# Patient Record
Sex: MALE | Race: WHITE | Employment: OTHER | URBAN - METROPOLITAN AREA
[De-identification: names, ages, dates, MRNs, and addresses within clinical notes are randomized per-mention and may not be internally consistent; named-entity substitution may affect disease eponyms.]

---

## 2017-07-26 ENCOUNTER — TRANSFERRED RECORDS (OUTPATIENT)
Dept: HEALTH INFORMATION MANAGEMENT | Facility: CLINIC | Age: 42
End: 2017-07-26

## 2017-08-01 ENCOUNTER — TRANSFERRED RECORDS (OUTPATIENT)
Dept: HEALTH INFORMATION MANAGEMENT | Facility: CLINIC | Age: 42
End: 2017-08-01

## 2017-08-11 ENCOUNTER — PRE VISIT (OUTPATIENT)
Dept: UROLOGY | Facility: CLINIC | Age: 42
End: 2017-08-11

## 2017-09-18 ENCOUNTER — PRE VISIT (OUTPATIENT)
Dept: UROLOGY | Facility: CLINIC | Age: 42
End: 2017-09-18

## 2017-09-22 ENCOUNTER — OFFICE VISIT (OUTPATIENT)
Dept: UROLOGY | Facility: CLINIC | Age: 42
End: 2017-09-22

## 2017-09-22 VITALS
WEIGHT: 195 LBS | HEART RATE: 92 BPM | SYSTOLIC BLOOD PRESSURE: 120 MMHG | BODY MASS INDEX: 28.78 KG/M2 | DIASTOLIC BLOOD PRESSURE: 78 MMHG

## 2017-09-22 DIAGNOSIS — N20.0 KIDNEY STONES: Primary | ICD-10-CM

## 2017-09-22 ASSESSMENT — PAIN SCALES - GENERAL: PAINLEVEL: NO PAIN (0)

## 2017-09-22 NOTE — PATIENT INSTRUCTIONS
Follow up with Dr. Horn in 4-6 weeks with imaging prior to appointment.    It was a pleasure meeting with you today.  Thank you for allowing me and my team the privilege of caring for you today.  YOU are the reason we are here, and I truly hope we provided you with the excellent service you deserve.  Please let us know if there is anything else we can do for you so that we can be sure you are leaving completely satisfied with your care experience.      MAIRA Leon

## 2017-09-22 NOTE — LETTER
9/22/2017       RE: Maicol Carrera  [address]  [facility] [address]     Dear Colleague,    Thank you for referring your patient, Maicol Carrera, to the [facility] AND [facility] FOR PROSTATE AND UROLOGIC CANCERS at [facility]. Please see a copy of my visit note below.    ASSESSMENT and PLAN     Renal stone:  Patient had renal US in 8/2017. Plan to get a CT scan today and follow-up in 4-6 weeks to discuss results.     ____________________________________________________________________    CHIEF COMPLAINT  It was my pleasure to see Maicol Carrera who is a 42 year old male here for evaluation of kidney stones.    HPI  He has a history of percutaneous nephrolithotomy in 2011. In the past he has been seen by Dr. Carrizales.     RADIOLOGIC IMAGING      I reviewed the recent radiologic imaging and reports described above.      Patient Active Problem List    Diagnosis Date Noted     Sepsis with multiple organ dysfunction (MOD) (H) 10/24/2011     Priority: Medium     Anemia due to blood loss, acute 10/24/2011     Priority: Medium     Postsurgical nonabsorption 10/24/2011     Priority: Medium     Problem list name updated by automated process. Provider to review       Quadriplegia, post-traumatic (H) 10/24/2011     Priority: Medium     Renal hemorrhage, left 10/24/2011     Priority: Medium     Kidney stones 08/09/2011     Priority: Medium     Past Medical History:   Diagnosis Date     Acid reflux      Constipation, chronic      Head injury      Neurogenic bladder     SP catheter     Quadriplegia (H)      Renal disease      Seizure (H)      Spastic quadriplegia (H)      Urinary tract infection      Past Surgical History:   Procedure Laterality Date     APPENDECTOMY OPEN       BACK SURGERY       INSERT PUMP BACLOFEN       LASER HOLMIUM NEPHROLITHOTOMY VIA PERCUTANEOUS NEPHROSTOMY  10/19/2011    Procedure:LASER HOLMIUM NEPHROLITHOTOMY VIA PERCUTANEOUS NEPHROSTOMY; Left Ureteral Stent Placement,  Left Percutaneous Access, Left Percutaneous Nephrostomy  *Latex Allergy*  ; Surgeon:YAN ADDISON; Location:[facility]     ORTHOPEDIC SURGERY       supra pubic catheter       Current Outpatient Prescriptions   Medication Sig Dispense Refill     acetaminophen (TYLENOL) 160 MG/5ML oral liquid 20.3 mLs by Per Feeding Tube route every 6 hours as needed. 120 mL      albuterol (2.5 MG/3ML) 0.083% nebulizer solution Take 3 mLs by nebulization every 2 hours as needed. 1 Box      bisacodyl (DULCOLAX) 10 MG suppository Place 1 suppository rectally every other day. 12 suppository      Cranberry 300 MG TABS Take 300 mg by mouth 2 times daily.       cyanocolbalamin (VITAMIN B-12) 1000 MCG tablet Take 1 tablet by mouth daily.       ferrous sulfate 325 (65 FE) MG tablet Take 1 tablet by mouth daily (with breakfast). 100 tablet      fluconazole (DIFLUCAN) 100 MG tablet Take  by mouth daily. two tablets on first day, then one tablet daily for 2 weeks 15 tablet      levetiracetam (KEPPRA XR) 500 MG 24 hr tablet Take 2 tablets by mouth 2 times daily.       magnesium gluconate (MAGONATE) 500 MG tablet Take 1 tablet by mouth daily.       miconazole (MICATIN; MICRO GUARD) 2 % powder Apply  topically 2 times daily. 70 g      ondansetron (ZOFRAN-ODT) 4 MG disintegrating tablet Take 1 tablet by mouth every 6 hours as needed for nausea. 20 tablet      oxybutynin (DITROPAN) 5 MG tablet Take 1 tablet by mouth 3 times daily. 270 tablet      ranitidine (ZANTAC) 150 MG tablet Take 1 tablet by mouth At Bedtime. 180 tablet      senna-docusate (SENOKOT-S;PERICOLACE) 8.6-50 MG per tablet Take 1-2 tablets by mouth 2 times daily. 60 tablet      vitamin C 250 MG TABS Take 1 tablet by mouth 2 times daily.       zinc oxide (DESITIN) 40 % ointment Apply  topically every hour as needed. 56.7 g      zolpidem (AMBIEN) 5 MG tablet Take 1 tablet by mouth nightly as needed for sleep. 30 tablet      baclofen (LIORESAL) 10 MG tablet Take 10 mg by mouth 4 times  daily.       MAGNESIUM CITRATE PO Take  by mouth every 3 days. PRN       sodium PHOSphate 66 mL, mineral oil 60 mL, docusate 100 mL, Magnesium Citrate 60 mL PINK LADY enema Place 268 mLs rectally once.  mL      docusate sodium (COLACE) 100 MG capsule Take 100 mg by mouth 2 times daily.       Bisacodyl (DULCOLAX PO) Take 10 mg by mouth At Bedtime.       BACLOFEN  mg by Intrathecal route daily. Via pump         Nutritional Supplements (OSTEO-MULTIVITAMINS/MINERALS OR) Take  by mouth daily. 12 pm         Sorbitol 70 % SOLN Take 30 mLs by mouth At Bedtime.        Allergies   Allergen Reactions     Latex Rash     Latex Rash     No family history on file.   Social History     Occupational History     Not on file.     Social History Main Topics     Smoking status: Former Smoker     Packs/day: 0.30     Years: 5.00     Types: Cigarettes     Quit date: 9/23/1994     Smokeless tobacco: Never Used     Alcohol use No     Drug use: No     Sexual activity: Not on file       REVIEW OF SYSTEMS  The following systems were evaluated:   Constitutional, Eyes, Ears/Nose/Throat, Respiratory, Cardiovascular, Gastrointestinal, Genitourinary, Musculoskeletal, Skin/Integument, Neurologic, Psychiatric, Hematologic/Lymphatic, Allergic/Immunologic, Endocrine.  The only pertinent positives were as follows:  There are no additional symptoms other than noted in HPI     PHYSICAL EXAM  Vitals:    09/22/17 1429   BP: 120/78   Pulse: 92   Weight: 88.5 kg (195 lb)     Wt Readings from Last 3 Encounters:   09/22/17 88.5 kg (195 lb)   11/05/11 87.7 kg (193 lb 5.5 oz)     Constitutional: Alert, no acute distress  Psychiatric: Normal mood and affect  Head: Normocephalic.  Neck: Neck supple. No adenopathy. Thyroid symmetric, normal size  ENT: Oropharynx clear.  Back: No spinal tenderness.  No costovertebral angle tenderness  Cardiovascular: RRR. No murmurs, clicks gallops or rub  Respiratory: Good diaphragmatic excursion. Lungs  clear  Gastrointestinal: Abdomen soft, non-tender. No masses, organomegaly. No hernia  Skin: no suspicious lesions or rashes on abdomen  Extremities: No lower extremity edema.  No clubbing or cyanosis.  Neurologic: Cranial nerves grossly intact.  Equal strength and sensation on bilateral extremities.   : Deferred     Recent Labs   Lab Test  12/29/11   1500  11/29/11   1230  11/07/11   0633  11/06/11   1530   WBC  8.1  6.0  10.6  9.3   HGB  13.8  11.6*  9.2*  8.7*   HCT  44.1  38.2*  29.6*  28.6*   PLT  237  279  437  458*     Recent Labs   Lab Test  12/29/11   1500  11/29/11   1230  11/07/11   0633  11/06/11   1530   NA  142  144  140  140   POTASSIUM  4.0  4.1  4.7  4.9   CHLORIDE  103  104  104  104   CO2  29  29  29  30   ANIONGAP  10.2  10.8  7.1  6.4   GLC  125*  71  91  137*   BUN  13  11  21  21   CR  0.60*  0.70  1.00  1.10   GFRESTIMATED  >90  >90  86  77   GFRESTBLACK  >90  >90  >90  >90   BALAJI  9.2  9.2  9.1  9.0     Recent Labs   Lab Test  11/06/11   1530   COLOR  Yellow   APPEARANCE  Cloudy   URINEGLC  Negative   URINEBILI  Negative   URINEKETONE  Negative   SG  1.011   UBLD  Large*   URINEPH  6.5   PROTEIN  10*   NITRITE  Negative   LEUKEST  Large*   RBCU  124*   WBCU  >182*       I, Lio Horn, reviewed these laboratory values.        Scribe Disclosure:   IViky, am serving as a scribe; to document services personally performed by Lio Horn MD -based on data collection and the provider's statements to me.     Provider Disclosure:  I agree with above History, Review of Systems, Physical exam and Plan.  I have reviewed the content of the documentation and have edited it as needed. I have personally performed the services documented here and the ocumentation accurately represents those services and the decisions I have made.      Electronically signed by:  Lio Horn MD      Patient Care Team:  Nancy Ag MD as PCP - General (Family Practice)  Dejon Lam MD  as Referring Physician  Mike Horn MD as MD (Urology)  Priscilla Wong, RN as Registered Nurse  MIKE GHOTRA    Copy to patient  GABRIELA CARRANZA  [address] [facility] [address]

## 2017-09-22 NOTE — NURSING NOTE
Chief Complaint   Patient presents with     Consult     New patient consult for kidney stones.       Nadira Hare

## 2017-09-22 NOTE — PROGRESS NOTES
ASSESSMENT and PLAN     Renal stone:  Patient had renal US in 8/2017. Plan to get a CT scan today and follow-up in 4-6 weeks to discuss results.     ____________________________________________________________________    CHIEF COMPLAINT  It was my pleasure to see Maicol Carrera who is a 42 year old male here for evaluation of kidney stones.    HPI  He has a history of percutaneous nephrolithotomy in 2011. In the past he has been seen by Dr. Addison.     RADIOLOGIC IMAGING      I reviewed the recent radiologic imaging and reports described above.      Patient Active Problem List    Diagnosis Date Noted     Sepsis with multiple organ dysfunction (MOD) (H) 10/24/2011     Priority: Medium     Anemia due to blood loss, acute 10/24/2011     Priority: Medium     Postsurgical nonabsorption 10/24/2011     Priority: Medium     Problem list name updated by automated process. Provider to review       Quadriplegia, post-traumatic (H) 10/24/2011     Priority: Medium     Renal hemorrhage, left 10/24/2011     Priority: Medium     Kidney stones 08/09/2011     Priority: Medium     Past Medical History:   Diagnosis Date     Acid reflux      Constipation, chronic      Head injury      Neurogenic bladder     SP catheter     Quadriplegia (H)      Renal disease      Seizure (H)      Spastic quadriplegia (H)      Urinary tract infection      Past Surgical History:   Procedure Laterality Date     APPENDECTOMY OPEN       BACK SURGERY       INSERT PUMP BACLOFEN       LASER HOLMIUM NEPHROLITHOTOMY VIA PERCUTANEOUS NEPHROSTOMY  10/19/2011    Procedure:LASER HOLMIUM NEPHROLITHOTOMY VIA PERCUTANEOUS NEPHROSTOMY; Left Ureteral Stent Placement, Left Percutaneous Access, Left Percutaneous Nephrostomy  *Latex Allergy*  ; Surgeon:YAN ADDISON; Location[facility]     ORTHOPEDIC SURGERY       supra pubic catheter       Current Outpatient Prescriptions   Medication Sig Dispense Refill     acetaminophen (TYLENOL) 160 MG/5ML oral liquid 20.3 mLs by Per  Feeding Tube route every 6 hours as needed. 120 mL      albuterol (2.5 MG/3ML) 0.083% nebulizer solution Take 3 mLs by nebulization every 2 hours as needed. 1 Box      bisacodyl (DULCOLAX) 10 MG suppository Place 1 suppository rectally every other day. 12 suppository      Cranberry 300 MG TABS Take 300 mg by mouth 2 times daily.       cyanocolbalamin (VITAMIN B-12) 1000 MCG tablet Take 1 tablet by mouth daily.       ferrous sulfate 325 (65 FE) MG tablet Take 1 tablet by mouth daily (with breakfast). 100 tablet      fluconazole (DIFLUCAN) 100 MG tablet Take  by mouth daily. two tablets on first day, then one tablet daily for 2 weeks 15 tablet      levetiracetam (KEPPRA XR) 500 MG 24 hr tablet Take 2 tablets by mouth 2 times daily.       magnesium gluconate (MAGONATE) 500 MG tablet Take 1 tablet by mouth daily.       miconazole (MICATIN; MICRO GUARD) 2 % powder Apply  topically 2 times daily. 70 g      ondansetron (ZOFRAN-ODT) 4 MG disintegrating tablet Take 1 tablet by mouth every 6 hours as needed for nausea. 20 tablet      oxybutynin (DITROPAN) 5 MG tablet Take 1 tablet by mouth 3 times daily. 270 tablet      ranitidine (ZANTAC) 150 MG tablet Take 1 tablet by mouth At Bedtime. 180 tablet      senna-docusate (SENOKOT-S;PERICOLACE) 8.6-50 MG per tablet Take 1-2 tablets by mouth 2 times daily. 60 tablet      vitamin C 250 MG TABS Take 1 tablet by mouth 2 times daily.       zinc oxide (DESITIN) 40 % ointment Apply  topically every hour as needed. 56.7 g      zolpidem (AMBIEN) 5 MG tablet Take 1 tablet by mouth nightly as needed for sleep. 30 tablet      baclofen (LIORESAL) 10 MG tablet Take 10 mg by mouth 4 times daily.       MAGNESIUM CITRATE PO Take  by mouth every 3 days. PRN       sodium PHOSphate 66 mL, mineral oil 60 mL, docusate 100 mL, Magnesium Citrate 60 mL PINK LADY enema Place 268 mLs rectally once.  mL      docusate sodium (COLACE) 100 MG capsule Take 100 mg by mouth 2 times daily.       Bisacodyl  (DULCOLAX PO) Take 10 mg by mouth At Bedtime.       BACLOFEN  mg by Intrathecal route daily. Via pump         Nutritional Supplements (OSTEO-MULTIVITAMINS/MINERALS OR) Take  by mouth daily. 12 pm         Sorbitol 70 % SOLN Take 30 mLs by mouth At Bedtime.        Allergies   Allergen Reactions     Latex Rash     Latex Rash     No family history on file.   Social History     Occupational History     Not on file.     Social History Main Topics     Smoking status: Former Smoker     Packs/day: 0.30     Years: 5.00     Types: Cigarettes     Quit date: 9/23/1994     Smokeless tobacco: Never Used     Alcohol use No     Drug use: No     Sexual activity: Not on file       REVIEW OF SYSTEMS  The following systems were evaluated:   Constitutional, Eyes, Ears/Nose/Throat, Respiratory, Cardiovascular, Gastrointestinal, Genitourinary, Musculoskeletal, Skin/Integument, Neurologic, Psychiatric, Hematologic/Lymphatic, Allergic/Immunologic, Endocrine.  The only pertinent positives were as follows:  There are no additional symptoms other than noted in HPI     PHYSICAL EXAM  Vitals:    09/22/17 1429   BP: 120/78   Pulse: 92   Weight: 88.5 kg (195 lb)     Wt Readings from Last 3 Encounters:   09/22/17 88.5 kg (195 lb)   11/05/11 87.7 kg (193 lb 5.5 oz)     Constitutional: Alert, no acute distress  Psychiatric: Normal mood and affect  Head: Normocephalic.  Neck: Neck supple. No adenopathy. Thyroid symmetric, normal size  ENT: Oropharynx clear.  Back: No spinal tenderness.  No costovertebral angle tenderness  Cardiovascular: RRR. No murmurs, clicks gallops or rub  Respiratory: Good diaphragmatic excursion. Lungs clear  Gastrointestinal: Abdomen soft, non-tender. No masses, organomegaly. No hernia  Skin: no suspicious lesions or rashes on abdomen  Extremities: No lower extremity edema.  No clubbing or cyanosis.  Neurologic: Cranial nerves grossly intact.  Equal strength and sensation on bilateral extremities.   : Deferred     Recent  Labs   Lab Test  12/29/11   1500  11/29/11   1230  11/07/11   0633  11/06/11   1530   WBC  8.1  6.0  10.6  9.3   HGB  13.8  11.6*  9.2*  8.7*   HCT  44.1  38.2*  29.6*  28.6*   PLT  237  279  437  458*     Recent Labs   Lab Test  12/29/11   1500  11/29/11   1230  11/07/11   0633  11/06/11   1530   NA  142  144  140  140   POTASSIUM  4.0  4.1  4.7  4.9   CHLORIDE  103  104  104  104   CO2  29  29  29  30   ANIONGAP  10.2  10.8  7.1  6.4   GLC  125*  71  91  137*   BUN  13  11  21  21   CR  0.60*  0.70  1.00  1.10   GFRESTIMATED  >90  >90  86  77   GFRESTBLACK  >90  >90  >90  >90   BALAJI  9.2  9.2  9.1  9.0     Recent Labs   Lab Test  11/06/11   1530   COLOR  Yellow   APPEARANCE  Cloudy   URINEGLC  Negative   URINEBILI  Negative   URINEKETONE  Negative   SG  1.011   UBLD  Large*   URINEPH  6.5   PROTEIN  10*   NITRITE  Negative   LEUKEST  Large*   RBCU  124*   WBCU  >182*       I, Lio Horn, reviewed these laboratory values.        Scribe Disclosure:   I, Viky Parker, am serving as a scribe; to document services personally performed by Lio Horn MD -based on data collection and the provider's statements to me.     Provider Disclosure:  I agree with above History, Review of Systems, Physical exam and Plan.  I have reviewed the content of the documentation and have edited it as needed. I have personally performed the services documented here and the ocumentation accurately represents those services and the decisions I have made.      Electronically signed by:  Lio Horn MD    CC  Patient Care Team:  Nancy Ag MD as PCP - General (Family Practice)  Mike Ghotra MD as Referring Physician  Mike Horn MD as MD (Urology)  Priscilla Wong, RN as Registered Nurse  MIKE GHOTRA    Copy to patient  GABRIELA CARRANZA  [facility] [facility] [address]

## 2017-09-22 NOTE — MR AVS SNAPSHOT
After Visit Summary   9/22/2017    Maicol Carrera    MRN: [id number]           Patient Information     Date Of Birth          1975        Visit Information        Provider Department      9/22/2017 2:40 PM Dejon Horn MD [facility] and [facility]        Today's Diagnoses     Kidney stones    -  1      Care Instructions    Follow up with Dr. Horn in 4-6 weeks with imaging prior to appointment.    It was a pleasure meeting with you today.  Thank you for allowing me and my team the privilege of caring for you today.  YOU are the reason we are here, and I truly hope we provided you with the excellent service you deserve.  Please let us know if there is anything else we can do for you so that we can be sure you are leaving completely satisfied with your care experience.      MAIRA Leon          Follow-ups after your visit        Your next 10 appointments already scheduled     Oct 20, 2017 10:00 AM CDT   CT ABDOMEN PELVIS W/O CONTRAST with UCCT2   [facility] ([facility])    [address] [address] [phone]           Please bring any scans or X-rays taken at other hospitals, if similar tests were done. Also bring a list of your medicines, including vitamins, minerals and over-the-counter drugs. It is safest to leave personal items at home.  Be sure to tell your doctor:   If you have any allergies.   If there s any chance you are pregnant.   If you are breastfeeding.   If you have any special needs.  You may have contrast for this exam. To prepare:   Do not eat or drink for 2 hours before your exam. If you need to take medicine, you may take it with small sips of water. (We may ask you to take liquid medicine as well.)   The day before your exam, drink extra fluids at least six 8-ounce glasses (unless your doctor tells you to restrict your fluids).  Patients over 70 or  patients with diabetes or kidney problems:   If you haven t had a blood test (creatinine test) within the last 30 days, go to your clinic or Diagnostic Imaging Department for this test.  If you have diabetes:   If your kidney function is normal, continue taking your metformin (Avandamet, Glucophage, Glucovance, Metaglip) on the day of your exam.   If your kidney function is abnormal, wait 48 hours before restarting this medicine.  You will have oral contrast for this exam:   You will drink the contrast at home. Get this from your clinic or Diagnostic Imaging Department. Please follow the directions given.  Please wear loose clothing, such as a sweat suit or jogging clothes. Avoid snaps, zippers and other metal. We may ask you to undress and put on a hospital gown.  If you have any questions, please call the Imaging Department where you will have your exam.            Oct 20, 2017 11:00 AM CDT   (Arrive by 10:45 AM)   Return Renal Calculi with Dejon Horn MD   [facility] and [facility] [[email or web address]] for Prostate and Urologic Cancers ([facility])    [address] [place] [address] [phone]              Future tests that were ordered for you today     Open Future Orders        Priority Expected Expires Ordered    CT Abdomen Pelvis w/o Contrast Routine  9/21/2018 9/22/2017            Who to contact     Please call your clinic at [phone] to:    Ask questions about your health    Make or cancel appointments    Discuss your medicines    Learn about your test results    Speak to your doctor   If you have compliments or concerns about an experience at your clinic, or if you wish to file a complaint, please contact [facility] Patient Relations at [phone] or email us at [email or web address]         Additional Information About Your Visit        [employer]Noise Information     [employer] is an electronic gateway that provides easy, online  access to your medical records. With [employer], you can request a clinic appointment, read your test results, renew a prescription or communicate with your care team.     To sign up for [employer] visit the website at [email or web address]   You will be asked to enter the access code listed below, as well as some personal information. Please follow the directions to create your username and password.     Your access code is: [email or web address]  Expires: 2017  6:30 AM     Your access code will  in 90 days. If you need help or a new code, please contact your [facility] or call [phone] for assistance.        Care EveryWhere ID     This is your Care EveryWhere ID. This could be used by other organizations to access your [place] medical records  [id number]        Your Vitals Were     Pulse BMI (Body Mass Index)                92 28.78 kg/m2           Blood Pressure from Last 3 Encounters:   17 120/78   12 123/71   12 110/68    Weight from Last 3 Encounters:   17 88.5 kg (195 lb)   11 87.7 kg (193 lb 5.5 oz)               Primary Care Provider Office Phone # Fax #    Nancy Ag [phone]       [address] [place] [id number]        Equal Access to Services     AUSTIN VIEIRA AH: Hadii trupti ku hadasho Sonavneet, waaxda luqadaha, qaybta kaalmada adenadeemyada, kathy phillips . [facility] [phone].    ATENCIÓN: Si habla español, tiene a da silva disposición servicios gratuitos de asistencia lingüística. Llame al [phone].    We comply with applicable federal civil rights laws and [place] laws. We do not discriminate on the basis of race, color, national origin, age, disability sex, sexual orientation or gender identity.            Thank you!     Thank you for choosing [facility] UROLOGY AND [facility] PROSTATE [facility] UROLOGIC CANCERS  for your care. Our goal is always to provide you with excellent care.  Hearing back from our patients is one way we can continue to improve our services. Please take a few minutes to complete the written survey that you may receive in the mail after your visit with us. Thank you!             Your Updated Medication List - Protect others around you: Learn how to safely use, store and throw away your medicines at www.disposemymeds.org.          This list is accurate as of: 9/22/17  3:41 PM.  Always use your most recent med list.                   Brand Name Dispense Instructions for use Diagnosis    acetaminophen 32 mg/mL solution    TYLENOL    120 mL    20.3 mLs by Per Feeding Tube route every 6 hours as needed.    Abdominal pain       albuterol (2.5 MG/3ML) 0.083% neb solution     1 Box    Take 3 mLs by nebulization every 2 hours as needed.    SOB (shortness of breath)       ascorbic acid 250 MG Tabs tablet      Take 1 tablet by mouth 2 times daily.    Recurrent UTI       * baclofen 10 MG tablet    LIORESAL     Take 10 mg by mouth 4 times daily.        * BACLOFEN IT      240 mg by Intrathecal route daily. Via pump        COLACE 100 MG capsule   Generic drug:  docusate sodium      Take 100 mg by mouth 2 times daily.        Cranberry 300 MG Tabs      Take 300 mg by mouth 2 times daily.    UTI (urinary tract infection)       cyanocobalamin 1000 MCG tablet    vitamin  B-12     Take 1 tablet by mouth daily.    Vitamin B 12 deficiency       * bisacodyl 10 MG Suppository    DULCOLAX    12 suppository    Place 1 suppository rectally every other day.    Chronic constipation       * DULCOLAX PO      Take 10 mg by mouth At Bedtime.        ferrous sulfate 325 (65 FE) MG tablet    IRON    100 tablet    Take 1 tablet by mouth daily (with breakfast).    Anemia due to blood loss, acute       fluconazole 100 MG tablet    DIFLUCAN    15 tablet    Take  by mouth daily. two tablets on first day, then one tablet daily for 2 weeks    Candida UTI       levETIRAcetam 500 MG 24 hr tablet    KEPPRA XR     Take  2 tablets by mouth 2 times daily.    Seizure disorder (H)       MAGNESIUM CITRATE PO      Take  by mouth every 3 days. PRN        magnesium gluconate 500 MG tablet    MAGONATE     Take 1 tablet by mouth daily.    Magnesium deficiency       miconazole 2 % powder    MICATIN; MICRO GUARD    70 g    Apply  topically 2 times daily.    Candida infection of flexural skin       ondansetron 4 MG ODT tab    ZOFRAN-ODT    20 tablet    Take 1 tablet by mouth every 6 hours as needed for nausea.    Nausea and vomiting       OSTEO-MULTIVITAMINS/MINERALS OR      Take  by mouth daily. 12 pm        oxybutynin 5 MG tablet    DITROPAN    270 tablet    Take 1 tablet by mouth 3 times daily.    Neurogenic bladder       ranitidine 150 MG tablet    ZANTAC    180 tablet    Take 1 tablet by mouth At Bedtime.    GERD (gastroesophageal reflux disease)       senna-docusate 8.6-50 MG per tablet    SENOKOT-S;PERICOLACE    60 tablet    Take 1-2 tablets by mouth 2 times daily.    Chronic constipation       sodium PHOSphate 66 mL, mineral oil 60 mL, docusate 100 mL, Magnesium Citrate 60 mL PINK LADY enema     286 mL    Place 268 mLs rectally once. PRN        sorbitol 70 % Soln      Take 30 mLs by mouth At Bedtime.        zinc oxide 40 % ointment    DESITIN    56.7 g    Apply  topically every hour as needed.    Breakdown of skin tissue       zolpidem 5 MG tablet    AMBIEN    30 tablet    Take 1 tablet by mouth nightly as needed for sleep.    Insomnia       * Notice:  This list has 4 medication(s) that are the same as other medications prescribed for you. Read the directions carefully, and ask your doctor or other care provider to review them with you.

## 2017-10-16 ENCOUNTER — PRE VISIT (OUTPATIENT)
Dept: UROLOGY | Facility: CLINIC | Age: 42
End: 2017-10-16

## 2017-10-16 NOTE — TELEPHONE ENCOUNTER
Stone follow up with imaging prior.  Records available in [facility].

## 2017-10-20 ENCOUNTER — ALLIED HEALTH/NURSE VISIT (OUTPATIENT)
Dept: UROLOGY | Facility: CLINIC | Age: 42
End: 2017-10-20

## 2017-10-20 ENCOUNTER — OFFICE VISIT (OUTPATIENT)
Dept: UROLOGY | Facility: CLINIC | Age: 42
End: 2017-10-20

## 2017-10-20 VITALS
DIASTOLIC BLOOD PRESSURE: 73 MMHG | WEIGHT: 196 LBS | HEIGHT: 69 IN | SYSTOLIC BLOOD PRESSURE: 111 MMHG | BODY MASS INDEX: 29.03 KG/M2 | HEART RATE: 93 BPM

## 2017-10-20 DIAGNOSIS — N20.0 CALCULUS OF KIDNEY: Primary | ICD-10-CM

## 2017-10-20 DIAGNOSIS — N20.0 CALCULUS OF KIDNEY: ICD-10-CM

## 2017-10-20 LAB
ALBUMIN UR-MCNC: 100 MG/DL
APPEARANCE UR: ABNORMAL
BACTERIA #/AREA URNS HPF: ABNORMAL /HPF
BILIRUB UR QL STRIP: NEGATIVE
COLOR UR AUTO: ABNORMAL
GLUCOSE UR STRIP-MCNC: NEGATIVE MG/DL
HGB UR QL STRIP: ABNORMAL
KETONES UR STRIP-MCNC: NEGATIVE MG/DL
LEUKOCYTE ESTERASE UR QL STRIP: ABNORMAL
NITRATE UR QL: POSITIVE
PH UR STRIP: 8 PH (ref 5–7)
RBC #/AREA URNS AUTO: 58 /HPF (ref 0–2)
SOURCE: ABNORMAL
SP GR UR STRIP: 1 (ref 1–1.03)
UROBILINOGEN UR STRIP-MCNC: 0 MG/DL (ref 0–2)
WBC #/AREA URNS AUTO: >182 /HPF (ref 0–2)
WBC CLUMPS #/AREA URNS HPF: PRESENT /HPF

## 2017-10-20 RX ORDER — CEFAZOLIN SODIUM 1 G/3ML
1 INJECTION, POWDER, FOR SOLUTION INTRAMUSCULAR; INTRAVENOUS SEE ADMIN INSTRUCTIONS
Status: CANCELLED | OUTPATIENT
Start: 2017-10-20

## 2017-10-20 RX ORDER — ACETAMINOPHEN 325 MG/1
975 TABLET ORAL ONCE
Status: CANCELLED | OUTPATIENT
Start: 2017-10-20 | End: 2017-10-20

## 2017-10-20 ASSESSMENT — PAIN SCALES - GENERAL: PAINLEVEL: NO PAIN (0)

## 2017-10-20 NOTE — PROGRESS NOTES
ASSESSMENT AND PLAN    Bilateral staghorn calculi    During counseling for this visit, we covered the natural history of kidney stones, the risk of progression to symptomatic pain/infection, and the possibility of renal failure/kidney damage.  We covered treatment options including observation, ureteroscopy, extracorporeal shock wave lithotripsy (ESWL), and percutaneous nephrolithotomy (PCNL).  We covered surgical risks which include but are not limited to heart attack, stroke, blood clot in the legs or lungs, death, injury to surrounding organs (intestine, liver, spleen, pancreas, lung, muscles, nerves), loss of sensation around incisions, decreased renal function, infection, injury to ureter, injury to bladder, and urethral strictures.  Additional procedures may be necessary in the perioperative period.  We discussed the risks of blood transfusion including HIV and hepatitis.   There are other additional unexpected complications which may occur. Patient would like to proceed with left PCNL. I do not see a need to repeat the NM Renogram at this time.    -Urinalysis with micro reflex culture  -IR to place nephrostomy tube and drain 1 day prior to surgery  -Antibiotics 7 days before surgery  -Will plan left PCNL  _________________________________________________________________________    CHIEF COMPLAINT  It was my pleasure to see Maicol Carrera who is a 42 year old quadriplegic (SCI) male who is here for follow-up for kidney stones.    HPI  Maicol Carrera is a 42 year old male with a previous history of bilateral renal stones. 5 years ago had a similar large stone burden. NM renal scan at the time revealed 9% function of his right kidney. He underwent PCNL of the left kidney and was complicated by septic shock.    Now presents with bilateral complete staghorn calculi according to CT on 10/20/17. Denies any flank pain, fevers, nausea, vomiting.      RADIOLOGIC IMAGING  10/20/17 CT Abdomen Pelvis w/o contrast  1.  Bilateral staghorn calculi with inflammatory changes surrounding  the renal pelvises and proximal ureters bilaterally, left greater than  right, concerning for infection. 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  2. New soft tissue density at the mid left ureter, may be infectious  or inflammatory, consider direct visualization.  3. Diffuse bladder wall thickening with inflammatory stranding  surrounding the bladder, prostate and rectum, not significantly  changed since 1/25/2012 study. May be sequela of chronic cystitis and  prostatitis, although superimposed acute infection is not excluded.  Correlate clinically.  4. Cholelithiasis.     I reviewed the recent radiologic imaging and reports described above.      Patient Active Problem List    Diagnosis Date Noted     Sepsis with multiple organ dysfunction (MOD) (H) 10/24/2011     Priority: Medium     Anemia due to blood loss, acute 10/24/2011     Priority: Medium     Postsurgical nonabsorption 10/24/2011     Priority: Medium     Problem list name updated by automated process. Provider to review       Quadriplegia, post-traumatic (H) 10/24/2011     Priority: Medium     Renal hemorrhage, left 10/24/2011     Priority: Medium     Kidney stones 08/09/2011     Priority: Medium     Past Medical History:   Diagnosis Date     Acid reflux      Constipation, chronic      Head injury      Neurogenic bladder     SP catheter     Quadriplegia (H)      Renal disease      Seizure (H)      Spastic quadriplegia (H)      Urinary tract infection      Past Surgical History:   Procedure Laterality Date     APPENDECTOMY OPEN       BACK SURGERY       INSERT PUMP BACLOFEN       LASER HOLMIUM NEPHROLITHOTOMY VIA PERCUTANEOUS NEPHROSTOMY  10/19/2011    Procedure:LASER HOLMIUM NEPHROLITHOTOMY VIA PERCUTANEOUS NEPHROSTOMY; Left Ureteral Stent  Placement, Left Percutaneous Access, Left Percutaneous Nephrostomy  *Latex Allergy*  ; Surgeon:YAN ADDISON; Location:[facility] OR     ORTHOPEDIC SURGERY       supra pubic catheter       Current Outpatient Prescriptions   Medication Sig Dispense Refill     acetaminophen (TYLENOL) 160 MG/5ML oral liquid 20.3 mLs by Per Feeding Tube route every 6 hours as needed. 120 mL      albuterol (2.5 MG/3ML) 0.083% nebulizer solution Take 3 mLs by nebulization every 2 hours as needed. 1 Box      bisacodyl (DULCOLAX) 10 MG suppository Place 1 suppository rectally every other day. 12 suppository      Cranberry 300 MG TABS Take 300 mg by mouth 2 times daily.       cyanocolbalamin (VITAMIN B-12) 1000 MCG tablet Take 1 tablet by mouth daily.       ferrous sulfate 325 (65 FE) MG tablet Take 1 tablet by mouth daily (with breakfast). 100 tablet      fluconazole (DIFLUCAN) 100 MG tablet Take  by mouth daily. two tablets on first day, then one tablet daily for 2 weeks 15 tablet      levetiracetam (KEPPRA XR) 500 MG 24 hr tablet Take 2 tablets by mouth 2 times daily.       magnesium gluconate (MAGONATE) 500 MG tablet Take 1 tablet by mouth daily.       miconazole (MICATIN; MICRO GUARD) 2 % powder Apply  topically 2 times daily. 70 g      ondansetron (ZOFRAN-ODT) 4 MG disintegrating tablet Take 1 tablet by mouth every 6 hours as needed for nausea. 20 tablet      oxybutynin (DITROPAN) 5 MG tablet Take 1 tablet by mouth 3 times daily. 270 tablet      ranitidine (ZANTAC) 150 MG tablet Take 1 tablet by mouth At Bedtime. 180 tablet      senna-docusate (SENOKOT-S;PERICOLACE) 8.6-50 MG per tablet Take 1-2 tablets by mouth 2 times daily. 60 tablet      vitamin C 250 MG TABS Take 1 tablet by mouth 2 times daily.       zinc oxide (DESITIN) 40 % ointment Apply  topically every hour as needed. 56.7 g      zolpidem (AMBIEN) 5 MG tablet Take 1 tablet by mouth nightly as needed for sleep. 30 tablet      baclofen (LIORESAL) 10 MG tablet Take 10 mg by  "mouth 4 times daily.       MAGNESIUM CITRATE PO Take  by mouth every 3 days. PRN       sodium PHOSphate 66 mL, mineral oil 60 mL, docusate 100 mL, Magnesium Citrate 60 mL PINK LADY enema Place 268 mLs rectally once.  mL      docusate sodium (COLACE) 100 MG capsule Take 100 mg by mouth 2 times daily.       Bisacodyl (DULCOLAX PO) Take 10 mg by mouth At Bedtime.       BACLOFEN  mg by Intrathecal route daily. Via pump         Nutritional Supplements (OSTEO-MULTIVITAMINS/MINERALS OR) Take  by mouth daily. 12 pm         Sorbitol 70 % SOLN Take 30 mLs by mouth At Bedtime.        SOCIAL HISTORY: He  reports that he quit smoking about 23 years ago. His smoking use included Cigarettes. He has a 1.50 pack-year smoking history. He has never used smokeless tobacco. He reports that he does not drink alcohol or use illicit drugs.    PHYSICAL EXAM  /73  Pulse 93  Ht 1.753 m (5' 9\")  Wt 88.9 kg (196 lb)  BMI 28.94 kg/m2  Constitutional: Alert, no acute distress. In wheelchair.  Psychiatric: Normal mood and affect  Gastrointestinal: Abdomen soft, non-tender. No masses, organomegaly.       Lab Results   Component Value Date    BALAJI 9.2 12/29/2011    BALAJI 9.2 11/29/2011    BALAJI 9.1 11/07/2011       Recent Labs   Lab Test  12/29/11   1500  11/29/11   1230  11/07/11   0633  11/06/11   1530   NA  142  144  140  140   POTASSIUM  4.0  4.1  4.7  4.9   CHLORIDE  103  104  104  104   CO2  29 29 29 30   ANIONGAP  10.2  10.8  7.1  6.4   GLC  125*  71  91  137*   BUN  13  11  21  21   CR  0.60*  0.70  1.00  1.10   GFRESTIMATED  >90  >90  86  77   GFRESTBLACK  >90  >90  >90  >90   BALAJI  9.2  9.2  9.1  9.0        UA RESULTS:   Recent Labs   Lab Test  11/06/11   1530  10/22/11   1200  10/21/11   0630   SG  1.011  1.024  1.008   URINEPH  6.5  6.5  6.5   NITRITE  Negative  Negative  Negative   RBCU  124*  >182*  >182*   WBCU  >182*  >182*  >182*       As the medical student, I acted as a scribe for this note. All portions of the " documented history and physical were personally performed by Dr. JEREMIE Horn as the attending physician.     Rishabh Michele served as the scribe for this patient's visit and documented my history and physical exam.  I performed the history and physical exam.  I have edited and agree with the note.  JEREMIE Horn MD          Patient Care Team:  Nancy Ag MD as PCP - General (Family Practice)  Dejon Lam MD as Referring Physician  Dejon Horn MD as MD (Urology)  Priscilla Wong, RN as Registered Nurse  SELF, REFERRED    Copy to patient  GABRIELA CARRANZA  [address] [place] [id number]

## 2017-10-20 NOTE — PROGRESS NOTES
Pre Op Teaching Flowsheet       Pre and Post op Patient Education  Relevant Diagnosis:  Left kidney stone  Teaching Topic:  Pre and post op teaching for Left percutaneous nephrolithotomy, access to kidney, ureteroscopy, cystoscopy, stent placement  Person Involved in teaching:  Maicol Carrera      Motivation Level:  Asks Questions: Yes  Eager to Learn:  Yes  Cooperative: Yes  Receptive (willing/able to accept information):  Yes  Patient demonstrates understanding of the following:  Date and time of surgery:  12.20.17 at 0745  Location of surgery: [address] [place] [address]  History and Physical and any other testing necessary prior to surgery: Yes  Required time line for completion of History and Physical and any pre-op testing: Yes    PAC 12.6.17 at 0900    NPO Guidelines: NPO after midnight    Patient demonstrates understanding of the following:  Pre-op bowel prep: no, not needed  Pre-op showering/scrub information with Hibiclens Soap: Yes  Medications to take the day of surgery:  Per PCP  Blood thinner medications discussed and when to stop (if applicable):  Yes  Diabetes medication management (if applicable):  N/A  Discussed pain control after surgery: pain scale, pain medications and pain management techniques  Infection Prevention: Patient demonstrates understanding of the following:  Patient instructed on hand hygiene:  Yes  Surgical procedure site care taught: Yes  Signs and symptoms of infection taught:  Yes  Wound care will be taught at the time of discharge.  Central venous catheter care will be taught at the time of discharge (if applicable).    Post-op follow-up:  Discussed how to contact the hospital, nurse, and clinic scheduling staff if necessary.    Instructional materials used/given/mailed:  [place] Surgery Booklet, post op teaching sheet, Map, Soap, and arrival/location information.    Surgical instructions given to patient in clinic: Yes.    Instructional Materials given:  Before your surgery packet ,  Medications to avoid before surgery , Showering or Bathing instructions before surgery  and What to expect after surgery    Post-op appointment/testing scheduled per MD orders: Yes, 12.29.17 at 1020    Total time with patient: 10 minutes    Arlette Alvarez RN-BC, BSN  Urology Care Coordinator

## 2017-10-20 NOTE — MR AVS SNAPSHOT
After Visit Summary   10/20/2017    Maicol Carrera    MRN: [id number]           Patient Information     Date Of Birth          1975        Visit Information        Provider Department      10/20/2017 11:00 AM Dejon Horn MD [facility] Urology and [facility] for Prostate and Urologic Cancers        Today's Diagnoses     Calculus of kidney    -  1      Care Instructions    Urine testing today.    Schedule surgery with Dr. Horn.    It was a pleasure meeting with you today.  Thank you for allowing me and my team the privilege of caring for you today.  YOU are the reason we are here, and I truly hope we provided you with the excellent service you deserve.  Please let us know if there is anything else we can do for you so that we can be sure you are leaving completely satisfied with your care experience.      Lyndsay Sherwood SH[facility]          Follow-ups after your visit        Additional Services     PAC Visit Referral (For [facility] Only)       Does this visit require an Anesthesia consul  Quad                  Your next 10 appointments already scheduled     Dec 06, 2017  9:00 AM CST   (Arrive by 8:45 AM)   PAC EVALUATION with  Pac Domingo 1   [facility] Preoperative Assessment [place] ([facility])    [address] [place] [address]   [phone]            Dec 06, 2017 10:00 AM CST   (Arrive by 9:45 AM)   PAC RN ASSESSMENT with  Pac Rn   [facility] Assessment [place] ([facility])    [address] [place] [address]   [phone]            Dec 06, 2017 10:30 AM CST   (Arrive by 10:15 AM)   PAC Anesthesia Consult with  Pac Anesthesiologist   [facility] Preoperative Assessment [place] ([facility])    [address] [place] [address]   [phone]            Dec 20, 2017   Procedure with Dejon Horn MD   [facility], [place], Same Day Surgery  "(--)    [address] [address]   [phone]            Dec 29, 2017 10:20 AM CST   (Arrive by 10:05 AM)   Cystoscopy with Dejon Horn MD   [facility] Urology and [facility] for Prostate and Urologic Cancers ([facility])    [address] [facility]  [address] [facility] [address] [phone]              Who to contact     Please call your clinic at [phone] to:    Ask questions about your health    Make or cancel appointments    Discuss your medicines    Learn about your test results    Speak to your doctor   If you have compliments or concerns about an experience at your clinic, or if you wish to file a complaint, please contact [facility] Patient Relations at [phone] or email us at [email or web address] [place]         Additional Information About Your Visit        [employer] Information     benchee is an electronic gateway that provides easy, online access to your medical records. With benchee, you can request a clinic appointment, read your test results, renew a prescription or communicate with your care team.     To sign up for benchee visit the website at [email or web address]   You will be asked to enter the access code listed below, as well as some personal information. Please follow the directions to create your username and password.     Your access code is: [email or web address]  Expires: 2017  5:30 AM     Your access code will  in 90 days. If you need help or a new code, please contact your [facility] or call [phone] for assistance.        Care EveryWhere ID     This is your Care EveryWhere ID. This could be used by other organizations to access your [facility] medical records  [id number]        Your Vitals Were     Pulse Height BMI (Body Mass Index)             93 1.753 m (5' 9\") 28.94 kg/m2          Blood Pressure from Last 3 Encounters:   17 123/77   10/20/17 111/73 "   09/22/17 120/78    Weight from Last 3 Encounters:   11/13/17 89.4 kg (197 lb)   10/20/17 88.9 kg (196 lb)   09/22/17 88.5 kg (195 lb)              We Performed the Following     PAC Visit Referral (For [facility] Only)     Geetha-Operative Worksheet  (Urology General)     Urine Culture Aerobic Bacterial        Primary Care Provider    Nancy Ag MD       No address on file        Equal Access to Services     JUS VIEIRA : Hadii trupti vigil hadpatriciao Sonavneet, waaxda luqadaha, qaybta kaalmada adeegyada, kathy huynhjihansilvestre phillips . So [facility] [phone].    ATENCIÓN: Si habla español, tiene a da silva disposición servicios gratuitos de asistencia lingüística. Llame al [phone].    We comply with applicable federal civil rights laws and [place] laws. We do not discriminate on the basis of race, color, national origin, age, disability, sex, sexual orientation, or gender identity.            Thank you!     Thank you for choosing [facility] UROLOGY AND [place] FOR PROSTATE AND UROLOGIC CANCERS  for your care. Our goal is always to provide you with excellent care. Hearing back from our patients is one way we can continue to improve our services. Please take a few minutes to complete the written survey that you may receive in the mail after your visit with us. Thank you!             Your Updated Medication List - Protect others around you: Learn how to safely use, store and throw away your medicines at www.disposemymeds.org.          This list is accurate as of: 10/20/17 11:59 PM.  Always use your most recent med list.                   Brand Name Dispense Instructions for use Diagnosis    acetaminophen 32 mg/mL solution    TYLENOL    120 mL    20.3 mLs by Per Feeding Tube route every 6 hours as needed.    Abdominal pain       albuterol (2.5 MG/3ML) 0.083% neb solution     1 Box    Take 3 mLs by nebulization every 2 hours as needed.    SOB (shortness of breath)       ascorbic acid 250 MG Tabs tablet      Take 1  tablet by mouth 2 times daily.    Recurrent UTI       * baclofen 10 MG tablet    LIORESAL     Take 10 mg by mouth 4 times daily.        * BACLOFEN IT      240 mg by Intrathecal route daily. Via pump        COLACE 100 MG capsule   Generic drug:  docusate sodium      Take 100 mg by mouth 2 times daily.        Cranberry 300 MG Tabs      Take 300 mg by mouth 2 times daily.    UTI (urinary tract infection)       cyanocobalamin 1000 MCG tablet    vitamin  B-12     Take 1 tablet by mouth daily.    Vitamin B 12 deficiency       * bisacodyl 10 MG Suppository    DULCOLAX    12 suppository    Place 1 suppository rectally every other day.    Chronic constipation       * DULCOLAX PO      Take 10 mg by mouth At Bedtime.        ferrous sulfate 325 (65 FE) MG tablet    IRON    100 tablet    Take 1 tablet by mouth daily (with breakfast).    Anemia due to blood loss, acute       fluconazole 100 MG tablet    DIFLUCAN    15 tablet    Take  by mouth daily. two tablets on first day, then one tablet daily for 2 weeks    Candida UTI       levETIRAcetam 500 MG 24 hr tablet    KEPPRA XR     Take 2 tablets by mouth 2 times daily.    Seizure disorder (H)       MAGNESIUM CITRATE PO      Take  by mouth every 3 days. PRN        magnesium gluconate 500 MG tablet    MAGONATE     Take 1 tablet by mouth daily.    Magnesium deficiency       miconazole 2 % powder    MICATIN; MICRO GUARD    70 g    Apply  topically 2 times daily.    Candida infection of flexural skin       ondansetron 4 MG ODT tab    ZOFRAN-ODT    20 tablet    Take 1 tablet by mouth every 6 hours as needed for nausea.    Nausea and vomiting       OSTEO-MULTIVITAMINS/MINERALS OR      Take  by mouth daily. 12 pm        oxybutynin 5 MG tablet    DITROPAN    270 tablet    Take 1 tablet by mouth 3 times daily.    Neurogenic bladder       ranitidine 150 MG tablet    ZANTAC    180 tablet    Take 1 tablet by mouth At Bedtime.    GERD (gastroesophageal reflux disease)       senna-docusate 8.6-50  MG per tablet    SENOKOT-S;PERICOLACE    60 tablet    Take 1-2 tablets by mouth 2 times daily.    Chronic constipation       sodium PHOSphate 66 mL, mineral oil 60 mL, docusate 100 mL, Magnesium Citrate 60 mL PINK LADY enema     286 mL    Place 268 mLs rectally once. PRN        sorbitol 70 % Soln      Take 30 mLs by mouth At Bedtime.        zinc oxide 40 % ointment    DESITIN    56.7 g    Apply  topically every hour as needed.    Breakdown of skin tissue       zolpidem 5 MG tablet    AMBIEN    30 tablet    Take 1 tablet by mouth nightly as needed for sleep.    Insomnia       * Notice:  This list has 4 medication(s) that are the same as other medications prescribed for you. Read the directions carefully, and ask your doctor or other care provider to review them with you.

## 2017-10-20 NOTE — NURSING NOTE
"Chief Complaint   Patient presents with     RECHECK     [place] follow up with imaging       Initial Wt 88.9 kg (196 lb)  BMI 28.93 kg/m2 Estimated body mass index is 28.93 kg/(m^2) as calculated from the following:    Height as of 10/28/11: 1.753 m (5' 9.02\").    Weight as of this encounter: 88.9 kg (196 lb).  Medication Reconciliation: complete     MAIRA Leon    "

## 2017-10-20 NOTE — PATIENT INSTRUCTIONS
Urine testing today.    Schedule surgery with Dr. Horn.    It was a pleasure meeting with you today.  Thank you for allowing me and my team the privilege of caring for you today.  YOU are the reason we are here, and I truly hope we provided you with the excellent service you deserve.  Please let us know if there is anything else we can do for you so that we can be sure you are leaving completely satisfied with your care experience.      MAIRA Leon

## 2017-10-20 NOTE — MR AVS SNAPSHOT
After Visit Summary   10/20/2017    Maicol Carrera    MRN: [id number]           Patient Information     Date Of Birth          1975        Visit Information        Provider Department      10/20/2017 12:00 PM Nurse, [facility] Urology and Inst for Prostate and Urologic Cancers        Today's Diagnoses     Calculus of kidney           Follow-ups after your visit        Your next 10 appointments already scheduled     Dec 06, 2017  9:00 AM CST   (Arrive by 8:45 AM)   PAC EVALUATION with  Pac Domingo 1   [facility] Preoperative Assessment [place] ([facility])    [address] [place] [address]   [phone]            Dec 06, 2017 10:00 AM CST   (Arrive by 9:45 AM)   PAC RN ASSESSMENT with Libby Pac Rn   [facility] Preoperative Assessment [place] ([facility])    [address] [place] [address]   [phone]            Dec 06, 2017 10:30 AM CST   (Arrive by 10:15 AM)   PAC Anesthesia Consult with Libby Pac Anesthesiologist   [facility] Preoperative Assessment [place] ([facility])    [address] [place] [address]   [phone]            Dec 20, 2017   Procedure with Dejon Horn MD   [facility], [place], Same Day Surgery (--)    [address] [facility] [address] [phone]            Dec 29, 2017 10:20 AM CST   (Arrive by 10:05 AM)   Cystoscopy with Dejon Horn MD   [facility] Urology and [facility] for Prostate and Urologic Cancers ([facility])    [address] [place] [address] [phone]              Future tests that were ordered for you today     Open Future Orders        Priority Expected Expires Ordered    IR Nephrostomy Tube Placement Left Routine 12/1/2017 2/1/2018 10/20/2017            Who to contact     Please call your clinic at [phone] to:    Ask questions  about your health    Make or cancel appointments    Discuss your medicines    Learn about your test results    Speak to your doctor   If you have compliments or concerns about an experience at your clinic, or if you wish to file a complaint, please contact [facility] Patient Relations at [phone] or email us at [email or web address]         Additional Information About Your Visit        BackOpshart Information     GiveCorpst is an electronic gateway that provides easy, online access to your medical records. With Snupps, you can request a clinic appointment, read your test results, renew a prescription or communicate with your care team.     To sign up for Snupps visit the website at [email or web address]   You will be asked to enter the access code listed below, as well as some personal information. Please follow the directions to create your username and password.     Your access code is: [email or web address]  Expires: 2017  6:30 AM     Your access code will  in 90 days. If you need help or a new code, please contact your [facility] or call [phone] for assistance.        Care EveryWhere ID     This is your Care EveryWhere ID. This could be used by other organizations to access your [facility] medical records  [id number]         Blood Pressure from Last 3 Encounters:   10/20/17 111/73   17 120/78   12 123/71    Weight from Last 3 Encounters:   10/20/17 88.9 kg (196 lb)   17 88.5 kg (195 lb)   11 87.7 kg (193 lb 5.5 oz)              We Performed the Following     Routine UA with micro reflex to culture        Primary Care Provider Office Phone # Fax #    Nancy Ag [phone]       [address] [facility] [address]  [facility] [address]        Equal Access to Services     AUSTIN VIEIAR AH: Lory Forman, waaxda luqadaha, qaybta kaalmabrynn kunz, kathy frazier.  So [facility] [phone].    ATENCIÓN: Si joseph shaw, tiene a da silva disposición servicios gratuitos de asistencia lingüística. Richar sargent [phone].    We comply with applicable federal civil rights laws and [place] laws. We do not discriminate on the basis of race, color, national origin, age, disability, sex, sexual orientation, or gender identity.            Thank you!     Thank you for choosing [facility] UROLOGY AND [facility] FOR PROSTATE AND UROLOGIC CANCERS  for your care. Our goal is always to provide you with excellent care. Hearing back from our patients is one way we can continue to improve our services. Please take a few minutes to complete the written survey that you may receive in the mail after your visit with us. Thank you!             Your Updated Medication List - Protect others around you: Learn how to safely use, store and throw away your medicines at www.disposemymeds.org.          This list is accurate as of: 10/20/17 12:32 PM.  Always use your most recent med list.                   Brand Name Dispense Instructions for use Diagnosis    acetaminophen 32 mg/mL solution    TYLENOL    120 mL    20.3 mLs by Per Feeding Tube route every 6 hours as needed.    Abdominal pain       albuterol (2.5 MG/3ML) 0.083% neb solution     1 Box    Take 3 mLs by nebulization every 2 hours as needed.    SOB (shortness of breath)       ascorbic acid 250 MG Tabs tablet      Take 1 tablet by mouth 2 times daily.    Recurrent UTI       * baclofen 10 MG tablet    LIORESAL     Take 10 mg by mouth 4 times daily.        * BACLOFEN IT      240 mg by Intrathecal route daily. Via pump        COLACE 100 MG capsule   Generic drug:  docusate sodium      Take 100 mg by mouth 2 times daily.        Cranberry 300 MG Tabs      Take 300 mg by mouth 2 times daily.    UTI (urinary tract infection)       cyanocobalamin 1000 MCG tablet    vitamin  B-12     Take 1 tablet by mouth daily.    Vitamin B 12 deficiency       * bisacodyl 10 MG Suppository     DULCOLAX    12 suppository    Place 1 suppository rectally every other day.    Chronic constipation       * DULCOLAX PO      Take 10 mg by mouth At Bedtime.        ferrous sulfate 325 (65 FE) MG tablet    IRON    100 tablet    Take 1 tablet by mouth daily (with breakfast).    Anemia due to blood loss, acute       fluconazole 100 MG tablet    DIFLUCAN    15 tablet    Take  by mouth daily. two tablets on first day, then one tablet daily for 2 weeks    Candida UTI       levETIRAcetam 500 MG 24 hr tablet    KEPPRA XR     Take 2 tablets by mouth 2 times daily.    Seizure disorder (H)       MAGNESIUM CITRATE PO      Take  by mouth every 3 days. PRN        magnesium gluconate 500 MG tablet    MAGONATE     Take 1 tablet by mouth daily.    Magnesium deficiency       miconazole 2 % powder    MICATIN; MICRO GUARD    70 g    Apply  topically 2 times daily.    Candida infection of flexural skin       ondansetron 4 MG ODT tab    ZOFRAN-ODT    20 tablet    Take 1 tablet by mouth every 6 hours as needed for nausea.    Nausea and vomiting       OSTEO-MULTIVITAMINS/MINERALS OR      Take  by mouth daily. 12 pm        oxybutynin 5 MG tablet    DITROPAN    270 tablet    Take 1 tablet by mouth 3 times daily.    Neurogenic bladder       ranitidine 150 MG tablet    ZANTAC    180 tablet    Take 1 tablet by mouth At Bedtime.    GERD (gastroesophageal reflux disease)       senna-docusate 8.6-50 MG per tablet    SENOKOT-S;PERICOLACE    60 tablet    Take 1-2 tablets by mouth 2 times daily.    Chronic constipation       sodium PHOSphate 66 mL, mineral oil 60 mL, docusate 100 mL, Magnesium Citrate 60 mL PINK LADY enema     286 mL    Place 268 mLs rectally once. PRN        sorbitol 70 % Soln      Take 30 mLs by mouth At Bedtime.        zinc oxide 40 % ointment    DESITIN    56.7 g    Apply  topically every hour as needed.    Breakdown of skin tissue       zolpidem 5 MG tablet    AMBIEN    30 tablet    Take 1 tablet by mouth nightly as needed  for sleep.    Insomnia       * Notice:  This list has 4 medication(s) that are the same as other medications prescribed for you. Read the directions carefully, and ask your doctor or other care provider to review them with you.

## 2017-10-20 NOTE — LETTER
10/20/2017     RE: Maicol Carrera  [address] [place] [address]     Dear Colleague,    Thank you for referring your patient, Maicol Carrera, to the [facility] PR[facility] UROLOGIC CANCERS at [facility]. Please see a copy of my visit note below.    ASSESSMENT AND PLAN    Bilateral staghorn calculi    During counseling for this visit, we covered the natural history of kidney stones, the risk of progression to symptomatic pain/infection, and the possibility of renal failure/kidney damage.  We covered treatment options including observation, ureteroscopy, extracorporeal shock wave lithotripsy (ESWL), and percutaneous nephrolithotomy (PCNL).  We covered surgical risks which include but are not limited to heart attack, stroke, blood clot in the legs or lungs, death, injury to surrounding organs (intestine, liver, spleen, pancreas, lung, muscles, nerves), loss of sensation around incisions, decreased renal function, infection, injury to ureter, injury to bladder, and urethral strictures.  Additional procedures may be necessary in the perioperative period.  We discussed the risks of blood transfusion including HIV and hepatitis.   There are other additional unexpected complications which may occur. Patient would like to proceed with left PCNL. I do not see a need to repeat the NM Renogram at this time.    -Urinalysis with micro reflex culture  -IR to place nephrostomy tube and drain 1 day prior to surgery  -Antibiotics 7 days before surgery  -Will plan left PCNL  _________________________________________________________________________    CHIEF COMPLAINT  It was my pleasure to see Maicol Carrera who is a 42 year old quadriplegic (SCI) male who is here for follow-up for kidney stones.    HPI  Maicol Carrera is a 42 year old male with a previous history of bilateral renal stones. 5 years ago had a similar large stone burden. NM renal scan at the time revealed 9%  function of his right kidney. He underwent PCNL of the left kidney and was complicated by septic shock.    Now presents with bilateral complete staghorn calculi according to CT on 10/20/17. Denies any flank pain, fevers, nausea, vomiting.      RADIOLOGIC IMAGING  10/20/17 CT Abdomen Pelvis w/o contrast  1. Bilateral staghorn calculi with inflammatory changes surrounding  the renal pelvises and proximal ureters bilaterally, left greater than  right, concerning for infection. 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  2. New soft tissue density at the mid left ureter, may be infectious  or inflammatory, consider direct visualization.  3. Diffuse bladder wall thickening with inflammatory stranding  surrounding the bladder, prostate and rectum, not significantly  changed since 1/25/2012 study. May be sequela of chronic cystitis and  prostatitis, although superimposed acute infection is not excluded.  Correlate clinically.  4. Cholelithiasis.     I reviewed the recent radiologic imaging and reports described above.      Patient Active Problem List    Diagnosis Date Noted     Sepsis with multiple organ dysfunction (MOD) (H) 10/24/2011     Priority: Medium     Anemia due to blood loss, acute 10/24/2011     Priority: Medium     Postsurgical nonabsorption 10/24/2011     Priority: Medium     Problem list name updated by automated process. Provider to review       Quadriplegia, post-traumatic (H) 10/24/2011     Priority: Medium     Renal hemorrhage, left 10/24/2011     Priority: Medium     Kidney stones 08/09/2011     Priority: Medium     Past Medical History:   Diagnosis Date     Acid reflux      Constipation, chronic      Head injury      Neurogenic bladder     SP catheter     Quadriplegia (H)      Renal disease      Seizure (H)      Spastic quadriplegia (H)      Urinary tract  infection      Past Surgical History:   Procedure Laterality Date     APPENDECTOMY OPEN       BACK SURGERY       INSERT PUMP BACLOFEN       LASER HOLMIUM NEPHROLITHOTOMY VIA PERCUTANEOUS NEPHROSTOMY  10/19/2011    Procedure:LASER HOLMIUM NEPHROLITHOTOMY VIA PERCUTANEOUS NEPHROSTOMY; Left Ureteral Stent Placement, Left Percutaneous Access, Left Percutaneous Nephrostomy  *Latex Allergy*  ; Surgeon:YAN ADDISON; Location:[facility] OR     ORTHOPEDIC SURGERY       supra pubic catheter       Current Outpatient Prescriptions   Medication Sig Dispense Refill     acetaminophen (TYLENOL) 160 MG/5ML oral liquid 20.3 mLs by Per Feeding Tube route every 6 hours as needed. 120 mL      albuterol (2.5 MG/3ML) 0.083% nebulizer solution Take 3 mLs by nebulization every 2 hours as needed. 1 Box      bisacodyl (DULCOLAX) 10 MG suppository Place 1 suppository rectally every other day. 12 suppository      Cranberry 300 MG TABS Take 300 mg by mouth 2 times daily.       cyanocolbalamin (VITAMIN B-12) 1000 MCG tablet Take 1 tablet by mouth daily.       ferrous sulfate 325 (65 FE) MG tablet Take 1 tablet by mouth daily (with breakfast). 100 tablet      fluconazole (DIFLUCAN) 100 MG tablet Take  by mouth daily. two tablets on first day, then one tablet daily for 2 weeks 15 tablet      levetiracetam (KEPPRA XR) 500 MG 24 hr tablet Take 2 tablets by mouth 2 times daily.       magnesium gluconate (MAGONATE) 500 MG tablet Take 1 tablet by mouth daily.       miconazole (MICATIN; MICRO GUARD) 2 % powder Apply  topically 2 times daily. 70 g      ondansetron (ZOFRAN-ODT) 4 MG disintegrating tablet Take 1 tablet by mouth every 6 hours as needed for nausea. 20 tablet      oxybutynin (DITROPAN) 5 MG tablet Take 1 tablet by mouth 3 times daily. 270 tablet      ranitidine (ZANTAC) 150 MG tablet Take 1 tablet by mouth At Bedtime. 180 tablet      senna-docusate (SENOKOT-S;PERICOLACE) 8.6-50 MG per tablet Take 1-2 tablets by mouth 2 times daily. 60 tablet   "    vitamin C 250 MG TABS Take 1 tablet by mouth 2 times daily.       zinc oxide (DESITIN) 40 % ointment Apply  topically every hour as needed. 56.7 g      zolpidem (AMBIEN) 5 MG tablet Take 1 tablet by mouth nightly as needed for sleep. 30 tablet      baclofen (LIORESAL) 10 MG tablet Take 10 mg by mouth 4 times daily.       MAGNESIUM CITRATE PO Take  by mouth every 3 days. PRN       sodium PHOSphate 66 mL, mineral oil 60 mL, docusate 100 mL, Magnesium Citrate 60 mL PINK LADY enema Place 268 mLs rectally once.  mL      docusate sodium (COLACE) 100 MG capsule Take 100 mg by mouth 2 times daily.       Bisacodyl (DULCOLAX PO) Take 10 mg by mouth At Bedtime.       BACLOFEN  mg by Intrathecal route daily. Via pump         Nutritional Supplements (OSTEO-MULTIVITAMINS/MINERALS OR) Take  by mouth daily. 12 pm         Sorbitol 70 % SOLN Take 30 mLs by mouth At Bedtime.        SOCIAL HISTORY: He  reports that he quit smoking about 23 years ago. His smoking use included Cigarettes. He has a 1.50 pack-year smoking history. He has never used smokeless tobacco. He reports that he does not drink alcohol or use illicit drugs.    PHYSICAL EXAM  /73  Pulse 93  Ht 1.753 m (5' 9\")  Wt 88.9 kg (196 lb)  BMI 28.94 kg/m2  Constitutional: Alert, no acute distress. In wheelchair.  Psychiatric: Normal mood and affect  Gastrointestinal: Abdomen soft, non-tender. No masses, organomegaly.       Lab Results   Component Value Date    BALAJI 9.2 12/29/2011    BALAJI 9.2 11/29/2011    BALAJI 9.1 11/07/2011       Recent Labs   Lab Test  12/29/11   1500  11/29/11   1230  11/07/11   0633  11/06/11   1530   NA  142  144  140  140   POTASSIUM  4.0  4.1  4.7  4.9   CHLORIDE  103  104  104  104   CO2  29 29 29 30   ANIONGAP  10.2  10.8  7.1  6.4   GLC  125*  71  91  137*   BUN  13  11  21  21   CR  0.60*  0.70  1.00  1.10   GFRESTIMATED  >90  >90  86  77   GFRESTBLACK  >90  >90  >90  >90   BALAJI  9.2  9.2  9.1  9.0        UA RESULTS: "   Recent Labs   Lab Test  11/06/11   1530  10/22/11   1200  10/21/11   0630   SG  1.011  1.024  1.008   URINEPH  6.5  6.5  6.5   NITRITE  Negative  Negative  Negative   RBCU  124*  >182*  >182*   WBCU  >182*  >182*  >182*       As the medical student, I acted as a scribe for this note. All portions of the documented history and physical were personally performed by Dr. JEREMIE Horn as the attending physician.     Rishabh Michele served as the scribe for this patient's visit and documented my history and physical exam.  I performed the history and physical exam.  I have edited and agree with the note.      JEREMIE Horn MD    CC  Patient Care Team:  Nancy Ag MD as PCP - General (Family Practice)  Dejon Lam MD as Referring Physician  Dejon Horn MD as MD (Urology)  Priscilla Wong, RN as Registered Nurse  SELF, REFERRED    Copy to patient  GABRIELA CARRANZA  [address] [place] [id number]

## 2017-10-21 LAB
BACTERIA SPEC CULT: ABNORMAL
Lab: ABNORMAL
SPECIMEN SOURCE: ABNORMAL

## 2017-11-08 ENCOUNTER — TELEPHONE (OUTPATIENT)
Dept: INTERVENTIONAL RADIOLOGY/VASCULAR | Facility: CLINIC | Age: 42
End: 2017-11-08

## 2017-11-08 DIAGNOSIS — R30.0 DYSURIA: Primary | ICD-10-CM

## 2017-11-13 ENCOUNTER — APPOINTMENT (OUTPATIENT)
Dept: MEDSURG UNIT | Facility: CLINIC | Age: 42
End: 2017-11-13
Attending: UROLOGY
Payer: COMMERCIAL

## 2017-11-13 ENCOUNTER — HOSPITAL ENCOUNTER (OUTPATIENT)
Facility: CLINIC | Age: 42
Discharge: HOME | End: 2017-11-13
Attending: UROLOGY | Admitting: UROLOGY
Payer: COMMERCIAL

## 2017-11-13 ENCOUNTER — APPOINTMENT (OUTPATIENT)
Dept: INTERVENTIONAL RADIOLOGY/VASCULAR | Facility: CLINIC | Age: 42
End: 2017-11-13
Attending: UROLOGY
Payer: COMMERCIAL

## 2017-11-13 VITALS
HEART RATE: 83 BPM | RESPIRATION RATE: 18 BRPM | SYSTOLIC BLOOD PRESSURE: 123 MMHG | OXYGEN SATURATION: 97 % | DIASTOLIC BLOOD PRESSURE: 77 MMHG | TEMPERATURE: 98.4 F | BODY MASS INDEX: 29.18 KG/M2 | WEIGHT: 197 LBS | HEIGHT: 69 IN

## 2017-11-13 DIAGNOSIS — N20.0 CALCULUS OF KIDNEY: ICD-10-CM

## 2017-11-13 LAB
ALBUMIN UR-MCNC: ABNORMAL MG/DL
APPEARANCE UR: ABNORMAL
BILIRUB UR QL STRIP: ABNORMAL
COLOR UR AUTO: ABNORMAL
CREAT BLD-MCNC: 1.2 MG/DL (ref 0.66–1.25)
GFR SERPL CREATININE-BSD FRML MDRD: 67 ML/MIN/1.7M2
GLUCOSE UR STRIP-MCNC: ABNORMAL MG/DL
HCT VFR BLD AUTO: 41 % (ref 40–53)
HGB BLD-MCNC: 12.8 G/DL (ref 13.3–17.7)
HGB UR QL STRIP: ABNORMAL
INR PPP: 1.03 (ref 0.86–1.14)
KETONES UR STRIP-MCNC: ABNORMAL MG/DL
LEUKOCYTE ESTERASE UR QL STRIP: ABNORMAL
NITRATE UR QL: ABNORMAL
PH UR STRIP: ABNORMAL PH (ref 5–7)
PLATELET # BLD AUTO: 285 10E9/L (ref 150–450)
RBC #/AREA URNS AUTO: ABNORMAL /HPF (ref 0–2)
SOURCE: ABNORMAL
SP GR UR STRIP: ABNORMAL (ref 1–1.03)
UROBILINOGEN UR STRIP-MCNC: ABNORMAL MG/DL (ref 0–2)
WBC #/AREA URNS AUTO: ABNORMAL /HPF

## 2017-11-13 PROCEDURE — 50432 PLMT NEPHROSTOMY CATHETER: CPT | Mod: LT

## 2017-11-13 PROCEDURE — C1729 CATH, DRAINAGE: HCPCS

## 2017-11-13 PROCEDURE — 40000167 ZZH STATISTIC PP CARE STAGE 2

## 2017-11-13 PROCEDURE — 27210912 ZZH NEEDLE CR8

## 2017-11-13 PROCEDURE — 99153 MOD SED SAME PHYS/QHP EA: CPT

## 2017-11-13 PROCEDURE — 85027 COMPLETE CBC AUTOMATED: CPT | Performed by: RADIOLOGY

## 2017-11-13 PROCEDURE — 27210905 ZZH KIT CR7

## 2017-11-13 PROCEDURE — 82565 ASSAY OF CREATININE: CPT

## 2017-11-13 PROCEDURE — 25000128 H RX IP 250 OP 636: Performed by: PHYSICIAN ASSISTANT

## 2017-11-13 PROCEDURE — 99152 MOD SED SAME PHYS/QHP 5/>YRS: CPT

## 2017-11-13 PROCEDURE — C1769 GUIDE WIRE: HCPCS

## 2017-11-13 PROCEDURE — 27210889 ZZH ACCESSORY CR8

## 2017-11-13 PROCEDURE — 25000128 H RX IP 250 OP 636

## 2017-11-13 PROCEDURE — 27210732 ZZH ACCESSORY CR1

## 2017-11-13 PROCEDURE — 85610 PROTHROMBIN TIME: CPT | Performed by: RADIOLOGY

## 2017-11-13 RX ORDER — NALOXONE HYDROCHLORIDE 0.4 MG/ML
.1-.4 INJECTION, SOLUTION INTRAMUSCULAR; INTRAVENOUS; SUBCUTANEOUS
Status: DISCONTINUED | OUTPATIENT
Start: 2017-11-13 | End: 2017-11-13 | Stop reason: HOSPADM

## 2017-11-13 RX ORDER — AMPICILLIN 1 G/1
1 INJECTION, POWDER, FOR SOLUTION INTRAMUSCULAR; INTRAVENOUS
Status: COMPLETED | OUTPATIENT
Start: 2017-11-13 | End: 2017-11-13

## 2017-11-13 RX ORDER — FENTANYL CITRATE 50 UG/ML
25-50 INJECTION, SOLUTION INTRAMUSCULAR; INTRAVENOUS EVERY 5 MIN PRN
Status: DISCONTINUED | OUTPATIENT
Start: 2017-11-13 | End: 2017-11-13 | Stop reason: HOSPADM

## 2017-11-13 RX ORDER — LIDOCAINE 40 MG/G
CREAM TOPICAL
Status: DISCONTINUED | OUTPATIENT
Start: 2017-11-13 | End: 2017-11-13 | Stop reason: HOSPADM

## 2017-11-13 RX ORDER — FLUMAZENIL 0.1 MG/ML
0.2 INJECTION, SOLUTION INTRAVENOUS
Status: DISCONTINUED | OUTPATIENT
Start: 2017-11-13 | End: 2017-11-13 | Stop reason: HOSPADM

## 2017-11-13 RX ADMIN — AMPICILLIN SODIUM 1 G: 1 INJECTION, POWDER, FOR SOLUTION INTRAMUSCULAR; INTRAVENOUS at 12:19

## 2017-11-13 RX ADMIN — MIDAZOLAM 1 MG: 1 INJECTION INTRAMUSCULAR; INTRAVENOUS at 14:45

## 2017-11-13 RX ADMIN — MIDAZOLAM 0.5 MG: 1 INJECTION INTRAMUSCULAR; INTRAVENOUS at 14:55

## 2017-11-13 RX ADMIN — GENTAMICIN SULFATE 160 MG: 40 INJECTION, SOLUTION INTRAMUSCULAR; INTRAVENOUS at 13:03

## 2017-11-13 RX ADMIN — MIDAZOLAM 0.5 MG: 1 INJECTION INTRAMUSCULAR; INTRAVENOUS at 14:54

## 2017-11-13 RX ADMIN — FENTANYL CITRATE 25 MCG: 50 INJECTION INTRAMUSCULAR; INTRAVENOUS at 15:10

## 2017-11-13 RX ADMIN — FENTANYL CITRATE 25 MCG: 50 INJECTION INTRAMUSCULAR; INTRAVENOUS at 14:55

## 2017-11-13 RX ADMIN — MIDAZOLAM 0.5 MG: 1 INJECTION INTRAMUSCULAR; INTRAVENOUS at 15:10

## 2017-11-13 RX ADMIN — FENTANYL CITRATE 25 MCG: 50 INJECTION INTRAMUSCULAR; INTRAVENOUS at 14:54

## 2017-11-13 RX ADMIN — FENTANYL CITRATE 50 MCG: 50 INJECTION INTRAMUSCULAR; INTRAVENOUS at 14:45

## 2017-11-13 NOTE — PROGRESS NOTES
Returned from IR s/p left PNT placement.  VSS.  Denies pain.  PNT and Belle draining dark cherry urine.  Clots in Belle,  PNT no clots.

## 2017-11-13 NOTE — CONSULTS
Interventional Radiology Brief Post Procedure Note    Procedure: Left  Nephrostomy Tube Placement     Proceduralist: Arsenio Srivastava MD and Marlena Bates RPA    Assistant: ROSANNA Fellow PhysicianTacho    Time Out: Prior to the start of the procedure and with procedural staff participation, I verbally confirmed the patient s identity using two indicators, relevant allergies, that the procedure was appropriate and matched the consent or emergent situation, and that the correct equipment/implants were available. Immediately prior to starting the procedure I conducted the Time Out with the procedural staff and re-confirmed the patient s name, procedure, and site/side. (The Joint Commission universal protocol was followed.)  Yes        Sedation: IR Nurse Monitored Care   Post Procedure Summary:  Prior to the start of the procedure and with procedural staff participation, I verbally confirmed the patient s identity using two indicators, relevant allergies, that the procedure was appropriate and matched the consent or emergent situation, and that the correct equipment/implants were available. Immediately prior to starting the procedure I conducted the Time Out with the procedural staff and re-confirmed the patient s name, procedure, and site/side. (The Joint Commission universal protocol was followed.)  Yes       Sedatives: Fentanyl and Midazolam (Versed)    Vital signs, airway and pulse oximetry were monitored and remained stable throughout the procedure and sedation was maintained until the procedure was complete.  The patient was monitored by staff until sedation discharge criteria were met.    Patient tolerance: Patient tolerated the procedure well with no immediate complications.    Time of sedation in minutes: 40 minutes from beginning to end of physician one to one monitoring.          Findings: Left Nephrostomy placed into renal pelvis, labs sent     Estimated Blood Loss: Minimal    Fluoroscopy Time:   minute(s)    SPECIMENS: Fluid and/or tissue for laboratory analysis and culture    Complications: 1. None     Condition: Stable    Plan: P.R.N    Comments: See dictated procedure note for full details.    Marlena Bates, RPA

## 2017-11-13 NOTE — PROGRESS NOTES
Prepped for IR procedure.  Wheelchair bound.  A & O x 4.  Denies pain.  Care giver with him.  Has ly catheter in place, baclofen continuous pump.   Consent signed, H & P needs update, labs pending.

## 2017-11-13 NOTE — DISCHARGE INSTRUCTIONS
"[facility]  Discharge Instructions for   Percutaneous Nephrostomy   Tube Placement    After you go home:    Have an adult stay with you for 24 hours.    Drink plenty of fluids.    Resume a regular diet unless otherwise ordered by your physician.      For 24 Hours:    Relax and take it easy.    Do not drive or operate machines at home or at work.    No alcohol for 24 hours.    Do not make any important or legal decisions.    Do not do any strenuous exercise or lifting greater that 10 lbs for at least  2 days following your procedure.    CALL THE PHYSICIAN IF:    You stat bleeding from the procedure site. If you do start to bleed from the site lie down and hold some pressure on the site. Your physician will tell you if you need to return to the hospital.    You develop nausea or vomiting.    You develop hives or a rash or any unexplained itching.    ADDITIONAL INSTRUCTIONS:  Please call for the following problems:  1. No urine draining from the nephrostomy tube. Check that tube is not kinked.  2. Urine leaking around tube.  3. Urine becomes very foul smelling or new or fresh blood in urine  4. The skin around the tube is red, painful, or has drainage.  5. You have pain in your back, over your kidney.  6. You have a fever of 100.5 F and chills  7. You feel nauseated and \"just not right.\"    Change the dressing initially the next day to check the insertion site.  After that change every other day.  Clean around tube site with washcloth and antibacterial soap.      [facility] INTERVENTIONAL RADIOLOGY DEPARTMENT  Procedure Physician:  Dr. Titus   Date:November 13, 2017  Telephone Numbers:  [phone]     Monday-Friday 8:00AM-4:30PM                       [phone]     After 4:30 PM Monday-Friday, Weekends and Holidays. Ask for Interventional Radiologist on Call. Someone is available 24 hours a day.  [facility] toll free number: [phone] Monday- Friday 8:00AM -4:30PM.    I  "

## 2017-11-13 NOTE — PROGRESS NOTES
Tolerated bedrest without problems.  Tolerated food and fluids.  Both ly and PNT draining dark cherry urine.  Reviewed discharge instructions with patient and his PCA.  Paperwork filled out by MD's for nursing home.  PCA has paperwork.  Discharged to home with PCA.

## 2017-11-13 NOTE — PROGRESS NOTES
Interventional Radiology Pre-Procedure Sedation Assessment   Time of Assessment: 12:11 PM    Expected Level: Moderate Sedation    Indication: Sedation is required for the following type of Procedure:     Sedation and procedural consent: Risks, benefits and alternatives were discussed with Patient    PO Intake: Appropriately NPO for procedure    ASA Class: Class 3 - SEVERE SYSTEMIC DISEASE, DEFINITE FUNCTIONAL LIMITATIONS.    Mallampati: Grade 3:  Soft palate visible, posterior pharyngeal wall not visible    Lungs: Lungs Clear with good breath sounds bilaterally    Heart: Normal heart sounds and rate    History and physical reviewed and updates include No updates. I have reviewed the lab findings, diagnostic data, medications, and the plan for sedation. I have determined this patient to be an appropriate candidate for the planned sedation and procedure and have reassessed the patient IMMEDIATELY PRIOR to sedation and procedure.    Jose Coles MD

## 2017-11-13 NOTE — IP AVS SNAPSHOT
[address] [address]                                       After Visit Summary   11/13/2017    Maicol Carrera    MRN: [id number]           After Visit Summary Signature Page     I have received my discharge instructions, and my questions have been answered. I have discussed any challenges I see with this plan with the nurse or doctor.    ..........................................................................................................................................  Patient/Patient Representative Signature      ..........................................................................................................................................  Patient Representative Print Name and Relationship to Patient    ..................................................               ................................................  Date                                            Time    ..........................................................................................................................................  Reviewed by Signature/Title    ...................................................              ..............................................  Date                                                            Time

## 2017-11-13 NOTE — IP AVS SNAPSHOT
MRN:[id number]                      After Visit Summary   11/13/2017    Maicol Carrera    MRN: [id number]           Visit Information        Department      11/13/2017 10:15 AM [address] [facility]          Review of your medicines      UNREVIEWED medicines. Ask your doctor about these medicines        Dose / Directions    acetaminophen 32 mg/mL solution   Commonly known as:  TYLENOL   Used for:  Abdominal pain        Dose:  650 mg   20.3 mLs by Per Feeding Tube route every 6 hours as needed.   Quantity:  120 mL   Refills:  0       albuterol (2.5 MG/3ML) 0.083% neb solution   Used for:  SOB (shortness of breath)        Dose:  2.5 mg   Take 3 mLs by nebulization every 2 hours as needed.   Quantity:  1 Box   Refills:  0       ascorbic acid 250 MG Tabs tablet   Used for:  Recurrent UTI        Dose:  250 mg   Take 1 tablet by mouth 2 times daily.   Refills:  0       * baclofen 10 MG tablet   Commonly known as:  LIORESAL        Dose:  10 mg   Take 10 mg by mouth 4 times daily.   Refills:  0       * BACLOFEN IT        Dose:  240 mg   240 mg by Intrathecal route daily. Via pump   Refills:  0       COLACE 100 MG capsule   Generic drug:  docusate sodium        Dose:  100 mg   Take 100 mg by mouth 2 times daily.   Refills:  0       Cranberry 300 MG Tabs   Used for:  UTI (urinary tract infection)        Dose:  300 mg   Take 300 mg by mouth 2 times daily.   Refills:  0       cyanocobalamin 1000 MCG tablet   Commonly known as:  vitamin  B-12   Used for:  Vitamin B 12 deficiency        Dose:  1000 mcg   Take 1 tablet by mouth daily.   Refills:  0       * bisacodyl 10 MG Suppository   Commonly known as:  DULCOLAX   Used for:  Chronic constipation        Dose:  10 mg   Place 1 suppository rectally every other day.   Quantity:  12 suppository   Refills:  0       * DULCOLAX PO        Dose:  10 mg   Take 10 mg by mouth At Bedtime.   Refills:  0       ferrous sulfate 325 (65 FE) MG tablet   Commonly known as:  IRON    Used for:  Anemia due to blood loss, acute        Dose:  325 mg   Take 1 tablet by mouth daily (with breakfast).   Quantity:  100 tablet   Refills:  0       fluconazole 100 MG tablet   Commonly known as:  DIFLUCAN   Used for:  Candida UTI        Take  by mouth daily. two tablets on first day, then one tablet daily for 2 weeks   Quantity:  15 tablet   Refills:  0       levETIRAcetam 500 MG 24 hr tablet   Commonly known as:  KEPPRA XR   Used for:  Seizure disorder (H)        Dose:  1000 mg   Take 2 tablets by mouth 2 times daily.   Refills:  0       MAGNESIUM CITRATE PO        Take  by mouth every 3 days. PRN   Refills:  0       magnesium gluconate 500 MG tablet   Commonly known as:  MAGONATE   Used for:  Magnesium deficiency        Dose:  500 mg   Take 1 tablet by mouth daily.   Refills:  0       miconazole 2 % powder   Commonly known as:  MICATIN; MICRO GUARD   Used for:  Candida infection of flexural skin        Apply  topically 2 times daily.   Quantity:  70 g   Refills:  0       ondansetron 4 MG ODT tab   Commonly known as:  ZOFRAN-ODT   Used for:  Nausea and vomiting        Dose:  4 mg   Take 1 tablet by mouth every 6 hours as needed for nausea.   Quantity:  20 tablet   Refills:  0       OSTEO-MULTIVITAMINS/MINERALS OR        Take  by mouth daily. 12 pm   Refills:  0       oxybutynin 5 MG tablet   Commonly known as:  DITROPAN   Used for:  Neurogenic bladder        Dose:  5 mg   Take 1 tablet by mouth 3 times daily.   Quantity:  270 tablet   Refills:  0       ranitidine 150 MG tablet   Commonly known as:  ZANTAC   Used for:  GERD (gastroesophageal reflux disease)        Dose:  150 mg   Take 1 tablet by mouth At Bedtime.   Quantity:  180 tablet   Refills:  0       senna-docusate 8.6-50 MG per tablet   Commonly known as:  SENOKOT-S;PERICOLACE   Used for:  Chronic constipation        Dose:  1-2 tablet   Take 1-2 tablets by mouth 2 times daily.   Quantity:  60 tablet   Refills:  0       sodium PHOSphate 66 mL,  mineral oil 60 mL, docusate 100 mL, Magnesium Citrate 60 mL PINK LADY enema        Dose:  268 mL   Place 268 mLs rectally once. PRN   Quantity:  286 mL   Refills:  0       sorbitol 70 % Soln        Dose:  30 mL   Take 30 mLs by mouth At Bedtime.   Refills:  0       zinc oxide 40 % ointment   Commonly known as:  DESITIN   Used for:  Breakdown of skin tissue        Apply  topically every hour as needed.   Quantity:  56.7 g   Refills:  0       zolpidem 5 MG tablet   Commonly known as:  AMBIEN   Used for:  Insomnia        Dose:  5 mg   Take 1 tablet by mouth nightly as needed for sleep.   Quantity:  30 tablet   Refills:  0       * Notice:  This list has 4 medication(s) that are the same as other medications prescribed for you. Read the directions carefully, and ask your doctor or other care provider to review them with you.             Protect others around you: Learn how to safely use, store and throw away your medicines at www.disposemymeds.org.         Follow-ups after your visit        Your next 10 appointments already scheduled     Dec 06, 2017  9:00 AM CST   (Arrive by 8:45 AM)   PAC EVALUATION with  Pac Domingo 1   [facility] Preoperative Assessment [place] ([facility])    [address] [place] [id number]   [phone]            Dec 06, 2017 10:00 AM CST   (Arrive by 9:45 AM)   PAC RN ASSESSMENT with  Pac Rn   [facility] Preoperative Assessment [place] ([facility])    [address] [place] [id number]   [phone]            Dec 06, 2017 10:30 AM CST   (Arrive by 10:15 AM)   PAC Anesthesia Consult with  Pac Anesthesiologist   [facility] Preoperative Assessment [place] ([facility])    [address] [place] [id number]   [phone]            Dec 20, 2017   Procedure with Dejon Horn MD   [facility], [place], Same Day Surgery (--)    [address] [facility]  "[id number]   [phone]            Dec 29, 2017 10:20 AM CST   (Arrive by 10:05 AM)   Cystoscopy with Dejon Horn MD   [facility] ([facility])    [address] [place] [phone]   [phone]               Care Instructions        Further instructions from your care team       [facility]  Discharge Instructions for   Percutaneous Nephrostomy   Tube Placement    After you go home:    Have an adult stay with you for 24 hours.    Drink plenty of fluids.    Resume a regular diet unless otherwise ordered by your physician.      For 24 Hours:    Relax and take it easy.    Do not drive or operate machines at home or at work.    No alcohol for 24 hours.    Do not make any important or legal decisions.    Do not do any strenuous exercise or lifting greater that 10 lbs for at least  2 days following your procedure.    CALL THE PHYSICIAN IF:    You stat bleeding from the procedure site. If you do start to bleed from the site lie down and hold some pressure on the site. Your physician will tell you if you need to return to the hospital.    You develop nausea or vomiting.    You develop hives or a rash or any unexplained itching.    ADDITIONAL INSTRUCTIONS:  Please call for the following problems:  1. No urine draining from the nephrostomy tube. Check that tube is not kinked.  2. Urine leaking around tube.  3. Urine becomes very foul smelling or new or fresh blood in urine  4. The skin around the tube is red, painful, or has drainage.  5. You have pain in your back, over your kidney.  6. You have a fever of 100.5 F and chills  7. You feel nauseated and \"just not right.\"    Change the dressing initially the next day to check the insertion site.  After that change every other day.  Clean around tube site with washcloth and antibacterial soap.      [facility] INTERVENTIONAL RADIOLOGY DEPARTMENT  Procedure " "Physician:  Dr. Titus   Date:2017  Telephone Numbers:  [phone]     Monday-Friday 8:00AM-4:30PM                       [phone]     After 4:30 PM Monday-Friday, Weekends and Holidays. Ask for Interventional Radiologist on Call. Someone is available 24 hours a day.  [facility] toll free number: [phone] Monday- Friday 8:00AM -4:30PM.    I     Additional Information About Your Visit        [employer] Information     [facility] lets you send messages to your doctor, view your test results, renew your prescriptions, schedule appointments and more. To sign up, go to [email or web address] . Click on \"Log in\" on the left side of the screen, which will take you to the Welcome page. Then click on \"Sign up Now\" on the right side of the page.     You will be asked to enter the access code listed below, as well as some personal information. Please follow the directions to create your username and password.     Your access code is: [email or web address]  Expires: 2017  5:30 AM     Your access code will  in 90 days. If you need help or a new code, please call your [facility] clinic or [phone].        Care EveryWhere ID     This is your Care EveryWhere ID. This could be used by other organizations to access your [facility] medical records  [id number]        Your Vitals Were     Blood Pressure Pulse Temperature Respirations Height Weight    128/82 (BP Location: Left arm) 86 98.4  F (36.9  C) (Oral) 20 1.753 m (5' 9\") 89.4 kg (197 lb)    Pulse Oximetry BMI (Body Mass Index)                98% 29.09 kg/m2           Primary Care Provider    Nancy Ag MD      Equal Access to Services     [facility]: Lory Forman, naomie blanco, jada valenzuelaalluis f kunz, kathy frazier. Formerly [facility] [phone].    ATENCIÓN: Si habla español, tiene a da silva disposición servicios gratuitos de asistencia lingüística. Llashli al [phone].    We comply with applicable federal civil " rights laws and [place] laws. We do not discriminate on the basis of race, color, national origin, age, disability, sex, sexual orientation, or gender identity.            Thank you!     Thank you for choosing [facility] for your care. Our goal is always to provide you with excellent care. Hearing back from our patients is one way we can continue to improve our services. Please take a few minutes to complete the written survey that you may receive in the mail after you visit with us. Thank you!             Medication List: This is a list of all your medications and when to take them. Check marks below indicate your daily home schedule. Keep this list as a reference.      Medications           Morning Afternoon Evening Bedtime As Needed    acetaminophen 32 mg/mL solution   Commonly known as:  TYLENOL   20.3 mLs by Per Feeding Tube route every 6 hours as needed.                                albuterol (2.5 MG/3ML) 0.083% neb solution   Take 3 mLs by nebulization every 2 hours as needed.                                ascorbic acid 250 MG Tabs tablet   Take 1 tablet by mouth 2 times daily.                                * baclofen 10 MG tablet   Commonly known as:  LIORESAL   Take 10 mg by mouth 4 times daily.                                * BACLOFEN IT   240 mg by Intrathecal route daily. Via pump                                COLACE 100 MG capsule   Take 100 mg by mouth 2 times daily.   Generic drug:  docusate sodium                                Cranberry 300 MG Tabs   Take 300 mg by mouth 2 times daily.                                cyanocobalamin 1000 MCG tablet   Commonly known as:  vitamin  B-12   Take 1 tablet by mouth daily.                                * bisacodyl 10 MG Suppository   Commonly known as:  DULCOLAX   Place 1 suppository rectally every other day.                                * DULCOLAX PO   Take 10 mg by mouth At Bedtime.                                ferrous sulfate 325 (65 FE) MG  tablet   Commonly known as:  IRON   Take 1 tablet by mouth daily (with breakfast).                                fluconazole 100 MG tablet   Commonly known as:  DIFLUCAN   Take  by mouth daily. two tablets on first day, then one tablet daily for 2 weeks                                levETIRAcetam 500 MG 24 hr tablet   Commonly known as:  KEPPRA XR   Take 2 tablets by mouth 2 times daily.                                MAGNESIUM CITRATE PO   Take  by mouth every 3 days. PRN                                magnesium gluconate 500 MG tablet   Commonly known as:  MAGONATE   Take 1 tablet by mouth daily.                                miconazole 2 % powder   Commonly known as:  MICATIN; MICRO GUARD   Apply  topically 2 times daily.                                ondansetron 4 MG ODT tab   Commonly known as:  ZOFRAN-ODT   Take 1 tablet by mouth every 6 hours as needed for nausea.                                OSTEO-MULTIVITAMINS/MINERALS OR   Take  by mouth daily. 12 pm                                oxybutynin 5 MG tablet   Commonly known as:  DITROPAN   Take 1 tablet by mouth 3 times daily.                                ranitidine 150 MG tablet   Commonly known as:  ZANTAC   Take 1 tablet by mouth At Bedtime.                                senna-docusate 8.6-50 MG per tablet   Commonly known as:  SENOKOT-S;PERICOLACE   Take 1-2 tablets by mouth 2 times daily.                                sodium PHOSphate 66 mL, mineral oil 60 mL, docusate 100 mL, Magnesium Citrate 60 mL PINK LADY enema   Place 268 mLs rectally once. PRN                                sorbitol 70 % Soln   Take 30 mLs by mouth At Bedtime.                                zinc oxide 40 % ointment   Commonly known as:  DESITIN   Apply  topically every hour as needed.                                zolpidem 5 MG tablet   Commonly known as:  AMBIEN   Take 1 tablet by mouth nightly as needed for sleep.                                * Notice:  This list has  4 medication(s) that are the same as other medications prescribed for you. Read the directions carefully, and ask your doctor or other care provider to review them with you.

## 2017-11-13 NOTE — PROGRESS NOTES
SPIRITUAL HEALTH SERVICES  SPIRITUAL ASSESSMENT Progress Note  [facility] ([facility]    Visited pt at the request of Aura Rubio, the  from [facility], the facility at which he lives.  Maicol was accompanied by Autumn, an attendant with [facility] and is known to this  from that facility.  He said he is about to receive a stent and expressed a degree of concern that, though it is considered same day surgery, it may result in extended hospitalization, as similar procedures have in the past.  He expressed appreciation for the visit but no further needs at this time.    Will follow this pt while he remains in the hospital.  Visits are available on request.    Monster Fierro M.Div.  Staff   Pager [phone]

## 2017-11-13 NOTE — PROGRESS NOTES
Interventional Radiology Intra-procedural Nursing Note    Patient Name: Maicol Carrera  Medical Record Number: [id number]  Today's Date: November 13, 2017         Start Time: 1445  End of procedure time: 1525  Procedure: Left percutaneous nephrostomy tube placement  Fire Safety Score: 1    Consent Review/Timeout Performed by: Dr. Arsenio Srivastava / Dr. Amrit Titus  Procedure Performed By:  Dr. Arsenio Srivastava / Dr. Amrit Titus     Fentanyl administered: 100 mcg  Versed administered: 2 mg    Start of Sedation Time: 1435  End of Sedation Time: 1520  Total Sedation Time: 40 minutes    Procedure start time: 1445  Puncture time: 1445  Procedure end time: 1525    Report given to: 2A RN  Time pt departs:  1530    Other Notes:  Alert male transported via cart from  to IR Procedure Room 4 for planned intervention.  ID band confirmed and patient acknowledges understanding of planned procedure. Patient repositioned to procedure table via hover-mat and positioned on right side lying.  Patient prepped and draped per policy see VS flowsheet, MAR for further information.       10.2 F Skater catheter (LOT # [id number] / Expiration date 2020-06-21) inserted to left posterior flank region using image guidance.  Placement is confirmed and specimen obtained for ordered lab work.  Output appears purulent, thick and creamy.   Catheter is attached to collection bag for drainage.    Patient condition post procedure is stable.   Patient returned to  for post-procedure monitoring.    Aggie Hickey RN

## 2017-11-16 ENCOUNTER — TELEPHONE (OUTPATIENT)
Dept: UROLOGY | Facility: CLINIC | Age: 42
End: 2017-11-16

## 2017-11-16 NOTE — TELEPHONE ENCOUNTER
Called with a time changed from 7:45 am to 11 am on 12/20/17 for surgery and spoke with Ivett from [facility].  Patient is aware of change.

## 2017-11-20 ENCOUNTER — TELEPHONE (OUTPATIENT)
Dept: UROLOGY | Facility: CLINIC | Age: 42
End: 2017-11-20

## 2017-11-20 DIAGNOSIS — R30.0 DYSURIA: Primary | ICD-10-CM

## 2017-11-20 NOTE — TELEPHONE ENCOUNTER
Called [facility], informed them of plan. They will obtain UA UC and then irrigate PNT with 5 mL sterile saline with low pressure.

## 2017-11-20 NOTE — TELEPHONE ENCOUNTER
"----- Message from Priscilla Wong RN sent at 11/20/2017  3:05 PM CST -----  Regarding: RE: Left PNT  Contact: [phone]  Entered.  ----- Message -----     From: Raquel Bruner LPN     Sent: 11/20/2017   2:50 PM       To: Priscilla Wong RN  Subject: RE: Left PNT                                     I need orders for them.   ----- Message -----     From: Priscilla Wong RN     Sent: 11/20/2017   2:44 PM       To: Raquel Bruner LPN  Subject: RE: Left PNT                                     Yes, please have them irrigate and also if they can run a UA and UC that would be great. Thank you!!  ----- Message -----     From: Raquel Bruner LPN     Sent: 11/20/2017   1:32 PM       To: Dejon Horn MD, Priscilla Wong RN  Subject: Left PNT                                         [facility] called regarding this patient. Patient apparently has \"thick grainy\" urine from left PNT. They did try milk the tubing, which did not help. They believe the tube might be partially clogged.  Should they irrigate?    Please advise.    -Raquel Bruner LPN        "

## 2017-11-28 ENCOUNTER — TELEPHONE (OUTPATIENT)
Dept: UROLOGY | Facility: CLINIC | Age: 42
End: 2017-11-28

## 2017-11-28 NOTE — TELEPHONE ENCOUNTER
"----- Message from Dejon Horn MD sent at 11/28/2017  9:30 AM CST -----  Regarding: RE: Left PNT  Contact: [phone]  They can irrigate with 20cc of sterile normal saline twice per day.  ThanksLio.  ----- Message -----     From: Raquel Bruner LPN     Sent: 11/20/2017   1:32 PM       To: Dejon Horn MD, Priscilla Wong RN  Subject: Left PNT                                         [facility] called regarding this patient. Patient apparently has \"thick grainy\" urine from left PNT. They did try milk the tubing, which did not help. They believe the tube might be partially clogged.  Should they irrigate?    Please advise.    -Raquel Bruner LPN    "

## 2017-11-29 ENCOUNTER — CARE COORDINATION (OUTPATIENT)
Dept: UROLOGY | Facility: CLINIC | Age: 42
End: 2017-11-29

## 2017-11-29 NOTE — PROGRESS NOTES
Spoke with Ivett at care facility. She will obtain a UA and UC with sensitivities tomorrow and fax results. Priscilla Wong RN

## 2017-11-30 ENCOUNTER — TRANSFERRED RECORDS (OUTPATIENT)
Dept: HEALTH INFORMATION MANAGEMENT | Facility: CLINIC | Age: 42
End: 2017-11-30

## 2017-12-05 ENCOUNTER — TELEPHONE (OUTPATIENT)
Dept: UROLOGY | Facility: CLINIC | Age: 42
End: 2017-12-05

## 2017-12-05 ENCOUNTER — TELEPHONE (OUTPATIENT)
Dept: INTERVENTIONAL RADIOLOGY/VASCULAR | Facility: CLINIC | Age: 42
End: 2017-12-05

## 2017-12-05 DIAGNOSIS — N20.0 KIDNEY STONE: Primary | ICD-10-CM

## 2017-12-05 NOTE — TELEPHONE ENCOUNTER
Patients care giver called with positive results below   Patient was prescribed IV antibiotics by his PMD Dr Horn notified. Maisha Jenkins LPN Staff Nurse

## 2017-12-05 NOTE — TELEPHONE ENCOUNTER
Patients care giver called in regarding positive UC  This was faxed to us last week  Patient needs antibiotics to sent and also nephrostomy tube is still running poorly  They are irrigating but it is plugged often.  Needs treatment ASAP. Maisha Jenkins LPN Staff Nurse

## 2017-12-06 ENCOUNTER — HOSPITAL ENCOUNTER (OUTPATIENT)
Facility: CLINIC | Age: 42
Discharge: NURSING, REHAB OR LONG-TERM CARE | End: 2017-12-06
Attending: UROLOGY | Admitting: UROLOGY
Payer: COMMERCIAL

## 2017-12-06 ENCOUNTER — HOSPITAL ENCOUNTER (OUTPATIENT)
Dept: VASCULAR ULTRASOUND | Facility: CLINIC | Age: 42
End: 2017-12-06
Attending: UROLOGY | Admitting: UROLOGY
Payer: COMMERCIAL

## 2017-12-06 ENCOUNTER — APPOINTMENT (OUTPATIENT)
Dept: MEDSURG UNIT | Facility: CLINIC | Age: 42
End: 2017-12-06
Attending: UROLOGY
Payer: COMMERCIAL

## 2017-12-06 ENCOUNTER — APPOINTMENT (OUTPATIENT)
Dept: INTERVENTIONAL RADIOLOGY/VASCULAR | Facility: CLINIC | Age: 42
End: 2017-12-06
Attending: UROLOGY
Payer: COMMERCIAL

## 2017-12-06 VITALS
RESPIRATION RATE: 16 BRPM | HEART RATE: 90 BPM | TEMPERATURE: 98.2 F | HEIGHT: 69 IN | DIASTOLIC BLOOD PRESSURE: 77 MMHG | OXYGEN SATURATION: 94 % | BODY MASS INDEX: 27.4 KG/M2 | WEIGHT: 185 LBS | SYSTOLIC BLOOD PRESSURE: 141 MMHG

## 2017-12-06 DIAGNOSIS — N20.0 KIDNEY STONE: ICD-10-CM

## 2017-12-06 PROCEDURE — 25000125 ZZHC RX 250: Performed by: FAMILY MEDICINE

## 2017-12-06 PROCEDURE — 40000556 ZZH STATISTIC PERIPHERAL IV START W US GUIDANCE

## 2017-12-06 PROCEDURE — C1769 GUIDE WIRE: HCPCS

## 2017-12-06 PROCEDURE — 25000128 H RX IP 250 OP 636: Performed by: UROLOGY

## 2017-12-06 PROCEDURE — 25000128 H RX IP 250 OP 636: Performed by: STUDENT IN AN ORGANIZED HEALTH CARE EDUCATION/TRAINING PROGRAM

## 2017-12-06 PROCEDURE — 99153 MOD SED SAME PHYS/QHP EA: CPT

## 2017-12-06 PROCEDURE — 25000125 ZZHC RX 250: Performed by: STUDENT IN AN ORGANIZED HEALTH CARE EDUCATION/TRAINING PROGRAM

## 2017-12-06 PROCEDURE — 25000128 H RX IP 250 OP 636: Performed by: PHYSICIAN ASSISTANT

## 2017-12-06 PROCEDURE — 27210903 ZZH KIT CR5

## 2017-12-06 PROCEDURE — C1729 CATH, DRAINAGE: HCPCS

## 2017-12-06 PROCEDURE — 99152 MOD SED SAME PHYS/QHP 5/>YRS: CPT

## 2017-12-06 PROCEDURE — 40000168 ZZH STATISTIC PP CARE STAGE 3

## 2017-12-06 PROCEDURE — 50435 EXCHANGE NEPHROSTOMY CATH: CPT | Mod: LT

## 2017-12-06 RX ORDER — AMPICILLIN 1 G/1
1 INJECTION, POWDER, FOR SOLUTION INTRAMUSCULAR; INTRAVENOUS
Status: COMPLETED | OUTPATIENT
Start: 2017-12-06 | End: 2017-12-06

## 2017-12-06 RX ORDER — IODIXANOL 320 MG/ML
50 INJECTION, SOLUTION INTRAVASCULAR ONCE
Status: COMPLETED | OUTPATIENT
Start: 2017-12-06 | End: 2017-12-06

## 2017-12-06 RX ORDER — FENTANYL CITRATE 50 UG/ML
25-50 INJECTION, SOLUTION INTRAMUSCULAR; INTRAVENOUS EVERY 5 MIN PRN
Status: DISCONTINUED | OUTPATIENT
Start: 2017-12-06 | End: 2017-12-06 | Stop reason: HOSPADM

## 2017-12-06 RX ORDER — NALOXONE HYDROCHLORIDE 0.4 MG/ML
.1-.4 INJECTION, SOLUTION INTRAMUSCULAR; INTRAVENOUS; SUBCUTANEOUS
Status: DISCONTINUED | OUTPATIENT
Start: 2017-12-06 | End: 2017-12-06 | Stop reason: HOSPADM

## 2017-12-06 RX ORDER — FLUMAZENIL 0.1 MG/ML
0.2 INJECTION, SOLUTION INTRAVENOUS
Status: DISCONTINUED | OUTPATIENT
Start: 2017-12-06 | End: 2017-12-06 | Stop reason: HOSPADM

## 2017-12-06 RX ORDER — LIDOCAINE 40 MG/G
CREAM TOPICAL
Status: DISCONTINUED | OUTPATIENT
Start: 2017-12-06 | End: 2017-12-06 | Stop reason: HOSPADM

## 2017-12-06 RX ORDER — SODIUM CHLORIDE 9 MG/ML
INJECTION, SOLUTION INTRAVENOUS CONTINUOUS
Status: DISCONTINUED | OUTPATIENT
Start: 2017-12-06 | End: 2017-12-06 | Stop reason: HOSPADM

## 2017-12-06 RX ADMIN — IODIXANOL 30 ML: 320 INJECTION, SOLUTION INTRAVASCULAR at 16:30

## 2017-12-06 RX ADMIN — MIDAZOLAM 2 MG: 1 INJECTION INTRAMUSCULAR; INTRAVENOUS at 16:18

## 2017-12-06 RX ADMIN — LIDOCAINE HYDROCHLORIDE 5 ML: 10 INJECTION, SOLUTION EPIDURAL; INFILTRATION; INTRACAUDAL; PERINEURAL at 18:27

## 2017-12-06 RX ADMIN — AMPICILLIN SODIUM 1 G: 1 INJECTION, POWDER, FOR SOLUTION INTRAMUSCULAR; INTRAVENOUS at 13:56

## 2017-12-06 RX ADMIN — FENTANYL CITRATE 100 MCG: 50 INJECTION, SOLUTION INTRAMUSCULAR; INTRAVENOUS at 16:18

## 2017-12-06 RX ADMIN — GENTAMICIN SULFATE 160 MG: 40 INJECTION, SOLUTION INTRAMUSCULAR; INTRAVENOUS at 14:13

## 2017-12-06 RX ADMIN — SODIUM CHLORIDE: 9 INJECTION, SOLUTION INTRAVENOUS at 13:56

## 2017-12-06 RX ADMIN — LIDOCAINE HYDROCHLORIDE 5 ML: 10 INJECTION, SOLUTION EPIDURAL; INFILTRATION; INTRACAUDAL; PERINEURAL at 16:21

## 2017-12-06 NOTE — PROGRESS NOTES
Prepped for nephrostomy tube check.  Additionally, PICC line placement ordered.   Denies pain.  Needs update on H & P.  No labs ordered.  Consent needed.

## 2017-12-06 NOTE — PROGRESS NOTES
Interventional Radiology Pre-Procedure Sedation Assessment   Time of Assessment: 2:47 PM    Expected Level: Moderate Sedation    Indication: Sedation is required for the following type of Procedure:     Sedation and procedural consent: Risks, benefits and alternatives were discussed with Patient    PO Intake: Appropriately NPO for procedure    ASA Class: Class 3 - SEVERE SYSTEMIC DISEASE, DEFINITE FUNCTIONAL LIMITATIONS.    Mallampati: Grade 3:  Soft palate visible, posterior pharyngeal wall not visible    Lungs: Lungs Clear with good breath sounds bilaterally    Heart: Normal heart sounds and rate    Focused history and physical completed prior to procedure. I have reviewed the lab findings, diagnostic data, medications, and the plan for sedation. I have determined this patient to be an appropriate candidate for the planned sedation and procedure and have reassessed the patient IMMEDIATELY PRIOR to sedation and procedure.    Karina Singer MD

## 2017-12-06 NOTE — PROCEDURES
Interventional Radiology Brief Post Procedure Note    Procedure: IR NEPHROSTOMY TUBE CHANGE LEFT    Proceduralist: Lio Melo PA-C    Assistant: None    Time Out: Prior to the start of the procedure and with procedural staff participation, I verbally confirmed the patient s identity using two indicators, relevant allergies, that the procedure was appropriate and matched the consent or emergent situation, and that the correct equipment/implants were available. Immediately prior to starting the procedure I conducted the Time Out with the procedural staff and re-confirmed the patient s name, procedure, and site/side. (The [facility] universal protocol was followed.)  Yes    Medications   Medication Event Details Admin User Admin Time   midazolam (VERSED) injection 0.5-1 mg Medication Given Dose: 2 mg; Route: Intravenous Nohemi Fields RN 12/6/2017  4:18 PM   fentaNYL (PF) (SUBLIMAZE) injection 25-50 mcg Medication Given Dose: 100 mcg; Route: Intravenous Nohemi Fields RN 12/6/2017  4:18 PM   lidocaine 1 % 1-30 mL Medication Given Dose: 5 mL; Route: Intradermal Nohemi Fields RN 12/6/2017  4:21 PM       Sedation: IR Nurse Monitored Care   Post Procedure Summary:  Prior to the start of the procedure and with procedural staff participation, I verbally confirmed the patient s identity using two indicators, relevant allergies, that the procedure was appropriate and matched the consent or emergent situation, and that the correct equipment/implants were available. Immediately prior to starting the procedure I conducted the Time Out with the procedural staff and re-confirmed the patient s name, procedure, and site/side. (The [facility] universal protocol was followed.)  Yes       Sedatives: Fentanyl and Midazolam (Versed)    Vital signs, airway and pulse oximetry were monitored and remained stable throughout the procedure and sedation was maintained until the procedure was complete.  The  patient was monitored by staff until sedation discharge criteria were met.    Patient tolerance: Patient tolerated the procedure well with no immediate complications.    Time of sedation in minutes: 30 minutes from beginning to end of physician one to one monitoring.          Findings: Completed fluoroscopy-guided over wire exchange of 10.2 [place] villaseñor callahan macklock left percutaneous nephrostomy tube (PNT).      Estimated Blood Loss: Minimal    Fluoroscopy Time: 6.3 minute(s)    SPECIMENS: None    Complications: 1. None     Condition: Stable    Plan: Follow-up per primary team.     Comments: See dictated procedure note for full details.    Lio Melo PA-C

## 2017-12-06 NOTE — PROGRESS NOTES
Patient Name: Maicol Carrera  Medical Record Number: [id number]  Today's Date: 12/6/2017    Procedure: Left Nephrostomy Tube Check, change  Proceduralist: ORALIA Melo PA-C    Sedation start time: 1550  Sedation end time: 1625  Sedation medications administered: Fentanyl 100 mcg Versed 2 mg    Procedure start time: 1550  Puncture time: wire inserted into existing catheter @ 1555  Procedure end time: 1630    Report given to:  RN  : none needed    Other Notes: Pt arrived to IR room 1 from . Pt denies any questions or concerns regarding procedure. Pt positioned supine and monitored per protocol.  Nephrostomy exchanged for 10.2F x 25cm Elmer Ta. LOT [id number]. Exp 2019-11-09.   Pt tolerated procedure without any noted complications. Pt transferred back to .    Nohemi Govea RN

## 2017-12-06 NOTE — IP AVS SNAPSHOT
[address] [address]                                       After Visit Summary   12/6/2017    Maicol Carrera    MRN: [id number]           After Visit Summary Signature Page     I have received my discharge instructions, and my questions have been answered. I have discussed any challenges I see with this plan with the nurse or doctor.    ..........................................................................................................................................  Patient/Patient Representative Signature      ..........................................................................................................................................  Patient Representative Print Name and Relationship to Patient    ..................................................               ................................................  Date                                            Time    ..........................................................................................................................................  Reviewed by Signature/Title    ...................................................              ..............................................  Date                                                            Time

## 2017-12-06 NOTE — IP AVS SNAPSHOT
MRN:[id number]                      After Visit Summary   12/6/2017    Maicol Carrera    MRN: [id number]           Visit Information        Department      12/6/2017 12:39 PM [address] [facility]          Review of your medicines      UNREVIEWED medicines. Ask your doctor about these medicines        Dose / Directions    acetaminophen 32 mg/mL solution   Commonly known as:  TYLENOL   Used for:  Abdominal pain        Dose:  650 mg   20.3 mLs by Per Feeding Tube route every 6 hours as needed.   Quantity:  120 mL   Refills:  0       albuterol (2.5 MG/3ML) 0.083% neb solution   Used for:  SOB (shortness of breath)        Dose:  2.5 mg   Take 3 mLs by nebulization every 2 hours as needed.   Quantity:  1 Box   Refills:  0       ascorbic acid 250 MG Tabs tablet   Used for:  Recurrent UTI        Dose:  250 mg   Take 1 tablet by mouth 2 times daily.   Refills:  0       * baclofen 10 MG tablet   Commonly known as:  LIORESAL        Dose:  10 mg   Take 10 mg by mouth 4 times daily.   Refills:  0       * BACLOFEN IT        Dose:  240 mg   240 mg by Intrathecal route daily. Via pump   Refills:  0       COLACE 100 MG capsule   Generic drug:  docusate sodium        Dose:  100 mg   Take 100 mg by mouth 2 times daily.   Refills:  0       Cranberry 300 MG Tabs   Used for:  UTI (urinary tract infection)        Dose:  300 mg   Take 300 mg by mouth 2 times daily.   Refills:  0       cyanocobalamin 1000 MCG tablet   Commonly known as:  vitamin  B-12   Used for:  Vitamin B 12 deficiency        Dose:  1000 mcg   Take 1 tablet by mouth daily.   Refills:  0       * bisacodyl 10 MG Suppository   Commonly known as:  DULCOLAX   Used for:  Chronic constipation        Dose:  10 mg   Place 1 suppository rectally every other day.   Quantity:  12 suppository   Refills:  0       * DULCOLAX PO        Dose:  10 mg   Take 10 mg by mouth At Bedtime.   Refills:  0       ferrous sulfate 325 (65 FE) MG tablet   Commonly known as:  IRON    Used for:  Anemia due to blood loss, acute        Dose:  325 mg   Take 1 tablet by mouth daily (with breakfast).   Quantity:  100 tablet   Refills:  0       fluconazole 100 MG tablet   Commonly known as:  DIFLUCAN   Used for:  Candida UTI        Take  by mouth daily. two tablets on first day, then one tablet daily for 2 weeks   Quantity:  15 tablet   Refills:  0       levETIRAcetam 500 MG 24 hr tablet   Commonly known as:  KEPPRA XR   Used for:  Seizure disorder (H)        Dose:  1000 mg   Take 2 tablets by mouth 2 times daily.   Refills:  0       MAGNESIUM CITRATE PO        Take  by mouth every 3 days. PRN   Refills:  0       magnesium gluconate 500 MG tablet   Commonly known as:  MAGONATE   Used for:  Magnesium deficiency        Dose:  500 mg   Take 1 tablet by mouth daily.   Refills:  0       miconazole 2 % powder   Commonly known as:  MICATIN; MICRO GUARD   Used for:  Candida infection of flexural skin        Apply  topically 2 times daily.   Quantity:  70 g   Refills:  0       ondansetron 4 MG ODT tab   Commonly known as:  ZOFRAN-ODT   Used for:  Nausea and vomiting        Dose:  4 mg   Take 1 tablet by mouth every 6 hours as needed for nausea.   Quantity:  20 tablet   Refills:  0       OSTEO-MULTIVITAMINS/MINERALS OR        Take  by mouth daily. 12 pm   Refills:  0       oxybutynin 5 MG tablet   Commonly known as:  DITROPAN   Used for:  Neurogenic bladder        Dose:  5 mg   Take 1 tablet by mouth 3 times daily.   Quantity:  270 tablet   Refills:  0       ranitidine 150 MG tablet   Commonly known as:  ZANTAC   Used for:  GERD (gastroesophageal reflux disease)        Dose:  150 mg   Take 1 tablet by mouth At Bedtime.   Quantity:  180 tablet   Refills:  0       senna-docusate 8.6-50 MG per tablet   Commonly known as:  SENOKOT-S;PERICOLACE   Used for:  Chronic constipation        Dose:  1-2 tablet   Take 1-2 tablets by mouth 2 times daily.   Quantity:  60 tablet   Refills:  0       sodium PHOSphate 66 mL,  mineral oil 60 mL, docusate 100 mL, Magnesium Citrate 60 mL PINK LADY enema        Dose:  268 mL   Place 268 mLs rectally once. PRN   Quantity:  286 mL   Refills:  0       sorbitol 70 % Soln        Dose:  30 mL   Take 30 mLs by mouth At Bedtime.   Refills:  0       zinc oxide 40 % ointment   Commonly known as:  DESITIN   Used for:  Breakdown of skin tissue        Apply  topically every hour as needed.   Quantity:  56.7 g   Refills:  0       zolpidem 5 MG tablet   Commonly known as:  AMBIEN   Used for:  Insomnia        Dose:  5 mg   Take 1 tablet by mouth nightly as needed for sleep.   Quantity:  30 tablet   Refills:  0       * Notice:  This list has 4 medication(s) that are the same as other medications prescribed for you. Read the directions carefully, and ask your doctor or other care provider to review them with you.             Protect others around you: Learn how to safely use, store and throw away your medicines at www.disposemymeds.org.         Follow-ups after your visit        Your next 10 appointments already scheduled     Dec 06, 2017  6:00 PM CST   (Arrive by 5:45 PM)   IR PICC VASCULAR with [id number]   [facility], [place], Vascular Access ([facility])    [address] [address]              1. You will need to have had a history and physical exam within 7 days of the procedure. 2. Laboratory test are to be obtained by your doctor prior to the exam (CBCP, INR and PTT) 3. Someone will need to drive you to and from the hospital. 4. If you are or may be pregnant, contact your doctor or a Radiology nurse prior to the day of the exam. 5. If you have diabetes, check with your doctor or a Radiology nurse to see if your insulin needs to be adjusted for the exam. 6. If you are taking Coumadin (to thin you blood) please contact your doctor or a Radiology nurse at least 3 days before the exam for special instructions. 7. The day before your exam you may eat  your regular diet and are encouraged to drink at least 2 quarts of clear liquids. Drink no alcoholic beverages for 24 hours prior to the exam. 8. Do not eat any solid food or milk products for 6 hours prior to the exam. You may drink clear liquids until 2 hours prior to the exam. Clear liquids include the following: water, Jell-O, clear broth, apple juice or any noncarbonated drink that you can see through (no pop!) 9. The morning of the exam you may brush your teeth and take medications as directed with a sip of water. 10. Tell the Radiology nurse if you have any allergies.            Dec 12, 2017  8:30 AM CST   (Arrive by 8:15 AM)   PAC EVALUATION with  Pac Domingo 9   [facility] ([facility])    [address] [place] [id number] [phone]            Dec 12, 2017  9:30 AM CST   (Arrive by 9:15 AM)   PAC RN ASSESSMENT with  Pac Rn   [facility] [place] ([facility])    [address] [place] [id number]   [phone]            Dec 12, 2017 10:10 AM CST   (Arrive by 9:55 AM)   PAC Anesthesia Consult with  Pac Anesthesiologist   [facility] ([facility])    [address] [place] [id number]   [phone]            Dec 20, 2017   Procedure with Dejon Horn MD   [facility], [place], Same Day Surgery (--)    [address] [facility] [address]   [phone]            Dec 29, 2017 10:20 AM CST   (Arrive by 10:05 AM)   Cystoscopy with Dejon Horn MD   [facility] ([facility])    [address] [place] [id number] [phone]               Care Instructions        Further instructions from your care team       [facility]  Discharge Instructions for  "  Percutaneous Nephrostomy   Tube Placement    After you go home:    Have an adult stay with you for 24 hours.    Drink plenty of fluids.    Resume a regular diet unless otherwise ordered by your physician.      For 24 Hours:    Relax and take it easy.    Do not drive or operate machines at home or at work.    No alcohol for 24 hours.    Do not make any important or legal decisions.    Do not do any strenuous exercise or lifting greater that 10 lbs for at least             2 days following your procedure.    CALL THE PHYSICIAN IF:    You stat bleeding from the procedure site. If you do start to bleed from the site lie down and hold some pressure on the site. Your physician will tell you if you need to return to the hospital.    You develop nausea or vomiting.    You develop hives or a rash or any unexplained itching.    ADDITIONAL INSTRUCTIONS:  Please call for the following problems:  1. No urine draining from the nephrostomy tube. Check that tube is not kinked.  2. Urine leaking around tube.  3. Urine becomes very foul smelling or new or fresh blood in urine  4. The skin around the tube is red, painful, or has drainage.  5. You have pain in your back, over your kidney.  6. You have a fever of 100.5 F and chills  7. You feel nauseated and \"just not right.\"    Change the dressing initially the next day to check the insertion site.  After that change every other day.  Clean around tube site with washcloth and antibacterial soap.      [facility] INTERVENTIONAL RADIOLOGY DEPARTMENT  Procedure Physician:  Dr. Montes   Date:December 6, 2017  Telephone Numbers:  [phone]     Monday-Friday 8:00AM-4:30PM                       [phone]     After 4:30 PM Monday-Friday, Weekends and Holidays. Ask for Interventional Radiologist on Call. Someone is available 24 hours a day.  [facility] toll free number: [phone] Monday- Friday 8:00AM -4:30PM.    I     Additional Information About Your Visit        MyChart Information     " "Jogg lets you send messages to your doctor, view your test results, renew your prescriptions, schedule appointments and more. To sign up, go to [email or web address] . Click on \"Log in\" on the left side of the screen, which will take you to the Welcome page. Then click on \"Sign up Now\" on the right side of the page.     You will be asked to enter the access code listed below, as well as some personal information. Please follow the directions to create your username and password.     Your access code is: [email or web address]  Expires: 2017  5:30 AM     Your access code will  in 90 days. If you need help or a new code, please call your [facility] clinic or [phone].        Care EveryWhere ID     This is your Care EveryWhere ID. This could be used by other organizations to access your [facility] medical records  [id number]        Your Vitals Were     Blood Pressure Pulse Temperature Respirations Height Weight    141/77 90 98.2  F (36.8  C) 16 1.753 m (5' 9\") 83.9 kg (185 lb)    Pulse Oximetry BMI (Body Mass Index)                94% 27.32 kg/m2           Primary Care Provider    Nancy Ag MD      Equal Access to Services     [facility]: Hadii aad ku hadasho Soomaali, waaxda luqadaha, qaybta kaalmada adeegyada, waxay arielin haymonalisan enedelia torres lamia . So [facility] [phone].    ATENCIÓN: Si habla español, tiene a da silva disposición servicios gratuitos de asistencia lingüística. Llame al [phone].    We comply with applicable federal civil rights laws and [place] laws. We do not discriminate on the basis of race, color, national origin, age, disability, sex, sexual orientation, or gender identity.            Thank you!     Thank you for choosing [facility] for your care. Our goal is always to provide you with excellent care. Hearing back from our patients is one way we can continue to improve our services. Please take a few minutes to complete the written survey that you may receive in the mail " after you visit with us. Thank you!             Medication List: This is a list of all your medications and when to take them. Check marks below indicate your daily home schedule. Keep this list as a reference.      Medications           Morning Afternoon Evening Bedtime As Needed    acetaminophen 32 mg/mL solution   Commonly known as:  TYLENOL   20.3 mLs by Per Feeding Tube route every 6 hours as needed.                                albuterol (2.5 MG/3ML) 0.083% neb solution   Take 3 mLs by nebulization every 2 hours as needed.                                ascorbic acid 250 MG Tabs tablet   Take 1 tablet by mouth 2 times daily.                                * baclofen 10 MG tablet   Commonly known as:  LIORESAL   Take 10 mg by mouth 4 times daily.                                * BACLOFEN IT   240 mg by Intrathecal route daily. Via pump                                COLACE 100 MG capsule   Take 100 mg by mouth 2 times daily.   Generic drug:  docusate sodium                                Cranberry 300 MG Tabs   Take 300 mg by mouth 2 times daily.                                cyanocobalamin 1000 MCG tablet   Commonly known as:  vitamin  B-12   Take 1 tablet by mouth daily.                                * bisacodyl 10 MG Suppository   Commonly known as:  DULCOLAX   Place 1 suppository rectally every other day.                                * DULCOLAX PO   Take 10 mg by mouth At Bedtime.                                ferrous sulfate 325 (65 FE) MG tablet   Commonly known as:  IRON   Take 1 tablet by mouth daily (with breakfast).                                fluconazole 100 MG tablet   Commonly known as:  DIFLUCAN   Take  by mouth daily. two tablets on first day, then one tablet daily for 2 weeks                                levETIRAcetam 500 MG 24 hr tablet   Commonly known as:  KEPPRA XR   Take 2 tablets by mouth 2 times daily.                                MAGNESIUM CITRATE PO   Take  by mouth every  3 days. PRN                                magnesium gluconate 500 MG tablet   Commonly known as:  MAGONATE   Take 1 tablet by mouth daily.                                miconazole 2 % powder   Commonly known as:  MICATIN; MICRO GUARD   Apply  topically 2 times daily.                                ondansetron 4 MG ODT tab   Commonly known as:  ZOFRAN-ODT   Take 1 tablet by mouth every 6 hours as needed for nausea.                                OSTEO-MULTIVITAMINS/MINERALS OR   Take  by mouth daily. 12 pm                                oxybutynin 5 MG tablet   Commonly known as:  DITROPAN   Take 1 tablet by mouth 3 times daily.                                ranitidine 150 MG tablet   Commonly known as:  ZANTAC   Take 1 tablet by mouth At Bedtime.                                senna-docusate 8.6-50 MG per tablet   Commonly known as:  SENOKOT-S;PERICOLACE   Take 1-2 tablets by mouth 2 times daily.                                sodium PHOSphate 66 mL, mineral oil 60 mL, docusate 100 mL, Magnesium Citrate 60 mL PINK LADY enema   Place 268 mLs rectally once. PRN                                sorbitol 70 % Soln   Take 30 mLs by mouth At Bedtime.                                zinc oxide 40 % ointment   Commonly known as:  DESITIN   Apply  topically every hour as needed.                                zolpidem 5 MG tablet   Commonly known as:  AMBIEN   Take 1 tablet by mouth nightly as needed for sleep.                                * Notice:  This list has 4 medication(s) that are the same as other medications prescribed for you. Read the directions carefully, and ask your doctor or other care provider to review them with you.

## 2017-12-06 NOTE — DISCHARGE INSTRUCTIONS
"[facility]  Discharge Instructions for   Percutaneous Nephrostomy   Tube Placement    After you go home:    Have an adult stay with you for 24 hours.    Drink plenty of fluids.    Resume a regular diet unless otherwise ordered by your physician.      For 24 Hours:    Relax and take it easy.    Do not drive or operate machines at home or at work.    No alcohol for 24 hours.    Do not make any important or legal decisions.    Do not do any strenuous exercise or lifting greater that 10 lbs for at least             2 days following your procedure.    CALL THE PHYSICIAN IF:    You stat bleeding from the procedure site. If you do start to bleed from the site lie down and hold some pressure on the site. Your physician will tell you if you need to return to the hospital.    You develop nausea or vomiting.    You develop hives or a rash or any unexplained itching.    ADDITIONAL INSTRUCTIONS:  Please call for the following problems:  1. No urine draining from the nephrostomy tube. Check that tube is not kinked.  2. Urine leaking around tube.  3. Urine becomes very foul smelling or new or fresh blood in urine  4. The skin around the tube is red, painful, or has drainage.  5. You have pain in your back, over your kidney.  6. You have a fever of 100.5 F and chills  7. You feel nauseated and \"just not right.\"    Change the dressing initially the next day to check the insertion site.  After that change every other day.  Clean around tube site with washcloth and antibacterial soap.      [facility] INTERVENTIONAL RADIOLOGY DEPARTMENT  Procedure Physician:  Dr. Montes   Date:December 6, 2017  Telephone Numbers:  [phone]     Monday-Friday 8:00AM-4:30PM                       [phone]     After 4:30 PM Monday-Friday, Weekends and Holidays. Ask for Interventional Radiologist on Call. Someone is available 24 hours a day.  [facility] toll free number: [phone] Monday- Friday 8:00AM -4:30PM.    I  "

## 2017-12-07 NOTE — PROGRESS NOTES
1830: Returned from PICC Line placement. Patient may be discharged.  1900:  Discharged to home. Care transferred to [facility]. Transported to van by patient's personalized wheelchair. Discharge instructions sent with patient to give to care facility.

## 2017-12-12 ENCOUNTER — OFFICE VISIT (OUTPATIENT)
Dept: SURGERY | Facility: CLINIC | Age: 42
End: 2017-12-12

## 2017-12-12 ENCOUNTER — ANESTHESIA EVENT (OUTPATIENT)
Dept: SURGERY | Facility: CLINIC | Age: 42
End: 2017-12-12

## 2017-12-12 ENCOUNTER — ALLIED HEALTH/NURSE VISIT (OUTPATIENT)
Dept: SURGERY | Facility: CLINIC | Age: 42
End: 2017-12-12

## 2017-12-12 VITALS
DIASTOLIC BLOOD PRESSURE: 76 MMHG | OXYGEN SATURATION: 94 % | SYSTOLIC BLOOD PRESSURE: 134 MMHG | HEART RATE: 82 BPM | TEMPERATURE: 97.5 F | RESPIRATION RATE: 16 BRPM

## 2017-12-12 DIAGNOSIS — Z01.818 PREOP EXAMINATION: Primary | ICD-10-CM

## 2017-12-12 RX ORDER — KETOCONAZOLE 20 MG/G
CREAM TOPICAL 2 TIMES DAILY PRN
COMMUNITY

## 2017-12-12 RX ORDER — GLUCOSAMINE HCL 500 MG
400 TABLET ORAL 2 TIMES DAILY
COMMUNITY

## 2017-12-12 RX ORDER — CLINDAMYCIN PHOSPHATE 10 MG/G
GEL TOPICAL 2 TIMES DAILY
COMMUNITY

## 2017-12-12 RX ORDER — POLYVINYL ALCOHOL 14 MG/ML
2 SOLUTION/ DROPS OPHTHALMIC
COMMUNITY

## 2017-12-12 RX ORDER — SELENIUM SULFIDE 2.5 MG/100ML
LOTION TOPICAL
COMMUNITY

## 2017-12-12 RX ORDER — POLYETHYLENE GLYCOL 3350 17 G/17G
1 POWDER, FOR SOLUTION ORAL DAILY PRN
COMMUNITY

## 2017-12-12 RX ORDER — MOMETASONE FUROATE 1 MG/G
CREAM TOPICAL AT BEDTIME
COMMUNITY

## 2017-12-12 NOTE — PATIENT INSTRUCTIONS
Preparing for Your Surgery      Name:  Maicol Carrera   MRN:  [id number]   :  1975   Today's Date:  2017     Arriving for surgery:  Surgery date:  17  Arrival time:  09:00 a.m.  Please come to:       [facility] [address]  [address] [place]  [address]    -   parking is available in front of the hospital from 5:15 am to 8:00 pm    -  Stop at the Information Desk in the lobby    -   Inform the information person that you are here for surgery. An escort to 3c will be provided. If you would not like an escort, please proceed to 3C on the 3rd floor. [phone]     What can I eat or drink?  -  You may have solid food or milk products until 8 hours prior to your surgery  midnight  -  You may have water, apple juice or 7up/Sprite until 2 hours prior to your surgery  09:00 a.m.    Which medicines can I take?  -  Do NOT take these medications in the morning, the day of surgery:    Please hold vitamins, minerals and supplements 7 days prior to surgery.  Miconazole powder, Desitin, Miralax    -  Please take these medications the day of surgery:     Albuterol as needed, fluconazole, Levetiracetam, ondansetron, oxybutynin, ranitidine, Baclofen, senna,  colace and dulcolax as needed, Tylenol as needed    How do I prepare myself?  -  Take two showers: one the night before surgery; and one the morning of surgery.         Use Scrubcare or Hibiclens to wash from neck down.  You may use your own shampoo and conditioner. No other hair products.   -  Do NOT use lotion, powder, deodorant, or antiperspirant the day of your surgery.  -  Do NOT wear any makeup, fingernail polish or jewelry.  -Do not bring your own medications to the hospital, except for inhalers and eye drops.  -  Bring your ID and insurance card.    Questions or Concerns:  If you have questions or concerns, please call the  Preoperative Assessment Center, Monday-Friday 7AM-7PM:  [phone]

## 2017-12-12 NOTE — MR AVS SNAPSHOT
After Visit Summary   12/12/2017    Maicol Carrera    MRN: [id number]           Patient Information     Date Of Birth          1975        Visit Information        Provider Department      12/12/2017 8:00 AM Pharmacist, Libby Romo [facility]        Today's Diagnoses     Preop examination    -  1       Follow-ups after your visit        Your next 10 appointments already scheduled     Dec 12, 2017 10:10 AM CST   (Arrive by 9:55 AM)   PAC Anesthesia Consult with  Pac Anesthesiologist   [facility] Preoperative Assessment Center ([facility])    [address] [place] [address]  4th [facility] [id number] [phone]            Dec 12, 2017 11:30 AM CST   LAB with  LAB   [facility] Lab ([facility])    [address] [place] [address] [id number] [phone]           Please do not eat 10-12 hours before your appointment if you are coming in fasting for labs on lipids, cholesterol, or glucose (sugar). This does not apply to pregnant women. Water, hot tea and black coffee (with nothing added) are okay. Do not drink other fluids, diet soda or chew gum.            Dec 20, 2017   Procedure with Dejon Horn MD   [facility] [place], Same Day Surgery (--)    [address] [facility] [id number] [phone]            Dec 29, 2017 10:20 AM CST   (Arrive by 10:05 AM)   Cystoscopy with Dejon Horn MD   [facility] Urology and [facility] Urologic [facility] ([facility])    [address] [place] [id number] [phone]              Who to contact     Please call your clinic at [phone] to:    Ask questions about your health    Make or cancel appointments    Discuss your medicines    Learn about your test results    Speak to your doctor   If you have compliments or concerns about an experience at your clinic, or if you wish to file a complaint, please  contact [facility] Patient Relations at [phone] or email us at [email or web address]         Additional Information About Your Visit        [employer] Parkingharc[employer] Information     prettysecrets is an electronic gateway that provides easy, online access to your medical records. With prettysecrets, you can request a clinic appointment, read your test results, renew a prescription or communicate with your care team.     To sign up for prettysecrets visit the website at [email or web address]   You will be asked to enter the access code listed below, as well as some personal information. Please follow the directions to create your username and password.     Your access code is: [email or web address]  Expires: 3/7/2018  6:30 AM     Your access code will  in 90 days. If you need help or a new code, please contact your [facility] or call [phone] for assistance.        Care EveryWhere ID     This is your Care EveryWhere ID. This could be used by other organizations to access your [place] medical records  [id number]         Blood Pressure from Last 3 Encounters:   17 134/76   17 141/77   17 123/77    Weight from Last 3 Encounters:   17 83.9 kg (185 lb)   17 89.4 kg (197 lb)   10/20/17 88.9 kg (196 lb)              Today, you had the following     No orders found for display       Primary Care Provider    Nancy Ag MD       No address on file        Equal Access to Services     [facility] : Hadii trupti israelo Sonavneet, waaxda luqadaha, qaybta kaalmada joaquina, kathy phillips . So [facility] [phone].    ATENCIÓN: Si habla español, tiene a da silva disposición servicios gratuitos de asistencia lingüística. Llame al [phone].    We comply with applicable federal civil rights laws and [place] laws. We do not discriminate on the basis of race, color, national origin, age, disability, sex, sexual orientation, or  gender identity.            Thank you!     Thank you for choosing [facility] PREOPERATIVE [facility]  for your care. Our goal is always to provide you with excellent care. Hearing back from our patients is one way we can continue to improve our services. Please take a few minutes to complete the written survey that you may receive in the mail after your visit with us. Thank you!             Your Updated Medication List - Protect others around you: Learn how to safely use, store and throw away your medicines at www.disposemymeds.org.          This list is accurate as of: 12/12/17 10:03 AM.  Always use your most recent med list.                   Brand Name Dispense Instructions for use Diagnosis    ACETAMINOPHEN PO      Take 650 mg by mouth every 4 hours as needed for pain        albuterol (2.5 MG/3ML) 0.083% neb solution     1 Box    Take 3 mLs by nebulization every 2 hours as needed.    SOB (shortness of breath)       ascorbic acid 250 MG Tabs tablet      Take 1 tablet by mouth 2 times daily.    Recurrent UTI       * baclofen 10 MG tablet    LIORESAL     Take 10 mg by mouth 4 times daily as needed        * BACLOFEN IT      Via implanted IT pump Baclofen 2000 mc/gmL  Dose: 277.1 mcg/day Low reservoir alarm date: 4/26/2018        clindamycin 1 % topical gel    CLINDAMAX     Apply topically 2 times daily        COLACE 100 MG capsule   Generic drug:  docusate sodium      Take 100 mg by mouth 2 times daily.        Cranberry 400 MG Tabs      Take 400 mg by mouth 2 times daily        cyanocobalamin 1000 MCG tablet    vitamin  B-12     Take 1 tablet by mouth daily.    Vitamin B 12 deficiency       * bisacodyl 10 MG Suppository    DULCOLAX    12 suppository    Place 1 suppository rectally every other day.    Chronic constipation       * DULCOLAX PO      Take 10 mg by mouth At Bedtime.        ferrous sulfate 325 (65 FE) MG tablet    IRON    100 tablet    Take 1 tablet by mouth daily (with breakfast).    Anemia due to  blood loss, acute       IMIPENEM-CILASTATIN IV      Inject 500 mg into the vein every 6 hours        KEPPRA PO      Take 1,000 mg by mouth 2 times daily        ketoconazole 2 % cream    NIZORAL     Apply topically 2 times daily as needed        magnesium gluconate 500 MG tablet    MAGONATE     Take 1 tablet by mouth daily.    Magnesium deficiency       miconazole 2 % powder    MICATIN; MICRO GUARD    70 g    Apply  topically 2 times daily.    Candida infection of flexural skin       mometasone 0.1 % cream    ELOCON     Apply topically At Bedtime        ondansetron 4 MG ODT tab    ZOFRAN-ODT    20 tablet    Take 1 tablet by mouth every 6 hours as needed for nausea.    Nausea and vomiting       OSTEO-MULTIVITAMINS/MINERALS OR      Take  by mouth daily. 12 pm        oxybutynin 5 MG tablet    DITROPAN    270 tablet    Take 1 tablet by mouth 3 times daily.    Neurogenic bladder       polyethylene glycol powder    MIRALAX/GLYCOLAX     Take 1 capful by mouth daily as needed for constipation (if no BM x3 days)        polyvinyl alcohol 1.4 % ophthalmic solution    LIQUIFILM TEARS     Place 2 drops into both eyes every 2 hours as needed for dry eyes        selenium sulfide 2.5 % lotion    SELSUN     Apply to scalp topically in the morning every Sun, Tue, Thursday. Place for 5 min then rinse.        senna-docusate 8.6-50 MG per tablet    SENOKOT-S;PERICOLACE    60 tablet    Take 1-2 tablets by mouth 2 times daily.    Chronic constipation       VITAMIN D (CHOLECALCIFEROL) PO      Take 5,000 Units by mouth daily        zinc oxide 40 % ointment    DESITIN    56.7 g    Apply  topically every hour as needed.    Breakdown of skin tissue       * Notice:  This list has 4 medication(s) that are the same as other medications prescribed for you. Read the directions carefully, and ask your doctor or other care provider to review them with you.

## 2017-12-12 NOTE — ANESTHESIA PREPROCEDURE EVALUATION
Anesthesia Evaluation     . Pt has had prior anesthetic. Type: General    No history of anesthetic complications          ROS/MED HX    ENT/Pulmonary:     (+)sleep apnea, uses CPAP On BiPAP cmH2O , . .    Neurologic: Comment: Quadriplegia with spasticity    (+)Spinal cord injury year sustained: 1994 level of injury: C4     Cardiovascular:  - neg cardiovascular ROS   (+) ----. : . . . :. . No previous cardiac testing       METS/Exercise Tolerance:     Hematologic:  - neg hematologic  ROS       Musculoskeletal:         GI/Hepatic:     (+) GERD Asymptomatic on medication,       Renal/Genitourinary:     (+) Nephrolithiasis , Other Renal/ Genitourinary, neurogenic bladder      Endo:  - neg endo ROS       Psychiatric:  - neg psychiatric ROS       Infectious Disease:  - neg infectious disease ROS       Malignancy:      - no malignancy   Other:    (+) other significant disability Wheelchair bound                   Physical Exam  Normal systems: cardiovascular and pulmonary    Airway   Mallampati: II  TM distance: >3 FB  Neck ROM: full    Dental   (+) missing    Cardiovascular   Rhythm and rate: regular and normal      Pulmonary    breath sounds clear to auscultation    Other findings: Lab Results       Component                Value               Date                       WBC                      8.1                 12/29/2011                 HGB                      12.8 (L)            11/13/2017                 IRON                     24 (L)              11/07/2011                 IRONSAT                  10 (L)              11/07/2011                 HCT                      41.0                11/13/2017                 PLT                      285                 11/13/2017                 INR                      1.03                11/13/2017                 PTT                      33                  10/23/2011                 NA                       142                 12/29/2011                 POTASSIUM                 4.0                 12/29/2011                 BALAJI                      9.2                 12/29/2011                 GLC                      125 (H)             12/29/2011                 CR                       0.60 (L)            12/29/2011                 BUN                      13                  12/29/2011                 CO2                      29                  12/29/2011                 ALT                      58                  12/29/2011                 AST                      36                  12/29/2011                 BILITOTAL                0.4                 12/29/2011                 ALKPHOS                  81                  12/29/2011                     PAC Discussion and Assessment    ASA Classification: 3  Case is suitable for: [place]  Anesthetic techniques and relevant risks discussed: GA with regional block for post-op pain control  Invasive monitoring and risk discussed:   Types:   Possibility and Risk of blood transfusion discussed:   NPO instructions given:   Additional anesthetic preparation and risks discussed:   Needs early admission to pre-op area:   Other:     PAC Resident/NP Anesthesia Assessment:  Maicol Carrera is a 42 year old male who presents for pre-operative H & P in preparation for a Left Percutaneous Nephrolithotomy Access To Kidney, Ureteroscopy, Cystoscopy, Stent Placement With Holmium Laser  on 12/20/17 with Dr. Dejon Horn for calculus of kidney at the [facility].    PAC referral for risk assessment and optimization for anesthesia with comorbid conditions of:    Pre-operative considerations:  1.  Cardiac:        Risk of Major Adverse Cardiac event:  0.4%  2.  Pulm:     -JUAN:  On Bipap  3.  Neuro:  -Spastic C4 quadriplegia    *implanted intrathecal baclofen pump to manage spasticity  4.  GI:  Risk of PONV score =1 .  If > 2, anti-emetic intervention recommended.    Patient is optimized and is  acceptable candidate for the proposed procedure.  No further diagnostic evaluation is needed.    Patient also evaluated by Dr. Rutledge. See recommendations below.     Saundra You MS, PA-C  12/12/17 11:11 AM        Mid-Level Provider/Resident:   Date:   Time:     Attending Anesthesiologist Anesthesia Assessment:  42 year old for laser holmium nephrolitotomy via perc nephrostomy and cystoscopy, ureteroscopy. Chart reviewed, patient seen and evaluated; agree with above assessment. Patient has no significant cardiac or pulmonary disease, but has JUAN and uses CPAP. Had occasionally had autonomic dysreflexia, but it not a significant issue for him. He does have a baclofen pump.    Patient is appropriate for the planned procedure without further workup or medical management change. The final anesthesia plan will be determined by the physician anesthesiologist caring for the patient on the day of surgery.    Reviewed and Signed by PAC Anesthesiologist  Anesthesiologist: sharon  Date: 12/12/2017  Time:   Pass/Fail: Pass  Disposition:     PAC Pharmacist Assessment:        Pharmacist:   Date:   Time:                           .

## 2017-12-12 NOTE — H&P
Pre-Operative H & P     CC:  Preoperative exam to assess for increased cardiopulmonary risk while undergoing surgery and anesthesia.    Date of Encounter: December 12, 2017   Primary Care Physician:  Nancy Ag (Inactive)       RADHA Carrera is a 42 year old male who presents for pre-operative H & P in preparation for a Left Percutaneous Nephrolithotomy Access To Kidney, Ureteroscopy, Cystoscopy, Stent Placement With Holmium Laser  on 12/20/17 with Dr. Dejon Horn for calculus of kidney at the [facility].     Mr. Carrera has a history of kidney stones and according to the CT on 10/20/17 he has developed bilateral complete staghorn calculi, so the above procedure has been recommended. He denies any fevers, nausea or vomiting.  He has C4  quadriplegia with and an implanted intrathecal baclofen pump to manage spasticity.  He has no known cardiac disease.  He does have JUAN and uses a Bipap.        History is obtained from the patient and medical record including Care Everywhere.    Past Medical History  Past Medical History:   Diagnosis Date     Constipation, chronic      GERD (gastroesophageal reflux disease)      Head injury      Neurogenic bladder     SP catheter     JUAN (obstructive sleep apnea)      Quadriplegia (H)      Seizure (H)      Spastic quadriplegia (H)      Urinary tract infection          Past Surgical History  Past Surgical History:   Procedure Laterality Date     APPENDECTOMY OPEN       BACK SURGERY       INSERT PUMP BACLOFEN       LASER HOLMIUM NEPHROLITHOTOMY VIA PERCUTANEOUS NEPHROSTOMY  10/19/2011    Procedure:LASER HOLMIUM NEPHROLITHOTOMY VIA PERCUTANEOUS NEPHROSTOMY; Left Ureteral Stent Placement, Left Percutaneous Access, Left Percutaneous Nephrostomy  *Latex Allergy*  ; Surgeon:YAN ADDISON; Location:[facility] OR     ORTHOPEDIC SURGERY       supra pubic catheter             Prior to Admission Medications  Current Outpatient  Prescriptions   Medication Sig Dispense Refill     polyvinyl alcohol (LIQUIFILM TEARS) 1.4 % ophthalmic solution Place 2 drops into both eyes every 2 hours as needed for dry eyes       clindamycin (CLINDAMAX) 1 % topical gel Apply topically 2 times daily       Cranberry 400 MG TABS Take 400 mg by mouth 2 times daily       IMIPENEM-CILASTATIN IV Inject 500 mg into the vein every 6 hours       ketoconazole (NIZORAL) 2 % cream Apply topically 2 times daily as needed       LevETIRAcetam (KEPPRA PO) Take 1,000 mg by mouth 2 times daily       polyethylene glycol (MIRALAX/GLYCOLAX) powder Take 1 capful by mouth daily as needed for constipation (if no BM x3 days)       mometasone (ELOCON) 0.1 % cream Apply topically At Bedtime       selenium sulfide (SELSUN) 2.5 % lotion Apply to scalp topically in the morning every Sun, Tue, Thursday. Place for 5 min then rinse.       ACETAMINOPHEN PO Take 650 mg by mouth every 4 hours as needed for pain       VITAMIN D, CHOLECALCIFEROL, PO Take 5,000 Units by mouth daily       albuterol (2.5 MG/3ML) 0.083% nebulizer solution Take 3 mLs by nebulization every 2 hours as needed. 1 Box      bisacodyl (DULCOLAX) 10 MG suppository Place 1 suppository rectally every other day. 12 suppository      cyanocolbalamin (VITAMIN B-12) 1000 MCG tablet Take 1 tablet by mouth daily.       ferrous sulfate 325 (65 FE) MG tablet Take 1 tablet by mouth daily (with breakfast). 100 tablet      magnesium gluconate (MAGONATE) 500 MG tablet Take 1 tablet by mouth daily.       miconazole (MICATIN; MICRO GUARD) 2 % powder Apply  topically 2 times daily. 70 g      ondansetron (ZOFRAN-ODT) 4 MG disintegrating tablet Take 1 tablet by mouth every 6 hours as needed for nausea. 20 tablet      oxybutynin (DITROPAN) 5 MG tablet Take 1 tablet by mouth 3 times daily. 270 tablet      senna-docusate (SENOKOT-S;PERICOLACE) 8.6-50 MG per tablet Take 1-2 tablets by mouth 2 times daily. 60 tablet      vitamin C 250 MG TABS Take 1  tablet by mouth 2 times daily.       zinc oxide (DESITIN) 40 % ointment Apply  topically every hour as needed. 56.7 g      baclofen (LIORESAL) 10 MG tablet Take 10 mg by mouth 4 times daily as needed        docusate sodium (COLACE) 100 MG capsule Take 100 mg by mouth 2 times daily.       Bisacodyl (DULCOLAX PO) Take 10 mg by mouth At Bedtime.       BACLOFEN IT Via implanted IT pump  Baclofen 2000 mc/gmL   Dose: 277.1 mcg/day  Low reservoir alarm date: 4/26/2018       Nutritional Supplements (OSTEO-MULTIVITAMINS/MINERALS OR) Take  by mouth daily. 12 pm             Allergies  Latex and Latex     Social History  Social History     Social History     Marital status: Single     Spouse name: N/A     Number of children: N/A     Years of education: N/A     Occupational History     Not on file.     Social History Main Topics     Smoking status: Former Smoker     Packs/day: 0.30     Years: 5.00     Types: Cigarettes     Quit date: 9/23/1994     Smokeless tobacco: Never Used     Alcohol use No     Drug use: No     Sexual activity: Not on file     Other Topics Concern     Not on file     Social History Narrative          Family History  No family history on file.     ROS   The complete review of systems is negative other than noted in the HPI or here.   Constitutional: Denies  fevers/chills.    Cardiovascular: Denies chest pain, BRICENO or orthopnea, palpitations or syncope.  Respiratory: Denies shortness of breath or significant cough.    GI: Denies nausea/vomiting     Neurologic: Denies history of stroke, TIA, migraines, seizures,   Psychiatric: Denies changes in mood or affect.        Labs: (personally reviewed):  Lab Results   Component Value Date    WBC 8.1 12/29/2011    HGB 12.8 (L) 11/13/2017    IRON 24 (L) 11/07/2011    IRONSAT 10 (L) 11/07/2011    HCT 41.0 11/13/2017     11/13/2017    INR 1.03 11/13/2017    PTT 33 10/23/2011     12/29/2011    POTASSIUM 4.0 12/29/2011    BALAJI 9.2 12/29/2011     (H)  12/29/2011    CR 0.60 (L) 12/29/2011    BUN 13 12/29/2011    CO2 29 12/29/2011    ALT 58 12/29/2011    AST 36 12/29/2011    BILITOTAL 0.4 12/29/2011    ALKPHOS 81 12/29/2011           Physical Exam:  No LMP for male patient.   Vital signs:  /76  Pulse 82  Temp 97.5  F (36.4  C) (Oral)  Resp 16  SpO2 94%    Constitutional: Awake, alert, cooperative, no apparent distress, and appears stated age.  Eyes: Pupils equal, round and reactive to light, sclera clear, conjunctiva normal.  HENT: Normocephalic, oral pharynx with moist mucus membranes. No goiter appreciated.   Respiratory: Clear to auscultation bilaterally, no crackles or wheezing.  Cardiovascular: Regular rate and rhythm, normal S1 and S2, and no overt murmur noted.  Palpable pulses to radial  DP and PT arteries.   GI: Normal bowel sounds, soft, non-distended, non-tender  Skin: Warm and dry.     Musculoskeletal: Full ROM of neck.  Wheel Chair Bound  Neurologic: Awake, alert, oriented to name, place and time.    Neuropsychiatric: Calm, cooperative. Normal affect.     Assessment/Plan  Maicol Carrera is a 42 year old male who presents for pre-operative H & P in preparation for a Left Percutaneous Nephrolithotomy Access To Kidney, Ureteroscopy, Cystoscopy, Stent Placement With Holmium Laser  on 12/20/17 with Dr. Dejon Horn for calculus of kidney at the [facility].    PAC referral for risk assessment and optimization for anesthesia with comorbid conditions of:    Pre-operative considerations:  1.  Cardiac:        Risk of Major Adverse Cardiac event:  0.4%  2.  Pulm:     -JUAN:  On Bipap  3.  Neuro:  -Spastic C4 quadriplegia    *implanted intrathecal baclofen pump to manage spasticity  4.  GI:  Risk of PONV score =1 .  If > 2, anti-emetic intervention recommended.    Patient is optimized and is acceptable candidate for the proposed procedure.  No further diagnostic evaluation is needed.      AVS given to patient  regarding medication instructions,  surgery time/arrival time and NPO status.  Saundra TILLEY-C   Preoperative Assessment Center  [facility]  Clinic and Surgery Center  Phone: [phone]  Fax: [phone]

## 2017-12-12 NOTE — MR AVS SNAPSHOT
After Visit Summary   2017    Maicol Carrera    MRN: [id number]           Patient Information     Date Of Birth          1975        Visit Information        Provider Department      2017 9:30 AM Rn, [facility] Preoperative Assessment Center        Care Instructions    Preparing for Your Surgery      Name:  Maicol Carrera   MRN:  [id number]   :  1975   Today's Date:  2017     Arriving for surgery:  Surgery date:  17  Arrival time:  09:00 a.m.  Please come to:       [facility] [address]  [address] [place]  [address]    -  [employer] parking is available in front of the hospital from 5:15 am to 8:00 pm    -  Stop at the Information Desk in the lobby    -   Inform the information person that you are here for surgery. An escort to 3c will be provided. If you would not like an escort, please proceed to 3C on the 3rd floor. [phone]     What can I eat or drink?  -  You may have solid food or milk products until 8 hours prior to your surgery  midnight  -  You may have water, apple juice or 7up/Sprite until 2 hours prior to your surgery  09:00 a.m.    Which medicines can I take?  -  Do NOT take these medications in the morning, the day of surgery:    Please hold vitamins, minerals and supplements 7 days prior to surgery.  Miconazole powder, Desitin, Miralax    -  Please take these medications the day of surgery:     Albuterol as needed, fluconazole, Levetiracetam, ondansetron, oxybutynin, ranitidine, Baclofen, senna,  colace and dulcolax as needed, Tylenol as needed    How do I prepare myself?  -  Take two showers: one the night before surgery; and one the morning of surgery.         Use Scrubcare or Hibiclens to wash from neck down.  You may use your own shampoo and conditioner. No other hair products.   -  Do NOT use lotion, powder, deodorant, or antiperspirant the day of your surgery.  -  Do NOT wear any makeup, fingernail  polish or jewelry.  -Do not bring your own medications to the hospital, except for inhalers and eye drops.  -  Bring your ID and insurance card.    Questions or Concerns:  If you have questions or concerns, please call the  Preoperative Assessment Center, Monday-Friday 7AM-7PM:  [phone]                    Follow-ups after your visit        Your next 10 appointments already scheduled     Dec 20, 2017   Procedure with Dejon Horn MD   [facility], [place], Same Day Surgery (--)    [address]  [facility] [id number] [phone]            Dec 29, 2017 10:20 AM CST   (Arrive by 10:05 AM)   Cystoscopy with Dejon Horn MD   [facility] Urology and [facility] for Prostate and Urologic Cancers ([facility])    [address] [place] [address]  4th [facility] [address] [phone]              Who to contact     Please call your clinic at [phone] to:    Ask questions about your health    Make or cancel appointments    Discuss your medicines    Learn about your test results    Speak to your doctor   If you have compliments or concerns about an experience at your clinic, or if you wish to file a complaint, please contact [facility] Patient Relations at [phone] or email us at [email or web address]         Additional Information About Your Visit        SkilledWizardhart Information     WholeWorldBand is an electronic gateway that provides easy, online access to your medical records. With WholeWorldBand, you can request a clinic appointment, read your test results, renew a prescription or communicate with your care team.     To sign up for WholeWorldBand visit the website at [email or web address]   You will be asked to enter the access code listed below, as well as some personal information. Please follow the directions to create your username and password.     Your access code is: [email or web address]  Expires: 3/7/2018  6:30 AM     Your access code will  in 90 days. If  you need help or a new code, please contact your [facility] or call [phone] for assistance.        Care EveryWhere ID     This is your Care EveryWhere ID. This could be used by other organizations to access your [place] medical records  [id number]         Blood Pressure from Last 3 Encounters:   12/12/17 134/76   12/06/17 141/77   11/13/17 123/77    Weight from Last 3 Encounters:   12/06/17 83.9 kg (185 lb)   11/13/17 89.4 kg (197 lb)   10/20/17 88.9 kg (196 lb)              Today, you had the following     No orders found for display       Primary Care Provider    Nancy Ag MD       No address on file        Equal Access to Services     JUS [facility] WesleyMATTHEW : Hadii trupti israelo Dickson, waaxda luqadaha, qaybta kaalmada adeegyada, kathy phillips . So [facility] [phone].    ATENCIÓN: Si habla español, tiene a da silva disposición servicios gratuitos de asistencia lingüística. LlSelect [facility] [phone].    We comply with applicable federal civil rights laws and [place] laws. We do not discriminate on the basis of race, color, national origin, age, disability, sex, sexual orientation, or gender identity.            Thank you!     Thank you for choosing [facility] PREOPERATIVE ASSESSMENT CENTER  for your care. Our goal is always to provide you with excellent care. Hearing back from our patients is one way we can continue to improve our services. Please take a few minutes to complete the written survey that you may receive in the mail after your visit with us. Thank you!             Your Updated Medication List - Protect others around you: Learn how to safely use, store and throw away your medicines at www.disposemymeds.org.          This list is accurate as of: 12/12/17 11:59 PM.  Always use your most recent med list.                   Brand Name Dispense Instructions for use Diagnosis    ACETAMINOPHEN PO      Take 650 mg by mouth every 4 hours as needed for  pain        albuterol (2.5 MG/3ML) 0.083% neb solution     1 Box    Take 3 mLs by nebulization every 2 hours as needed.    SOB (shortness of breath)       ascorbic acid 250 MG Tabs tablet      Take 1 tablet by mouth 2 times daily.    Recurrent UTI       * baclofen 10 MG tablet    LIORESAL     Take 10 mg by mouth 4 times daily as needed        * BACLOFEN IT      Via implanted IT pump Baclofen 2000 mc/gmL  Dose: 277.1 mcg/day Low reservoir alarm date: 4/26/2018        clindamycin 1 % topical gel    CLINDAMAX     Apply topically 2 times daily        COLACE 100 MG capsule   Generic drug:  docusate sodium      Take 100 mg by mouth 2 times daily.        Cranberry 400 MG Tabs      Take 400 mg by mouth 2 times daily        cyanocobalamin 1000 MCG tablet    vitamin  B-12     Take 1 tablet by mouth daily.    Vitamin B 12 deficiency       * bisacodyl 10 MG Suppository    DULCOLAX    12 suppository    Place 1 suppository rectally every other day.    Chronic constipation       * DULCOLAX PO      Take 10 mg by mouth At Bedtime.        ferrous sulfate 325 (65 FE) MG tablet    IRON    100 tablet    Take 1 tablet by mouth daily (with breakfast).    Anemia due to blood loss, acute       IMIPENEM-CILASTATIN IV      Inject 500 mg into the vein every 6 hours        KEPPRA PO      Take 1,000 mg by mouth 2 times daily        ketoconazole 2 % cream    NIZORAL     Apply topically 2 times daily as needed        magnesium gluconate 500 MG tablet    MAGONATE     Take 1 tablet by mouth daily.    Magnesium deficiency       miconazole 2 % powder    MICATIN; MICRO GUARD    70 g    Apply  topically 2 times daily.    Candida infection of flexural skin       mometasone 0.1 % cream    ELOCON     Apply topically At Bedtime        ondansetron 4 MG ODT tab    ZOFRAN-ODT    20 tablet    Take 1 tablet by mouth every 6 hours as needed for nausea.    Nausea and vomiting       OSTEO-MULTIVITAMINS/MINERALS OR      Take  by mouth daily. 12 pm        oxybutynin  5 MG tablet    DITROPAN    270 tablet    Take 1 tablet by mouth 3 times daily.    Neurogenic bladder       polyethylene glycol powder    MIRALAX/GLYCOLAX     Take 1 capful by mouth daily as needed for constipation (if no BM x3 days)        polyvinyl alcohol 1.4 % ophthalmic solution    LIQUIFILM TEARS     Place 2 drops into both eyes every 2 hours as needed for dry eyes        selenium sulfide 2.5 % lotion    SELSUN     Apply to scalp topically in the morning every Sun, Tue, Thursday. Place for 5 min then rinse.        senna-docusate 8.6-50 MG per tablet    SENOKOT-S;PERICOLACE    60 tablet    Take 1-2 tablets by mouth 2 times daily.    Chronic constipation       VITAMIN D (CHOLECALCIFEROL) PO      Take 5,000 Units by mouth daily        zinc oxide 40 % ointment    DESITIN    56.7 g    Apply  topically every hour as needed.    Breakdown of skin tissue       * Notice:  This list has 4 medication(s) that are the same as other medications prescribed for you. Read the directions carefully, and ask your doctor or other care provider to review them with you.

## 2017-12-12 NOTE — PROGRESS NOTES
Preoperative Assessment Center medication history for December 12, 2017 is complete.    See Epic admission navigator for allergy information, pharmacy and prior to admission medications.    Operating room staff will still need to confirm medications and last dose information on day of surgery.     Medication history interview sources:  patient, IT pump read out, med list from [facility].     Changes made to PTA medication list (reason)  Added: artificial tears, imipenem (for UTI, to complete on 12/13), ketoconazole cream, miralax, mometasone cream, selsun topical, apap, vitamin D,   Deleted: pink lady enema, ranitidine, fluconazole (completed), sorbitol, ambien (no longer taking)  Changed: baclofen PO sig, cranberry dose, keppra dosage form, mag citrate, updated dosing on baclofen IT pump.     Additional medication history information (including reliability of information, actions taken by pharmacist):None     Prior to Admission medications    Medication Sig Last Dose Taking? Auth Provider   polyvinyl alcohol (LIQUIFILM TEARS) 1.4 % ophthalmic solution Place 2 drops into both eyes every 2 hours as needed for dry eyes Taking Yes Unknown, Entered By History   clindamycin (CLINDAMAX) 1 % topical gel Apply topically 2 times daily Taking Yes Unknown, Entered By History   Cranberry 400 MG TABS Take 400 mg by mouth 2 times daily Taking Yes Unknown, Entered By History   IMIPENEM-CILASTATIN IV Inject 500 mg into the vein every 6 hours Taking Yes Unknown, Entered By History   ketoconazole (NIZORAL) 2 % cream Apply topically 2 times daily as needed Taking Yes Unknown, Entered By History   LevETIRAcetam (KEPPRA PO) Take 1,000 mg by mouth 2 times daily Taking Yes Unknown, Entered By History   polyethylene glycol (MIRALAX/GLYCOLAX) powder Take 1 capful by mouth daily as needed for constipation (if no BM x3 days) Taking Yes Unknown, Entered By History   mometasone (ELOCON) 0.1 % cream Apply topically At Bedtime Taking Yes  Unknown, Entered By History   selenium sulfide (SELSUN) 2.5 % lotion Apply to scalp topically in the morning every Sun, Tue, Thursday. Place for 5 min then rinse. Taking Yes Unknown, Entered By History   ACETAMINOPHEN PO Take 650 mg by mouth every 4 hours as needed for pain Taking Yes Unknown, Entered By History   VITAMIN D, CHOLECALCIFEROL, PO Take 5,000 Units by mouth daily Taking Yes Unknown, Entered By History   albuterol (2.5 MG/3ML) 0.083% nebulizer solution Take 3 mLs by nebulization every 2 hours as needed. Taking Yes Stella Cano MD   bisacodyl (DULCOLAX) 10 MG suppository Place 1 suppository rectally every other day. Taking Yes Stella Cano MD   cyanocolbalamin (VITAMIN B-12) 1000 MCG tablet Take 1 tablet by mouth daily. Taking Yes Stella Cano MD   ferrous sulfate 325 (65 FE) MG tablet Take 1 tablet by mouth daily (with breakfast). Taking Yes Stella Cano MD   magnesium gluconate (MAGONATE) 500 MG tablet Take 1 tablet by mouth daily. Taking Yes Stella Cano MD   miconazole (MICATIN; MICRO GUARD) 2 % powder Apply  topically 2 times daily. Taking Yes Stella Cano MD   ondansetron (ZOFRAN-ODT) 4 MG disintegrating tablet Take 1 tablet by mouth every 6 hours as needed for nausea. Taking Yes Stella Cano MD   oxybutynin (DITROPAN) 5 MG tablet Take 1 tablet by mouth 3 times daily. Taking Yes Stella Cano MD   senna-docusate (SENOKOT-S;PERICOLACE) 8.6-50 MG per tablet Take 1-2 tablets by mouth 2 times daily. Taking Yes Stella Cano MD   vitamin C 250 MG TABS Take 1 tablet by mouth 2 times daily. Taking Yes Stella Cano MD   zinc oxide (DESITIN) 40 % ointment Apply  topically every hour as needed. Taking Yes Stella Cano MD   baclofen (LIORESAL) 10 MG tablet Take 10 mg by mouth 4 times daily as needed  Taking Yes Reported, Patient   docusate sodium (COLACE) 100 MG capsule Take 100 mg by mouth 2 times daily. Taking Yes Reported, Patient   Bisacodyl (DULCOLAX PO) Take 10  mg by mouth At Bedtime. Taking Yes Reported, Patient   BACLOFEN IT Via implanted IT pump  Baclofen 2000 mc/gmL   Dose: 277.1 mcg/day  Low reservoir alarm date: 4/26/2018 Taking Yes Reported, Patient   Nutritional Supplements (OSTEO-MULTIVITAMINS/MINERALS OR) Take  by mouth daily. 12 pm   Taking Yes Reported, Patient       Medication history completed by: [facility]

## 2017-12-18 ENCOUNTER — PRE VISIT (OUTPATIENT)
Dept: UROLOGY | Facility: CLINIC | Age: 42
End: 2017-12-18

## 2017-12-19 RX ORDER — NALOXONE HYDROCHLORIDE 0.4 MG/ML
.1-.4 INJECTION, SOLUTION INTRAMUSCULAR; INTRAVENOUS; SUBCUTANEOUS
Status: CANCELLED | OUTPATIENT
Start: 2017-12-19

## 2017-12-19 RX ORDER — FLUMAZENIL 0.1 MG/ML
0.2 INJECTION, SOLUTION INTRAVENOUS
Status: CANCELLED | OUTPATIENT
Start: 2017-12-19

## 2017-12-19 RX ORDER — FENTANYL CITRATE 50 UG/ML
25-50 INJECTION, SOLUTION INTRAMUSCULAR; INTRAVENOUS
Status: CANCELLED | OUTPATIENT
Start: 2017-12-19

## 2017-12-20 ENCOUNTER — HOSPITAL ENCOUNTER (OUTPATIENT)
Facility: CLINIC | Age: 42
Discharge: NURSING, REHAB OR LONG-TERM CARE | End: 2017-12-20
Attending: UROLOGY | Admitting: UROLOGY
Payer: COMMERCIAL

## 2017-12-20 ENCOUNTER — ANESTHESIA (OUTPATIENT)
Dept: SURGERY | Facility: CLINIC | Age: 42
End: 2017-12-20

## 2017-12-20 ENCOUNTER — SURGERY (OUTPATIENT)
Age: 42
End: 2017-12-20

## 2017-12-20 VITALS
WEIGHT: 173.5 LBS | BODY MASS INDEX: 25.62 KG/M2 | DIASTOLIC BLOOD PRESSURE: 90 MMHG | HEART RATE: 87 BPM | TEMPERATURE: 32 F | OXYGEN SATURATION: 98 % | SYSTOLIC BLOOD PRESSURE: 133 MMHG

## 2017-12-20 PROCEDURE — 40000882 ZZH CANCELLED SURGERY UP TO 46-60 MINS: Performed by: UROLOGY

## 2017-12-20 NOTE — OR NURSING
Dr. Horn (urology) at bedside in pre op. Explained to patient that due to his recent infection, the patient should be on antibiotics for a longer period of time prior to having this procedure done. Discussed with patient that it is not safe to continue with planned procedure at this time and cancelled the case.   Patient had previously inserted PICC line that was left in place. No other IV started/discontinued.  Writer called [facility] where patient resides and gave report to Chinedu Ibarra RN.   [facility]

## 2017-12-29 ENCOUNTER — OFFICE VISIT (OUTPATIENT)
Dept: UROLOGY | Facility: CLINIC | Age: 42
End: 2017-12-29
Payer: COMMERCIAL

## 2017-12-29 VITALS — WEIGHT: 185 LBS | BODY MASS INDEX: 27.32 KG/M2

## 2017-12-29 DIAGNOSIS — N28.89 RENAL HEMORRHAGE, LEFT: Primary | ICD-10-CM

## 2017-12-29 LAB
ALBUMIN UR-MCNC: 100 MG/DL
APPEARANCE UR: ABNORMAL
BACTERIA #/AREA URNS HPF: ABNORMAL /HPF
BILIRUB UR QL STRIP: NEGATIVE
COLOR UR AUTO: YELLOW
GLUCOSE UR STRIP-MCNC: NEGATIVE MG/DL
HGB UR QL STRIP: ABNORMAL
KETONES UR STRIP-MCNC: NEGATIVE MG/DL
LEUKOCYTE ESTERASE UR QL STRIP: ABNORMAL
NITRATE UR QL: POSITIVE
PH UR STRIP: 7 PH (ref 5–7)
RBC #/AREA URNS AUTO: 134 /HPF (ref 0–2)
SOURCE: ABNORMAL
SP GR UR STRIP: 1.01 (ref 1–1.03)
UROBILINOGEN UR STRIP-MCNC: 0 MG/DL (ref 0–2)
WBC #/AREA URNS AUTO: >182 /HPF (ref 0–2)
WBC CLUMPS #/AREA URNS HPF: PRESENT /HPF

## 2017-12-29 ASSESSMENT — PAIN SCALES - GENERAL: PAINLEVEL: NO PAIN (0)

## 2017-12-29 NOTE — MR AVS SNAPSHOT
After Visit Summary   12/29/2017    Maicol Carrera    MRN: [id number]           Patient Information     Date Of Birth          1975        Visit Information        Provider Department      12/29/2017 10:20 AM Dejon Horn MD [facility] Urology and [facility] for Prostate and Urologic Cancers        Today's Diagnoses     Renal hemorrhage, left    -  1      Care Instructions    We will give you a call to schedule surgery with Dr. Horn.    Pt was given surgical soap.    It was a pleasure meeting with you today.  Thank you for allowing me and my team the privilege of caring for you today.  YOU are the reason we are here, and I truly hope we provided you with the excellent service you deserve.  Please let us know if there is anything else we can do for you so that we can be sure you are leaving completely satisfied with your care experience.              Follow-ups after your visit        Who to contact     Please call your clinic at [phone] to:    Ask questions about your health    Make or cancel appointments    Discuss your medicines    Learn about your test results    Speak to your doctor   If you have compliments or concerns about an experience at your clinic, or if you wish to file a complaint, please contact [facility] Patient Relations at [phone] or email us at [email or web address]         Additional Information About Your Visit        MyChart Information     G[employer] is an electronic gateway that provides easy, online access to your medical records. With Uni2, you can request a clinic appointment, read your test results, renew a prescription or communicate with your care team.     To sign up for [employer] visit the website at [email or web address]   You will be asked to enter the access code listed below, as well as some personal information. Please follow the directions to create your username and password.     Your access code is:  [email or web address]  Expires: 3/7/2018  6:30 AM     Your access code will  in 90 days. If you need help or a new code, please contact your [facility] or call [phone] for assistance.        Care EveryWhere ID     This is your Care EveryWhere ID. This could be used by other organizations to access your [place] medical records  [id number]        Your Vitals Were     BMI (Body Mass Index)                   27.32 kg/m2            Blood Pressure from Last 3 Encounters:   17 133/90   17 134/76   17 141/77    Weight from Last 3 Encounters:   17 83.9 kg (185 lb)   17 78.7 kg (173 lb 8 oz)   17 83.9 kg (185 lb)              We Performed the Following     Routine UA with micro reflex to culture     Urine Culture Aerobic Bacterial        Primary Care Provider    [facility]       [address] [place] [address]        Equal Access to Services     AUSTIN VIEIRA : Hadii aad ku hadasho Soomaali, waaxda luqadaha, qaybta kaalmada adeegyada, waxay arielin haymonalisan enedelia phillips . So [facility] [phone].    ATENCIÓN: Si habla español, tiene a da silva disposición servicios gratuitos de asistencia lingüística. Llame al [phone].    We comply with applicable federal civil rights laws and [place] laws. We do not discriminate on the basis of race, color, national origin, age, disability, sex, sexual orientation, or gender identity.            Thank you!     Thank you for choosing [facility] UROLOGY AND [facility] FOR PROSTATE AND UROLOGIC CANCERS  for your care. Our goal is always to provide you with excellent care. Hearing back from our patients is one way we can continue to improve our services. Please take a few minutes to complete the written survey that you may receive in the mail after your visit with us. Thank you!             Your Updated Medication List - Protect others around you: Learn how to safely use, store and throw away your medicines at  www.disposemymeds.org.          This list is accurate as of: 12/29/17 11:59 PM.  Always use your most recent med list.                   Brand Name Dispense Instructions for use Diagnosis    ACETAMINOPHEN PO      Take 650 mg by mouth every 4 hours as needed for pain        albuterol (2.5 MG/3ML) 0.083% neb solution     1 Box    Take 3 mLs by nebulization every 2 hours as needed.    SOB (shortness of breath)       ascorbic acid 250 MG Tabs tablet      Take 1 tablet by mouth 2 times daily.    Recurrent UTI       * baclofen 10 MG tablet    LIORESAL     Take 10 mg by mouth 4 times daily as needed        * BACLOFEN IT      Via implanted IT pump Baclofen 2000 mc/gmL  Dose: 277.1 mcg/day Low reservoir alarm date: 4/26/2018        clindamycin 1 % topical gel    CLINDAMAX     Apply topically 2 times daily        COLACE 100 MG capsule   Generic drug:  docusate sodium      Take 100 mg by mouth 2 times daily.        Cranberry 400 MG Tabs      Take 400 mg by mouth 2 times daily        cyanocobalamin 1000 MCG tablet    vitamin  B-12     Take 1 tablet by mouth daily.    Vitamin B 12 deficiency       * bisacodyl 10 MG Suppository    DULCOLAX    12 suppository    Place 1 suppository rectally every other day.    Chronic constipation       * DULCOLAX PO      Take 10 mg by mouth At Bedtime.        ferrous sulfate 325 (65 FE) MG tablet    IRON    100 tablet    Take 1 tablet by mouth daily (with breakfast).    Anemia due to blood loss, acute       KEPPRA PO      Take 1,000 mg by mouth 2 times daily        ketoconazole 2 % cream    NIZORAL     Apply topically 2 times daily as needed        magnesium gluconate 500 MG tablet    MAGONATE     Take 1 tablet by mouth daily.    Magnesium deficiency       miconazole 2 % powder    MICATIN; MICRO GUARD    70 g    Apply  topically 2 times daily.    Candida infection of flexural skin       mometasone 0.1 % cream    ELOCON     Apply topically At Bedtime        ondansetron 4 MG ODT tab    ZOFRAN-ODT     20 tablet    Take 1 tablet by mouth every 6 hours as needed for nausea.    Nausea and vomiting       OSTEO-MULTIVITAMINS/MINERALS OR      Take  by mouth daily. 12 pm        oxybutynin 5 MG tablet    DITROPAN    270 tablet    Take 1 tablet by mouth 3 times daily.    Neurogenic bladder       polyethylene glycol powder    MIRALAX/GLYCOLAX     Take 1 capful by mouth daily as needed for constipation (if no BM x3 days)        polyvinyl alcohol 1.4 % ophthalmic solution    LIQUIFILM TEARS     Place 2 drops into both eyes every 2 hours as needed for dry eyes        selenium sulfide 2.5 % lotion    SELSUN     Apply to scalp topically in the morning every Sun, Tue, Thursday. Place for 5 min then rinse.        senna-docusate 8.6-50 MG per tablet    SENOKOT-S;PERICOLACE    60 tablet    Take 1-2 tablets by mouth 2 times daily.    Chronic constipation       VITAMIN D (CHOLECALCIFEROL) PO      Take 5,000 Units by mouth daily        zinc oxide 40 % ointment    DESITIN    56.7 g    Apply  topically every hour as needed.    Breakdown of skin tissue       * Notice:  This list has 4 medication(s) that are the same as other medications prescribed for you. Read the directions carefully, and ask your doctor or other care provider to review them with you.

## 2017-12-29 NOTE — LETTER
January 12, 2018       TO: Maicol Carrera  [address]  [place] [address]       DearMr.Debi,    We are writing to inform you of your test results.    Your test results fall within the expected range(s) or remain unchanged from previous results.  Please continue with current treatment plan.    Resulted Orders   Routine UA with micro reflex to culture   Result Value Ref Range    Color Urine Yellow     Appearance Urine Cloudy     Glucose Urine Negative NEG^Negative mg/dL    Bilirubin Urine Negative NEG^Negative    Ketones Urine Negative NEG^Negative mg/dL    Specific Gravity Urine 1.009 1.003 - 1.035    Blood Urine Moderate (A) NEG^Negative    pH Urine 7.0 5.0 - 7.0 pH    Protein Albumin Urine 100 (A) NEG^Negative mg/dL    Urobilinogen mg/dL 0.0 0.0 - 2.0 mg/dL    Nitrite Urine Positive (A) NEG^Negative    Leukocyte Esterase Urine Large (A) NEG^Negative    Source Catheterized Urine       Comment:      LNEPH TUBE    WBC Urine >182 (H) 0 - 2 /HPF    RBC Urine 134 (H) 0 - 2 /HPF    WBC Clumps Present (A) NEG^Negative /HPF    Bacteria Urine Few (A) NEG^Negative /HPF   Urine Culture Aerobic Bacterial   Result Value Ref Range    Specimen Description Catheterized Urine LNEPH TUBE     Special Requests Specimen received in preservative     Culture Micro (A)      50,000 to 100,000 colonies/mL  Pseudomonas aeruginosa      Culture Micro 10,000 to 50,000 colonies/mL  Proteus mirabilis   (A)     Culture Micro (A)      10,000 to 50,000 colonies/mL  Methicillin resistant Staphylococcus aureus (MRSA)         Thank you for choosing [facility] for your care. Please follow up as previously planned.  Please call with any questions or concerns.    Thank you,  Helen Mcclelland, RN Care Coordinator for  Dr. Dejon Horn

## 2017-12-29 NOTE — LETTER
12/29/2017       RE: Maicol Carrera  [address]  [facility] [address]     Dear Colleague,    Thank you for referring your patient, Maicol Carrera, to the [facility] FOR PROSTATE AND UROLOGIC CANCERS at [facility]. Please see a copy of my visit note below.    ASSESSMENT and PLAN  Recent percutaneous nephrolithotomy surgery cancelled due to urinary tract infection and antibiotic not continued to the time of surgery.  Plan for urinalysis urine culture today and then reschedule surgery.    We discussed need to continue antibiotic right to day of surgery.  _________________________________________________________________    CHIEF COMPLAINT   It was my pleasure to see Maicol Carrera who is a 42 year old male for follow-up of kidney stone.      HPI  Mr. Carrera is doing well today.    Patient Active Problem List    Diagnosis Date Noted     Sepsis with multiple organ dysfunction (MOD) (H) 10/24/2011     Priority: Medium     Anemia due to blood loss, acute 10/24/2011     Priority: Medium     Postsurgical nonabsorption 10/24/2011     Priority: Medium     Problem list name updated by automated process. Provider to review       Quadriplegia, post-traumatic (H) 10/24/2011     Priority: Medium     Renal hemorrhage, left 10/24/2011     Priority: Medium     Kidney stones 08/09/2011     Priority: Medium     Past Medical History:   Diagnosis Date     Constipation, chronic      GERD (gastroesophageal reflux disease)      Head injury      Neurogenic bladder     SP catheter     JUAN (obstructive sleep apnea)      Quadriplegia (H)      Seizure (H)      Spastic quadriplegia (H)      Urinary tract infection      Past Surgical History:   Procedure Laterality Date     APPENDECTOMY OPEN       BACK SURGERY       INSERT PUMP BACLOFEN       LASER HOLMIUM NEPHROLITHOTOMY VIA PERCUTANEOUS NEPHROSTOMY  10/19/2011    Procedure:LASER HOLMIUM NEPHROLITHOTOMY VIA PERCUTANEOUS NEPHROSTOMY; Left Ureteral  Stent Placement, Left Percutaneous Access, Left Percutaneous Nephrostomy  *Latex Allergy*  ; Surgeon:YAN ADDISON; Location:[facility]     ORTHOPEDIC SURGERY       supra pubic catheter       Current Outpatient Prescriptions   Medication Sig Dispense Refill     polyvinyl alcohol (LIQUIFILM TEARS) 1.4 % ophthalmic solution Place 2 drops into both eyes every 2 hours as needed for dry eyes       clindamycin (CLINDAMAX) 1 % topical gel Apply topically 2 times daily       Cranberry 400 MG TABS Take 400 mg by mouth 2 times daily       ketoconazole (NIZORAL) 2 % cream Apply topically 2 times daily as needed       LevETIRAcetam (KEPPRA PO) Take 1,000 mg by mouth 2 times daily       polyethylene glycol (MIRALAX/GLYCOLAX) powder Take 1 capful by mouth daily as needed for constipation (if no BM x3 days)       mometasone (ELOCON) 0.1 % cream Apply topically At Bedtime       selenium sulfide (SELSUN) 2.5 % lotion Apply to scalp topically in the morning every Sun, Tue, Thursday. Place for 5 min then rinse.       ACETAMINOPHEN PO Take 650 mg by mouth every 4 hours as needed for pain       VITAMIN D, CHOLECALCIFEROL, PO Take 5,000 Units by mouth daily       albuterol (2.5 MG/3ML) 0.083% nebulizer solution Take 3 mLs by nebulization every 2 hours as needed. 1 Box      bisacodyl (DULCOLAX) 10 MG suppository Place 1 suppository rectally every other day. 12 suppository      cyanocolbalamin (VITAMIN B-12) 1000 MCG tablet Take 1 tablet by mouth daily.       ferrous sulfate 325 (65 FE) MG tablet Take 1 tablet by mouth daily (with breakfast). 100 tablet      magnesium gluconate (MAGONATE) 500 MG tablet Take 1 tablet by mouth daily.       miconazole (MICATIN; MICRO GUARD) 2 % powder Apply  topically 2 times daily. 70 g      ondansetron (ZOFRAN-ODT) 4 MG disintegrating tablet Take 1 tablet by mouth every 6 hours as needed for nausea. 20 tablet      oxybutynin (DITROPAN) 5 MG tablet Take 1 tablet by mouth 3 times daily. 270 tablet       senna-docusate (SENOKOT-S;PERICOLACE) 8.6-50 MG per tablet Take 1-2 tablets by mouth 2 times daily. 60 tablet      vitamin C 250 MG TABS Take 1 tablet by mouth 2 times daily.       zinc oxide (DESITIN) 40 % ointment Apply  topically every hour as needed. 56.7 g      baclofen (LIORESAL) 10 MG tablet Take 10 mg by mouth 4 times daily as needed        docusate sodium (COLACE) 100 MG capsule Take 100 mg by mouth 2 times daily.       Bisacodyl (DULCOLAX PO) Take 10 mg by mouth At Bedtime.       BACLOFEN IT Via implanted IT pump  Baclofen 2000 mc/gmL   Dose: 277.1 mcg/day  Low reservoir alarm date: 4/26/2018       Nutritional Supplements (OSTEO-MULTIVITAMINS/MINERALS OR) Take  by mouth daily. 12 pm          SOCIAL HISTORY: He  reports that he quit smoking about 23 years ago. His smoking use included Cigarettes. He has a 1.50 pack-year smoking history. He has never used smokeless tobacco. He reports that he does not drink alcohol or use illicit drugs.    PHYSICAL EXAM  Vitals:    12/29/17 1022   Weight: 83.9 kg (185 lb)     Constitutional: Alert, no acute distress  Psychiatric: Normal mood and affect  Abdomen: soft, non tender, non distended.    : Deferred    CC  Patient Care Team:  [facility], [employer] as PCP - Priscilla Gayle, RN as Registered Nurse  LUIS WOOD    Copy to patient  GABRIELA CARRANZA  [facility] [facility] [address]      Again, thank you for allowing me to participate in the care of your patient.      Sincerely,    Dejon Horn MD

## 2017-12-29 NOTE — PATIENT INSTRUCTIONS
We will give you a call to schedule surgery with Dr. Horn.    Pt was given surgical soap.    It was a pleasure meeting with you today.  Thank you for allowing me and my team the privilege of caring for you today.  YOU are the reason we are here, and I truly hope we provided you with the excellent service you deserve.  Please let us know if there is anything else we can do for you so that we can be sure you are leaving completely satisfied with your care experience.

## 2018-01-01 LAB
BACTERIA SPEC CULT: ABNORMAL
Lab: ABNORMAL
SPECIMEN SOURCE: ABNORMAL

## 2018-01-05 DIAGNOSIS — N20.0 NEPHROLITHIASIS: Primary | ICD-10-CM

## 2018-01-05 RX ORDER — CEFAZOLIN SODIUM 1 G/3ML
1 INJECTION, POWDER, FOR SOLUTION INTRAMUSCULAR; INTRAVENOUS SEE ADMIN INSTRUCTIONS
Status: CANCELLED | OUTPATIENT
Start: 2018-01-05

## 2018-01-08 NOTE — PROGRESS NOTES
ASSESSMENT and PLAN  Recent percutaneous nephrolithotomy surgery cancelled due to urinary tract infection and antibiotic not continued to the time of surgery.  Plan for urinalysis urine culture today and then reschedule surgery.    We discussed need to continue antibiotic right to day of surgery.  _________________________________________________________________    CHIEF COMPLAINT   It was my pleasure to see Maicol Carrera who is a 42 year old male for follow-up of kidney stone.      HPI  Mr. Carrera is doing well today.    Patient Active Problem List    Diagnosis Date Noted     Sepsis with multiple organ dysfunction (MOD) (H) 10/24/2011     Priority: Medium     Anemia due to blood loss, acute 10/24/2011     Priority: Medium     Postsurgical nonabsorption 10/24/2011     Priority: Medium     Problem list name updated by automated process. Provider to review       Quadriplegia, post-traumatic (H) 10/24/2011     Priority: Medium     Renal hemorrhage, left 10/24/2011     Priority: Medium     Kidney stones 08/09/2011     Priority: Medium     Past Medical History:   Diagnosis Date     Constipation, chronic      GERD (gastroesophageal reflux disease)      Head injury      Neurogenic bladder     SP catheter     JUAN (obstructive sleep apnea)      Quadriplegia (H)      Seizure (H)      Spastic quadriplegia (H)      Urinary tract infection      Past Surgical History:   Procedure Laterality Date     APPENDECTOMY OPEN       BACK SURGERY       INSERT PUMP BACLOFEN       LASER HOLMIUM NEPHROLITHOTOMY VIA PERCUTANEOUS NEPHROSTOMY  10/19/2011    Procedure:LASER HOLMIUM NEPHROLITHOTOMY VIA PERCUTANEOUS NEPHROSTOMY; Left Ureteral Stent Placement, Left Percutaneous Access, Left Percutaneous Nephrostomy  *Latex Allergy*  ; Surgeon:YAN ADDISON; Location:[facility]     ORTHOPEDIC SURGERY       supra pubic catheter       Current Outpatient Prescriptions   Medication Sig Dispense Refill     polyvinyl alcohol (LIQUIFILM TEARS) 1.4 %  ophthalmic solution Place 2 drops into both eyes every 2 hours as needed for dry eyes       clindamycin (CLINDAMAX) 1 % topical gel Apply topically 2 times daily       Cranberry 400 MG TABS Take 400 mg by mouth 2 times daily       ketoconazole (NIZORAL) 2 % cream Apply topically 2 times daily as needed       LevETIRAcetam (KEPPRA PO) Take 1,000 mg by mouth 2 times daily       polyethylene glycol (MIRALAX/GLYCOLAX) powder Take 1 capful by mouth daily as needed for constipation (if no BM x3 days)       mometasone (ELOCON) 0.1 % cream Apply topically At Bedtime       selenium sulfide (SELSUN) 2.5 % lotion Apply to scalp topically in the morning every Sun, Tue, Thursday. Place for 5 min then rinse.       ACETAMINOPHEN PO Take 650 mg by mouth every 4 hours as needed for pain       VITAMIN D, CHOLECALCIFEROL, PO Take 5,000 Units by mouth daily       albuterol (2.5 MG/3ML) 0.083% nebulizer solution Take 3 mLs by nebulization every 2 hours as needed. 1 Box      bisacodyl (DULCOLAX) 10 MG suppository Place 1 suppository rectally every other day. 12 suppository      cyanocolbalamin (VITAMIN B-12) 1000 MCG tablet Take 1 tablet by mouth daily.       ferrous sulfate 325 (65 FE) MG tablet Take 1 tablet by mouth daily (with breakfast). 100 tablet      magnesium gluconate (MAGONATE) 500 MG tablet Take 1 tablet by mouth daily.       miconazole (MICATIN; MICRO GUARD) 2 % powder Apply  topically 2 times daily. 70 g      ondansetron (ZOFRAN-ODT) 4 MG disintegrating tablet Take 1 tablet by mouth every 6 hours as needed for nausea. 20 tablet      oxybutynin (DITROPAN) 5 MG tablet Take 1 tablet by mouth 3 times daily. 270 tablet      senna-docusate (SENOKOT-S;PERICOLACE) 8.6-50 MG per tablet Take 1-2 tablets by mouth 2 times daily. 60 tablet      vitamin C 250 MG TABS Take 1 tablet by mouth 2 times daily.       zinc oxide (DESITIN) 40 % ointment Apply  topically every hour as needed. 56.7 g      baclofen (LIORESAL) 10 MG tablet Take 10 mg  by mouth 4 times daily as needed        docusate sodium (COLACE) 100 MG capsule Take 100 mg by mouth 2 times daily.       Bisacodyl (DULCOLAX PO) Take 10 mg by mouth At Bedtime.       BACLOFEN IT Via implanted IT pump  Baclofen 2000 mc/gmL   Dose: 277.1 mcg/day  Low reservoir alarm date: 4/26/2018       Nutritional Supplements (OSTEO-MULTIVITAMINS/MINERALS OR) Take  by mouth daily. 12 pm          SOCIAL HISTORY: He  reports that he quit smoking about 23 years ago. His smoking use included Cigarettes. He has a 1.50 pack-year smoking history. He has never used smokeless tobacco. He reports that he does not drink alcohol or use illicit drugs.    PHYSICAL EXAM  Vitals:    12/29/17 1022   Weight: 83.9 kg (185 lb)     Constitutional: Alert, no acute distress  Psychiatric: Normal mood and affect  Abdomen: soft, non tender, non distended.    : Deferred    CC  Patient Care Team:  [facility] [place], [employer] as PCP - Dejon Elizabeth MD as MD (Urology)  Priscilla Wong RN as Registered Nurse  LUIS WOOD    Copy to patient  GABRIELA CARRANZA  [address] [facility] [address]

## 2018-03-01 ENCOUNTER — TRANSFERRED RECORDS (OUTPATIENT)
Dept: HEALTH INFORMATION MANAGEMENT | Facility: CLINIC | Age: 43
End: 2018-03-01

## 2018-03-05 NOTE — OR NURSING
Message sent to Priscilla Wong, care coordinator for Dr Horn that father Javon Carrera signs patients consents per Babs RN at [facility] where patient resides.  Patient will be transported by [facility] will be arranging to have family or staff member ride home with patient post surgery.

## 2018-03-06 ENCOUNTER — ANESTHESIA EVENT (OUTPATIENT)
Dept: SURGERY | Facility: CLINIC | Age: 43
End: 2018-03-06
Payer: COMMERCIAL

## 2018-03-07 ENCOUNTER — ANESTHESIA (OUTPATIENT)
Dept: SURGERY | Facility: CLINIC | Age: 43
End: 2018-03-07
Payer: COMMERCIAL

## 2018-03-07 ENCOUNTER — HOSPITAL ENCOUNTER (INPATIENT)
Facility: CLINIC | Age: 43
LOS: 6 days | Discharge: NURSING, REHAB OR LONG-TERM CARE | End: 2018-03-13
Attending: UROLOGY | Admitting: UROLOGY
Payer: COMMERCIAL

## 2018-03-07 ENCOUNTER — APPOINTMENT (OUTPATIENT)
Dept: GENERAL RADIOLOGY | Facility: CLINIC | Age: 43
End: 2018-03-07
Attending: UROLOGY
Payer: COMMERCIAL

## 2018-03-07 DIAGNOSIS — N20.0 KIDNEY STONES: Primary | ICD-10-CM

## 2018-03-07 DIAGNOSIS — A41.9 SEPSIS DUE TO URINARY TRACT INFECTION (H): ICD-10-CM

## 2018-03-07 DIAGNOSIS — N39.0 SEPSIS DUE TO URINARY TRACT INFECTION (H): ICD-10-CM

## 2018-03-07 LAB
ABO + RH BLD: NORMAL
ABO + RH BLD: NORMAL
ANION GAP SERPL CALCULATED.3IONS-SCNC: 10 MMOL/L (ref 3–14)
ANION GAP SERPL CALCULATED.3IONS-SCNC: 7 MMOL/L (ref 3–14)
BASE EXCESS BLDV CALC-SCNC: 0.5 MMOL/L
BLD GP AB SCN SERPL QL: NORMAL
BLOOD BANK CMNT PATIENT-IMP: NORMAL
BUN SERPL-MCNC: 13 MG/DL (ref 7–30)
BUN SERPL-MCNC: 14 MG/DL (ref 7–30)
CA-I BLD-MCNC: 4.6 MG/DL (ref 4.4–5.2)
CA-I BLD-MCNC: 4.7 MG/DL (ref 4.4–5.2)
CALCIUM SERPL-MCNC: 7.9 MG/DL (ref 8.5–10.1)
CALCIUM SERPL-MCNC: 9.1 MG/DL (ref 8.5–10.1)
CHLORIDE SERPL-SCNC: 104 MMOL/L (ref 94–109)
CHLORIDE SERPL-SCNC: 108 MMOL/L (ref 94–109)
CO2 SERPL-SCNC: 22 MMOL/L (ref 20–32)
CO2 SERPL-SCNC: 26 MMOL/L (ref 20–32)
CREAT SERPL-MCNC: 1.04 MG/DL (ref 0.66–1.25)
CREAT SERPL-MCNC: 1.11 MG/DL (ref 0.66–1.25)
ERYTHROCYTE [DISTWIDTH] IN BLOOD BY AUTOMATED COUNT: 16.5 % (ref 10–15)
ERYTHROCYTE [DISTWIDTH] IN BLOOD BY AUTOMATED COUNT: 16.8 % (ref 10–15)
GFR SERPL CREATININE-BSD FRML MDRD: 73 ML/MIN/1.7M2
GFR SERPL CREATININE-BSD FRML MDRD: 79 ML/MIN/1.7M2
GLUCOSE BLD-MCNC: 104 MG/DL (ref 70–99)
GLUCOSE BLDC GLUCOMTR-MCNC: 69 MG/DL (ref 70–99)
GLUCOSE BLDC GLUCOMTR-MCNC: 85 MG/DL (ref 70–99)
GLUCOSE SERPL-MCNC: 118 MG/DL (ref 70–99)
GLUCOSE SERPL-MCNC: 80 MG/DL (ref 70–99)
HCO3 BLDV-SCNC: 25 MMOL/L (ref 21–28)
HCT VFR BLD AUTO: 37.2 % (ref 40–53)
HCT VFR BLD AUTO: 43.5 % (ref 40–53)
HGB BLD-MCNC: 11.4 G/DL (ref 13.3–17.7)
HGB BLD-MCNC: 11.9 G/DL (ref 13.3–17.7)
HGB BLD-MCNC: 13.4 G/DL (ref 13.3–17.7)
MCH RBC QN AUTO: 26.1 PG (ref 26.5–33)
MCH RBC QN AUTO: 26.2 PG (ref 26.5–33)
MCHC RBC AUTO-ENTMCNC: 30.6 G/DL (ref 31.5–36.5)
MCHC RBC AUTO-ENTMCNC: 30.8 G/DL (ref 31.5–36.5)
MCV RBC AUTO: 85 FL (ref 78–100)
MCV RBC AUTO: 85 FL (ref 78–100)
O2/TOTAL GAS SETTING VFR VENT: ABNORMAL %
PCO2 BLDV: 39 MM HG (ref 40–50)
PH BLDV: 7.41 PH (ref 7.32–7.43)
PLATELET # BLD AUTO: 263 10E9/L (ref 150–450)
PLATELET # BLD AUTO: 307 10E9/L (ref 150–450)
PO2 BLDV: 53 MM HG (ref 25–47)
POTASSIUM BLD-SCNC: 4.4 MMOL/L (ref 3.4–5.3)
POTASSIUM SERPL-SCNC: 4.1 MMOL/L (ref 3.4–5.3)
POTASSIUM SERPL-SCNC: 4.7 MMOL/L (ref 3.4–5.3)
RBC # BLD AUTO: 4.37 10E12/L (ref 4.4–5.9)
RBC # BLD AUTO: 5.12 10E12/L (ref 4.4–5.9)
SODIUM BLD-SCNC: 141 MMOL/L (ref 133–144)
SODIUM SERPL-SCNC: 138 MMOL/L (ref 133–144)
SODIUM SERPL-SCNC: 140 MMOL/L (ref 133–144)
SPECIMEN EXP DATE BLD: NORMAL
WBC # BLD AUTO: 21.4 10E9/L (ref 4–11)
WBC # BLD AUTO: 9 10E9/L (ref 4–11)

## 2018-03-07 PROCEDURE — 25000128 H RX IP 250 OP 636: Performed by: STUDENT IN AN ORGANIZED HEALTH CARE EDUCATION/TRAINING PROGRAM

## 2018-03-07 PROCEDURE — 25000128 H RX IP 250 OP 636: Performed by: UROLOGY

## 2018-03-07 PROCEDURE — 82330 ASSAY OF CALCIUM: CPT | Performed by: ANESTHESIOLOGY

## 2018-03-07 PROCEDURE — 84132 ASSAY OF SERUM POTASSIUM: CPT | Performed by: UROLOGY

## 2018-03-07 PROCEDURE — 36415 COLL VENOUS BLD VENIPUNCTURE: CPT | Performed by: ANESTHESIOLOGY

## 2018-03-07 PROCEDURE — 25000125 ZZHC RX 250: Performed by: ANESTHESIOLOGY

## 2018-03-07 PROCEDURE — 37000008 ZZH ANESTHESIA TECHNICAL FEE, 1ST 30 MIN: Performed by: UROLOGY

## 2018-03-07 PROCEDURE — 85027 COMPLETE CBC AUTOMATED: CPT | Performed by: ANESTHESIOLOGY

## 2018-03-07 PROCEDURE — 25000128 H RX IP 250 OP 636: Performed by: NURSE ANESTHETIST, CERTIFIED REGISTERED

## 2018-03-07 PROCEDURE — 0TP5X0Z REMOVAL OF DRAINAGE DEVICE FROM KIDNEY, EXTERNAL APPROACH: ICD-10-PCS | Performed by: UROLOGY

## 2018-03-07 PROCEDURE — 25500064 ZZH RX 255 OP 636: Performed by: UROLOGY

## 2018-03-07 PROCEDURE — 25000566 ZZH SEVOFLURANE, EA 15 MIN: Performed by: UROLOGY

## 2018-03-07 PROCEDURE — 36000070 ZZH SURGERY LEVEL 5 EA 15 ADDTL MIN - UMMC: Performed by: UROLOGY

## 2018-03-07 PROCEDURE — 71045 X-RAY EXAM CHEST 1 VIEW: CPT

## 2018-03-07 PROCEDURE — 3E033XZ INTRODUCTION OF VASOPRESSOR INTO PERIPHERAL VEIN, PERCUTANEOUS APPROACH: ICD-10-PCS | Performed by: SURGERY

## 2018-03-07 PROCEDURE — 36000072 ZZH SURGERY LEVEL 5 W FLUORO 1ST 30 MIN - UMMC: Performed by: UROLOGY

## 2018-03-07 PROCEDURE — 27210794 ZZH OR GENERAL SUPPLY STERILE: Performed by: UROLOGY

## 2018-03-07 PROCEDURE — 86900 BLOOD TYPING SEROLOGIC ABO: CPT | Performed by: ANESTHESIOLOGY

## 2018-03-07 PROCEDURE — 0T174ZD BYPASS LEFT URETER TO CUTANEOUS, PERCUTANEOUS ENDOSCOPIC APPROACH: ICD-10-PCS | Performed by: UROLOGY

## 2018-03-07 PROCEDURE — 71000017 ZZH RECOVERY PHASE 1 LEVEL 3 EA ADDTL HR: Performed by: UROLOGY

## 2018-03-07 PROCEDURE — 25000128 H RX IP 250 OP 636: Performed by: ANESTHESIOLOGY

## 2018-03-07 PROCEDURE — 00000146 ZZHCL STATISTIC GLUCOSE BY METER IP

## 2018-03-07 PROCEDURE — 93010 ELECTROCARDIOGRAM REPORT: CPT | Performed by: INTERNAL MEDICINE

## 2018-03-07 PROCEDURE — 0TC43ZZ EXTIRPATION OF MATTER FROM LEFT KIDNEY PELVIS, PERCUTANEOUS APPROACH: ICD-10-PCS | Performed by: UROLOGY

## 2018-03-07 PROCEDURE — 82803 BLOOD GASES ANY COMBINATION: CPT | Performed by: UROLOGY

## 2018-03-07 PROCEDURE — 25000132 ZZH RX MED GY IP 250 OP 250 PS 637: Performed by: ANESTHESIOLOGY

## 2018-03-07 PROCEDURE — 20000004 ZZH R&B ICU UMMC

## 2018-03-07 PROCEDURE — 82947 ASSAY GLUCOSE BLOOD QUANT: CPT | Performed by: UROLOGY

## 2018-03-07 PROCEDURE — 86901 BLOOD TYPING SEROLOGIC RH(D): CPT | Performed by: ANESTHESIOLOGY

## 2018-03-07 PROCEDURE — 82330 ASSAY OF CALCIUM: CPT | Performed by: UROLOGY

## 2018-03-07 PROCEDURE — 99291 CRITICAL CARE FIRST HOUR: CPT | Mod: GC | Performed by: SURGERY

## 2018-03-07 PROCEDURE — C9399 UNCLASSIFIED DRUGS OR BIOLOG: HCPCS | Performed by: NURSE ANESTHETIST, CERTIFIED REGISTERED

## 2018-03-07 PROCEDURE — P9041 ALBUMIN (HUMAN),5%, 50ML: HCPCS | Performed by: ANESTHESIOLOGY

## 2018-03-07 PROCEDURE — C1758 CATHETER, URETERAL: HCPCS | Performed by: UROLOGY

## 2018-03-07 PROCEDURE — C1726 CATH, BAL DIL, NON-VASCULAR: HCPCS | Performed by: UROLOGY

## 2018-03-07 PROCEDURE — 25000125 ZZHC RX 250: Performed by: STUDENT IN AN ORGANIZED HEALTH CARE EDUCATION/TRAINING PROGRAM

## 2018-03-07 PROCEDURE — 87088 URINE BACTERIA CULTURE: CPT | Performed by: STUDENT IN AN ORGANIZED HEALTH CARE EDUCATION/TRAINING PROGRAM

## 2018-03-07 PROCEDURE — 40000986 XR CHEST PORT 1 VW

## 2018-03-07 PROCEDURE — 37000009 ZZH ANESTHESIA TECHNICAL FEE, EACH ADDTL 15 MIN: Performed by: UROLOGY

## 2018-03-07 PROCEDURE — C2617 STENT, NON-COR, TEM W/O DEL: HCPCS | Performed by: UROLOGY

## 2018-03-07 PROCEDURE — 87186 SC STD MICRODIL/AGAR DIL: CPT | Performed by: STUDENT IN AN ORGANIZED HEALTH CARE EDUCATION/TRAINING PROGRAM

## 2018-03-07 PROCEDURE — 25000132 ZZH RX MED GY IP 250 OP 250 PS 637: Performed by: STUDENT IN AN ORGANIZED HEALTH CARE EDUCATION/TRAINING PROGRAM

## 2018-03-07 PROCEDURE — 40000277 XR SURGERY CARM FLUORO LESS THAN 5 MIN W STILLS: Mod: TC

## 2018-03-07 PROCEDURE — 25000125 ZZHC RX 250: Performed by: NURSE ANESTHETIST, CERTIFIED REGISTERED

## 2018-03-07 PROCEDURE — 85027 COMPLETE CBC AUTOMATED: CPT | Performed by: UROLOGY

## 2018-03-07 PROCEDURE — 87086 URINE CULTURE/COLONY COUNT: CPT | Performed by: STUDENT IN AN ORGANIZED HEALTH CARE EDUCATION/TRAINING PROGRAM

## 2018-03-07 PROCEDURE — 86850 RBC ANTIBODY SCREEN: CPT | Performed by: ANESTHESIOLOGY

## 2018-03-07 PROCEDURE — 25000132 ZZH RX MED GY IP 250 OP 250 PS 637: Performed by: UROLOGY

## 2018-03-07 PROCEDURE — 80048 BASIC METABOLIC PNL TOTAL CA: CPT | Performed by: UROLOGY

## 2018-03-07 PROCEDURE — 93005 ELECTROCARDIOGRAM TRACING: CPT

## 2018-03-07 PROCEDURE — C1769 GUIDE WIRE: HCPCS | Performed by: UROLOGY

## 2018-03-07 PROCEDURE — 71000016 ZZH RECOVERY PHASE 1 LEVEL 3 FIRST HR: Performed by: UROLOGY

## 2018-03-07 PROCEDURE — 82365 CALCULUS SPECTROSCOPY: CPT | Performed by: UROLOGY

## 2018-03-07 PROCEDURE — 0T743DZ DILATION OF LEFT KIDNEY PELVIS WITH INTRALUMINAL DEVICE, PERCUTANEOUS APPROACH: ICD-10-PCS | Performed by: UROLOGY

## 2018-03-07 PROCEDURE — 84295 ASSAY OF SERUM SODIUM: CPT | Performed by: UROLOGY

## 2018-03-07 PROCEDURE — 40000171 ZZH STATISTIC PRE-PROCEDURE ASSESSMENT III: Performed by: UROLOGY

## 2018-03-07 PROCEDURE — C1887 CATHETER, GUIDING: HCPCS | Performed by: UROLOGY

## 2018-03-07 DEVICE — IMPLANTABLE DEVICE
Type: IMPLANTABLE DEVICE | Status: NON-FUNCTIONAL
Removed: 2018-03-21

## 2018-03-07 RX ORDER — PROPOFOL 10 MG/ML
INJECTION, EMULSION INTRAVENOUS PRN
Status: DISCONTINUED | OUTPATIENT
Start: 2018-03-07 | End: 2018-03-07

## 2018-03-07 RX ORDER — BACLOFEN 10 MG/1
10 TABLET ORAL 4 TIMES DAILY PRN
Status: DISCONTINUED | OUTPATIENT
Start: 2018-03-07 | End: 2018-03-13 | Stop reason: HOSPADM

## 2018-03-07 RX ORDER — DEXAMETHASONE SODIUM PHOSPHATE 4 MG/ML
4 INJECTION, SOLUTION INTRA-ARTICULAR; INTRALESIONAL; INTRAMUSCULAR; INTRAVENOUS; SOFT TISSUE
Status: DISCONTINUED | OUTPATIENT
Start: 2018-03-07 | End: 2018-03-07 | Stop reason: HOSPADM

## 2018-03-07 RX ORDER — ATROPINE SULFATE 0.1 MG/ML
INJECTION INTRAVENOUS
Status: DISCONTINUED
Start: 2018-03-07 | End: 2018-03-09 | Stop reason: HOSPADM

## 2018-03-07 RX ORDER — MICONAZOLE NITRATE 20 MG/G
CREAM TOPICAL 2 TIMES DAILY PRN
Status: DISCONTINUED | OUTPATIENT
Start: 2018-03-07 | End: 2018-03-13 | Stop reason: HOSPADM

## 2018-03-07 RX ORDER — ONDANSETRON 4 MG/1
4 TABLET, ORALLY DISINTEGRATING ORAL EVERY 30 MIN PRN
Status: DISCONTINUED | OUTPATIENT
Start: 2018-03-07 | End: 2018-03-07

## 2018-03-07 RX ORDER — SODIUM CHLORIDE, SODIUM LACTATE, POTASSIUM CHLORIDE, CALCIUM CHLORIDE 600; 310; 30; 20 MG/100ML; MG/100ML; MG/100ML; MG/100ML
INJECTION, SOLUTION INTRAVENOUS CONTINUOUS PRN
Status: DISCONTINUED | OUTPATIENT
Start: 2018-03-07 | End: 2018-03-07

## 2018-03-07 RX ORDER — CEFAZOLIN SODIUM 2 G/100ML
2 INJECTION, SOLUTION INTRAVENOUS
Status: DISCONTINUED | OUTPATIENT
Start: 2018-03-07 | End: 2018-03-07

## 2018-03-07 RX ORDER — MEPERIDINE HYDROCHLORIDE 50 MG/ML
12.5 INJECTION INTRAMUSCULAR; INTRAVENOUS; SUBCUTANEOUS
Status: DISCONTINUED | OUTPATIENT
Start: 2018-03-07 | End: 2018-03-07

## 2018-03-07 RX ORDER — SODIUM CHLORIDE, SODIUM LACTATE, POTASSIUM CHLORIDE, CALCIUM CHLORIDE 600; 310; 30; 20 MG/100ML; MG/100ML; MG/100ML; MG/100ML
INJECTION, SOLUTION INTRAVENOUS CONTINUOUS
Status: DISCONTINUED | OUTPATIENT
Start: 2018-03-07 | End: 2018-03-07

## 2018-03-07 RX ORDER — SODIUM CHLORIDE 9 MG/ML
INJECTION, SOLUTION INTRAVENOUS CONTINUOUS
Status: DISCONTINUED | OUTPATIENT
Start: 2018-03-07 | End: 2018-03-08

## 2018-03-07 RX ORDER — MOMETASONE FUROATE 1 MG/G
CREAM TOPICAL AT BEDTIME
Status: DISCONTINUED | OUTPATIENT
Start: 2018-03-07 | End: 2018-03-13 | Stop reason: HOSPADM

## 2018-03-07 RX ORDER — FERROUS SULFATE 325(65) MG
325 TABLET ORAL
Status: DISCONTINUED | OUTPATIENT
Start: 2018-03-08 | End: 2018-03-13 | Stop reason: HOSPADM

## 2018-03-07 RX ORDER — FLUCONAZOLE 2 MG/ML
400 INJECTION, SOLUTION INTRAVENOUS EVERY 24 HOURS
Status: DISCONTINUED | OUTPATIENT
Start: 2018-03-08 | End: 2018-03-10

## 2018-03-07 RX ORDER — ALBUTEROL SULFATE 0.83 MG/ML
2.5 SOLUTION RESPIRATORY (INHALATION)
Status: DISCONTINUED | OUTPATIENT
Start: 2018-03-07 | End: 2018-03-13 | Stop reason: HOSPADM

## 2018-03-07 RX ORDER — OXYBUTYNIN CHLORIDE 5 MG/1
5 TABLET ORAL 3 TIMES DAILY
Status: DISCONTINUED | OUTPATIENT
Start: 2018-03-07 | End: 2018-03-13 | Stop reason: HOSPADM

## 2018-03-07 RX ORDER — BISACODYL 10 MG
10 SUPPOSITORY, RECTAL RECTAL EVERY OTHER DAY
Status: DISCONTINUED | OUTPATIENT
Start: 2018-03-08 | End: 2018-03-13 | Stop reason: HOSPADM

## 2018-03-07 RX ORDER — CEFAZOLIN SODIUM 1 G/3ML
1 INJECTION, POWDER, FOR SOLUTION INTRAMUSCULAR; INTRAVENOUS SEE ADMIN INSTRUCTIONS
Status: DISCONTINUED | OUTPATIENT
Start: 2018-03-07 | End: 2018-03-07

## 2018-03-07 RX ORDER — EPHEDRINE SULFATE 50 MG/ML
INJECTION, SOLUTION INTRAMUSCULAR; INTRAVENOUS; SUBCUTANEOUS PRN
Status: DISCONTINUED | OUTPATIENT
Start: 2018-03-07 | End: 2018-03-07

## 2018-03-07 RX ORDER — POLYETHYLENE GLYCOL 3350 17 G/17G
17 POWDER, FOR SOLUTION ORAL DAILY PRN
Status: DISCONTINUED | OUTPATIENT
Start: 2018-03-07 | End: 2018-03-13 | Stop reason: HOSPADM

## 2018-03-07 RX ORDER — DEXAMETHASONE SODIUM PHOSPHATE 4 MG/ML
4 INJECTION, SOLUTION INTRA-ARTICULAR; INTRALESIONAL; INTRAMUSCULAR; INTRAVENOUS; SOFT TISSUE EVERY 10 MIN PRN
Status: DISCONTINUED | OUTPATIENT
Start: 2018-03-07 | End: 2018-03-07

## 2018-03-07 RX ORDER — ONDANSETRON 2 MG/ML
4 INJECTION INTRAMUSCULAR; INTRAVENOUS EVERY 30 MIN PRN
Status: DISCONTINUED | OUTPATIENT
Start: 2018-03-07 | End: 2018-03-07

## 2018-03-07 RX ORDER — METOPROLOL TARTRATE 1 MG/ML
1-2 INJECTION, SOLUTION INTRAVENOUS EVERY 5 MIN PRN
Status: DISCONTINUED | OUTPATIENT
Start: 2018-03-07 | End: 2018-03-07 | Stop reason: HOSPADM

## 2018-03-07 RX ORDER — ACETAMINOPHEN 10 MG/ML
1000 INJECTION, SOLUTION INTRAVENOUS ONCE
Status: DISCONTINUED | OUTPATIENT
Start: 2018-03-07 | End: 2018-03-07 | Stop reason: CLARIF

## 2018-03-07 RX ORDER — ONDANSETRON 2 MG/ML
4 INJECTION INTRAMUSCULAR; INTRAVENOUS EVERY 30 MIN PRN
Status: DISCONTINUED | OUTPATIENT
Start: 2018-03-07 | End: 2018-03-07 | Stop reason: HOSPADM

## 2018-03-07 RX ORDER — SODIUM CHLORIDE 9 MG/ML
INJECTION, SOLUTION INTRAVENOUS CONTINUOUS
Status: DISCONTINUED | OUTPATIENT
Start: 2018-03-07 | End: 2018-03-07 | Stop reason: CLARIF

## 2018-03-07 RX ORDER — DOCUSATE SODIUM 100 MG/1
100 CAPSULE, LIQUID FILLED ORAL 2 TIMES DAILY
Status: DISCONTINUED | OUTPATIENT
Start: 2018-03-07 | End: 2018-03-13 | Stop reason: HOSPADM

## 2018-03-07 RX ORDER — FENTANYL CITRATE 50 UG/ML
25-50 INJECTION, SOLUTION INTRAMUSCULAR; INTRAVENOUS
Status: DISCONTINUED | OUTPATIENT
Start: 2018-03-07 | End: 2018-03-07 | Stop reason: HOSPADM

## 2018-03-07 RX ORDER — VANCOMYCIN HYDROCHLORIDE 1 G/200ML
1000 INJECTION, SOLUTION INTRAVENOUS
Status: COMPLETED | OUTPATIENT
Start: 2018-03-07 | End: 2018-03-07

## 2018-03-07 RX ORDER — NALOXONE HYDROCHLORIDE 0.4 MG/ML
.1-.4 INJECTION, SOLUTION INTRAMUSCULAR; INTRAVENOUS; SUBCUTANEOUS
Status: DISCONTINUED | OUTPATIENT
Start: 2018-03-07 | End: 2018-03-07 | Stop reason: HOSPADM

## 2018-03-07 RX ORDER — FENTANYL CITRATE 50 UG/ML
INJECTION, SOLUTION INTRAMUSCULAR; INTRAVENOUS PRN
Status: DISCONTINUED | OUTPATIENT
Start: 2018-03-07 | End: 2018-03-07

## 2018-03-07 RX ORDER — ONDANSETRON 4 MG/1
4 TABLET, ORALLY DISINTEGRATING ORAL EVERY 30 MIN PRN
Status: DISCONTINUED | OUTPATIENT
Start: 2018-03-07 | End: 2018-03-07 | Stop reason: HOSPADM

## 2018-03-07 RX ORDER — ACETAMINOPHEN 325 MG/1
975 TABLET ORAL ONCE
Status: COMPLETED | OUTPATIENT
Start: 2018-03-07 | End: 2018-03-07

## 2018-03-07 RX ORDER — ACETAMINOPHEN 325 MG/1
975 TABLET ORAL EVERY 8 HOURS
Status: CANCELLED | OUTPATIENT
Start: 2018-03-07 | End: 2018-03-10

## 2018-03-07 RX ORDER — DEXAMETHASONE SODIUM PHOSPHATE 4 MG/ML
INJECTION, SOLUTION INTRA-ARTICULAR; INTRALESIONAL; INTRAMUSCULAR; INTRAVENOUS; SOFT TISSUE PRN
Status: DISCONTINUED | OUTPATIENT
Start: 2018-03-07 | End: 2018-03-07

## 2018-03-07 RX ORDER — ZINC OXIDE
OINTMENT (GRAM) TOPICAL
Status: DISCONTINUED | OUTPATIENT
Start: 2018-03-07 | End: 2018-03-13 | Stop reason: HOSPADM

## 2018-03-07 RX ORDER — LEVETIRACETAM 500 MG/1
1000 TABLET ORAL 2 TIMES DAILY
Status: DISCONTINUED | OUTPATIENT
Start: 2018-03-07 | End: 2018-03-13 | Stop reason: HOSPADM

## 2018-03-07 RX ORDER — FLUCONAZOLE 2 MG/ML
400 INJECTION, SOLUTION INTRAVENOUS ONCE
Status: COMPLETED | OUTPATIENT
Start: 2018-03-07 | End: 2018-03-07

## 2018-03-07 RX ORDER — HYDRALAZINE HYDROCHLORIDE 20 MG/ML
2.5-5 INJECTION INTRAMUSCULAR; INTRAVENOUS EVERY 10 MIN PRN
Status: DISCONTINUED | OUTPATIENT
Start: 2018-03-07 | End: 2018-03-07

## 2018-03-07 RX ORDER — SULFAMETHOXAZOLE/TRIMETHOPRIM 800-160 MG
1 TABLET ORAL 2 TIMES DAILY
Status: ON HOLD | COMMUNITY
End: 2018-03-12

## 2018-03-07 RX ORDER — NALOXONE HYDROCHLORIDE 0.4 MG/ML
.1-.4 INJECTION, SOLUTION INTRAMUSCULAR; INTRAVENOUS; SUBCUTANEOUS
Status: DISCONTINUED | OUTPATIENT
Start: 2018-03-07 | End: 2018-03-07

## 2018-03-07 RX ORDER — ONDANSETRON 2 MG/ML
INJECTION INTRAMUSCULAR; INTRAVENOUS PRN
Status: DISCONTINUED | OUTPATIENT
Start: 2018-03-07 | End: 2018-03-07

## 2018-03-07 RX ORDER — GLYCOPYRROLATE 0.2 MG/ML
INJECTION, SOLUTION INTRAMUSCULAR; INTRAVENOUS PRN
Status: DISCONTINUED | OUTPATIENT
Start: 2018-03-07 | End: 2018-03-07

## 2018-03-07 RX ORDER — LANOLIN ALCOHOL/MO/W.PET/CERES
1000 CREAM (GRAM) TOPICAL DAILY
Status: DISCONTINUED | OUTPATIENT
Start: 2018-03-08 | End: 2018-03-13 | Stop reason: HOSPADM

## 2018-03-07 RX ORDER — ALBUMIN, HUMAN INJ 5% 5 %
12.5 SOLUTION INTRAVENOUS ONCE
Status: COMPLETED | OUTPATIENT
Start: 2018-03-07 | End: 2018-03-07

## 2018-03-07 RX ORDER — FLUMAZENIL 0.1 MG/ML
0.2 INJECTION, SOLUTION INTRAVENOUS
Status: DISCONTINUED | OUTPATIENT
Start: 2018-03-07 | End: 2018-03-07 | Stop reason: HOSPADM

## 2018-03-07 RX ORDER — FENTANYL CITRATE 50 UG/ML
25-50 INJECTION, SOLUTION INTRAMUSCULAR; INTRAVENOUS EVERY 5 MIN PRN
Status: DISCONTINUED | OUTPATIENT
Start: 2018-03-07 | End: 2018-03-07

## 2018-03-07 RX ADMIN — PHENYLEPHRINE HYDROCHLORIDE 200 MCG: 10 INJECTION, SOLUTION INTRAMUSCULAR; INTRAVENOUS; SUBCUTANEOUS at 12:45

## 2018-03-07 RX ADMIN — PHENYLEPHRINE HYDROCHLORIDE 200 MCG: 10 INJECTION, SOLUTION INTRAMUSCULAR; INTRAVENOUS; SUBCUTANEOUS at 12:31

## 2018-03-07 RX ADMIN — VANCOMYCIN HYDROCHLORIDE 1000 MG: 1 INJECTION, SOLUTION INTRAVENOUS at 12:20

## 2018-03-07 RX ADMIN — LEVETIRACETAM 1000 MG: 500 TABLET ORAL at 22:12

## 2018-03-07 RX ADMIN — FENTANYL CITRATE 50 MCG: 50 INJECTION, SOLUTION INTRAMUSCULAR; INTRAVENOUS at 13:27

## 2018-03-07 RX ADMIN — GENTAMICIN SULFATE 230 MG: 40 INJECTION, SOLUTION INTRAMUSCULAR; INTRAVENOUS at 12:45

## 2018-03-07 RX ADMIN — SODIUM CHLORIDE, POTASSIUM CHLORIDE, SODIUM LACTATE AND CALCIUM CHLORIDE: 600; 310; 30; 20 INJECTION, SOLUTION INTRAVENOUS at 11:30

## 2018-03-07 RX ADMIN — PHENYLEPHRINE HYDROCHLORIDE 200 MCG: 10 INJECTION, SOLUTION INTRAMUSCULAR; INTRAVENOUS; SUBCUTANEOUS at 12:10

## 2018-03-07 RX ADMIN — FLUCONAZOLE 400 MG: 2 INJECTION INTRAVENOUS at 13:00

## 2018-03-07 RX ADMIN — SODIUM CHLORIDE 1000 ML: 9 INJECTION, SOLUTION INTRAVENOUS at 23:06

## 2018-03-07 RX ADMIN — ACETAMINOPHEN 975 MG: 325 TABLET, FILM COATED ORAL at 11:06

## 2018-03-07 RX ADMIN — VANCOMYCIN HYDROCHLORIDE 1250 MG: 10 INJECTION, POWDER, LYOPHILIZED, FOR SOLUTION INTRAVENOUS at 23:06

## 2018-03-07 RX ADMIN — Medication 5 MG: at 13:16

## 2018-03-07 RX ADMIN — PROPOFOL 100 MG: 10 INJECTION, EMULSION INTRAVENOUS at 11:59

## 2018-03-07 RX ADMIN — ALBUMIN HUMAN 12.5 G: 0.05 INJECTION, SOLUTION INTRAVENOUS at 20:47

## 2018-03-07 RX ADMIN — Medication 5 MG: at 13:02

## 2018-03-07 RX ADMIN — METOPROLOL TARTRATE 4 MG: 5 INJECTION INTRAVENOUS at 16:39

## 2018-03-07 RX ADMIN — SUGAMMADEX 200 MG: 100 INJECTION, SOLUTION INTRAVENOUS at 16:13

## 2018-03-07 RX ADMIN — ACETAMINOPHEN 975 MG: 325 SOLUTION ORAL at 17:15

## 2018-03-07 RX ADMIN — DEXAMETHASONE SODIUM PHOSPHATE 6 MG: 4 INJECTION, SOLUTION INTRA-ARTICULAR; INTRALESIONAL; INTRAMUSCULAR; INTRAVENOUS; SOFT TISSUE at 13:56

## 2018-03-07 RX ADMIN — ROCURONIUM BROMIDE 50 MG: 10 INJECTION INTRAVENOUS at 11:59

## 2018-03-07 RX ADMIN — ROCURONIUM BROMIDE 20 MG: 10 INJECTION INTRAVENOUS at 12:30

## 2018-03-07 RX ADMIN — Medication 5 MG: at 12:35

## 2018-03-07 RX ADMIN — ONDANSETRON 4 MG: 2 INJECTION INTRAMUSCULAR; INTRAVENOUS at 16:07

## 2018-03-07 RX ADMIN — SODIUM CHLORIDE: 9 INJECTION, SOLUTION INTRAVENOUS at 17:50

## 2018-03-07 RX ADMIN — MIDAZOLAM 1 MG: 1 INJECTION INTRAMUSCULAR; INTRAVENOUS at 11:49

## 2018-03-07 RX ADMIN — SODIUM CHLORIDE, POTASSIUM CHLORIDE, SODIUM LACTATE AND CALCIUM CHLORIDE: 600; 310; 30; 20 INJECTION, SOLUTION INTRAVENOUS at 14:57

## 2018-03-07 RX ADMIN — NOREPINEPHRINE BITARTRATE 0.03 MCG/KG/MIN: 1 INJECTION INTRAVENOUS at 23:36

## 2018-03-07 RX ADMIN — FENTANYL CITRATE 150 MCG: 50 INJECTION, SOLUTION INTRAMUSCULAR; INTRAVENOUS at 11:58

## 2018-03-07 RX ADMIN — GLYCOPYRROLATE 0.2 MG: 0.2 INJECTION, SOLUTION INTRAMUSCULAR; INTRAVENOUS at 13:23

## 2018-03-07 RX ADMIN — DOCUSATE SODIUM 100 MG: 100 CAPSULE, LIQUID FILLED ORAL at 22:12

## 2018-03-07 RX ADMIN — SODIUM CHLORIDE 1000 ML: 9 INJECTION, SOLUTION INTRAVENOUS at 18:30

## 2018-03-07 RX ADMIN — OXYBUTYNIN CHLORIDE 5 MG: 5 TABLET ORAL at 22:13

## 2018-03-07 RX ADMIN — MICONAZOLE NITRATE: 2 POWDER TOPICAL at 22:13

## 2018-03-07 RX ADMIN — SODIUM CHLORIDE: 9 INJECTION, SOLUTION INTRAVENOUS at 18:15

## 2018-03-07 NOTE — ANESTHESIA POSTPROCEDURE EVALUATION
Patient: Maicol Kingel    Procedure(s):  Left Percutaneous Nephrolithotomy, Access To Kidney, Ureteroscopy, Cystoscopy, Stent Placement With Holmium Laser standby - Wound Class: I-Clean    Diagnosis:Nephrolithiasis   Diagnosis Additional Information: No value filed.    Anesthesia Type:  General    Note:  Anesthesia Post Evaluation    Patient location during evaluation: PACU  Patient participation: Able to fully participate in evaluation  Level of consciousness: awake and alert  Pain management: adequate  Airway patency: patent  Cardiovascular status: acceptable  Respiratory status: acceptable  Hydration status: acceptable  PONV: none             Last vitals:  Vitals:    03/07/18 1018   BP: 118/83   Resp: 14   Temp: 36.6  C (97.9  F)   SpO2: 96%         Electronically Signed By: Pal Dawkins MD  March 7, 2018  4:42 PM

## 2018-03-07 NOTE — IP AVS SNAPSHOT
"    [address] [facility]: [phone]                                              INTERAGENCY TRANSFER FORM - PHYSICIAN ORDERS   3/7/2018                    Hospital Admission Date: 3/7/2018  GABRIELA CARRANZA   : 1975  Sex: Male        Attending Provider: Dejon Horn MD     Allergies:  Latex, Latex    Infection:  MRSA   Service:  UROLOGY    Ht:  1.753 m (5' 9\")   Wt:  84.9 kg (187 lb 2.7 oz)   Admission Wt:  83.9 kg (185 lb)    BMI:  27.64 kg/m 2   BSA:  2.03 m 2            Patient PCP Information     Provider PCP Type    [facility]      ED Clinical Impression     Diagnosis Description Comment Added By Time Added    Kidney stones [N20.0] Kidney stones [N20.0]  Chacha Monroe PA 3/12/2018 11:57 AM    Sepsis due to urinary tract infection (H) [A41.9, N39.0] Sepsis due to urinary tract infection (H) [A41.9, N39.0]  Chacha Monroe PA 3/12/2018 11:57 AM      Hospital Problems as of 3/13/2018              Priority Class Noted POA    Sepsis due to urinary tract infection (H) Medium  3/7/2018 Yes      Non-Hospital Problems as of 3/13/2018              Priority Class Noted    Kidney stones Medium  2011    Sepsis with multiple organ dysfunction (MOD) (H) Medium  10/24/2011    Anemia due to blood loss, acute Medium  10/24/2011    Postsurgical nonabsorption Medium  10/24/2011    Quadriplegia, post-traumatic (H) Medium  10/24/2011    Renal hemorrhage, left Medium  10/24/2011      Code Status History     Date Active Date Inactive Code Status Order ID Comments User Context    3/12/2018 12:28 PM  Full Code [id number]  Chacha Monroe PA Outpatient    3/8/2018  2:10 AM 3/12/2018 12:28 PM Full Code [id number]  Vamsi Benson MD Inpatient    10/19/2011  7:06 PM 2011  7:05 PM Full Code [id number]  Dana Peng RN Inpatient         Medication Review      START taking        Dose / Directions Comments    cefTAZidime 2 GM vial   Commonly known as:  FORTAZ   Used for:  " Kidney stones        Dose:  2 g   Inject 2 g into the vein every 8 hours for 9 days   Quantity:  270 mL   Refills:  0          CONTINUE these medications which have NOT CHANGED        Dose / Directions Comments    ACETAMINOPHEN PO        Dose:  650 mg   Take 650 mg by mouth every 4 hours as needed for pain   Refills:  0        albuterol (2.5 MG/3ML) 0.083% neb solution   Used for:  SOB (shortness of breath)        Dose:  2.5 mg   Take 3 mLs by nebulization every 2 hours as needed.   Quantity:  1 Box   Refills:  0        ascorbic acid 250 MG Tabs tablet   Used for:  Recurrent UTI        Dose:  250 mg   Take 1 tablet by mouth 2 times daily.   Refills:  0        * baclofen 10 MG tablet   Commonly known as:  LIORESAL        Dose:  10 mg   Take 10 mg by mouth 4 times daily as needed   Refills:  0        * BACLOFEN IT        Via implanted IT pump Baclofen 2000 mc/gmL  Dose: 277.1 mcg/day Low reservoir alarm date: 4/26/2018   Refills:  0        clindamycin 1 % topical gel   Commonly known as:  CLINDAMAX        Apply topically 2 times daily   Refills:  0        COLACE 100 MG capsule   Generic drug:  docusate sodium        Dose:  100 mg   Take 100 mg by mouth 2 times daily.   Refills:  0        Cranberry 400 MG Tabs        Dose:  400 mg   Take 400 mg by mouth 2 times daily   Refills:  0        cyanocobalamin 1000 MCG tablet   Commonly known as:  vitamin  B-12   Used for:  Vitamin B 12 deficiency        Dose:  1000 mcg   Take 1 tablet by mouth daily.   Refills:  0        * bisacodyl 10 MG Suppository   Commonly known as:  DULCOLAX   Used for:  Chronic constipation        Dose:  10 mg   Place 1 suppository rectally every other day.   Quantity:  12 suppository   Refills:  0        * DULCOLAX PO        Dose:  10 mg   Take 10 mg by mouth At Bedtime.   Refills:  0        ferrous sulfate 325 (65 FE) MG tablet   Commonly known as:  IRON   Used for:  Anemia due to blood loss, acute        Dose:  325 mg   Take 1 tablet by mouth daily  (with breakfast).   Quantity:  100 tablet   Refills:  0        KEPPRA PO        Dose:  1000 mg   Take 1,000 mg by mouth 2 times daily   Refills:  0        ketoconazole 2 % cream   Commonly known as:  NIZORAL        Apply topically 2 times daily as needed   Refills:  0        magnesium gluconate 500 MG tablet   Commonly known as:  MAGONATE   Used for:  Magnesium deficiency        Dose:  500 mg   Take 1 tablet by mouth daily.   Refills:  0        miconazole 2 % powder   Commonly known as:  MICATIN; MICRO GUARD   Used for:  Candida infection of flexural skin        Apply  topically 2 times daily.   Quantity:  70 g   Refills:  0        mometasone 0.1 % cream   Commonly known as:  ELOCON        Apply topically At Bedtime   Refills:  0        MULTIVITAMIN ADULT PO        Dose:  1 tablet   Take 1 tablet by mouth daily   Refills:  0        ondansetron 4 MG ODT tab   Commonly known as:  ZOFRAN-ODT   Used for:  Nausea and vomiting        Dose:  4 mg   Take 1 tablet by mouth every 6 hours as needed for nausea.   Quantity:  20 tablet   Refills:  0        OSTEO-MULTIVITAMINS/MINERALS OR        Take  by mouth daily. 12 pm   Refills:  0        oxybutynin 5 MG tablet   Commonly known as:  DITROPAN   Used for:  Neurogenic bladder        Dose:  5 mg   Take 1 tablet by mouth 3 times daily.   Quantity:  270 tablet   Refills:  0        polyethylene glycol powder   Commonly known as:  MIRALAX/GLYCOLAX        Dose:  1 capful   Take 1 capful by mouth daily as needed for constipation (if no BM x3 days)   Refills:  0        polyvinyl alcohol 1.4 % ophthalmic solution   Commonly known as:  LIQUIFILM TEARS        Dose:  2 drop   Place 2 drops into both eyes every 2 hours as needed for dry eyes   Refills:  0        selenium sulfide 2.5 % lotion   Commonly known as:  SELSUN        Apply to scalp topically in the morning every Sun, Tue, Thursday. Place for 5 min then rinse.   Refills:  0        senna-docusate 8.6-50 MG per tablet   Commonly known  as:  SENOKOT-S;PERICOLACE   Used for:  Chronic constipation        Dose:  1-2 tablet   Take 1-2 tablets by mouth 2 times daily.   Quantity:  60 tablet   Refills:  0        sulfamethoxazole-trimethoprim 800-160 MG per tablet   Commonly known as:  BACTRIM DS/SEPTRA DS   Used for:  Kidney stones        Dose:  1 tablet   Take 1 tablet by mouth 2 times daily   Quantity:  18 tablet   Refills:  0        VITAMIN D (CHOLECALCIFEROL) PO        Dose:  5000 Units   Take 5,000 Units by mouth daily   Refills:  0        zinc oxide 40 % ointment   Commonly known as:  DESITIN   Used for:  Breakdown of skin tissue        Apply  topically every hour as needed.   Quantity:  56.7 g   Refills:  0        * Notice:  This list has 4 medication(s) that are the same as other medications prescribed for you. Read the directions carefully, and ask your doctor or other care provider to review them with you.      STOP taking     CIPROFLOXACIN PO                   Summary of Visit     Reason for your hospital stay       On 3/7/18 Mr. Carrera underwent:  1. Antegrade nephrostogram.   2. Removal of indwelling nephrostomy tube.   3. Percutaneous nephrolithotomy, stone burden >4 cm.   4. Use of CyberWand.   5. Use of C-arm and interpretation of the fluoroscopic image.   6. 10 x 26 left nephroureteral stent  Dr. Dejon Horn             After Care     Activity       Your activity upon discharge:   - May return to home activities  - Do not strain with bowel movements.    - Do not drive until you can press the brake pedal quickly and fully without pain.    - Do not operate a motor vehicle while taking narcotic pain medications.       Activity - Up ad orly           Diet       Follow this diet upon discharge:   Regular/ home diet       Discharge Instructions       Medications:   1) PAIN: ContinueTylenol (acetaminophen 625mg)every 4-6 hours as needed for mild pain.  Do not take more than 4,000mg of Tylenol (acetaminophen/ APAP) from all sources in any  24 hour period since this can cause liver damage.  Do not take more than 2400mg of ibuprofen in any 24 hour period since this can cause kidney damage. Never drive, operate machinery or drink alcoholic beverages while you are taking narcotic pain medications.      2) CONSTIPATION: Continue your home bowel regimen.  Please reduce your home bowel regimen if you develop loose stools. Other over the counter solutions such as prune juice, miralax, fiber products, senna, and dulcolax can also be used. If you are taking the home bowel regimen but still have not had a bowel movement in 3 days, start over-the-counter Milk of Magnesia taken twice daily until you have a nice bowel movement.  Call the office with any concerns.     3) ANTIBIOTICS:  - Continue fortaz (ceftazidime) 2g every 8 hours for 9 more days (through 3/21/18) to complete a total 14 day course of antibiotics for urosepsis following your stone procedure  - Continue Bactrim DS (trimethoprim/sulfamethoxazole) twice daily for 9 more days (through 3/21/18) to complete a total 14 day course of antibiotics for urosepsis following your stone procedure.   - Follow up with Infectious Disease about 1-2 weeks prior to your next stone procedure with Dr. Horn.  You will need an antibiotic plan prior to your next stone procedure.       Encourage PO fluids           Fall precautions           General info for SNF       Length of Stay Estimate: Long Term Care  Condition at Discharge: Improving  Level of care:skilled   Rehabilitation Potential: Good  Admission H&P remains valid and up-to-date: Yes  Recent Chemotherapy: N/A  Use Nursing Home Standing Orders: Yes       IV access       **Ordering Provider MUST call/page Care Coordinator/ to discuss arranging this service**    You are going home with the following vascular access device: PICC.  Please provide standard cares.       Intake and output       Every shift       Mantoux instructions       Give two-step  Kapil (PPD) Per Facility Policy Yes       Tubes and drains       You are going home with the following tubes or drains:   Suprapubic Belle Catheter:  - Continue your home suprapubic Belle catheter to gravity drainage.   - Protect the catheter and treat it like an extension of your body - keep it secured to your leg and do not let it get caught, snagged, or tugged. The catheter tubing should remain tension-free and without kinks. In order to drain best, the tubing and bag should always be lower than your body.  - You may notice a little blood, small amount of white discharge, or caking at the catheter insertion site. This is normal but it can irritate the skin so it is best to wash this off in the daily shower. You are encouraged to wash the catheter tubing to keep it clean, and the urine drainage bag also can be gotten wet.   - Catheters can sometimes cause bladder spasms (lower abdominal pain that feels like the need to urinate). Continue your home dose of oxybutynin for this.       Wound care (specify)       Site:   Left flank  Instructions:    Percutaneous Nephrostomy Tube (PNT) / Nephroureteral stent:  - You are going home with a left percutaneous nephroureteral stent   - For the first few weeks, you may having bruising or soreness at the tube site.  Use a warm heating pad to relieve discomfort.    - Blood in the urine can be expected.  Drink 8-10 glasses of fluid per day to keep urine light pink or light yellow.    - Daily showering is important but keep your tube site dry.  Avoid baths, swimming, etc.   - Clean around the tube site every other day using soap and water, then cover the site with a sterile bandage. Take care not to tug the tube or remove the suture.   - Keep the tube securely taped to your back at all times and do not let it get caught, snagged or tugged.  The tubing should always remain tension-free and without kinks.  In order to drain best (and prevent urinary infections), the tubing and bag  should always hang lower than your kidney.             Procedures     Oxygen - Nasal cannula       Resume previous orders for BIPAP / supplemental O2 with sleeping             Your next 10 appointments already scheduled     Mar 30, 2018 11:40 AM CDT   (Arrive by 11:25 AM)   Cystoscopy with Dejon Horn MD   [facility] ([facility])    [address]  [address]th Floor  [place] [id number] [phone]            Apr 05, 2018 10:00 AM CDT   (Arrive by 9:45 AM)   Return Visit with Shawna Saldivar MD   [facility] ([facility])    [address]  [address]  [place] [address] [phone]              Follow-Up Appointment Instructions     Future Labs/Procedures    Adult [facility] Follow-up and recommended labs and tests     Comments:    Follow-Up:   - Call your primary care provider to touch base regarding your recent admission.   - Follow up with Dr. Horn as scheduled on 3/30/18 at 11:40am for a postop checkup and to make plans for your next stone procedure.   - Follow up with Infectious Disease (Shawna Saldivar MD - 4/5/18 at 10:00am) for a checkup and to discuss an antibiotic plan prior to your next stone procedure.   - Call or return sooner than your regularly scheduled visit if you develop any of the following:  Fever (greater than 101.3F) or flu-like symptoms, uncontrolled pain, uncontrolled nausea or vomiting, as well as increased redness, swelling, or drainage from your wound.    Phone numbers:  - Nursing phone helpline at the Urology Clinic (8A-5P M-F):  [phone].    - Nights or weekends, call [phone] and ask the  to page the urology resident on call.   - For emergencies, always call [phone]    Appointments on [place] and/or [facility] (with [facility] or [facility] provider or service). Call [phone] if you haven't heard  regarding these appointments within 7 days of discharge.      Follow-Up Appointment Instructions     Adult [facility] Follow-up and recommended labs and tests       Follow-Up:   - Call your primary care provider to touch base regarding your recent admission.   - Follow up with Dr. Horn as scheduled on 3/30/18 at 11:40am for a postop checkup and to make plans for your next stone procedure.   - Follow up with Infectious Disease (Shawna Saldivar MD - 4/5/18 at 10:00am) for a checkup and to discuss an antibiotic plan prior to your next stone procedure.   - Call or return sooner than your regularly scheduled visit if you develop any of the following:  Fever (greater than 101.3F) or flu-like symptoms, uncontrolled pain, uncontrolled nausea or vomiting, as well as increased redness, swelling, or drainage from your wound.    Phone numbers:  - Nursing phone helpline at the Urology Clinic (8A-5P M-F):  [phone].    - Nights or weekends, call [phone] and ask the  to page the urology resident on call.   - For emergencies, always call [phone]    Appointments on [place] and/or [facility] (with [facility] or [facility] provider or service). Call [phone] if you haven't heard regarding these appointments within 7 days of discharge.             Statement of Approval     Ordered          03/13/18 1407  I have reviewed and agree with all the recommendations and orders detailed in this document.  EFFECTIVE NOW     Approved and electronically signed by:  Dejon Horn MD

## 2018-03-07 NOTE — OP NOTE
UROLOGY OPERATIVE NOTE    PREOPERATIVE DIAGNOSIS: Left staghorn renal calculus    POSTOPERATIVE DIAGNOSIS: Left staghorn renal calculus    PROCEDURES PERFORMED:   1. Antegrade nephrostogram.   2. Removal of indwelling nephrostomy tube.   3. Percutaneous nephrolithotomy, stone burden >4 cm.   4. Use of CyberWand.   5. Use of C-arm and interpretation of the fluoroscopic image.   6. 10 x 26 left nephroureteral stent    STAFF SURGEON: SOURAV Horn MD. Please note that Dr. Horn was present for the entire procedure.      : Vamsi Bonilla MD     ESTIMATED BLOOD LOSS:  500 cc  SPECIMENS: Stone fragment sent analysis.     SIGNIFICANT FINDINGS: Renal pelvis largely cleared of stone; Significant calyceal stone burden at end of case; Stone sent for analysis    BRIEF OPERATIVE INDICATIONS: Mr Carrera is a 42 year old man with a complex past medical history along with a urologic history of urolithiasis who was recently found to have a large left sided staghorn calculus.  In light of these findings the patient opted to proceed with PCNL.    PROCEDURE PERFORMED: After identifying and marking the patient in the preoperative holding area, the patient was brought to the operating room and placed in supine position. Preoperative timeout was then completed to verify the patient and procedure to be performed. Once adequate anesthesia was obtained, the patient was placed in prone position and all the pressure points were well supported. Subsequently, the patient was then prepped and draped in a standard sterile fashion.     We began our procedure by  placing a sensor guidewire through the existing nephrostomy tube into the left renal pelvis under fluoroscopic guidance. After removing the nephrostomy tube, we placed a JB1 catheter over the sensor guidewire and attempted to guide the wire into the left ureter.  Given the notable stone burden, however, we were unable to advance the wire.  We removed the JB1 catheter and  advanced a dual-lumen catheter over the wire.  Fortunately, we were able to advance the sensor wire into the left ureter with the assistance of the dual lumen catheter.  The wire was advanced into the bladder under radiographic guidance.  A second super stiff wire was then placed.  A balloon dilator was then used to dilate the tract under fluoroscopic guidance.     After placing the nephroscope we introduced the CyberWand and began to address the stone burden within the renal pelvis.  These efforts were complicated by the notable stone volume and poor visualization with moderate bleeding throughout the case.  Once we eventually removed much of the stone within the renal pelvis, we used a 3-prong grasper to remove a fragment for stone analysis      Fluoroscopy confirmed significant reduction in the pelvic stone burden but revealed extensive residual stone within the calyces.  We were able to access and remove some of the calyceal stone.  Given the duration of the case and worsening visualization, we opted to finish the case with a plan to address the residual stone burden at a later date.       At this point, we placed a 10 x 26 left nephroureteral stent over the wire under radiographic guidance.  The sheath was removed and we closed the site with 3-0 Vicryl suture.  A urostomy device was placed over site.  The patient was awakened from anesthesia, brought to the recovery room.    Postoperative Plan:  -Patient to transfer to PACU with close monitoring given low grade fever at end of case and concern for possible developing sepsis  -Plan to admit with continuation of vancomycin, gentamicin and fluconazole  -CT stone protocol in morning to evaluate residual stone burden      Attestation:  I was present for the entire procedure on 3/7/18.  I agree with this note.  JEREMIE Horn MD

## 2018-03-07 NOTE — H&P
ASSESSMENT AND PLAN  Bilateral staghorn calculus.  Plan for left percutaneous nephrolithotomy today.  __________________________________________________    CHIEF COMPLAINT  It was my pleasure to see Maicol Carrera who is a 42 year old male here for left pcnl.      HPI  He is doing well.  His urine culture from his percutaneous nephrostomy tube last week showed mutliple organisms <30K.  No sensitivities were done.  He has been on cipro and bactrim x 24 hrs.    Patient Active Problem List    Diagnosis Date Noted     Sepsis with multiple organ dysfunction (MOD) (H) 10/24/2011     Priority: Medium     Anemia due to blood loss, acute 10/24/2011     Priority: Medium     Postsurgical nonabsorption 10/24/2011     Priority: Medium     Problem list name updated by automated process. Provider to review       Quadriplegia, post-traumatic (H) 10/24/2011     Priority: Medium     Renal hemorrhage, left 10/24/2011     Priority: Medium     Kidney stones 08/09/2011     Priority: Medium     Past Medical History:   Diagnosis Date     Constipation, chronic      GERD (gastroesophageal reflux disease)      Head injury      Neurogenic bladder     SP catheter     JUAN (obstructive sleep apnea)      Quadriplegia (H)      Seizure (H)      Spastic quadriplegia (H)      Urinary tract infection      Past Surgical History:   Procedure Laterality Date     APPENDECTOMY OPEN       BACK SURGERY       INSERT PUMP BACLOFEN       LASER HOLMIUM NEPHROLITHOTOMY VIA PERCUTANEOUS NEPHROSTOMY  10/19/2011    Procedure:LASER HOLMIUM NEPHROLITHOTOMY VIA PERCUTANEOUS NEPHROSTOMY; Left Ureteral Stent Placement, Left Percutaneous Access, Left Percutaneous Nephrostomy  *Latex Allergy*  ; Surgeon:YAN ADDISON; Location:[facility] OR     ORTHOPEDIC SURGERY       supra pubic catheter       No current outpatient prescriptions on file.      Allergies   Allergen Reactions     Latex Rash     Latex Rash     History reviewed. No pertinent family history.   Social History      Occupational History     Not on file.     Social History Main Topics     Smoking status: Former Smoker     Packs/day: 0.30     Years: 5.00     Types: Cigarettes     Quit date: 9/23/1994     Smokeless tobacco: Never Used     Alcohol use No     Drug use: No     Sexual activity: Not on file       REVIEW OF SYSTEMS  There are no additional symptoms other than noted in HPI     PHYSICAL EXAM  Vitals:    03/07/18 1018   BP: 118/83   Resp: 14   Temp: 97.9  F (36.6  C)   TempSrc: Oral   SpO2: 96%   Weight: 83.9 kg (185 lb)     Wt Readings from Last 3 Encounters:   03/07/18 83.9 kg (185 lb)   12/29/17 83.9 kg (185 lb)   12/20/17 78.7 kg (173 lb 8 oz)     Constitutional: Alert, no acute distress  Psychiatric: Normal mood and affect  Head: Normocephalic.  Neck: Neck supple. No adenopathy. Thyroid symmetric, normal size  ENT: Oropharynx clear.  Back: No spinal tenderness.  No costovertebral angle tenderness  Cardiovascular: RRR.   Respiratory: Good diaphragmatic excursion.   Gastrointestinal: Abdomen soft, non-tender.   Extremities: No lower extremity edema.   : Deferred     Recent Labs   Lab Test  11/13/17   1215  12/29/11   1500  11/29/11   1230  11/07/11   0633  11/06/11   1530   WBC   --   8.1  6.0  10.6  9.3   HGB  12.8*  13.8  11.6*  9.2*  8.7*   HCT  41.0  44.1  38.2*  29.6*  28.6*   PLT  285  237  279  437  458*     Recent Labs   Lab Test  11/13/17   1409  12/29/11   1500  11/29/11   1230  11/07/11   0633  11/06/11   1530   NA   --   142  144  140  140   POTASSIUM   --   4.0  4.1  4.7  4.9   CHLORIDE   --   103  104  104  104   CO2   --   29 29 29  30   ANIONGAP   --   10.2  10.8  7.1  6.4   GLC   --   125*  71  91  137*   BUN   --   13  11  21  21   CR   --   0.60*  0.70  1.00  1.10   GFRESTIMATED  67  >90  >90  86  77   GFRESTBLACK  81  >90  >90  >90  >90   BALAJI   --   9.2  9.2  9.1  9.0     Recent Labs   Lab Test  12/29/17   1145   COLOR  Yellow   APPEARANCE  Cloudy   URINEGLC  Negative   URINEBILI  Negative    URINEKETONE  Negative   SG  1.009   UBLD  Moderate*   URINEPH  7.0   PROTEIN  100*   NITRITE  Positive*   LEUKEST  Large*   RBCU  134*   WBCU  >182*       Lio PA, reviewed these laboratory values.    CC  Patient Care Team:  [facility] as PCP - Dejon Elizabeth MD as MD (Urology)  Priscilla Wong, RN as Registered Nurse  [facility] as Referring Physician      Copy to patient  GABRIELA HERNANDEZAUTUMN  [facility] [facility] [address]

## 2018-03-07 NOTE — ANESTHESIA CARE TRANSFER NOTE
Patient: Maicol Kingel    Procedure(s):  Left Percutaneous Nephrolithotomy, Access To Kidney, Ureteroscopy, Cystoscopy, Stent Placement With Holmium Laser standby - Wound Class: I-Clean    Diagnosis: Nephrolithiasis   Diagnosis Additional Information: No value filed.    Anesthesia Type:   General     Note:  Airway :Face Mask  Patient transferred to:PACU  Handoff Report: Identifed the Patient, Identified the Reponsible Provider, Reviewed the pertinent medical history, Discussed the surgical course, Reviewed Intra-OP anesthesia mangement and issues during anesthesia, Set expectations for post-procedure period and Allowed opportunity for questions and acknowledgement of understanding      Vitals: (Last set prior to Anesthesia Care Transfer)    CRNA VITALS  3/7/2018 1600 - 3/7/2018 1630      3/7/2018             Resp Rate (set): 10                Electronically Signed By: ANNIE Walton CRNA  March 7, 2018  4:30 PM

## 2018-03-07 NOTE — ANESTHESIA PREPROCEDURE EVALUATION
Anesthesia Evaluation     . Pt has had prior anesthetic. Type: General    No history of anesthetic complications          ROS/MED HX    ENT/Pulmonary:     (+)sleep apnea, uses CPAP On BiPAP cmH2O , . .    Neurologic: Comment: Quadriplegia with spasticity    (+)Spinal cord injury year sustained: 1994 level of injury: C4     Cardiovascular:  - neg cardiovascular ROS   (+) ----. : . . . :. . No previous cardiac testing       METS/Exercise Tolerance:     Hematologic:  - neg hematologic  ROS       Musculoskeletal:         GI/Hepatic:     (+) GERD Asymptomatic on medication,       Renal/Genitourinary:     (+) Nephrolithiasis , Other Renal/ Genitourinary, neurogenic bladder      Endo:  - neg endo ROS       Psychiatric:  - neg psychiatric ROS       Infectious Disease:  - neg infectious disease ROS       Malignancy:      - no malignancy   Other:    (+) other significant disability Wheelchair bound                   Physical Exam      Airway   Mallampati: III  TM distance: >3 FB  Neck ROM: limited    Dental     Cardiovascular   Rhythm and rate: regular and normal  (-) no weak pulses and no murmur    Pulmonary    breath sounds clear to auscultation(-) no rhonchi                    Anesthesia Plan      History & Physical Review      ASA Status:  4 .    NPO Status:  > 8 hours    Plan for General and ETT with Intravenous induction. Maintenance will be Balanced.      Additional equipment: 2nd IV GETA. PIVx1-2. Standard ASA monitors. IV opioids. PACU postop    Risks and benefits of anesthetic discussed with patient including sore throat, voice hoarseness, injury to vocal cords, throat, mouth, teeth, tongue, and lips from intubation; nausea/vomiting; cardiac arrest, respiratory complications, MI, stroke, blood clots, death.    Transfusion risks discussed include infection, and complications involving the heart and lungs (transfusion reaction)    Presented opportunity to answer patient and family questions. Questions addressed.         Postoperative Care      Consents  Anesthetic plan, risks, benefits and alternatives discussed with:  Patient.  Use of blood products discussed: Yes.   Use of blood products discussed with Patient.  Consented to blood products.  .                          .

## 2018-03-07 NOTE — IP AVS SNAPSHOT
"    UNIT [address] [facility] BANK: [phone]                                              INTERAGENCY TRANSFER FORM - LAB / IMAGING / EKG / EMG RESULTS   3/7/2018                    Hospital Admission Date: 3/7/2018  GABRIELA CARRANZA   : 1975  Sex: Male        Attending Provider: Dejon Horn MD     Allergies:  Latex, Latex    Infection:  MRSA   Service:  UROLOGY    Ht:  1.753 m (5' 9\")   Wt:  84.9 kg (187 lb 2.7 oz)   Admission Wt:  83.9 kg (185 lb)    BMI:  27.64 kg/m 2   BSA:  2.03 m 2            Patient PCP Information     Provider PCP Type    [facility]         Lab Results - 3 Days      Basic metabolic panel [[id number]]  Resulted: 18, Result status: Final result    Ordering provider: Bravo Gilbert MD  18 Resulting lab: [facility]    Specimen Information    Type Source Collected On   Blood  18 0709          Components       Value Reference Range Flag Lab   Sodium 135 133 - 144 mmol/L  51   Potassium 4.3 3.4 - 5.3 mmol/L  51   Chloride 104 94 - 109 mmol/L  51   Carbon Dioxide 22 20 - 32 mmol/L  51   Anion Gap 10 3 - 14 mmol/L  51   Glucose 83 70 - 99 mg/dL  51   Urea Nitrogen 20 7 - 30 mg/dL  51   Creatinine 1.18 0.66 - 1.25 mg/dL  51   GFR Estimate 68 >60 mL/min/1.7m2  51   Comment:  Non  GFR Calc   GFR Estimate If Black 83 >60 mL/min/1.7m2  51   Comment:  African American GFR Calc   Calcium 9.9 8.5 - 10.1 mg/dL  51            CBC with platelets [[id number]] (Abnormal)  Resulted: 18 0808, Result status: Final result    Ordering provider: Bravo Gilbert MD  18 Resulting lab: [facility]    Specimen Information    Type Source Collected On   Blood  18 0709          Components       Value Reference Range Flag Lab   WBC 9.5 4.0 - 11.0 10e9/L  51   RBC Count 3.13 4.4 - 5.9 10e12/L L 51   Hemoglobin 7.9 13.3 - 17.7 g/dL L 51   Hematocrit 26.9 40.0 - 53.0 % L 51 "   MCV 86 78 - 100 fl  51   MCH 25.2 26.5 - 33.0 pg L 51   MCHC 29.4 31.5 - 36.5 g/dL L 51   RDW 17.2 10.0 - 15.0 % H 51   Platelet Count 274 150 - 450 10e9/L  51            Blood culture [[id number]]  Resulted: 03/13/18 0252, Result status: Preliminary result    Ordering provider: Jazz Carrasquillo APRN CNP  03/08/18 1324 Resulting lab: [facility]    Specimen Information    Type Source Collected On   Blood  03/08/18 1357   Comment:  Left Hand          Components       Value Reference Range Flag Lab   Specimen Description Blood Left Hand      Culture Micro No growth after 5 days   75            Blood culture [[id number]]  Resulted: 03/13/18 0252, Result status: Preliminary result    Ordering provider: Vamsi Benson MD  03/07/18 1729 Resulting lab: [facility]    Specimen Information    Type Source Collected On   Blood  03/08/18 0035   Comment:  Right Hand          Components       Value Reference Range Flag Lab   Specimen Description Blood Right Hand      Culture Micro No growth after 5 days   75            Basic metabolic panel [[id number]]  Resulted: 03/12/18 0859, Result status: Final result    Ordering provider: Vamsi Benson MD  03/12/18 0100 Resulting lab: [facility]    Specimen Information    Type Source Collected On   Blood  03/12/18 0816          Components       Value Reference Range Flag Lab   Sodium 137 133 - 144 mmol/L  51   Potassium 4.5 3.4 - 5.3 mmol/L  51   Chloride 104 94 - 109 mmol/L  51   Carbon Dioxide 24 20 - 32 mmol/L  51   Anion Gap 8 3 - 14 mmol/L  51   Glucose 98 70 - 99 mg/dL  51   Urea Nitrogen 20 7 - 30 mg/dL  51   Creatinine 1.06 0.66 - 1.25 mg/dL  51   GFR Estimate 77 >60 mL/min/1.7m2  51   Comment:  Non  GFR Calc   GFR Estimate If Black >90 >60 mL/min/1.7m2  51   Comment:  African American GFR Calc   Calcium 10.0 8.5 - 10.1 mg/dL  51            CBC with  platelets [[id number]] (Abnormal)  Resulted: 03/12/18 0836, Result status: Final result    Ordering provider: Vamsi Benson MD  03/12/18 0100 Resulting lab: [facility]    Specimen Information    Type Source Collected On   Blood  03/12/18 0816          Components       Value Reference Range Flag Lab   WBC 8.2 4.0 - 11.0 10e9/L  51   RBC Count 2.99 4.4 - 5.9 10e12/L L 51   Hemoglobin 7.5 13.3 - 17.7 g/dL L 51   Hematocrit 25.9 40.0 - 53.0 % L 51   MCV 87 78 - 100 fl  51   MCH 25.1 26.5 - 33.0 pg L 51   MCHC 29.0 31.5 - 36.5 g/dL L 51   RDW 17.2 10.0 - 15.0 % H 51   Platelet Count 253 150 - 450 10e9/L  51            Stone analysis [[id number]]  Resulted: 03/11/18 1733, Result status: Final result    Ordering provider: Dejon Horn MD  03/07/18 1520 Resulting lab: [facility]    Specimen Information    Type Source Collected On   Calculus/Stone Kidney, Left 03/07/18 1427          Components       Value Reference Range Flag Lab   Stone Composition SEE NOTE   51   Comment:         (Note)  Calculi composed primarily of:  50% magnesium ammonium phosphate (struvite), and  50% calcium phosphate (hydroxy- and carbonate- apatite).  INTERPRETIVE INFORMATION: Calculi (Stone) analysis  Calculi are the products of physiological processes that   yield crystalline compounds in a matrix of biological   compounds and blood.  Matrix components are not reported.    The clinically significant crystalline components   identified in calculi specimens are reported.  Gross   description may not be consistent with composition   determined by FTIR analysis.  Performed by [facility],  [address] [place] [id number] [phone]  [email or web address], Clifford Moseley MD, Lab. Director     Calculi Number Numerous   51   Calculi Size Various mm  51   Calculi Description SEE NOTE   51   Comment:         (Note)  Specimen received bloody, not the preferred dry state.     Bloody specimens often delay analysis.  Specimen consists of numerous, various sized (1 mm to 9 mm),  brown/tan, irregular calculi fragments.     Stone Mass 45 mg  51            Basic metabolic panel [[id number]]  Resulted: 03/11/18 0908, Result status: Final result    Ordering provider: Vamsi Benson MD  03/11/18 0100 Resulting lab: [facility]    Specimen Information    Type Source Collected On   Blood  03/11/18 0817          Components       Value Reference Range Flag Lab   Sodium 140 133 - 144 mmol/L  51   Potassium 4.5 3.4 - 5.3 mmol/L  51   Chloride 108 94 - 109 mmol/L  51   Carbon Dioxide 24 20 - 32 mmol/L  51   Anion Gap 9 3 - 14 mmol/L  51   Glucose 87 70 - 99 mg/dL  51   Urea Nitrogen 17 7 - 30 mg/dL  51   Creatinine 1.00 0.66 - 1.25 mg/dL  51   GFR Estimate 83 >60 mL/min/1.7m2  51   Comment:  Non  GFR Calc   GFR Estimate If Black >90 >60 mL/min/1.7m2  51   Comment:  African American GFR Calc   Calcium 9.2 8.5 - 10.1 mg/dL  51            CBC with platelets [[id number]] (Abnormal)  Resulted: 03/11/18 0842, Result status: Final result    Ordering provider: Vamsi Benson MD  03/11/18 0100 Resulting lab: [facility]    Specimen Information    Type Source Collected On   Blood  03/11/18 0817          Components       Value Reference Range Flag Lab   WBC 8.5 4.0 - 11.0 10e9/L  51   RBC Count 3.21 4.4 - 5.9 10e12/L L 51   Hemoglobin 8.1 13.3 - 17.7 g/dL L 51   Hematocrit 27.1 40.0 - 53.0 % L 51   MCV 84 78 - 100 fl  51   MCH 25.2 26.5 - 33.0 pg L 51   MCHC 29.9 31.5 - 36.5 g/dL L 51   RDW 17.2 10.0 - 15.0 % H 51   Platelet Count 240 150 - 450 10e9/L  51            Gentamicin Single Dose [[id number]]  Resulted: 03/11/18 0058, Result status: Final result    Ordering provider: Sudha Capellan RPH  03/10/18 1800 Resulting lab: [facility]    Specimen Information    Type Source Collected On    Blood  03/10/18 2356          Components       Value Reference Range Flag Lab   Gentamycin Level Single Daily Dose 3.6 mg/L  51   Comment:         Single Day Dosing (SDD) therapeutic range:         Trough approaching zero         Peak 17-24 mg/L              Gentamicin Single Dose [[id number]]  Resulted: 03/10/18 2035, Result status: Final result    Ordering provider: Sudha Capellan RPH  03/10/18 1400 Resulting lab: [facility]    Specimen Information    Type Source Collected On   Blood  03/10/18 1938          Components       Value Reference Range Flag Lab   Gentamycin Level Single Daily Dose 4.6 mg/L  51   Comment:         Single Day Dosing (SDD) therapeutic range:         Trough approaching zero         Peak 17-24 mg/L              Basic metabolic panel [[id number]] (Abnormal)  Resulted: 03/10/18 0952, Result status: Final result    Ordering provider: Vamsi Benson MD  03/09/18 2200 Resulting lab: [facility]    Specimen Information    Type Source Collected On   Blood  03/10/18 0859          Components       Value Reference Range Flag Lab   Sodium 142 133 - 144 mmol/L  51   Potassium 4.1 3.4 - 5.3 mmol/L  51   Chloride 111 94 - 109 mmol/L H 51   Carbon Dioxide 24 20 - 32 mmol/L  51   Anion Gap 6 3 - 14 mmol/L  51   Glucose 84 70 - 99 mg/dL  51   Urea Nitrogen 16 7 - 30 mg/dL  51   Creatinine 0.99 0.66 - 1.25 mg/dL  51   GFR Estimate 83 >60 mL/min/1.7m2  51   Comment:  Non  GFR Calc   GFR Estimate If Black >90 >60 mL/min/1.7m2  51   Comment:  African American GFR Calc   Calcium 8.7 8.5 - 10.1 mg/dL  51            CBC with platelets [[id number]] (Abnormal)  Resulted: 03/10/18 0934, Result status: Final result    Ordering provider: Vamsi Benson MD  03/09/18 2200 Resulting lab: [facility]    Specimen Information    Type Source Collected On   Blood  03/10/18 0859          Components        Value Reference Range Flag Lab   WBC 7.0 4.0 - 11.0 10e9/L  51   RBC Count 2.84 4.4 - 5.9 10e12/L L 51   Hemoglobin 7.2 13.3 - 17.7 g/dL L 51   Hematocrit 24.7 40.0 - 53.0 % L 51   MCV 87 78 - 100 fl  51   MCH 25.4 26.5 - 33.0 pg L 51   MCHC 29.1 31.5 - 36.5 g/dL L 51   RDW 17.5 10.0 - 15.0 % H 51   Platelet Count 218 150 - 450 10e9/L  51            Urine Culture Aerobic Bacterial [[id number]] (Abnormal)  Resulted: 03/10/18 0829, Result status: Final result    Ordering provider: Vamsi Benson MD  03/07/18 1729 Resulting lab: [facility]    Specimen Information    Type Source Collected On   Catheterized Urine Urine catheter 03/07/18 1830   Comment:  Left~Nephrostomy          Components       Value Reference Range Flag Lab   Specimen Description Catheterized Urine Left Nephrostomy      Culture Micro --  A 225   Result:         <10,000 colonies/mL  Methicillin resistant Staphylococcus aureus (MRSA)     Culture Micro --  A 225   Result:         <10,000 colonies/mL  Pseudomonas aeruginosa     Culture Micro --  A 225   Result:         <10,000 colonies/mL  Proteus mirabilis              Testing Performed By     Lab - Abbreviation Name Director Address Valid Date Range    51 - Unknown [facility] Unknown [address] [facility] [address] 12/31/14 1010 - Present    75 - Unknown [facility] Unknown [address] [facility] [address] 01/15/15 1019 - Present    225 - Unknown [facility] Unknown [address] [facility] [address] 12/19/14 0954 - Present            Unresulted Labs (24h ago through future)    Start       Ordered    03/14/18 0700  CBC with platelets  AM DRAW,   Routine     Comments:  Last Lab Result: Hemoglobin (g/dL)       Date                     Value                 03/13/2018               7.9 (L)          ----------    03/13/18 0831    03/14/18 0700  Basic metabolic  denzel DACOSTA,   Routine      03/13/18 0831          Encounter-Level Documents:     There are no encounter-level documents.      Order-Level Documents - 03/07/2018:     Scan on 2/28/2018  9:26 AM by Outside, Provider : EKG (below)

## 2018-03-07 NOTE — BRIEF OP NOTE
[facility], [place]    Brief Operative Note    Pre-operative diagnosis: Left staghorn calculus  Post-operative diagnosis Same  Procedure: Procedure(s):  Left Percutaneous Nephrolithotomy, Access To Kidney, Ureteroscopy, Cystoscopy, Stent Placement With Holmium Laser standby - Wound Class: I-Clean  Surgeon: Surgeon(s) and Role:     * Dejon Horn MD - Primary     * Vamsi Benson MD - Resident - Assisting  Anesthesia: Combined General with Block   Estimated blood loss: 500 CC  Drains: left 10 x 26 nephroureteral stent   Specimens:   ID Type Source Tests Collected by Time Destination   1 : Left kidney stones Calculus/Stone Kidney, Left STONE ANALYSIS Dejon Horn MD 3/7/2018  2:27 PM      Findings:   Extremely large left staghorn stone; Stone cleared from renal pelvis - large residual stone burden in calyces at end of case; Left nephroureteral stent placed  Complications: None.

## 2018-03-07 NOTE — OR NURSING
Dr Gordon has been called and updated on patient's status, lior dropping BP.  Concern for need for more access and she will send someone to start another IV.  Dr Paulino paged to update on status and see about more IV access.

## 2018-03-07 NOTE — IP AVS SNAPSHOT
"` `     [address] [facility]: [phone]                                              INTERAGENCY TRANSFER FORM - NURSING   3/7/2018                    Hospital Admission Date: 3/7/2018  GABRIELA CARRANZA   : 1975  Sex: Male        Attending Provider: Dejon Horn MD     Allergies:  Latex, Latex    Infection:  MRSA   Service:  UROLOGY    Ht:  1.753 m (5' 9\")   Wt:  84.9 kg (187 lb 2.7 oz)   Admission Wt:  83.9 kg (185 lb)    BMI:  27.64 kg/m 2   BSA:  2.03 m 2            Patient PCP Information     Provider PCP Type    [facility]      Current Code Status     Date Active Code Status Order ID Comments User Context       Prior      Code Status History     Date Active Date Inactive Code Status Order ID Comments User Context    3/12/2018 12:28 PM  Full Code [id number]  Chacha Monroe PA Outpatient    3/8/2018  2:10 AM 3/12/2018 12:28 PM Full Code [id number]  Vamsi Benson MD Inpatient    10/19/2011  7:06 PM 2011  7:05 PM Full Code [id number]  Dana Peng RN Inpatient      Advance Directives        Scanned docmt in ACP Activity?           No scanned doc        Hospital Problems as of 3/13/2018              Priority Class Noted POA    Sepsis due to urinary tract infection (H) Medium  3/7/2018 Yes      Non-Hospital Problems as of 3/13/2018              Priority Class Noted    Kidney stones Medium  2011    Sepsis with multiple organ dysfunction (MOD) (H) Medium  10/24/2011    Anemia due to blood loss, acute Medium  10/24/2011    Postsurgical nonabsorption Medium  10/24/2011    Quadriplegia, post-traumatic (H) Medium  10/24/2011    Renal hemorrhage, left Medium  10/24/2011      Immunizations     None         END      ASSESSMENT     Discharge Profile Flowsheet     EXPECTED DISCHARGE     Inspection under devices  Full except (identify device(s) not inspected) 18    Expected Discharge Date  18 1125   Skin WDL  ex 18 0908    " "GASTROINTESTINAL (ADULT,PEDIATRIC,OB)     Skin Color/Characteristics  pale 03/13/18 0908    GI WDL  ex 03/13/18 0908   Skin Temperature  warm 03/13/18 0908    Abdominal Appearance  contour irregular 03/13/18 0908   Skin Moisture  dry 03/13/18 0908    Last Bowel Movement  03/11/18 03/13/18 0908   Skin Integrity  drain/device(s) 03/13/18 0908    GI Signs/Symptoms  constipation 03/11/18 0410   Full except areas not inspected   Buttock, left;Buttock, right;Sacrum;Coccyx;Scapula, left;Scapula, right;Elbow, left;Elbow, right;Spine;Hip, left;Hip, right 03/13/18 0311    Passing flatus  yes 03/13/18 0908   SAFETY      COMMUNICATION ASSESSMENT     Safety WDL  WDL 03/13/18 0908    Patient's communication style  spoken language (English or Bilingual) 10/26/11 1935   Safety Factors  ID band on;upper side rails raised x 2;call light in reach;wheels locked;bed in low position 03/12/18 0216    SKIN     Safety Equipment  oxygen flowmeter 03/12/18 0216    Inspection of bony prominences  Full 03/13/18 0908   All Alarms  none present 03/13/18 0908                 Assessment WDL (Within Defined Limits) Definitions           Safety WDL     Effective: 09/28/15    Row Information: <b>WDL Definition:</b> Bed in low position, wheels locked; call light in reach; upper side rails up x 2; ID band on<br> <font color=\"gray\"><i>Item=AS safety wdl>>List=AS safety wdl>>Version=F14</i></font>      Skin WDL     Effective: 09/28/15    Row Information: <b>WDL Definition:</b> Warm; dry; intact; elastic; without discoloration; pressure points without redness<br> <font color=\"gray\"><i>Item=AS skin wdl>>List=AS skin wdl>>Version=F14</i></font>      Vitals     Vital Signs Flowsheet     VITAL SIGNS     HEIGHT AND WEIGHT      Temp  96.5  F (35.8  C) 03/13/18 0739   Weight  84.9 kg (187 lb 2.7 oz) 03/11/18 1902    Temp src  Axillary 03/13/18 0739   Weight Method  Bed scale 03/09/18 2209    Resp  16 03/13/18 0739   POSITIONING      Heart Rate  69 03/13/18 0739   " Body Position  refuses positioning 03/13/18 0908    Pulse/Heart Rate Source  Monitor 03/13/18 0739   Head of Bed (HOB)  HOB at 20-30 degrees 03/13/18 0908    BP  106/67 03/13/18 0739   Positioning/Transfer Devices  pillows;in use 03/13/18 0908    BP Location  Left arm 03/13/18 0739   DAILY CARE      Patient Position  Sitting 03/07/18 1025   Activity Management  activity adjusted per tolerance 03/13/18 0908    OXYGEN THERAPY     Activity Assistance Provided  assistance, 2 people 03/13/18 0908    SpO2  99 % 03/13/18 0739   ECG      O2 Device  BiPAP/CPAP 03/13/18 0739   ECG Rhythm  Normal sinus rhythm 03/09/18 1650    Oxygen Delivery  4 LPM 03/12/18 0500   Ectopy  None 03/09/18 1650    RESPIRATORY MONITORING     Lead Monitored  Lead II;V 1 03/09/18 1650    Respiratory Monitoring (EtCO2)  35 mmHg 03/07/18 2101   Equipment  electrodes changed 03/07/18 2157    PAIN/COMFORT     ANALGESIA SIDE EFFECTS MONITORING      Patient Currently in Pain  denies 03/13/18 0908   Side Effects Monitoring: Respiratory Quality  R 03/09/18 1456    Preferred Pain Scale  CAPA (Clinically Aligned Pain Assessment) ([facility] Adults Only) 03/13/18 0908   Side Effects Monitoring: Respiratory Depth  N 03/09/18 1456    CLINICALLY ALIGNED PAIN ASSESSMENT (CAPA) ([facility] ADULTS ONLY)     Side Effects Monitoring: Sedation Level  1 03/09/18 1456    Comfort  negligible pain 03/10/18 1541                 Patient Lines/Drains/Airways Status    Active LINES/DRAINS/AIRWAYS     Name: Placement date: Placement time: Site: Days: Last dressing change:    Nephrostomy Left STENT TO OLD nephrostomy site 03/07/18   1635    5     Suprapubic Catheter Double-lumen previous placement 03/07/18   1630   Double-lumen   5     Peripheral IV 03/07/18 Left Lower forearm 03/07/18   1840   Lower forearm   5     Peripheral IV 03/08/18 Right;Anterior Upper forearm 03/08/18   1110   Upper forearm   5     Incision/Surgical Site 03/07/18 Left Flank  03/07/18   1606    5             Patient Lines/Drains/Airways Status    Active PICC/CVC     Name: Placement date: Placement time: Site: Days: Additional Info Last dressing change:    PICC Double Lumen 12/06/17 Right Cephalic 12/06/17   1801   Cephalic   96 External Cath Length (cm): 3 cm            Size (Fr): 5 Fr            Orientation: Right            Extremity Circumference (cm): 30 cm            Catheter Brand: BioFlo, [place]            Dressing Intervention: Chlorhexidine patch;Transparent;New dressing            Description: Valved;Power PICC            Total Catheter Length (cm) Trimmed: 38 cm            Site Prep: Chlorhexidine            Local Anesthetic: Injectable            Inserted by: AQUILES Wilson, RN [facility]            Insertion attempts with ultrasound: 1            Patient Tolerance: Tolerated well            Placement Verification: Blood Return;Flouroscopy            Difficulty with threading line: No            Tip location: SVC            Full barrier precautions done: Yes            Consent Signed: Yes            Time Out performed: Yes            Use for : Antibiotics. Picc is ready to use.               Intake/Output Detail Report     Date Intake       Output   Net    Shift P.O. I.V. IV Piggyback Colloid Total Urine Blood Total       Day 03/12/18 0000 - 03/12/18 0659 240 -- -- -- 240 1075 -- 1075 -835    Jennifer 03/12/18 0700 - 03/12/18 1459 600 -- -- -- 600 2150 -- 2150 -1550    Noc 03/12/18 1500 - 03/12/18 2359 1270 -- -- -- 1270 1325 -- 1325 -55    Day 03/13/18 0000 - 03/13/18 0659 -- -- -- -- -- 990 -- 990 -990    Jennifer 03/13/18 0700 - 03/13/18 1459 360 -- -- -- 360 1525 -- 1525 -1165      Last Void/BM       Most Recent Value    Urine Occurrence     Stool Occurrence 1 at 03/09/2018 1400      Case Management/Discharge Planning     Case Management/Discharge Planning Flowsheet     LIVING ENVIRONMENT     Filed Complexity Screen Score  3 03/12/18 0904    Lives With  facility resident  03/07/18 1037   ABUSE RISK SCREEN      COPING/STRESS     QUESTION TO PATIENT:  Has a member of your family or a partner(now or in the past) intimidated, hurt, manipulated, or controlled you in any way?  patient declined to answer or is unable to answer 03/07/18 1016    Major Change/Loss/Stressor  none 03/10/18 1348   QUESTION TO PATIENT: Do you feel safe going back to the place where you are living?  patient declined to answer or is unable to answer 03/07/18 1016    EXPECTED DISCHARGE     OBSERVATION: Is there reason to believe there has been maltreatment of a vulnerable adult (ie. Physical/Sexual/Emotional abuse, self neglect, lack of adequate food, shelter, medical care, or financial exploitation)?  no 03/07/18 1016    Expected Discharge Date  03/12/18 03/12/18 1125   OTHER      / CAREGIVER     Are you depressed or being treated for depression?  No (per history) 03/09/18 0854

## 2018-03-07 NOTE — IP AVS SNAPSHOT
MRN:[id number]                      After Visit Summary   3/7/2018    Maicol Carrera    MRN: [id number]           Thank you!     Thank you for choosing [facility] for your care. Our goal is always to provide you with excellent care. Hearing back from our patients is one way we can continue to improve our services. Please take a few minutes to complete the written survey that you may receive in the mail after you visit with us. Thank you!        Patient Information     Date Of Birth          1975        Designated Caregiver       Most Recent Value    Caregiver    Will someone help with your care after discharge? yes    Name of designated caregiver care center [care center]    Phone number of caregiver same    Caregiver address same      About your hospital stay     You were admitted on:  March 7, 2018 You last received care in the:  [address] [facility]    You were discharged on:  March 13, 2018        Reason for your hospital stay       On 3/7/18 Mr. Carrera underwent:  1. Antegrade nephrostogram.   2. Removal of indwelling nephrostomy tube.   3. Percutaneous nephrolithotomy, stone burden >4 cm.   4. Use of CyberWand.   5. Use of C-arm and interpretation of the fluoroscopic image.   6. 10 x 26 left nephroureteral stent  Dr. Dejon Horn                  Who to Call     For medical emergencies, please call 911.  For non-urgent questions about your medical care, please call your primary care provider or clinic, None  For questions related to your surgery, please call your surgery clinic        Attending Provider     Provider Dejon Alston MD Urology       Primary Care Provider    [facility]      After Care Instructions     Activity       Your activity upon discharge:   - May return to home activities  - Do not strain with bowel movements.    - Do not drive until you can press the brake pedal quickly and fully without pain.    - Do not operate a motor vehicle  while taking narcotic pain medications.            Activity - Up ad orly           Diet       Follow this diet upon discharge:   Regular/ home diet            Discharge Instructions       Medications:   1) PAIN: ContinueTylenol (acetaminophen 625mg)every 4-6 hours as needed for mild pain.  Do not take more than 4,000mg of Tylenol (acetaminophen/ APAP) from all sources in any 24 hour period since this can cause liver damage.  Do not take more than 2400mg of ibuprofen in any 24 hour period since this can cause kidney damage. Never drive, operate machinery or drink alcoholic beverages while you are taking narcotic pain medications.      2) CONSTIPATION: Continue your home bowel regimen.  Please reduce your home bowel regimen if you develop loose stools. Other over the counter solutions such as prune juice, miralax, fiber products, senna, and dulcolax can also be used. If you are taking the home bowel regimen but still have not had a bowel movement in 3 days, start over-the-counter Milk of Magnesia taken twice daily until you have a nice bowel movement.  Call the office with any concerns.     3) ANTIBIOTICS:  - Continue fortaz (ceftazidime) 2g every 8 hours for 9 more days (through 3/21/18) to complete a total 14 day course of antibiotics for urosepsis following your stone procedure  - Continue Bactrim DS (trimethoprim/sulfamethoxazole) twice daily for 9 more days (through 3/21/18) to complete a total 14 day course of antibiotics for urosepsis following your stone procedure.   - Follow up with Infectious Disease about 1-2 weeks prior to your next stone procedure with Dr. Horn.  You will need an antibiotic plan prior to your next stone procedure.            Encourage PO fluids           Fall precautions           General info for SNF       Length of Stay Estimate: Long Term Care  Condition at Discharge: Improving  Level of care:skilled   Rehabilitation Potential: Good  Admission H&P remains valid and up-to-date:  Yes  Recent Chemotherapy: N/A  Use Nursing Home Standing Orders: Yes            IV access       **Ordering Provider MUST call/page Care Coordinator/ to discuss arranging this service**    You are going home with the following vascular access device: PICC.  Please provide standard cares.            Intake and output       Every shift            Mantoux instructions       Give two-step Mantoux (PPD) Per Facility Policy Yes            Oxygen - Nasal cannula       Resume previous orders for BIPAP / supplemental O2 with sleeping            Tubes and drains       You are going home with the following tubes or drains:   Suprapubic Belle Catheter:  - Continue your home suprapubic Belle catheter to gravity drainage.   - Protect the catheter and treat it like an extension of your body - keep it secured to your leg and do not let it get caught, snagged, or tugged. The catheter tubing should remain tension-free and without kinks. In order to drain best, the tubing and bag should always be lower than your body.  - You may notice a little blood, small amount of white discharge, or caking at the catheter insertion site. This is normal but it can irritate the skin so it is best to wash this off in the daily shower. You are encouraged to wash the catheter tubing to keep it clean, and the urine drainage bag also can be gotten wet.   - Catheters can sometimes cause bladder spasms (lower abdominal pain that feels like the need to urinate). Continue your home dose of oxybutynin for this.            Wound care (specify)       Site:   Left flank  Instructions:    Percutaneous Nephrostomy Tube (PNT) / Nephroureteral stent:  - You are going home with a left percutaneous nephroureteral stent   - For the first few weeks, you may having bruising or soreness at the tube site.  Use a warm heating pad to relieve discomfort.    - Blood in the urine can be expected.  Drink 8-10 glasses of fluid per day to keep urine light pink or light  yellow.    - Daily showering is important but keep your tube site dry.  Avoid baths, swimming, etc.   - Clean around the tube site every other day using soap and water, then cover the site with a sterile bandage. Take care not to tug the tube or remove the suture.   - Keep the tube securely taped to your back at all times and do not let it get caught, snagged or tugged.  The tubing should always remain tension-free and without kinks.  In order to drain best (and prevent urinary infections), the tubing and bag should always hang lower than your kidney.                  Follow-up Appointments     Adult [facility] Follow-up and recommended labs and tests       Follow-Up:   - Call your primary care provider to touch base regarding your recent admission.   - Follow up with Dr. Horn as scheduled on 3/30/18 at 11:40am for a postop checkup and to make plans for your next stone procedure.   - Follow up with Infectious Disease (Shawna Saldivar MD - 4/5/18 at 10:00am) for a checkup and to discuss an antibiotic plan prior to your next stone procedure.   - Call or return sooner than your regularly scheduled visit if you develop any of the following:  Fever (greater than 101.3F) or flu-like symptoms, uncontrolled pain, uncontrolled nausea or vomiting, as well as increased redness, swelling, or drainage from your wound.    Phone numbers:  - Nursing phone helpline at the Urology Clinic (8A-5P M-F):  [phone].    - Nights or weekends, call [phone] and ask the  to page the urology resident on call.   - For emergencies, always call [phone]    Appointments on [facility] and/or [facility] (with [facility] or [facility] provider or service). Call [phone] if you haven't heard regarding these appointments within 7 days of discharge.                  Your next 10 appointments already scheduled     Mar 30, 2018 11:40 AM CDT   (Arrive by 11:25 AM)   Cystoscopy with Dejon Horn MD   [facility]  [facility]    [address]  4th Floor  [place] [facility] [address] [phone]            2018 10:00 AM CDT   (Arrive by 9:45 AM)   Return Visit with Shawna Saldivar MD   [facility]    [address]  [address]  [place] [place] [facility] [address] [phone]              Pending Results     Date and Time Order Name Status Description    3/8/2018 1324 Blood culture Preliminary     3/7/2018 1729 Blood culture Preliminary             Statement of Approval     Ordered          18 1407  I have reviewed and agree with all the recommendations and orders detailed in this document.  EFFECTIVE NOW     Approved and electronically signed by:  Dejon Horn MD             Admission Information     Date & Time Provider Department Dept. Phone    3/7/2018 Dejon Horn MD [address] [facility] [phone]      Your Vitals Were     Blood Pressure Temperature Respirations Weight Pulse Oximetry BMI (Body Mass Index)    106/67 (BP Location: Left arm) 96.5  F (35.8  C) (Axillary) 16 84.9 kg (187 lb 2.7 oz) 99% 27.64 kg/m2      MyChart Information     [employer] lets you send messages to your doctor, view your test results, renew your prescriptions, schedule appointments and more. To sign up, go to [email or web address] . Click on \"Log in\" on the left side of the screen, which will take you to the Welcome page. Then click on \"Sign up Now\" on the right side of the page.     You will be asked to enter the access code listed below, as well as some personal information. Please follow the directions to create your username and password.     Your access code is: [email or web address]  Expires: 2018  1:37 PM     Your access code will  in 90 days. If you need help or a new code, please call your [place] clinic or [phone].        Care EveryWhere ID     This is your " Care EveryWhere ID. This could be used by other organizations to access your [facility] medical records  [id number]        Equal Access to Services     AUSTIN VIEIRA : Hadii trupti Forman, naomie cooperguyha, katgiuseppe valenzuelasudheerda kevanmathew, waxsamantha arielin hayaatj mathurnadeem torres alan frazierPernell Wetzel [facility] [phone].    ATENCIÓN: Si habla español, tiene a da silva disposición servicios gratuitos de asistencia lingüística. Llame al [phone].    We comply with applicable federal civil rights laws and [place] laws. We do not discriminate on the basis of race, color, national origin, age, disability, sex, sexual orientation, or gender identity.               Review of your medicines      START taking        Dose / Directions    cefTAZidime 2 GM vial   Commonly known as:  FORTAZ   Used for:  Kidney stones        Dose:  2 g   Inject 2 g into the vein every 8 hours for 9 days   Quantity:  270 mL   Refills:  0         CONTINUE these medicines which have NOT CHANGED        Dose / Directions    ACETAMINOPHEN PO        Dose:  650 mg   Take 650 mg by mouth every 4 hours as needed for pain   Refills:  0       albuterol (2.5 MG/3ML) 0.083% neb solution   Used for:  SOB (shortness of breath)        Dose:  2.5 mg   Take 3 mLs by nebulization every 2 hours as needed.   Quantity:  1 Box   Refills:  0       ascorbic acid 250 MG Tabs tablet   Used for:  Recurrent UTI        Dose:  250 mg   Take 1 tablet by mouth 2 times daily.   Refills:  0       * baclofen 10 MG tablet   Commonly known as:  LIORESAL        Dose:  10 mg   Take 10 mg by mouth 4 times daily as needed   Refills:  0       * BACLOFEN IT        Via implanted IT pump Baclofen 2000 mc/gmL  Dose: 277.1 mcg/day Low reservoir alarm date: 4/26/2018   Refills:  0       clindamycin 1 % topical gel   Commonly known as:  CLINDAMAX        Apply topically 2 times daily   Refills:  0       COLACE 100 MG capsule   Generic drug:  docusate sodium        Dose:  100 mg   Take 100 mg by mouth 2 times  daily.   Refills:  0       Cranberry 400 MG Tabs        Dose:  400 mg   Take 400 mg by mouth 2 times daily   Refills:  0       cyanocobalamin 1000 MCG tablet   Commonly known as:  vitamin  B-12   Used for:  Vitamin B 12 deficiency        Dose:  1000 mcg   Take 1 tablet by mouth daily.   Refills:  0       * bisacodyl 10 MG Suppository   Commonly known as:  DULCOLAX   Used for:  Chronic constipation        Dose:  10 mg   Place 1 suppository rectally every other day.   Quantity:  12 suppository   Refills:  0       * DULCOLAX PO        Dose:  10 mg   Take 10 mg by mouth At Bedtime.   Refills:  0       ferrous sulfate 325 (65 FE) MG tablet   Commonly known as:  IRON   Used for:  Anemia due to blood loss, acute        Dose:  325 mg   Take 1 tablet by mouth daily (with breakfast).   Quantity:  100 tablet   Refills:  0       KEPPRA PO        Dose:  1000 mg   Take 1,000 mg by mouth 2 times daily   Refills:  0       ketoconazole 2 % cream   Commonly known as:  NIZORAL        Apply topically 2 times daily as needed   Refills:  0       magnesium gluconate 500 MG tablet   Commonly known as:  MAGONATE   Used for:  Magnesium deficiency        Dose:  500 mg   Take 1 tablet by mouth daily.   Refills:  0       miconazole 2 % powder   Commonly known as:  MICATIN; MICRO GUARD   Used for:  Candida infection of flexural skin        Apply  topically 2 times daily.   Quantity:  70 g   Refills:  0       mometasone 0.1 % cream   Commonly known as:  ELOCON        Apply topically At Bedtime   Refills:  0       MULTIVITAMIN ADULT PO        Dose:  1 tablet   Take 1 tablet by mouth daily   Refills:  0       ondansetron 4 MG ODT tab   Commonly known as:  ZOFRAN-ODT   Used for:  Nausea and vomiting        Dose:  4 mg   Take 1 tablet by mouth every 6 hours as needed for nausea.   Quantity:  20 tablet   Refills:  0       OSTEO-MULTIVITAMINS/MINERALS OR        Take  by mouth daily. 12 pm   Refills:  0       oxybutynin 5 MG tablet   Commonly known as:   DITROPAN   Used for:  Neurogenic bladder        Dose:  5 mg   Take 1 tablet by mouth 3 times daily.   Quantity:  270 tablet   Refills:  0       polyethylene glycol powder   Commonly known as:  MIRALAX/GLYCOLAX        Dose:  1 capful   Take 1 capful by mouth daily as needed for constipation (if no BM x3 days)   Refills:  0       polyvinyl alcohol 1.4 % ophthalmic solution   Commonly known as:  LIQUIFILM TEARS        Dose:  2 drop   Place 2 drops into both eyes every 2 hours as needed for dry eyes   Refills:  0       selenium sulfide 2.5 % lotion   Commonly known as:  SELSUN        Apply to scalp topically in the morning every Sun, Tue, Thursday. Place for 5 min then rinse.   Refills:  0       senna-docusate 8.6-50 MG per tablet   Commonly known as:  SENOKOT-S;PERICOLACE   Used for:  Chronic constipation        Dose:  1-2 tablet   Take 1-2 tablets by mouth 2 times daily.   Quantity:  60 tablet   Refills:  0       sulfamethoxazole-trimethoprim 800-160 MG per tablet   Commonly known as:  BACTRIM DS/SEPTRA DS   Used for:  Kidney stones        Dose:  1 tablet   Take 1 tablet by mouth 2 times daily   Quantity:  18 tablet   Refills:  0       VITAMIN D (CHOLECALCIFEROL) PO        Dose:  5000 Units   Take 5,000 Units by mouth daily   Refills:  0       zinc oxide 40 % ointment   Commonly known as:  DESITIN   Used for:  Breakdown of skin tissue        Apply  topically every hour as needed.   Quantity:  56.7 g   Refills:  0       * Notice:  This list has 4 medication(s) that are the same as other medications prescribed for you. Read the directions carefully, and ask your doctor or other care provider to review them with you.      STOP taking     CIPROFLOXACIN PO                Where to get your medicines      Some of these will need a paper prescription and others can be bought over the counter. Ask your nurse if you have questions.     Bring a paper prescription for each of these medications     cefTAZidime 2 GM vial     sulfamethoxazole-trimethoprim 800-160 MG per tablet                Protect others around you: Learn how to safely use, store and throw away your medicines at www.disposemymeds.org.        ANTIBIOTIC INSTRUCTION     You've Been Prescribed an Antibiotic - Now What?  Your healthcare team thinks that you or your loved one might have an infection. Some infections can be treated with antibiotics, which are powerful, life-saving drugs. Like all medications, antibiotics have side effects and should only be used when necessary. There are some important things you should know about your antibiotic treatment.      Your healthcare team may run tests before you start taking an antibiotic.    Your team may take samples (e.g., from your blood, urine or other areas) to run tests to look for bacteria. These test can be important to determine if you need an antibiotic at all and, if you do, which antibiotic will work best.      Within a few days, your healthcare team might change or even stop your antibiotic.    Your team may start you on an antibiotic while they are working to find out what is making you sick.    Your team might change your antibiotic because test results show that a different antibiotic would be better to treat your infection.    In some cases, once your team has more information, they learn that you do not need an antibiotic at all. They may find out that you don't have an infection, or that the antibiotic you're taking won't work against your infection. For example, an infection caused by a virus can't be treated with antibiotics. Staying on an antibiotic when you don't need it is more likely to be harmful than helpful.      You may experience side effects from your antibiotic.    Like all medications, antibiotics have side effects. Some of these can be serious.    Let you healthcare team know if you have any known allergies when you are admitted to the hospital.    One significant side effect of nearly all  antibiotics is the risk of severe and sometimes deadly diarrhea caused by Clostridium difficile (C. Difficile). This occurs when a person takes antibiotics because some good germs are destroyed. Antibiotic use allows C. diificile to take over, putting patients at high risk for this serious infection.    As a patient or caregiver, it is important to understand your or your loved one's antibiotic treatment. It is especially important for caregivers to speak up when patients can't speak for themselves. Here are some important questions to ask your healthcare team.    What infection is this antibiotic treating and how do you know I have that infection?    What side effects might occur from this antibiotic?    How long will I need to take this antibiotic?    Is it safe to take this antibiotic with other medications or supplements (e.g., vitamins) that I am taking?     Are there any special directions I need to know about taking this antibiotic? For example, should I take it with food?    How will I be monitored to know whether my infection is responding to the antibiotic?    What tests may help to make sure the right antibiotic is prescribed for me?      Information provided by:  www.cdc.gov/getsmart  U.S. Department of Health and Human Services  Centers for disease Control and Prevention  National Center for Emerging and Zoonotic Infectious Diseases  Division of Healthcare Quality Promotion             Medication List: This is a list of all your medications and when to take them. Check marks below indicate your daily home schedule. Keep this list as a reference.      Medications           Morning Afternoon Evening Bedtime As Needed    ACETAMINOPHEN PO   Take 650 mg by mouth every 4 hours as needed for pain   Last time this was given:  650 mg on 3/11/2018  6:05 PM                                albuterol (2.5 MG/3ML) 0.083% neb solution   Take 3 mLs by nebulization every 2 hours as needed.                                 ascorbic acid 250 MG Tabs tablet   Take 1 tablet by mouth 2 times daily.                                * baclofen 10 MG tablet   Commonly known as:  LIORESAL   Take 10 mg by mouth 4 times daily as needed                                * BACLOFEN IT   Via implanted IT pump Baclofen 2000 mc/gmL  Dose: 277.1 mcg/day Low reservoir alarm date: 4/26/2018                                cefTAZidime 2 GM vial   Commonly known as:  FORTAZ   Inject 2 g into the vein every 8 hours for 9 days   Last time this was given:  2 g on 3/13/2018  8:44 AM                                clindamycin 1 % topical gel   Commonly known as:  CLINDAMAX   Apply topically 2 times daily                                COLACE 100 MG capsule   Take 100 mg by mouth 2 times daily.   Last time this was given:  100 mg on 3/13/2018  8:44 AM   Generic drug:  docusate sodium                                Cranberry 400 MG Tabs   Take 400 mg by mouth 2 times daily                                cyanocobalamin 1000 MCG tablet   Commonly known as:  vitamin  B-12   Take 1 tablet by mouth daily.   Last time this was given:  1,000 mcg on 3/13/2018  8:43 AM                                * bisacodyl 10 MG Suppository   Commonly known as:  DULCOLAX   Place 1 suppository rectally every other day.   Last time this was given:  10 mg on 3/10/2018  8:07 PM                                * DULCOLAX PO   Take 10 mg by mouth At Bedtime.                                ferrous sulfate 325 (65 FE) MG tablet   Commonly known as:  IRON   Take 1 tablet by mouth daily (with breakfast).   Last time this was given:  325 mg on 3/13/2018  8:44 AM                                KEPPRA PO   Take 1,000 mg by mouth 2 times daily   Last time this was given:  1,000 mg on 3/13/2018  8:43 AM                                ketoconazole 2 % cream   Commonly known as:  NIZORAL   Apply topically 2 times daily as needed                                magnesium gluconate 500 MG tablet   Commonly  known as:  MAGONATE   Take 1 tablet by mouth daily.                                miconazole 2 % powder   Commonly known as:  MICATIN; MICRO GUARD   Apply  topically 2 times daily.   Last time this was given:  3/12/2018  7:58 PM                                mometasone 0.1 % cream   Commonly known as:  ELOCON   Apply topically At Bedtime                                MULTIVITAMIN ADULT PO   Take 1 tablet by mouth daily                                ondansetron 4 MG ODT tab   Commonly known as:  ZOFRAN-ODT   Take 1 tablet by mouth every 6 hours as needed for nausea.                                OSTEO-MULTIVITAMINS/MINERALS OR   Take  by mouth daily. 12 pm                                oxybutynin 5 MG tablet   Commonly known as:  DITROPAN   Take 1 tablet by mouth 3 times daily.   Last time this was given:  5 mg on 3/13/2018  8:43 AM                                polyethylene glycol powder   Commonly known as:  MIRALAX/GLYCOLAX   Take 1 capful by mouth daily as needed for constipation (if no BM x3 days)                                polyvinyl alcohol 1.4 % ophthalmic solution   Commonly known as:  LIQUIFILM TEARS   Place 2 drops into both eyes every 2 hours as needed for dry eyes                                selenium sulfide 2.5 % lotion   Commonly known as:  SELSUN   Apply to scalp topically in the morning every Sun, Tue, Thursday. Place for 5 min then rinse.                                senna-docusate 8.6-50 MG per tablet   Commonly known as:  SENOKOT-S;PERICOLACE   Take 1-2 tablets by mouth 2 times daily.                                sulfamethoxazole-trimethoprim 800-160 MG per tablet   Commonly known as:  BACTRIM DS/SEPTRA DS   Take 1 tablet by mouth 2 times daily   Last time this was given:  1 tablet on 3/13/2018  8:43 AM                                VITAMIN D (CHOLECALCIFEROL) PO   Take 5,000 Units by mouth daily                                zinc oxide 40 % ointment   Commonly known as:   DESITIN   Apply  topically every hour as needed.                                * Notice:  This list has 4 medication(s) that are the same as other medications prescribed for you. Read the directions carefully, and ask your doctor or other care provider to review them with you.

## 2018-03-07 NOTE — OR NURSING
BG 69 upon arrival to pre op area. Pt asymptomatic. Fingers cold. Fingers warmed up and blood glucose rechecked for 85. Dr. Dutton aware. No new orders received.

## 2018-03-07 NOTE — IP AVS SNAPSHOT
[address] [address]    Phone:  [phone]                                       After Visit Summary   3/7/2018    Maicol Carrera    MRN: [id number]           After Visit Summary Signature Page     I have received my discharge instructions, and my questions have been answered. I have discussed any challenges I see with this plan with the nurse or doctor.    ..........................................................................................................................................  Patient/Patient Representative Signature      ..........................................................................................................................................  Patient Representative Print Name and Relationship to Patient    ..................................................               ................................................  Date                                            Time    ..........................................................................................................................................  Reviewed by Signature/Title    ...................................................              ..............................................  Date                                                            Time

## 2018-03-07 NOTE — IP AVS SNAPSHOT
` `     [address] [facility] BANK: [phone]            Medication Administration Report for Maicol Carrera as of 03/13/18 1435   Legend:    Given Hold Not Given Due Canceled Entry Other Actions    Time Time (Time) Time  Time-Action       Inactive    Active    Linked        Medications 03/07/18 03/08/18 03/09/18 03/10/18 03/11/18 03/12/18 03/13/18    acetaminophen (TYLENOL) tablet 650 mg  Dose: 650 mg  Freq: EVERY 4 HOURS Route: PO  Start: 03/08/18 0210   Admin Instructions: May skip scheduled doses while sleeping between 22:00 and 06:00  Maximum acetaminophen dose from all sources = 75 mg/kg/day not to exceed 4 grams/day.      (0212)-Not Given       (0557)-Not Given       1019 (650 mg)-Given       1339 (650 mg)-Given       1715 (650 mg)-Given       (2234)-Not Given        (0312)-Not Given       (0518)-Not Given       0957 (650 mg)-Given       1337 (650 mg)-Given       1727 (650 mg)-Given       2154 (650 mg)-Given        (0115)-Not Given       (0537)-Not Given       0915 (650 mg)-Given       1400 (650 mg)-Given       1715 (650 mg)-Given       (2129)-Not Given        (0332)-Not Given       (0521)-Not Given       (1008)-Not Given       1348 (650 mg)-Given       1805 (650 mg)-Given       (2215)-Not Given        (0118)-Not Given       (0530)-Not Given       (1005)-Not Given       (1315)-Not Given       (1952)-Not Given       (2153)-Not Given        (0156)-Not Given       (0516)-Not Given       (1104)-Not Given       (1435)-Not Given       [ ] 1815       [ ] 2215           albuterol neb solution 2.5 mg  Dose: 2.5 mg  Freq: EVERY 2 HOURS PRN Route: NEBULIZATION  PRN Reasons: wheezing,shortness of breath / dyspnea  Start: 03/07/18 2056              baclofen (LIORESAL) tablet 10 mg  Dose: 10 mg  Freq: 4 TIMES DAILY PRN Route: PO  PRN Reason: muscle spasms  Start: 03/07/18 2049              bisacodyl (DULCOLAX) Suppository 10 mg  Dose: 10 mg  Freq: EVERY OTHER DAY Route: RE  Start: 03/08/18 2000 2032 (10 mg)-Given          2007 (10 mg)-Given         (1952)-Not Given            cefTAZidime (FORTAZ) 2 g in 10 mL SWFI for IVP  Dose: 2 g  Freq: EVERY 8 HOURS Route: IV  Indications of Use: URINARY TRACT INFECTION  Start: 03/11/18 0800   Admin Instructions: Give IVP over 3-5 minutes         0940 (2 g)-Given       1551 (2 g)-Given        0038 (2 g)-Given       0821 (2 g)-Given       1611 (2 g)-Given        0050 (2 g)-Given       0844 (2 g)-Given       [ ] 1600           cyanocobalamin (vitamin  B-12) tablet 1,000 mcg  Dose: 1,000 mcg  Freq: DAILY Route: PO  Start: 03/08/18 0800     0753 (1,000 mcg)-Given        0957 (1,000 mcg)-Given        0912 (1,000 mcg)-Given        0939 (1,000 mcg)-Given        1004 (1,000 mcg)-Given        0843 (1,000 mcg)-Given           docusate sodium (COLACE) capsule 100 mg  Dose: 100 mg  Freq: 2 TIMES DAILY Route: PO  Start: 03/07/18 2100    2212 (100 mg)-Given        0753 (100 mg)-Given       2032 (100 mg)-Given        0957 (100 mg)-Given       1941 (100 mg)-Given        0912 (100 mg)-Given       2020 (100 mg)-Given        0940 (100 mg)-Given       1950 (100 mg)-Given        1004 (100 mg)-Given       1957 (100 mg)-Given        0844 (100 mg)-Given       [ ] 2000           ferrous sulfate (IRON) tablet 325 mg  Dose: 325 mg  Freq: DAILY WITH BREAKFAST Route: PO  Start: 03/08/18 0800   Admin Instructions: Absorbed best on an empty stomach. If stomach upset occurs, can take with meals.      0753 (325 mg)-Given        0958 (325 mg)-Given        0911 (325 mg)-Given        0938 (325 mg)-Given        1004 (325 mg)-Given        0844 (325 mg)-Given           heparin lock flush 10 UNIT/ML injection 2-5 mL  Dose: 2-5 mL  Freq: ONCE PRN Route: IK  PRN Reason: line flush  PRN Comment: for locking each dormant lumen with line placement  Start: 03/11/18 1111   Admin Instructions: May repeat x 1               heparin sodium PF injection 5,000 Units  Dose: 5,000 Units  Freq: EVERY 8 HOURS Route: SC  Start: 03/08/18 1700   Admin  "Instructions: High concentration HEParin. Not for line flush or cath care.  High concentration heparin. Not for line flush or cath care.      1710 (5,000 Units)-Given        0051 (5,000 Units)-Given       0958 (5,000 Units)-Given       1708 (5,000 Units)-Given        0001 (5,000 Units)-Given       0912 (5,000 Units)-Given       1638 (5,000 Units)-Given        0008 (5,000 Units)-Given       0939 (5,000 Units)-Given       1602 (5,000 Units)-Given        0038 (5,000 Units)-Given       1005 (5,000 Units)-Given       1658 (5,000 Units)-Given        0051 (5,000 Units)-Given       0844 (5,000 Units)-Given       [ ] 1700           hypromellose-dextran (ARTIFICAL TEARS) ophthalmic solution 2 drop  Dose: 2 drop  Freq: EVERY 2 HOURS PRN Route: Both Eyes  PRN Reason: dry eyes  Start: 03/07/18 2056              levETIRAcetam (KEPPRA) tablet 1,000 mg  Dose: 1,000 mg  Freq: 2 TIMES DAILY Route: PO  Start: 03/07/18 2100    2212 (1,000 mg)-Given        0753 (1,000 mg)-Given       2032 (1,000 mg)-Given        0958 (1,000 mg)-Given       1940 (1,000 mg)-Given        0912 (1,000 mg)-Given       2020 (1,000 mg)-Given        0939 (1,000 mg)-Given       1950 (1,000 mg)-Given        1005 (1,000 mg)-Given       1957 (1,000 mg)-Given        0843 (1,000 mg)-Given       [ ] 2000           lidocaine (LMX4) kit  Freq: ONCE PRN Route: Top  PRN Reason: moderate pain  PRN Comment: for local anesthetic during PICC insertion  Start: 03/11/18 1111   Admin Instructions: Apply to PICC Insertion Site. Apply 30 minutes prior to procedure. MAX Dose: 2.5 gm  (1/2 of 5 gm tube)                 lidocaine (LMX4) kit  Freq: EVERY 1 HOUR PRN Route: Top  PRN Reason: pain  PRN Comment: with VAD insertion or accessing implanted port.  Start: 03/08/18 0210   Admin Instructions: Do NOT give if patient has a history of allergy to any local anesthetic or any \"flash\" product.   Apply 30 minutes prior to VAD insertion or port access.  MAX Dose:  2.5 g (  of 5 g tube)     " "          lidocaine 1 % 0.5-5 mL  Dose: 0.5-5 mL  Freq: ONCE PRN Route: OTHER  PRN Comment: mild pain For local anesthetic during PICC insertion.  Start: 03/11/18 1111   Admin Instructions: Give Sub-Q/Intradermal.  Give in divided doses as needed.               lidocaine 1 % 1 mL  Dose: 1 mL  Freq: EVERY 1 HOUR PRN Route: OTHER  PRN Comment: mild pain with VAD insertion or accessing implanted port  Start: 03/08/18 0210   Admin Instructions: Do NOT give if patient has a history of allergy to any local anesthetic or any \"flash\" product. MAX dose 1 mL subcutaneous OR intradermal in divided doses.               Medication given by intrathecal pump: This is NOT an order to dispense medication. For information only.  Freq: CONTINUOUS PRN Route: XX  Start: 03/08/18 1523   Admin Instructions: Fill in all known information  <br><br>Per med rec note 12/12/17<br><br>Clinic Responsible for pump medications: (unknown)<br>Medication in pump: Baclofen 2000 mcg/mL <br>Dose: 277.1 mcg/day <br>Low reservoir alarm date: 4/26/2018<br><br>If information will need to be obtained at a later time, indicate the following:<br>* Department or individual who will follow up<br>* If family will be bringing information, or if a call to a clinic is needed.  If needed, [employer] may be able to supply the name of the physician who implanted the patient's pump.<br><br>Modify this order to add or update these instructions.               miconazole (MICATIN) 2 % cream  Freq: 2 TIMES DAILY PRN Route: Top  PRN Reasons: itching,irritation  Start: 03/07/18 2056   Admin Instructions: Apply to affected area<br><br>autosub for ketoconazole (NIZORAL) 2 % cream (home medication)               miconazole (MICATIN; MICRO GUARD) 2 % powder  Freq: 2 TIMES DAILY Route: Top  Start: 03/07/18 2100   Admin Instructions: Apply to affected area     2213 ( )-Given        0753 ( )-Given       2032 ( )-Given        0959 ( )-Given       1942 ( )-Given        (0913)-Not " Given       (2023)-Not Given        (0941)-Not Given       (1950)-Not Given        1006 ( )-Given       1958 ( )-Given        (0846)-Not Given       [ ] 2000           mometasone (ELOCON) 0.1 % cream  Freq: AT BEDTIME Route: Top  Start: 03/07/18 2200   Admin Instructions: Apply to affected areas     (2213)-Not Given        (2234)-Not Given        (2154)-Not Given [C]        (2109)-Not Given        (2215)-Not Given        (2139)-Not Given        [ ] 2200           naloxone (NARCAN) injection 0.1-0.4 mg  Dose: 0.1-0.4 mg  Freq: EVERY 2 MIN PRN Route: IV  PRN Reason: opioid reversal  Start: 03/08/18 0210   Admin Instructions: For respiratory rate LESS than or EQUAL to 8.  Partial reversal dose:  0.1 mg titrated q 2 minutes for Analgesia Side Effects Monitoring Sedation Level of 3 (frequently drowsy, arousable, drifts to sleep during conversation).Full reversal dose:  0.4 mg bolus for Analgesia Side Effects Monitoring Sedation Level of 4 (somnolent, minimal or no response to stimulation).  For ordered doses up to 2mg give IVP. Give each 0.4mg over 15 seconds in emergency situations. For non-emergent situations further dilute in 9mL of NS to facilitate titration of response.               No Medication Sleep Aids for this Patient  Freq: CONTINUOUS PRN Route: XX  Start: 03/08/18 0210              ondansetron (ZOFRAN-ODT) ODT tab 4 mg  Dose: 4 mg  Freq: EVERY 6 HOURS PRN Route: PO  PRN Reasons: nausea,vomiting  Start: 03/08/18 0210   Admin Instructions: This is Step 1 of nausea and vomiting management.  If nausea not resolved in 15 minutes, go to Step 2 prochlorperazine (COMPAZINE). Do not push through foil backing. Peel back foil and gently remove. Place on tongue immediately. Administration with liquid unnecessary  With dry hands, peel back foil backing and gently remove tablet; do not push oral disintegrating tablet through foil backing; administer immediately on tongue and oral disintegrating tablet dissolves in  seconds; then swallow with saliva; liquid not required.              Or  ondansetron (ZOFRAN) injection 4 mg  Dose: 4 mg  Freq: EVERY 6 HOURS PRN Route: IV  PRN Reasons: nausea,vomiting  Start: 03/08/18 0210   Admin Instructions: This is Step 1 of nausea and vomiting management.  If nausea not resolved in 15 minutes, go to Step 2 prochlorperazine (COMPAZINE).  Irritant. For ordered doses up to 4 mg, give IV Push undiluted over 2-5 minutes.               opium-belladonna (B&O SUPPRETTES) 30-16.2 MG per suppository 1 suppository  Dose: 30 mg  Freq: EVERY 6 HOURS PRN Route: RE  PRN Reason: bladder spasms  Start: 03/08/18 0210              oxybutynin (DITROPAN) tablet 5 mg  Dose: 5 mg  Freq: 3 TIMES DAILY Route: PO  Start: 03/07/18 2100    2213 (5 mg)-Given        0753 (5 mg)-Given       1339 (5 mg)-Given       2032 (5 mg)-Given        0958 (5 mg)-Given       1337 (5 mg)-Given       1941 (5 mg)-Given        0912 (5 mg)-Given       1400 (5 mg)-Given       2021 (5 mg)-Given        0941 (5 mg)-Given       1348 (5 mg)-Given       1950 (5 mg)-Given        1004 (5 mg)-Given       1314 (5 mg)-Given       1957 (5 mg)-Given        0843 (5 mg)-Given       [ ] 1400       [ ] 2000           oxyCODONE IR (ROXICODONE) tablet 5-10 mg  Dose: 5-10 mg  Freq: EVERY 3 HOURS PRN Route: PO  PRN Reason: other  PRN Comment: pain control or improvement in physical function. Hold dose for analgesic side effects.  Start: 03/08/18 0210   Admin Instructions: Start with the lowest dose.    May adjust dose by 5 mg every 3 hours as needed.  Notify provider to assess for uncontrolled pain or analgesic side effects. Hold while on PCA or with regular IV opioid dosing. Maximum total is 80 mg in 24 hours.               polyethylene glycol (MIRALAX/GLYCOLAX) Packet 17 g  Dose: 17 g  Freq: DAILY PRN Route: PO  PRN Reason: constipation  Start: 03/07/18 2056   Admin Instructions: 1 Packet = 17 grams. Mixed prescribed dose in 8 ounces of water.  1 Packet = 17  grams. Mixed prescribed dose in 8 ounces of water. Follow with 8 oz. of water.               sodium chloride (PF) 0.9% PF flush 3 mL  Dose: 3 mL  Freq: EVERY 8 HOURS Route: IK  Start: 03/08/18 0210   Admin Instructions: And Q1H PRN, to lock peripheral IV dormant line.      (0212)-Not Given       (1019)-Not Given       1715 (3 mL)-Given        (0312)-Not Given       0959 (3 mL)-Given       (1746)-Not Given        0004 (3 mL)-Given              (0915)-Not Given       (1715)-Not Given        (0940)-Not Given       1552 (3 mL)-Given        0038 (3 mL)-Given       1010 (3 mL)-Given       1619 (3 mL)-Given        0052 (3 mL)-Given       0847 (3 mL)-Given       [ ] 1600           sodium chloride (PF) 0.9% PF flush 3 mL  Dose: 3 mL  Freq: EVERY 1 HOUR PRN Route: IK  PRN Reason: line flush  PRN Comment: for peripheral IV flush post IV meds  Start: 03/08/18 0210              sodium chloride (PF) 0.9% PF flush 5-50 mL  Dose: 5-50 mL  Freq: ONCE PRN Route: IK  PRN Reason: line flush  PRN Comment: to flush each lumen with line placement  Start: 03/11/18 1111   Admin Instructions: May repeat x 1               sulfamethoxazole-trimethoprim (BACTRIM DS/SEPTRA DS) 800-160 MG per tablet 1 tablet  Dose: 1 tablet  Freq: 2 TIMES DAILY Route: PO  Indications of Use: URINARY TRACT INFECTION  Start: 03/11/18 0800        0939 (1 tablet)-Given       1950 (1 tablet)-Given        1004 (1 tablet)-Given       1957 (1 tablet)-Given        0843 (1 tablet)-Given       [ ] 2000           zinc oxide (DESITIN) 40 % ointment  Freq: EVERY 1 HOUR PRN Route: Top  PRN Reasons: irritation,dry skin  Start: 03/07/18 2056   Admin Instructions: Apply to affected areas              Discontinued Medications  Medications 03/07/18 03/08/18 03/09/18 03/10/18 03/11/18 03/12/18 03/13/18         Dose: 25 g  Freq: ONCE Route: IV  Start: 03/08/18 0245   End: 03/13/18 1127   Admin Instructions: Infusion rates should be adjusted based on patient condition and response.  -  For shock/hypovolemia, infuse as rapidly as tolerated. For patients with cardiac or circulatory disease at risk for rapid blood pressure increases, do not exceed 5-10 mL/min.   - For patients with normal plasma volume, do not exceed 1-2 mL/min to avoid circulatory overload and pulmonary edema.   - For procedure specific rates, please refer to procedure protocol.                  1127-Med Discontinued         Dose: 120 mg  Freq: EVERY 24 HOURS Route: IV  Indications of Use: URINARY TRACT INFECTION  Start: 03/08/18 1700   End: 03/11/18 0722     1710 (120 mg)-New Bag        1708 (120 mg)-New Bag        1638 (120 mg)-New Bag        0722-Med Discontinued

## 2018-03-07 NOTE — OR NURSING
"Patient given oral tylenol and was able to swallow without difficulty.  Will cancel IV tylenol.  Portable XRay completed to r/o pneumo.  Dr Paulino notified done and will have Radiology read.  The doctor states he can transfer to floor without being read.  Lab to room and stat labs drawn and sent to lab.  Continue to have fan on patient, ice packs to groin and posterior neck for comfort.  Continues to be \"very hot\"  Face is very flushed, hot to touch but rest of body pale and cool.  Dr Paulino aware of temp, heart rate, blood pressure, and ice packs to body.    "

## 2018-03-08 ENCOUNTER — APPOINTMENT (OUTPATIENT)
Dept: CT IMAGING | Facility: CLINIC | Age: 43
End: 2018-03-08
Attending: UROLOGY
Payer: COMMERCIAL

## 2018-03-08 LAB
ANION GAP SERPL CALCULATED.3IONS-SCNC: 11 MMOL/L (ref 3–14)
ANION GAP SERPL CALCULATED.3IONS-SCNC: 6 MMOL/L (ref 3–14)
ANION GAP SERPL CALCULATED.3IONS-SCNC: 6 MMOL/L (ref 3–14)
BUN SERPL-MCNC: 14 MG/DL (ref 7–30)
BUN SERPL-MCNC: 15 MG/DL (ref 7–30)
BUN SERPL-MCNC: 16 MG/DL (ref 7–30)
CALCIUM SERPL-MCNC: 7.3 MG/DL (ref 8.5–10.1)
CALCIUM SERPL-MCNC: 7.4 MG/DL (ref 8.5–10.1)
CALCIUM SERPL-MCNC: 8.3 MG/DL (ref 8.5–10.1)
CHLORIDE SERPL-SCNC: 111 MMOL/L (ref 94–109)
CHLORIDE SERPL-SCNC: 113 MMOL/L (ref 94–109)
CHLORIDE SERPL-SCNC: 114 MMOL/L (ref 94–109)
CO2 SERPL-SCNC: 21 MMOL/L (ref 20–32)
CO2 SERPL-SCNC: 23 MMOL/L (ref 20–32)
CO2 SERPL-SCNC: 24 MMOL/L (ref 20–32)
CREAT SERPL-MCNC: 1.04 MG/DL (ref 0.66–1.25)
CREAT SERPL-MCNC: 1.11 MG/DL (ref 0.66–1.25)
CREAT SERPL-MCNC: 1.2 MG/DL (ref 0.66–1.25)
ERYTHROCYTE [DISTWIDTH] IN BLOOD BY AUTOMATED COUNT: 16.8 % (ref 10–15)
ERYTHROCYTE [DISTWIDTH] IN BLOOD BY AUTOMATED COUNT: 17.1 % (ref 10–15)
GENTAMICIN SERPL-MCNC: 1.6 MG/L
GENTAMICIN SERPL-MCNC: 2.3 MG/L
GENTAMICIN SERPL-MCNC: 2.6 MG/L
GFR SERPL CREATININE-BSD FRML MDRD: 67 ML/MIN/1.7M2
GFR SERPL CREATININE-BSD FRML MDRD: 73 ML/MIN/1.7M2
GFR SERPL CREATININE-BSD FRML MDRD: 79 ML/MIN/1.7M2
GLUCOSE SERPL-MCNC: 144 MG/DL (ref 70–99)
GLUCOSE SERPL-MCNC: 158 MG/DL (ref 70–99)
GLUCOSE SERPL-MCNC: 85 MG/DL (ref 70–99)
HCT VFR BLD AUTO: 26.5 % (ref 40–53)
HCT VFR BLD AUTO: 29 % (ref 40–53)
HGB BLD-MCNC: 8.1 G/DL (ref 13.3–17.7)
HGB BLD-MCNC: 8.2 G/DL (ref 13.3–17.7)
HGB BLD-MCNC: 8.6 G/DL (ref 13.3–17.7)
INTERPRETATION ECG - MUSE: NORMAL
INTERPRETATION ECG - MUSE: NORMAL
LACTATE BLD-SCNC: 0.9 MMOL/L (ref 0.7–2)
MCH RBC QN AUTO: 25.4 PG (ref 26.5–33)
MCH RBC QN AUTO: 25.7 PG (ref 26.5–33)
MCHC RBC AUTO-ENTMCNC: 29.7 G/DL (ref 31.5–36.5)
MCHC RBC AUTO-ENTMCNC: 30.6 G/DL (ref 31.5–36.5)
MCV RBC AUTO: 84 FL (ref 78–100)
MCV RBC AUTO: 86 FL (ref 78–100)
PLATELET # BLD AUTO: 122 10E9/L (ref 150–450)
PLATELET # BLD AUTO: 145 10E9/L (ref 150–450)
POTASSIUM SERPL-SCNC: 4.1 MMOL/L (ref 3.4–5.3)
POTASSIUM SERPL-SCNC: 4.7 MMOL/L (ref 3.4–5.3)
POTASSIUM SERPL-SCNC: 4.9 MMOL/L (ref 3.4–5.3)
RBC # BLD AUTO: 3.15 10E12/L (ref 4.4–5.9)
RBC # BLD AUTO: 3.39 10E12/L (ref 4.4–5.9)
SODIUM SERPL-SCNC: 140 MMOL/L (ref 133–144)
SODIUM SERPL-SCNC: 144 MMOL/L (ref 133–144)
SODIUM SERPL-SCNC: 144 MMOL/L (ref 133–144)
VANCOMYCIN SERPL-MCNC: 20.3 MG/L
WBC # BLD AUTO: 10.2 10E9/L (ref 4–11)
WBC # BLD AUTO: 11 10E9/L (ref 4–11)

## 2018-03-08 PROCEDURE — 25000128 H RX IP 250 OP 636: Performed by: SURGERY

## 2018-03-08 PROCEDURE — 93010 ELECTROCARDIOGRAM REPORT: CPT | Mod: 77 | Performed by: INTERNAL MEDICINE

## 2018-03-08 PROCEDURE — 85027 COMPLETE CBC AUTOMATED: CPT | Performed by: UROLOGY

## 2018-03-08 PROCEDURE — 87040 BLOOD CULTURE FOR BACTERIA: CPT | Performed by: STUDENT IN AN ORGANIZED HEALTH CARE EDUCATION/TRAINING PROGRAM

## 2018-03-08 PROCEDURE — 40000556 ZZH STATISTIC PERIPHERAL IV START W US GUIDANCE

## 2018-03-08 PROCEDURE — 25000128 H RX IP 250 OP 636: Performed by: STUDENT IN AN ORGANIZED HEALTH CARE EDUCATION/TRAINING PROGRAM

## 2018-03-08 PROCEDURE — P9041 ALBUMIN (HUMAN),5%, 50ML: HCPCS | Performed by: STUDENT IN AN ORGANIZED HEALTH CARE EDUCATION/TRAINING PROGRAM

## 2018-03-08 PROCEDURE — 25000132 ZZH RX MED GY IP 250 OP 250 PS 637: Performed by: STUDENT IN AN ORGANIZED HEALTH CARE EDUCATION/TRAINING PROGRAM

## 2018-03-08 PROCEDURE — 25000128 H RX IP 250 OP 636: Performed by: UROLOGY

## 2018-03-08 PROCEDURE — 80048 BASIC METABOLIC PNL TOTAL CA: CPT | Performed by: UROLOGY

## 2018-03-08 PROCEDURE — 25000128 H RX IP 250 OP 636: Performed by: PHARMACIST

## 2018-03-08 PROCEDURE — 87040 BLOOD CULTURE FOR BACTERIA: CPT | Performed by: NURSE PRACTITIONER

## 2018-03-08 PROCEDURE — 74150 CT ABDOMEN W/O CONTRAST: CPT

## 2018-03-08 PROCEDURE — 80048 BASIC METABOLIC PNL TOTAL CA: CPT | Performed by: NURSE PRACTITIONER

## 2018-03-08 PROCEDURE — 93005 ELECTROCARDIOGRAM TRACING: CPT

## 2018-03-08 PROCEDURE — 36415 COLL VENOUS BLD VENIPUNCTURE: CPT | Performed by: NURSE PRACTITIONER

## 2018-03-08 PROCEDURE — 83605 ASSAY OF LACTIC ACID: CPT | Performed by: STUDENT IN AN ORGANIZED HEALTH CARE EDUCATION/TRAINING PROGRAM

## 2018-03-08 PROCEDURE — 80202 ASSAY OF VANCOMYCIN: CPT | Performed by: UROLOGY

## 2018-03-08 PROCEDURE — 20000004 ZZH R&B ICU UMMC

## 2018-03-08 PROCEDURE — 85027 COMPLETE CBC AUTOMATED: CPT | Performed by: STUDENT IN AN ORGANIZED HEALTH CARE EDUCATION/TRAINING PROGRAM

## 2018-03-08 PROCEDURE — 93010 ELECTROCARDIOGRAM REPORT: CPT | Performed by: INTERNAL MEDICINE

## 2018-03-08 PROCEDURE — 99233 SBSQ HOSP IP/OBS HIGH 50: CPT | Performed by: NURSE PRACTITIONER

## 2018-03-08 PROCEDURE — 40000275 ZZH STATISTIC RCP TIME EA 10 MIN

## 2018-03-08 PROCEDURE — 85018 HEMOGLOBIN: CPT | Performed by: NURSE PRACTITIONER

## 2018-03-08 PROCEDURE — 80048 BASIC METABOLIC PNL TOTAL CA: CPT | Performed by: STUDENT IN AN ORGANIZED HEALTH CARE EDUCATION/TRAINING PROGRAM

## 2018-03-08 PROCEDURE — 80170 ASSAY OF GENTAMICIN: CPT | Performed by: UROLOGY

## 2018-03-08 PROCEDURE — 36415 COLL VENOUS BLD VENIPUNCTURE: CPT | Performed by: UROLOGY

## 2018-03-08 RX ORDER — ALBUMIN, HUMAN INJ 5% 5 %
12.5 SOLUTION INTRAVENOUS ONCE
Status: COMPLETED | OUTPATIENT
Start: 2018-03-08 | End: 2018-03-08

## 2018-03-08 RX ORDER — ACETAMINOPHEN 325 MG/1
650 TABLET ORAL EVERY 4 HOURS
Status: DISCONTINUED | OUTPATIENT
Start: 2018-03-08 | End: 2018-03-13 | Stop reason: HOSPADM

## 2018-03-08 RX ORDER — OXYCODONE HYDROCHLORIDE 5 MG/1
5-10 TABLET ORAL
Status: DISCONTINUED | OUTPATIENT
Start: 2018-03-08 | End: 2018-03-13 | Stop reason: HOSPADM

## 2018-03-08 RX ORDER — FENTANYL CITRATE 50 UG/ML
25-50 INJECTION, SOLUTION INTRAMUSCULAR; INTRAVENOUS
Status: DISCONTINUED | OUTPATIENT
Start: 2018-03-08 | End: 2018-03-08 | Stop reason: CLARIF

## 2018-03-08 RX ORDER — DOPAMINE HYDROCHLORIDE 160 MG/100ML
2-20 INJECTION, SOLUTION INTRAVENOUS CONTINUOUS
Status: DISCONTINUED | OUTPATIENT
Start: 2018-03-08 | End: 2018-03-09

## 2018-03-08 RX ORDER — NALOXONE HYDROCHLORIDE 0.4 MG/ML
.1-.4 INJECTION, SOLUTION INTRAMUSCULAR; INTRAVENOUS; SUBCUTANEOUS
Status: ACTIVE | OUTPATIENT
Start: 2018-03-08 | End: 2018-03-09

## 2018-03-08 RX ORDER — SODIUM CHLORIDE 9 MG/ML
INJECTION, SOLUTION INTRAVENOUS CONTINUOUS
Status: DISCONTINUED | OUTPATIENT
Start: 2018-03-08 | End: 2018-03-09

## 2018-03-08 RX ORDER — HEPARIN SODIUM 5000 [USP'U]/.5ML
5000 INJECTION, SOLUTION INTRAVENOUS; SUBCUTANEOUS EVERY 8 HOURS
Status: DISCONTINUED | OUTPATIENT
Start: 2018-03-08 | End: 2018-03-13 | Stop reason: HOSPADM

## 2018-03-08 RX ORDER — ONDANSETRON 4 MG/1
4 TABLET, ORALLY DISINTEGRATING ORAL EVERY 6 HOURS PRN
Status: DISCONTINUED | OUTPATIENT
Start: 2018-03-08 | End: 2018-03-13 | Stop reason: HOSPADM

## 2018-03-08 RX ORDER — ALBUMIN, HUMAN INJ 5% 5 %
25 SOLUTION INTRAVENOUS ONCE
Status: DISCONTINUED | OUTPATIENT
Start: 2018-03-08 | End: 2018-03-13 | Stop reason: CLARIF

## 2018-03-08 RX ORDER — LIDOCAINE 40 MG/G
CREAM TOPICAL
Status: DISCONTINUED | OUTPATIENT
Start: 2018-03-08 | End: 2018-03-13 | Stop reason: HOSPADM

## 2018-03-08 RX ORDER — NALOXONE HYDROCHLORIDE 0.4 MG/ML
.1-.4 INJECTION, SOLUTION INTRAMUSCULAR; INTRAVENOUS; SUBCUTANEOUS
Status: DISCONTINUED | OUTPATIENT
Start: 2018-03-08 | End: 2018-03-13 | Stop reason: HOSPADM

## 2018-03-08 RX ORDER — ONDANSETRON 2 MG/ML
4 INJECTION INTRAMUSCULAR; INTRAVENOUS EVERY 6 HOURS PRN
Status: DISCONTINUED | OUTPATIENT
Start: 2018-03-08 | End: 2018-03-13 | Stop reason: HOSPADM

## 2018-03-08 RX ADMIN — ACETAMINOPHEN 650 MG: 325 TABLET, FILM COATED ORAL at 10:19

## 2018-03-08 RX ADMIN — OXYBUTYNIN CHLORIDE 5 MG: 5 TABLET ORAL at 20:32

## 2018-03-08 RX ADMIN — MICONAZOLE NITRATE: 2 POWDER TOPICAL at 20:32

## 2018-03-08 RX ADMIN — HEPARIN SODIUM 5000 UNITS: 5000 INJECTION, SOLUTION INTRAVENOUS; SUBCUTANEOUS at 17:10

## 2018-03-08 RX ADMIN — FLUCONAZOLE 400 MG: 2 INJECTION INTRAVENOUS at 13:37

## 2018-03-08 RX ADMIN — DOCUSATE SODIUM 100 MG: 100 CAPSULE, LIQUID FILLED ORAL at 07:53

## 2018-03-08 RX ADMIN — LEVETIRACETAM 1000 MG: 500 TABLET ORAL at 20:32

## 2018-03-08 RX ADMIN — Medication 1000 MCG: at 07:53

## 2018-03-08 RX ADMIN — DOPAMINE HYDROCHLORIDE 2 MCG/KG/MIN: 160 INJECTION, SOLUTION INTRAVENOUS at 02:01

## 2018-03-08 RX ADMIN — LEVETIRACETAM 1000 MG: 500 TABLET ORAL at 07:53

## 2018-03-08 RX ADMIN — ACETAMINOPHEN 650 MG: 325 TABLET, FILM COATED ORAL at 17:15

## 2018-03-08 RX ADMIN — GENTAMICIN SULFATE 120 MG: 40 INJECTION, SOLUTION INTRAMUSCULAR; INTRAVENOUS at 17:10

## 2018-03-08 RX ADMIN — BISACODYL 10 MG: 10 SUPPOSITORY RECTAL at 20:32

## 2018-03-08 RX ADMIN — DOCUSATE SODIUM 100 MG: 100 CAPSULE, LIQUID FILLED ORAL at 20:32

## 2018-03-08 RX ADMIN — ALBUMIN HUMAN 12.5 G: 0.05 INJECTION, SOLUTION INTRAVENOUS at 05:46

## 2018-03-08 RX ADMIN — MICONAZOLE NITRATE: 2 POWDER TOPICAL at 07:53

## 2018-03-08 RX ADMIN — FERROUS SULFATE 325 MG: 325 TABLET, FILM COATED ORAL at 07:53

## 2018-03-08 RX ADMIN — OXYBUTYNIN CHLORIDE 5 MG: 5 TABLET ORAL at 13:39

## 2018-03-08 RX ADMIN — VANCOMYCIN HYDROCHLORIDE 1250 MG: 10 INJECTION, POWDER, LYOPHILIZED, FOR SOLUTION INTRAVENOUS at 22:37

## 2018-03-08 RX ADMIN — OXYBUTYNIN CHLORIDE 5 MG: 5 TABLET ORAL at 07:53

## 2018-03-08 RX ADMIN — ACETAMINOPHEN 650 MG: 325 TABLET, FILM COATED ORAL at 13:39

## 2018-03-08 RX ADMIN — ALBUMIN HUMAN 12.5 G: 0.05 INJECTION, SOLUTION INTRAVENOUS at 00:38

## 2018-03-08 NOTE — OR NURSING
"Urine culture collected from old nephrostomy/stent site via ostomy bag.  Urine bloody.  Dr Bonilla contacted by return call regarding patient's status, lowering BP, possible need for better IV access.  Order received for fluid bolus.  Will still plan on 6B rather than ICU at this time.Dr Gordon here to start another peripheral line.  Patient continues to feel \"hot\".  Axillary temp 97.8 F.  "

## 2018-03-08 NOTE — PROGRESS NOTES
Urology  Progress Note    -Admitted to SICU from PACU given hypotension and concern for impending sepsis  -Started on dopamine which has subsequently been weaned  -Feeling well this morning     Exam  /53  Temp 97.5  F (36.4  C) (Axillary)  Resp 13  Wt 78 kg (171 lb 15.3 oz)  SpO2 100%  BMI 25.39 kg/m2  No acute distress  Unlabored breathing  Abdomen soft, nt/nd. Flank incision C/D/I with nephroureteral stent in place with watermelon urine in bag  Lower extremities non-edematous bilaterally  SP tube with pink urine    /1225  /125    Labs  WBC 10.2  Hgb 8.1  Cr 1.2 (baseline)    Assessment/Plan  42 year old y/o male with a history of C4 quadriplegia and nephrolithiasis now POD#1 s/p PCNL. Post op course notable for development of sepsis in PACU in the setting of known preop colonization with multiple organisms, all appropriately treated.     Neuro: APAP, oxycodone for pain control  CV: Currently on dopamine for BP support; mgmt per SICU  Pulm: No active issues  FEN/GI: NPO, MIVF @ 75/hr  Endo: No active issues  : Continue nephroureteral stent and SP tube  Heme/ID: Gentamicin, vancomycin, and fluconazole for preop urine culture with Pseudomonas, Proteus and MRSA + fungal prophylaxis  Activity: Ad orly  PPx: SCDs    Seen and examined with the chief resident. Will discuss with Dr. Horn.    Bravo Gilbert, PGY-4  Urology Resident     Contacting the Urology Team     Please use the following job codes to reach the Urology Team. Note that you must use an in house phone and that job codes cannot receive text pages.     On weekdays, dial [phone] (or star-star-star [phone] on the new [employer] [place] telephones) then [phone] to reach the Adult Urology resident or PA on call    On weekdays, dial [phone] (or star-star-star 777 on the new [employer] [place] telephones) then [phone] to reach the Pediatric Urology resident    On weeknights and weekends, dial [phone] (or star-star-star 777 on the new [employer] [place] telephones) then [phone] to reach the Urology  resident on call (for both Adult and Pediatrics)

## 2018-03-08 NOTE — PROGRESS NOTES
SURGICAL ICU PROGRESS NOTE  March 8, 2018      CO-MORBIDITIES:   Quadriplegia  Sz disorder  Hypoventilation/JUAN  Neurogenic bladder with recurrent infection  Constipation    ASSESSMENT: Maicol Carrera is a 42 year old quadriplegic male here 2nd to kidney stones. POD # 1 s/p Left perc nephrolithotomy with stent placement    TODAY'S PROGRESS/PLANS:   -wean pressors as tolerated  -transfer to floor  -fluid resuscitate if UOP drops off  -gentamycin not being cleared, discuss with primary     PLAN:  Neuro/ pain/ sedation:  #Quadraplegia  #Chronic spasticity   - Monitor neurological status.  - Continue home meds: baclofen (including balclofen pump), keppra, magnesium    Pulmonary care:   #JUAN  #Hypoventilation syndrome  - Supplemental oxygen to keep saturation above 92 %.  - Frequent Incentive spirometer   - CPAP when sleeping    Cardiovascular:    #Shock, septic  - Monitor hemodynamic status.   - Requiring dopamine for BP support. Wean pressors as able to keep MAP > 65    GI/ Nutrition care:   #No acute issues  - ADAT    Renal/ Fluid Balance/ Fluids/ Electrolytes:    # s/p Lithotripsy  # Chronic neurogenic bladder with Chronic percutanous nephrostomy   - MIVF for IV fluid hydration  - Fluid resuscitation if UO slows down (currently 200-400 ml/hr)  - Follow electrolytes    Endocrine:    No acute issues    ID/ Antibiotics:  # Pyelonephritis     UC / BC x 1 sent  Was on cipro/bactrim preop x 24 hours  Vanco, gent and fluconazole started last night   Monitor levels of vanco and gent (Levels currently very high)    Monitor micro narrow spectrum when appropriate   Send additional blood culture    Heme:     #Anemia, dilutional vs chronic  - Follow Hb  - Transfuse to keep  Hb > 7.0    MSK:  High risk pressure ulcers  -- Specialty bed per protocol    Prophylaxis:    - Mechanical prophylaxis for DVT. Add enoxaparin     Disposition:  Likely to floor today if pressors off and BP  stable  ====================================    SUBJECTIVE:   Events noted.  Pt sleeping deeply after request to leave him alone.  Was quite angry when nurse woke him a few minutes prior for morning meds/cares. ROS not obtained    OBJECTIVE:   1. VITAL SIGNS:   Temp:  [97.3  F (36.3  C)-100  F (37.8  C)] 97.6  F (36.4  C)  Heart Rate:  [] 70  Resp:  [13-30] 20  BP: ()/(45-89) 102/66  SpO2:  [92 %-100 %] 100 %  Resp: 20    2. INTAKE/ OUTPUT:   I/O last 3 completed shifts:  In: 3730.39 [P.O.:200; I.V.:2030.39; IV Piggyback:1000]  Out: 3230 [Urine:2730; Blood:500]    3. PHYSICAL EXAMINATION:   General: sleeping  Neuro: quad  Resp: Breathing non-labored, on CPAP, very decreased bilaterally, no rhonchi or crackles  CV: RRR no murmur  Abdomen: Soft, Non-distended, Non-tender  Extremities: warm and contracted.     4. INVESTIGATIONS:   Arterial Blood Gases   No lab results found in last 7 days.  Complete Blood Count     Recent Labs  Lab 03/08/18  0544 03/08/18  0035 03/07/18  1725 03/07/18  1555 03/07/18  1102   WBC 10.2 11.0 21.4*  --  9.0   HGB 8.1* 8.6* 11.4* 11.9* 13.4   * 145* 307  --  263     Basic Metabolic Panel    Recent Labs  Lab 03/08/18  0544 03/08/18  0035 03/07/18  1725 03/07/18  1555 03/07/18  1102    140 140 141 138   POTASSIUM 4.7 4.9 4.7 4.4 4.1   CHLORIDE 113* 111* 108  --  104   CO2 21 23 22  --  26   BUN 15 16 13  --  14   CR 1.11 1.20 1.11  --  1.04   * 158* 118* 104* 80     Liver Function Tests  No lab results found in last 7 days.  Pancreatic Enzymes  No lab results found in last 7 days.  Coagulation Profile  No lab results found in last 7 days.  Lactate  Invalid input(s): LACTATE    5. RADIOLOGY:   Recent Results (from the past 24 hour(s))   XR Surgery CASA Fluoro L/T 5 Min w Stills    Narrative    This exam was marked as non-reportable because it will not be read by a   radiologist or a [facility] non-radiologist provider.             XR Chest Port 1 View    Narrative     Exam:  Chest X-ray 3/7/2018 5:30 PM    History: Post-operative;     Comparison: CT abdomen pelvis 10/20/2017. Chest x-ray 11/3/2011.    Findings: Portable chest x-ray at 30 degrees. Patient is rotated. The  cardiomediastinal silhouette is within normal limits. Left basilar  opacity consistent with subpleural fatty deposition/mediastinal  lipomatosis as seen on CT from 10/20/2017. No pleural effusion. The  pulmonary vasculature is distinct. No focal consolidative opacity. No  pneumothorax. The upper abdomen is unremarkable.      Impression    Impression: No acute cardiopulmonary findings. Apparent left basilar  opacities seem to correspond to mediastinal lipomatosis on comparison  CT.    I have personally reviewed the examination and initial interpretation  and I agree with the findings.    JAMILAH ESCALANTE MD   XR Chest Port 1 View    Narrative    1 VIEW CHEST X-RAY 3/7/2018 9:47 PM    History: CT abdomen    Comparison: Radiograph earlier on the same day.    Findings:   2 portable supine views of the chest are obtained. The lung volumes  are stable. The trachea is midline. The cardiac mediastinal silhouette  is stable. Stable left basilar density corresponds to mediastinal  lipomatosis. No focal pulmonary opacities. No pleural effusion or  pneumothorax.      Impression    IMPRESSION:  No acute cardiopulmonary process identified.    I have personally reviewed the examination and initial interpretation  and I agree with the findings.    JAMILAH ESCALANTE MD   CT Abdomen w/o Contrast    Narrative    CT ABDOMEN PELVIS WITHOUT CONTRAST 3/8/2018    CLINICAL HISTORY: Evaluate for residual left stone after PCNL.    COMPARISON: Intraoperative fluoroscopy images from nephrolithotomy  3/7/2018. CT abdomen pelvis 10/20/2017.    TECHNIQUE: Unenhanced CT performed through the abdomen and pelvis.  Coronal and sagittal reconstructions were created.    DOSE (DLP): 990 mGy*cm    FINDINGS: Lack of contrast limits evaluation of the  abdominal and  pelvic viscera.    Left renal pelvis stone clearance with Interval placement of a left  percutaneous nephroureteral drain. Left perinephric and periureteral  fat stranding likely related to the procedure.    8 x 11 x 9 mm stone fragment remains in the left pelviureteral  junction/proximal ureter alongside the nephroureteral drain. No other  fragment identified through the left ureter or in the bladder.     Left infundibula have largely been cleared of stone. A portion of the  upper pole calyceal stone has been cleared with postprocedural  antidependent intracalyceal air. Calyceal stones remain through the  rest of the left kidney.    Unchanged right renal staghorn calculus. Unchanged bilateral atrophic  kidneys with thin cortices.    Inferior vena cava filter leg penetration through the caval wall,  unchanged.    Right lower quadrant intrathecal pump, unchanged. Catheter appears  intact.    Cholelithiasis without CT evidence for cholecystitis.    Bibasilar fibroatelectatic changes.    Right femoral gera.      Impression    IMPRESSION:  1. Post left PCNL changes. Left percutaneous nephroureteral drain in  place. 8 x 11 x 9 mm stone fragment remains in the left pelviureteral  junction/proximal ureter alongside the nephroureteral drain. No other  stone fragment identified through the left ureter into the bladder.    2. Left renal pelvis and infundibula have largely been cleared of  stones. Stones remain in the calyces.    3. Unchanged right staghorn calculus. Unchanged bilateral atrophic  kidneys with thin cortices.    JEFF RICKS MD       =========================================      Patient seen, findings and plan discussed with surgical ICU staff  Dr Zepeda.

## 2018-03-08 NOTE — OR NURSING
"Patient arrived in PACU.  Patient anxious and somewhat agitated because he feels \"very hot\".  Face is flushed but rest of body is cool and pale.  bilat hands/fingers are contracted.  Heavy scarring to right knee and small skin tear the size of dime to left knee (new).  Scars noted.  Baclofen pump can be palpated to lower right abdomen.  Suprapubic catheter site is covered with gauze dressing and noted sediment attached all along catheter length; noted bag to this tube was changed 2/22.  Drainage from left flank site of old nephrostomy with bloody return.  Dr Dawkins to room and is aware of heart rate of 120's and has given Metoprolol IV x1.    Heart rate has decreased to 90's within 15 minutes.   "

## 2018-03-08 NOTE — PHARMACY-AMINOGLYCOSIDE DOSING SERVICE
Pharmacy Aminoglycoside Initial Note  Date of Service 2018  Patient's  1975  42 year old, male    Weight (Adjusted):  75 kg  Patient received gentamicin 230mg (3mg/kg) x1 pre op at 1245 and another 80mg IV x1 intra op at 1622    Indication: Urinary Tract Infection    Current estimated CrCl = Estimated Creatinine Clearance: 97.6 mL/min (based on Cr of 1.11).  Patient is quadriplegic     Creatinine for last 3 days  3/7/2018: 11:02 AM Creatinine 1.04 mg/dL;  5:25 PM Creatinine 1.11 mg/dL     Nephrotoxins and other renal medications (Future)    Start     Dose/Rate Route Frequency Ordered Stop    18 2330  vancomycin (VANCOCIN) 1,250 mg in sodium chloride 0.9 % 250 mL intermittent infusion      1,250 mg  over 90 Minutes Intravenous EVERY 12 HOURS 18 215  gentamicin (GARAMYCIN) [place] - receiving intermittent dosing      1 each Does not apply SEE ADMIN INSTRUCTIONS 18            Contrast Orders - past 72 hours (72h ago through future)    Start     Dose/Rate Route Frequency Ordered Stop    18 1558  iothalamate meglumine (CONRAY) 60 % injection  Status:  Discontinued        PRN 18 1558 18          Aminoglycoside Levels - past 2 days  No results found for requested labs within last 48 hours.    Aminoglycosides IV Administrations (past 72 hours)                   gentamicin 80 mg in 100 mL 0.9% NaCl ()  Given 18 1622    gentamicin (GARAMYCIN) 230 mg in sodium chloride 0.9 % 50 mL intermittent infusion (mg) 230 mg New Bag 18 1245                    Plan:  1.  Patient received total of 4mg/kg of gentamicin today. No additional dose needed tonight. Given that patient is quadriplegic and SrCr may not be reflective of true renal function ordered level with AM labs to assess clearance .   2.  Target goals based on conventional dosing  3.  Goal peak level: 4-6 mg/L  4.  Goal trough level: </= 1 mg/L  5.  Pharmacy will continue to  follow and check levels as appropriate: level order with AM labs.       Andria Hopper, PharmD

## 2018-03-08 NOTE — H&P
SURGICAL ICU ADMISSION NOTE  3/7/2018      ASSESSMENT: Maicol Carrera is a 42 year old male with history of C4 quadriplegia, GERD, neurogenic bladder, JUAN, seizures, and L renal calculus (+Pseudomonas, proteus, MRSA, on vanc/gent) s/p left percutaneous nephrolithotomy, access to kidney, uteroscopy, cystoscopy, and stent placement with Holmium laser 3/7. In PACU, patient became hypotensive (70s/50s), MAPs in 60s, and tachycardic. Admitted to SICU for post-operative monitoring.    PLAN:   Neuro/ pain/ sedation:  -Monitor neurological status. Notify the MD/DO for any acute changes in exam.  -Tylenol q4h scheduled, oxy 5-10 q3 prn, for pain.  -Baclofen prn muscle spasms  -Keppra for seizures     Pulmonary care:   -Supplemental oxygen to keep saturation above 92 %.  - Incentive spirometer every 15- 30 minutes when awake.  -Albuterol nebs     Cardiovascular:    -Monitor hemodynamic status.      GI care:   -NPO  -dulcolax, colace  -zofran prn     Fluids/ Electrolytes/ Nutrition:   -75cc/hr normal saline     Renal/ Fluid Balance:    -Urine output is adequate so far.  -Will continue to monitor intake and output.  -Nephrostomy tube - to straight drainage  -suprapubic catheter  -ly catheter  -oxybutynin tid     Endocrine:    -No management indication.      ID/ Antibiotics:  -Gentamicin (started 3/7)  -Vancomycin     Heme:     -Hb stable  -CBC qAM     Prophylaxis:    -PCDs     Lines/ tubes/ drains:  -b/l PIV, ly, L nephrostomy, suprapubic catheter     Disposition:  -Surgical ICU.     CODE:  - Full    - - - - - - - - - - - - - - - - - - - - - - - - - - - - - - - - - - - - - - - - - - - - - - - - - - - - - - - - - - - - - - - - - - - - -     PRIMARY TEAM: Urology  PRIMARY PHYSICIAN: Dr. Horn    REASON FOR CRITICAL CARE ADMISSION: transient sepsis   ADMITTING PHYSICIAN: Dr. Horn    HISTORY PRESENTING ILLNESS: Maicol Carrera is a 42 year old male with history of C4 quadriplegia, GERD, neurogenic bladder, JUAN, seizures,  and L renal calculus (+Pseudomonas, proteus, MRSA, on vanc/gent) s/p left percutaneous nephrolithotomy, access to kidney, uteroscopy, cystoscopy, and stent placement with Holmium laser 3/7. In PACU, patient became hypotensive (70s/50s), MAPs in 60s, and tachycardic. Admitted to SICU for post-operative monitoring.    REVIEW OF SYSTEMS: As noted above. No headache, dizziness. No fever, chills. No chest pain or shortness of breath. No abdominal pain, nausea, vomiting. No diarrhea or constipation. No urinary difficulties. No muscle or body aches.    PAST MEDICAL HISTORY:   Past Medical History:   Diagnosis Date     Constipation, chronic      GERD (gastroesophageal reflux disease)      Head injury      Neurogenic bladder     SP catheter     JUAN (obstructive sleep apnea)      Quadriplegia (H)      Seizure (H)      Spastic quadriplegia (H)      Urinary tract infection        SURGICAL HISTORY:   Past Surgical History:   Procedure Laterality Date     APPENDECTOMY OPEN       BACK SURGERY       INSERT PUMP BACLOFEN       LASER HOLMIUM NEPHROLITHOTOMY VIA PERCUTANEOUS NEPHROSTOMY  10/19/2011    Procedure:LASER HOLMIUM NEPHROLITHOTOMY VIA PERCUTANEOUS NEPHROSTOMY; Left Ureteral Stent Placement, Left Percutaneous Access, Left Percutaneous Nephrostomy  *Latex Allergy*  ; Surgeon:YAN ADDISON; Location:[facility]     ORTHOPEDIC SURGERY       supra pubic catheter         SOCIAL HISTORY: Tobacco - Denies. Etoh - Denies.     FAMILY HISTORY: No bleeding/clotting disorders nor problems with anesthesia.     ALLERGIES:      Allergies   Allergen Reactions     Latex Rash     Latex Rash       MEDICATIONS:    No current facility-administered medications on file prior to encounter.   Current Outpatient Prescriptions on File Prior to Encounter:  clindamycin (CLINDAMAX) 1 % topical gel Apply topically 2 times daily   Cranberry 400 MG TABS Take 400 mg by mouth 2 times daily   LevETIRAcetam (KEPPRA PO) Take 1,000 mg by mouth 2 times daily    mometasone (ELOCON) 0.1 % cream Apply topically At Bedtime   selenium sulfide (SELSUN) 2.5 % lotion Apply to scalp topically in the morning every Sun, Tue, Thursday. Place for 5 min then rinse.   VITAMIN D, CHOLECALCIFEROL, PO Take 5,000 Units by mouth daily   bisacodyl (DULCOLAX) 10 MG suppository Place 1 suppository rectally every other day.   cyanocolbalamin (VITAMIN B-12) 1000 MCG tablet Take 1 tablet by mouth daily.   ferrous sulfate 325 (65 FE) MG tablet Take 1 tablet by mouth daily (with breakfast).   magnesium gluconate (MAGONATE) 500 MG tablet Take 1 tablet by mouth daily.   miconazole (MICATIN; MICRO GUARD) 2 % powder Apply  topically 2 times daily.   oxybutynin (DITROPAN) 5 MG tablet Take 1 tablet by mouth 3 times daily.   senna-docusate (SENOKOT-S;PERICOLACE) 8.6-50 MG per tablet Take 1-2 tablets by mouth 2 times daily.   vitamin C 250 MG TABS Take 1 tablet by mouth 2 times daily.   docusate sodium (COLACE) 100 MG capsule Take 100 mg by mouth 2 times daily.   Bisacodyl (DULCOLAX PO) Take 10 mg by mouth At Bedtime.   BACLOFEN IT Via implanted IT pumpBaclofen 2000 mc/gmL Dose: 277.1 mcg/dayLow reservoir alarm date: 4/26/2018   Nutritional Supplements (OSTEO-MULTIVITAMINS/MINERALS OR) Take  by mouth daily. 12 pm   polyvinyl alcohol (LIQUIFILM TEARS) 1.4 % ophthalmic solution Place 2 drops into both eyes every 2 hours as needed for dry eyes   ketoconazole (NIZORAL) 2 % cream Apply topically 2 times daily as needed   polyethylene glycol (MIRALAX/GLYCOLAX) powder Take 1 capful by mouth daily as needed for constipation (if no BM x3 days)   ACETAMINOPHEN PO Take 650 mg by mouth every 4 hours as needed for pain   albuterol (2.5 MG/3ML) 0.083% nebulizer solution Take 3 mLs by nebulization every 2 hours as needed.   ondansetron (ZOFRAN-ODT) 4 MG disintegrating tablet Take 1 tablet by mouth every 6 hours as needed for nausea.   zinc oxide (DESITIN) 40 % ointment Apply  topically every hour as needed.   baclofen  (LIORESAL) 10 MG tablet Take 10 mg by mouth 4 times daily as needed        PHYSICAL EXAMINATION:  Temp:  [97.3  F (36.3  C)-100  F (37.8  C)] 97.9  F (36.6  C)  Heart Rate:  [] 84  Resp:  [14-30] 20  BP: ()/(46-89) 79/55  SpO2:  [92 %-99 %] 98 %  General: Alert, well-appearing in no acute distress.  HEENT: Normocephalic, atraumatic. Patent nares.   Neck: No cervical lymphadenopathy.   Chest wall: Symmetric thorax. No masses or tenderness to palpation.   Respiratory: Non-labored breathing. On 2L nasal cannula.  Cardiovascular: Regular rate per peripheral pulse.  Gastrointestinal/: Abdomen soft, non-distended, non-tender to palpation. Baclofen pump palpated in R abdomen. Suprapubic catheter in place. L nephrostomy in place.   Extremities: Quadriplegic. Extremities wwp. DP palpable.    LABS: Reviewed.   Arterial Blood Gases   No lab results found in last 7 days.  Complete Blood Count     Recent Labs  Lab 03/07/18  1725 03/07/18  1555 03/07/18  1102   WBC 21.4*  --  9.0   HGB 11.4* 11.9* 13.4     --  263     Basic Metabolic Panel    Recent Labs  Lab 03/07/18  1725 03/07/18  1555 03/07/18  1102    141 138   POTASSIUM 4.7 4.4 4.1   CHLORIDE 108  --  104   CO2 22  --  26   BUN 13  --  14   CR 1.11  --  1.04   * 104* 80     Liver Function Tests  No lab results found in last 7 days.  Pancreatic Enzymes  No lab results found in last 7 days.  Coagulation Profile  No lab results found in last 7 days.  Lactate  Invalid input(s): LACTATE    IMAGING:  Results for orders placed or performed during the hospital encounter of 03/07/18   XR Surgery CASA Fluoro L/T 5 Min w Stills    Narrative    This exam was marked as non-reportable because it will not be read by a   radiologist or a [facility] non-radiologist provider.             XR Chest Port 1 View    Narrative    Exam:  Chest X-ray 3/7/2018 5:30 PM    History: Post-operative;     Comparison: CT abdomen pelvis 10/20/2017. Chest x-ray  11/3/2011.    Findings: Portable chest x-ray at 30 degrees. Patient is rotated. The  cardiomediastinal silhouette is within normal limits. Left basilar  opacity consistent with subpleural fatty deposition/mediastinal  lipomatosis as seen on CT from 10/20/2017. No pleural effusion. The  pulmonary vasculature is distinct. No focal consolidative opacity. No  pneumothorax. The upper abdomen is unremarkable.      Impression    Impression: No acute cardiopulmonary findings. Apparent left basilar  opacities seem to correspond to mediastinal lipomatosis on comparison  CT.    I have personally reviewed the examination and initial interpretation  and I agree with the findings.    JAMILAH ESCALANTE MD       Patient seen, findings and plan discussed with surgical ICU staff.    ------------------------------------  Toña Kellogg MD  SICU Resident, PGY1

## 2018-03-08 NOTE — OR NURSING
Report given to MONTSERRAT Rivas RN.  Awaiting ICU bed.  Dr Greenberg notified of low blood pressures and has ordered Albumin IV.

## 2018-03-08 NOTE — PLAN OF CARE
Problem: Patient Care Overview  Goal: Plan of Care/Patient Progress Review  Outcome: No Change  N: Pt is alert and oriented. Pt can slightly move both arms, but cannot move his legs. He has no sensation in his legs. Pt has garbled speech but is appropriate.   R: Lungs are clear and diminished with slight coarseness in the left upper lobe. Pt in Bipap right now with 4L O2. Satting 100%  C: Pt has had some HR and blood pressure issues tonight. Pt has gotten 750cc of albumin and 1000cc of NS while in ICU. His BP was low, we started Levo and he had a reaction resulting in a HR in low 30s. This was immediately stopped and pt was able to recover. BP did decrease again so Dopamine was started and is now running at 2mcg. This is effective. When Pt sleeps, his HR drops to mid to low 40s, MD notified. BP is now stable. PT is afebrile.   GI/: Pt has suprapubic catheter and an old nephro tube site which was stented in surgery which is draining into a ly bag. Both sites are draining bloody urine, MDs aware. UO adequate. Suprapubic site leaking. No BM.  Pt has small skin tear on L knee and scattered scabs. He also has a rash which he claims to be old on his chest and abdomen.

## 2018-03-08 NOTE — PHARMACY-AMINOGLYCOSIDE DOSING SERVICE
Pharmacy Aminoglycoside Follow-Up Note  Date of Service 2018  Patient's  1975   42 year old, male    Weight (Actual): 78 kg    Indication: Urinary Tract Infection  Current Gentamicin regimen:  Intermittent based on levels  Day of therapy: started 3/7 (day 2)    Target goals based on conventional dosing  Goal Peak level: 4-6 mg/L  Goal Trough level: </= 1 mg/L    Current estimated CrCl: Estimated Creatinine Clearance: 104.2 mL/min (based on Cr of 1.04).    Creatinine for last 3 days  3/7/2018: 11:02 AM Creatinine 1.04 mg/dL;  5:25 PM Creatinine 1.11 mg/dL  3/8/2018: 12:35 AM Creatinine 1.20 mg/dL;  5:44 AM Creatinine 1.11 mg/dL;  4:12 PM Creatinine 1.04 mg/dL    Nephrotoxins and other renal medications (Future)    Start     Dose/Rate Route Frequency Ordered Stop    18 1700  gentamicin (GARAMYCIN) 120 mg in sodium chloride 0.9 % 50 mL intermittent infusion      120 mg  over 60 Minutes Intravenous EVERY 24 HOURS 18 1527      18 1059  vancomycin [place] - receiving intermittent dosing      1 each Does not apply SEE ADMIN INSTRUCTIONS 18 1059      18 2158  gentamicin (GARAMYCIN) [place] - receiving intermittent dosing      1 each Does not apply SEE ADMIN INSTRUCTIONS 18 2158            Contrast Orders - past 72 hours (72h ago through future)    Start     Dose/Rate Route Frequency Ordered Stop    18 1558  iothalamate meglumine (CONRAY) 60 % injection  Status:  Discontinued        PRN 18 1558 18 2056          Aminoglycoside Levels - past 2 days  3/8/2018:  5:44 AM Gentamicin Level 2.6 mg/L;  9:41 AM Gentamicin Level 2.3 mg/L;  1:57 PM Gentamicin Level 1.6 mg/L    Aminoglycosides IV Administrations (past 72 hours)                   gentamicin 80 mg in 100 mL 0.9% NaCl ()  Given 18 1622    gentamicin (GARAMYCIN) 230 mg in sodium chloride 0.9 % 50 mL intermittent infusion (mg) 230 mg New Bag 18 1245              Pharmacokinetic  Analysis  Gentamicin not at steady-state  Half-life based on two post-dose levels is 11.4 hours.    Interpretation of levels and current regimen:  Gentamicin not at steady-state; q24 hour dosing would be appropriate with patient's calculated half-life.  Has serum creatinine changed greater than 50% in the last 72 hours: no  Urine output:  good urine output  Renal function: Stable    Plan  1. Dose gentamicin at 120 mg (1.5 mg/kg) q24h  2. Method of evaluation: 2 post dose levels  3. Pharmacy will continue to follow and check levels  as appropriate in 1-3 Days    Saundra Griffin, PharmD  PGY-1 Pharmacy Practice Resident

## 2018-03-08 NOTE — PHARMACY-VANCOMYCIN DOSING SERVICE
Pharmacy Vancomycin Initial Note  Date of Service 2018  Patient's  1975  42 year old, male    Indication: Urinary Tract Infection  Patient received 1 dose of vancomycin 1000mg IV pre op    Current estimated CrCl = Estimated Creatinine Clearance: 97.6 mL/min (based on Cr of 1.11). Patient is quadriplegic     Creatinine for last 3 days  3/7/2018: 11:02 AM Creatinine 1.04 mg/dL;  5:25 PM Creatinine 1.11 mg/dL    Recent Vancomycin Level(s) for last 3 days  No results found for requested labs within last 72 hours.      Vancomycin IV Administrations (past 72 hours)                   vancomycin (VANCOCIN) 1000 mg in dextrose 5% 200 mL PREMIX (mg) 1,000 mg Given 18 1220                Nephrotoxins and other renal medications (Future)    Start     Dose/Rate Route Frequency Ordered Stop    18 2330  vancomycin (VANCOCIN) 1,250 mg in sodium chloride 0.9 % 250 mL intermittent infusion      1,250 mg  over 90 Minutes Intravenous EVERY 12 HOURS 18 2148            Contrast Orders - past 72 hours (72h ago through future)    Start     Dose/Rate Route Frequency Ordered Stop    18 1558  iothalamate meglumine (CONRAY) 60 % injection  Status:  Discontinued        PRN 18 1558 18                Plan:  1.  Start vancomycin  1250 mg IV q12h (16mg/kg using ABW = 78kg).  Order timed to start at 2330 due to dose received pre op.   2.  Goal Trough Level: 10-15 mg/L   3.  Pharmacy will check trough levels as appropriate in 1-3 Days.    4. Serum creatinine levels will be ordered daily for the first week of therapy and at least twice weekly for subsequent weeks.    5. [place] method utilized to dose vancomycin therapy: Method 2    Vancomycin is a restricted antibiotic and requires ID/Antimicrobial Management Team (AMT) approval for empiric use beyond 48 hours (3/9).    Andria Hopper, FamiliaD

## 2018-03-08 NOTE — OR NURSING
MONTSERRAT Rivas RN has contacted SICU resident to update patient's status, hypotension, VSS, and history.  Dr Bonilla was contacted by this RN at 1920 and updated on patient's BP, bloody return from old PNT of 100- 150 ml, temp, heart rate.  Plan has already been established to send to ICU.  Patient updated on change in admission floor.  This RN has also contacted transport services and [facility] regarding admission.    No orders received to treat lower blood pressure at this time.  Continue to monitor.

## 2018-03-09 ENCOUNTER — CARE COORDINATION (OUTPATIENT)
Dept: UROLOGY | Facility: CLINIC | Age: 43
End: 2018-03-09

## 2018-03-09 LAB
ANION GAP SERPL CALCULATED.3IONS-SCNC: 11 MMOL/L (ref 3–14)
BUN SERPL-MCNC: 15 MG/DL (ref 7–30)
CALCIUM SERPL-MCNC: 8.3 MG/DL (ref 8.5–10.1)
CHLORIDE SERPL-SCNC: 112 MMOL/L (ref 94–109)
CO2 SERPL-SCNC: 22 MMOL/L (ref 20–32)
CREAT SERPL-MCNC: 0.94 MG/DL (ref 0.66–1.25)
ERYTHROCYTE [DISTWIDTH] IN BLOOD BY AUTOMATED COUNT: 17.3 % (ref 10–15)
GFR SERPL CREATININE-BSD FRML MDRD: 89 ML/MIN/1.7M2
GLUCOSE SERPL-MCNC: 96 MG/DL (ref 70–99)
HCT VFR BLD AUTO: 25.6 % (ref 40–53)
HGB BLD-MCNC: 7.5 G/DL (ref 13.3–17.7)
INTERPRETATION ECG - MUSE: NORMAL
MAGNESIUM SERPL-MCNC: 1.9 MG/DL (ref 1.6–2.3)
MCH RBC QN AUTO: 25.8 PG (ref 26.5–33)
MCHC RBC AUTO-ENTMCNC: 29.3 G/DL (ref 31.5–36.5)
MCV RBC AUTO: 88 FL (ref 78–100)
PHOSPHATE SERPL-MCNC: 3.3 MG/DL (ref 2.5–4.5)
PLATELET # BLD AUTO: 200 10E9/L (ref 150–450)
POTASSIUM SERPL-SCNC: 4 MMOL/L (ref 3.4–5.3)
RBC # BLD AUTO: 2.91 10E12/L (ref 4.4–5.9)
SODIUM SERPL-SCNC: 146 MMOL/L (ref 133–144)
WBC # BLD AUTO: 5.9 10E9/L (ref 4–11)

## 2018-03-09 PROCEDURE — 84100 ASSAY OF PHOSPHORUS: CPT | Performed by: SURGERY

## 2018-03-09 PROCEDURE — 80048 BASIC METABOLIC PNL TOTAL CA: CPT | Performed by: UROLOGY

## 2018-03-09 PROCEDURE — 84100 ASSAY OF PHOSPHORUS: CPT | Performed by: UROLOGY

## 2018-03-09 PROCEDURE — 83735 ASSAY OF MAGNESIUM: CPT | Performed by: UROLOGY

## 2018-03-09 PROCEDURE — 25000128 H RX IP 250 OP 636: Performed by: STUDENT IN AN ORGANIZED HEALTH CARE EDUCATION/TRAINING PROGRAM

## 2018-03-09 PROCEDURE — 12000008 ZZH R&B INTERMEDIATE UMMC

## 2018-03-09 PROCEDURE — 25000128 H RX IP 250 OP 636: Performed by: UROLOGY

## 2018-03-09 PROCEDURE — 85027 COMPLETE CBC AUTOMATED: CPT | Performed by: UROLOGY

## 2018-03-09 PROCEDURE — 99232 SBSQ HOSP IP/OBS MODERATE 35: CPT | Mod: GC | Performed by: SURGERY

## 2018-03-09 PROCEDURE — 25000128 H RX IP 250 OP 636: Performed by: PHARMACIST

## 2018-03-09 PROCEDURE — 25000132 ZZH RX MED GY IP 250 OP 250 PS 637: Performed by: STUDENT IN AN ORGANIZED HEALTH CARE EDUCATION/TRAINING PROGRAM

## 2018-03-09 PROCEDURE — 25000128 H RX IP 250 OP 636: Performed by: SURGERY

## 2018-03-09 PROCEDURE — 36415 COLL VENOUS BLD VENIPUNCTURE: CPT | Performed by: UROLOGY

## 2018-03-09 RX ORDER — SODIUM CHLORIDE 9 MG/ML
INJECTION, SOLUTION INTRAVENOUS
Status: DISCONTINUED
Start: 2018-03-09 | End: 2018-03-09 | Stop reason: HOSPADM

## 2018-03-09 RX ADMIN — GENTAMICIN SULFATE 120 MG: 40 INJECTION, SOLUTION INTRAMUSCULAR; INTRAVENOUS at 17:08

## 2018-03-09 RX ADMIN — DOCUSATE SODIUM 100 MG: 100 CAPSULE, LIQUID FILLED ORAL at 19:41

## 2018-03-09 RX ADMIN — VANCOMYCIN HYDROCHLORIDE 1250 MG: 10 INJECTION, POWDER, LYOPHILIZED, FOR SOLUTION INTRAVENOUS at 17:00

## 2018-03-09 RX ADMIN — DOCUSATE SODIUM 100 MG: 100 CAPSULE, LIQUID FILLED ORAL at 09:57

## 2018-03-09 RX ADMIN — MICONAZOLE NITRATE: 2 POWDER TOPICAL at 09:59

## 2018-03-09 RX ADMIN — FERROUS SULFATE 325 MG: 325 TABLET, FILM COATED ORAL at 09:58

## 2018-03-09 RX ADMIN — ACETAMINOPHEN 650 MG: 325 TABLET, FILM COATED ORAL at 21:54

## 2018-03-09 RX ADMIN — HEPARIN SODIUM 5000 UNITS: 5000 INJECTION, SOLUTION INTRAVENOUS; SUBCUTANEOUS at 00:51

## 2018-03-09 RX ADMIN — OXYBUTYNIN CHLORIDE 5 MG: 5 TABLET ORAL at 09:58

## 2018-03-09 RX ADMIN — FLUCONAZOLE 400 MG: 2 INJECTION INTRAVENOUS at 13:25

## 2018-03-09 RX ADMIN — OXYBUTYNIN CHLORIDE 5 MG: 5 TABLET ORAL at 19:41

## 2018-03-09 RX ADMIN — ACETAMINOPHEN 650 MG: 325 TABLET, FILM COATED ORAL at 13:37

## 2018-03-09 RX ADMIN — OXYBUTYNIN CHLORIDE 5 MG: 5 TABLET ORAL at 13:37

## 2018-03-09 RX ADMIN — HEPARIN SODIUM 5000 UNITS: 5000 INJECTION, SOLUTION INTRAVENOUS; SUBCUTANEOUS at 17:08

## 2018-03-09 RX ADMIN — LEVETIRACETAM 1000 MG: 500 TABLET ORAL at 09:58

## 2018-03-09 RX ADMIN — ACETAMINOPHEN 650 MG: 325 TABLET, FILM COATED ORAL at 17:27

## 2018-03-09 RX ADMIN — ACETAMINOPHEN 650 MG: 325 TABLET, FILM COATED ORAL at 09:57

## 2018-03-09 RX ADMIN — HEPARIN SODIUM 5000 UNITS: 5000 INJECTION, SOLUTION INTRAVENOUS; SUBCUTANEOUS at 09:58

## 2018-03-09 RX ADMIN — Medication 1000 MCG: at 09:57

## 2018-03-09 RX ADMIN — LEVETIRACETAM 1000 MG: 500 TABLET ORAL at 19:40

## 2018-03-09 RX ADMIN — MICONAZOLE NITRATE: 2 POWDER TOPICAL at 19:42

## 2018-03-09 NOTE — PROGRESS NOTES
Urology  Progress Note    -No issues overnight  -Off pressors  -Feeling much better this morning    Exam  /56  Temp 99.2  F (37.3  C) (Axillary)  Resp 15  Wt 78 kg (171 lb 15.3 oz)  SpO2 98%  BMI 25.39 kg/m2  No acute distress  Unlabored breathing  Abdomen soft, nt/nd. Flank incision C/D/I with nephroureteral stent in place with watermelon urine in oouch  Lower extremities non-edematous bilaterally  SP tube with pink urine    UOP 5225/850  /25    Labs  WBC 5.9  Hgb 7.5  Cr 0.94    3/7 Intraop urine cultures with <10k GNRs and GPCs    Assessment/Plan  42 year old y/o male with a history of C4 quadriplegia and nephrolithiasis now POD#2 s/p PCNL. Post op course notable for development of sepsis in PACU in the setting of known preop colonization with multiple organisms, all appropriately treated.     Neuro: APAP, oxycodone for pain control  CV: Off pressors  Pulm: No active issues  FEN/GI: Regular diet, MIVF @ 50/hr  Endo: No active issues  : Continue nephroureteral stent and SP tube  Heme/ID: Gentamicin, vancomycin, and fluconazole for preop urine culture with Pseudomonas, Proteus and MRSA + fungal prophylaxis  Activity: Ad orly  PPx: SCDs  Dispo: Transfer to floor today    Seen and examined with the chief resident. Will discuss with Dr. Horn.    Bravo Gilbert, PGY-4  Urology Resident     Contacting the Urology Team     Please use the following job codes to reach the Urology Team. Note that you must use an in house phone and that job codes cannot receive text pages.     On weekdays, dial [phone] (or star-star-star 777 on the new [employer] telephones) then [phone] to reach the Adult Urology resident or PA on call    On weekdays, dial [phone] (or star-star-star 777 on the new [employer] telephones) then [phone] to reach the Pediatric Urology resident    On weeknights and weekends, dial [phone] (or star-star-star 777 on the new [employer] telephones) then [phone] to reach the Urology resident on call (for both Adult and Pediatrics)           I saw Maicol Carrera on rounds on 3/9/18 and discussed his care with my resident team.  He is s/p percutaneous nephrolithotomy.  I performed a history and physical exam. I discussed my findings with my resident team.  I have reviewed their note and agree with the listed findings, assesment, and plan.

## 2018-03-09 NOTE — PROGRESS NOTES
SURGICAL ICU PROGRESS NOTE  March 9, 2018      CO-MORBIDITIES:   No diagnosis found.    ASSESSMENT: Maicol Carrera is a 42 year old male with history of C4 quadriplegia, GERD, neurogenic bladder, JUAN, seizures, and L renal calculus (+Pseudomonas, proteus, MRSA, on vanc/gent) s/p left percutaneous nephrolithotomy, access to kidney, uteroscopy, cystoscopy, and stent placement with Holmium laser 3/7. In PACU, patient became hypotensive (70s/50s), MAPs in 60s, and tachycardic. Admitted to SICU for post-operative monitoring.    TODAY'S PROGRESS/PLANS:   - Transfer to floor  - SL IVF  - DC Dopamine  - Check Mag and Phos    PLAN:  Neuro/ pain/ sedation:  #Quadraplegia  #Chronic spasticity   - Monitor neurological status.  - Continue home meds: baclofen (including balclofen pump), keppra, magnesium     Pulmonary care:   #JUAN  #Hypoventilation syndrome  - Supplemental oxygen to keep saturation above 92 %.  - Frequent Incentive spirometer   - APAP when sleeping     Cardiovascular:    #Shock, septic  - Monitor hemodynamic status.   - Has not needed dopamine ON     GI/ Nutrition care:   #No acute issues  - ADAT    Renal/ Fluid Balance/ Fluids/ Electrolytes:    # s/p Lithotripsy  # Chronic neurogenic bladder with Chronic percutanous nephrostomy   - MIVF 50cc/hr for IV fluid hydration  - Follow electrolytes     Endocrine:    No acute issues     ID/ Antibiotics:  # Pyelonephritis     UC / BC x 1 sent  Was on cipro/bactrim preop x 24 hours  Vanco, gent and fluconazole      Heme:     #Anemia, dilutional vs chronic  - Follow Hb  - Transfuse to keep  Hb > 7.0     MSK:  High risk pressure ulcers  -- Specialty bed per protocol     Prophylaxis:    - Mechanical prophylaxis for DVT. Add enoxaparin      Disposition:  To floor today    ====================================    SUBJECTIVE:   No acute events overnight. Has not required dopamine since start of evening shift. Patient denies pain.     OBJECTIVE:   1. VITAL SIGNS:   Temp:  [97.1  F  (36.2  C)-99.2  F (37.3  C)] 99.2  F (37.3  C)  Heart Rate:  [45-94] 86  Resp:  [15-20] 15  BP: ()/(45-77) 101/55  SpO2:  [98 %-100 %] 99 %  Resp: 15    2. INTAKE/ OUTPUT:   I/O last 3 completed shifts:  In: 2778.75 [P.O.:1100; I.V.:1678.75]  Out: 5185 [Urine:5185]    3. PHYSICAL EXAMINATION:   General: Resting comfortably in bed, APAP on  Neuro: A&Ox3, NAD.  Resp: Breathing non-labored.  CV: RRR.  Abdomen: Soft, Non-distended, Non-tender. L nephrostomy tube in place with serosanguinous drainage. Suprapubic catheter.   Extremities: warm and well perfused.    4. INVESTIGATIONS:   Arterial Blood Gases   No lab results found in last 7 days.  Complete Blood Count     Recent Labs  Lab 03/09/18  0341 03/08/18  1612 03/08/18  0544 03/08/18  0035 03/07/18  1725   WBC 5.9  --  10.2 11.0 21.4*   HGB 7.5* 8.2* 8.1* 8.6* 11.4*     --  122* 145* 307     Basic Metabolic Panel    Recent Labs  Lab 03/09/18  0341 03/08/18  1612 03/08/18  0544 03/08/18  0035   * 144 144 140   POTASSIUM 4.0 4.1 4.7 4.9   CHLORIDE 112* 114* 113* 111*   CO2 22 24 21 23   BUN 15 14 15 16   CR 0.94 1.04 1.11 1.20   GLC 96 85 144* 158*     Liver Function Tests  No lab results found in last 7 days.  Pancreatic Enzymes  No lab results found in last 7 days.  Coagulation Profile  No lab results found in last 7 days.  Lactate  Invalid input(s): LACTATE    5. RADIOLOGY:   Recent Results (from the past 24 hour(s))   CT Abdomen w/o Contrast    Narrative    CT ABDOMEN PELVIS WITHOUT CONTRAST 3/8/2018    CLINICAL HISTORY: Evaluate for residual left stone after PCNL.    COMPARISON: Intraoperative fluoroscopy images from nephrolithotomy  3/7/2018. CT abdomen pelvis 10/20/2017.    TECHNIQUE: Unenhanced CT performed through the abdomen and pelvis.  Coronal and sagittal reconstructions were created.    DOSE (DLP): 990 mGy*cm    FINDINGS: Lack of contrast limits evaluation of the abdominal and  pelvic viscera.    Left renal pelvis stone clearance with  Interval placement of a left  percutaneous nephroureteral drain. Left perinephric and periureteral  fat stranding likely related to the procedure.    8 x 11 x 9 mm stone fragment remains in the left pelviureteral  junction/proximal ureter alongside the nephroureteral drain. No other  fragment identified through the left ureter or in the bladder.     Left infundibula have largely been cleared of stone. A portion of the  upper pole calyceal stone has been cleared with postprocedural  antidependent intracalyceal air. Calyceal stones remain through the  rest of the left kidney.    Unchanged right renal staghorn calculus. Unchanged bilateral atrophic  kidneys with thin cortices.    Inferior vena cava filter leg penetration through the caval wall,  unchanged.    Right lower quadrant intrathecal pump, unchanged. Catheter appears  intact.    Cholelithiasis without CT evidence for cholecystitis.    Bibasilar fibroatelectatic changes.    Right femoral gera.      Impression    IMPRESSION:  1. Post left PCNL changes. Left percutaneous nephroureteral drain in  place. 8 x 11 x 9 mm stone fragment remains in the left pelviureteral  junction/proximal ureter alongside the nephroureteral drain. No other  stone fragment identified through the left ureter into the bladder.    2. Left renal pelvis and infundibula have largely been cleared of  stones. Stones remain in the calyces.    3. Unchanged right staghorn calculus. Unchanged bilateral atrophic  kidneys with thin cortices.    JEFF RICKS MD       =========================================      Patient seen, findings and plan discussed with surgical ICU staff.    Toña Kellogg MD  SICU Resident, PGY1

## 2018-03-09 NOTE — PLAN OF CARE
Neuro: pt AOX4, pupils equal/reactive. Denies pain.   Cardiac: remains on dopamine at 2, attempted to wean, which resulted in hypotension, re-started, MD aware. Afebrile.   Respiratory: Bipap removed this AM, placed on 2 lpm NC, tolerating well. LS clear/diminished.   GI: abd soft, regular diet, good appetite.   : suprapubic catheter and old nephro tube site, both patent, draining to ly bag, no issues.   Plan: continue to monitor pt closely and notify MD of any changes or concerns.

## 2018-03-09 NOTE — PHARMACY-VANCOMYCIN DOSING SERVICE
Pharmacy Vancomycin Note  Date of Service 2018  Patient's  1975   42 year old, male    Indication: Urinary Tract Infection  Goal Trough Level: 10-15 mg/L  Day of Therapy: 2  Current Vancomycin regimen:  Patient received 1000 mg vancomycin IV 3/7 @ 1220, and another 1250 mg @ 2300.    Current estimated CrCl = Estimated Creatinine Clearance: 104.2 mL/min (based on Cr of 1.04).    Creatinine for last 3 days  3/7/2018: 11:02 AM Creatinine 1.04 mg/dL;  5:25 PM Creatinine 1.11 mg/dL  3/8/2018: 12:35 AM Creatinine 1.20 mg/dL;  5:44 AM Creatinine 1.11 mg/dL;  4:12 PM Creatinine 1.04 mg/dL    Recent Vancomycin Levels (past 3 days)  3/8/2018:  5:37 PM Vancomycin Level 20.3 mg/L    Vancomycin IV Administrations (past 72 hours)                   vancomycin (VANCOCIN) 1,250 mg in sodium chloride 0.9 % 250 mL intermittent infusion (mg) 1,250 mg New Bag 18 230    vancomycin (VANCOCIN) 1000 mg in dextrose 5% 200 mL PREMIX (mg) 1,000 mg Given 18 1220                Nephrotoxins and other renal medications (Future)    Start     Dose/Rate Route Frequency Ordered Stop    18 2200  vancomycin (VANCOCIN) 1,250 mg in sodium chloride 0.9 % 250 mL intermittent infusion      1,250 mg  over 90 Minutes Intravenous EVERY 18 HOURS 18 1700  gentamicin (GARAMYCIN) 120 mg in sodium chloride 0.9 % 50 mL intermittent infusion      120 mg  over 60 Minutes Intravenous EVERY 24 HOURS 18 1527      18  gentamicin (GARAMYCIN) [place] - receiving intermittent dosing      1 each Does not apply SEE ADMIN INSTRUCTIONS 18               Contrast Orders - past 72 hours (72h ago through future)    Start     Dose/Rate Route Frequency Ordered Stop    18 155  iothalamate meglumine (CONRAY) 60 % injection  Status:  Discontinued        PRN 18 1558 18          Interpretation of levels and current regimen:  Trough level is  Supratherapeutic, will allow for  vancomycin trough level to drop slightly before dosing again.    Has serum creatinine changed > 50% in last 72 hours: No    Urine output:  Improving, good urine output today.    Renal Function: Seems to be improving.    Plan:  1.  Based on improved creatinine today and increased UOP, will schedule vancomycin IV 1250 mg q18h.  2.  Pharmacy will check trough levels as appropriate in 1-3 Days.    3. Serum creatinine levels will be ordered every other day for at least 10 days while on concomitant nephrotoxins.      Alia Santillan, PharmD        .

## 2018-03-09 NOTE — PROGRESS NOTES
POST OP- Left message with care facility to see how alexandra is doing following surgery. Asked that they return my call. Priscilla Wong RN

## 2018-03-09 NOTE — PLAN OF CARE
Problem: Patient Care Overview  Goal: Plan of Care/Patient Progress Review  Outcome: Improving  Neuro: Alert and oriented x. Pupils equal, reactive. Baseline quadriplegia, see flow sheet for full Neuro Assessment.   Cardiac: Has maintained MAP >60 without dopamine infusion. A-febrile.  Resp: Lung sounds clear, CPAP on over night.   GI: Small liquid BM x1 following suppository. On regular diet, total feed.  : Suprapubic patent w/ adequate output. Left Nephrostomy patent w/ scant output (MD aware).   Skin: Scattered redness.  Muscle/Mobility: Pt refusing q2h turns. Educated on importance of frequent position changes.  Pain: Denies pain  Lines/Drains: R PIV infusing w/ NS at 50cc/hr. L PIV saline locked.     PLAN: Monitor Hemodynamic status, continue w/ POC.

## 2018-03-10 LAB
ANION GAP SERPL CALCULATED.3IONS-SCNC: 6 MMOL/L (ref 3–14)
BACTERIA SPEC CULT: ABNORMAL
BUN SERPL-MCNC: 16 MG/DL (ref 7–30)
CALCIUM SERPL-MCNC: 8.7 MG/DL (ref 8.5–10.1)
CHLORIDE SERPL-SCNC: 111 MMOL/L (ref 94–109)
CO2 SERPL-SCNC: 24 MMOL/L (ref 20–32)
CREAT SERPL-MCNC: 0.99 MG/DL (ref 0.66–1.25)
ERYTHROCYTE [DISTWIDTH] IN BLOOD BY AUTOMATED COUNT: 17.5 % (ref 10–15)
GENTAMICIN SERPL-MCNC: 4.6 MG/L
GFR SERPL CREATININE-BSD FRML MDRD: 83 ML/MIN/1.7M2
GLUCOSE SERPL-MCNC: 84 MG/DL (ref 70–99)
HCT VFR BLD AUTO: 24.7 % (ref 40–53)
HGB BLD-MCNC: 7.2 G/DL (ref 13.3–17.7)
MCH RBC QN AUTO: 25.4 PG (ref 26.5–33)
MCHC RBC AUTO-ENTMCNC: 29.1 G/DL (ref 31.5–36.5)
MCV RBC AUTO: 87 FL (ref 78–100)
PLATELET # BLD AUTO: 218 10E9/L (ref 150–450)
POTASSIUM SERPL-SCNC: 4.1 MMOL/L (ref 3.4–5.3)
RBC # BLD AUTO: 2.84 10E12/L (ref 4.4–5.9)
SODIUM SERPL-SCNC: 142 MMOL/L (ref 133–144)
SPECIMEN SOURCE: ABNORMAL
WBC # BLD AUTO: 7 10E9/L (ref 4–11)

## 2018-03-10 PROCEDURE — 80170 ASSAY OF GENTAMICIN: CPT | Performed by: UROLOGY

## 2018-03-10 PROCEDURE — 80202 ASSAY OF VANCOMYCIN: CPT | Performed by: UROLOGY

## 2018-03-10 PROCEDURE — 25000132 ZZH RX MED GY IP 250 OP 250 PS 637: Performed by: STUDENT IN AN ORGANIZED HEALTH CARE EDUCATION/TRAINING PROGRAM

## 2018-03-10 PROCEDURE — 85027 COMPLETE CBC AUTOMATED: CPT | Performed by: STUDENT IN AN ORGANIZED HEALTH CARE EDUCATION/TRAINING PROGRAM

## 2018-03-10 PROCEDURE — 36415 COLL VENOUS BLD VENIPUNCTURE: CPT | Performed by: UROLOGY

## 2018-03-10 PROCEDURE — 25000128 H RX IP 250 OP 636: Performed by: UROLOGY

## 2018-03-10 PROCEDURE — 80048 BASIC METABOLIC PNL TOTAL CA: CPT | Performed by: STUDENT IN AN ORGANIZED HEALTH CARE EDUCATION/TRAINING PROGRAM

## 2018-03-10 PROCEDURE — 25000128 H RX IP 250 OP 636: Performed by: SURGERY

## 2018-03-10 PROCEDURE — 12000008 ZZH R&B INTERMEDIATE UMMC

## 2018-03-10 PROCEDURE — 36415 COLL VENOUS BLD VENIPUNCTURE: CPT | Performed by: STUDENT IN AN ORGANIZED HEALTH CARE EDUCATION/TRAINING PROGRAM

## 2018-03-10 RX ADMIN — DOCUSATE SODIUM 100 MG: 100 CAPSULE, LIQUID FILLED ORAL at 09:12

## 2018-03-10 RX ADMIN — ACETAMINOPHEN 650 MG: 325 TABLET, FILM COATED ORAL at 14:00

## 2018-03-10 RX ADMIN — OXYBUTYNIN CHLORIDE 5 MG: 5 TABLET ORAL at 14:00

## 2018-03-10 RX ADMIN — HEPARIN SODIUM 5000 UNITS: 5000 INJECTION, SOLUTION INTRAVENOUS; SUBCUTANEOUS at 09:12

## 2018-03-10 RX ADMIN — HEPARIN SODIUM 5000 UNITS: 5000 INJECTION, SOLUTION INTRAVENOUS; SUBCUTANEOUS at 16:38

## 2018-03-10 RX ADMIN — ACETAMINOPHEN 650 MG: 325 TABLET, FILM COATED ORAL at 17:15

## 2018-03-10 RX ADMIN — FERROUS SULFATE 325 MG: 325 TABLET, FILM COATED ORAL at 09:11

## 2018-03-10 RX ADMIN — Medication 1000 MCG: at 09:12

## 2018-03-10 RX ADMIN — DOCUSATE SODIUM 100 MG: 100 CAPSULE, LIQUID FILLED ORAL at 20:20

## 2018-03-10 RX ADMIN — LEVETIRACETAM 1000 MG: 500 TABLET ORAL at 20:20

## 2018-03-10 RX ADMIN — ACETAMINOPHEN 650 MG: 325 TABLET, FILM COATED ORAL at 09:15

## 2018-03-10 RX ADMIN — LEVETIRACETAM 1000 MG: 500 TABLET ORAL at 09:12

## 2018-03-10 RX ADMIN — OXYBUTYNIN CHLORIDE 5 MG: 5 TABLET ORAL at 20:21

## 2018-03-10 RX ADMIN — OXYBUTYNIN CHLORIDE 5 MG: 5 TABLET ORAL at 09:12

## 2018-03-10 RX ADMIN — HEPARIN SODIUM 5000 UNITS: 5000 INJECTION, SOLUTION INTRAVENOUS; SUBCUTANEOUS at 00:01

## 2018-03-10 RX ADMIN — GENTAMICIN SULFATE 120 MG: 40 INJECTION, SOLUTION INTRAMUSCULAR; INTRAVENOUS at 16:38

## 2018-03-10 RX ADMIN — BISACODYL 10 MG: 10 SUPPOSITORY RECTAL at 20:07

## 2018-03-10 ASSESSMENT — ACTIVITIES OF DAILY LIVING (ADL)
COGNITION: 0 - NO COGNITION ISSUES REPORTED
TRANSFERRING: 4-->COMPLETELY DEPENDENT
AMBULATION: 4-->COMPLETELY DEPENDENT
RETIRED_COMMUNICATION: 2-->DIFFICULTY SPEAKING (NOT RELATED TO LANGUAGE BARRIER)
FALL_HISTORY_WITHIN_LAST_SIX_MONTHS: NO
TOILETING: 4-->COMPLETELY DEPENDENT
DRESS: 4-->COMPLETELY DEPENDENT
BATHING: 4-->COMPLETELY DEPENDENT
RETIRED_EATING: 4-->COMPLETELY DEPENDENT
SWALLOWING: 0-->SWALLOWS FOODS/LIQUIDS WITHOUT DIFFICULTY

## 2018-03-10 NOTE — PLAN OF CARE
Problem: Patient Care Overview  Goal: Plan of Care/Patient Progress Review  Outcome: No Change  VSS. Suprapubic and L Nephrostomy patent. Pt transferred in stable condition to  by 4A staff.

## 2018-03-10 NOTE — PLAN OF CARE
Problem: Urinary Diversion (Adult)  Goal: Signs and Symptoms of Listed Potential Problems Will be Absent, Minimized or Managed (Urinary Diversion)  Signs and symptoms of listed potential problems will be absent, minimized or managed by discharge/transition of care (reference Urinary Diversion (Adult) CPG).   Outcome: No Change  Vitals:    03/09/18 2209 03/10/18 0001 03/10/18 0427 03/10/18 0741   BP:  116/58 103/43 102/46   BP Location:  Left arm Left arm Left arm   Resp:  18 18 18   Temp:  97.9  F (36.6  C) 97.2  F (36.2  C) 98  F (36.7  C)   TempSrc:  Axillary Axillary Axillary   SpO2:  99% 100% 99%   Weight: 82.4 kg (181 lb 10.5 oz)      AVSS. Alert and oriented x4. Pain well controled with scheduled tylenol. No c/o nausea. Good oral intake and tolerating regular diet. Needs assistance to order meals and is total assist with feeding. Declined getting OOB. Celling lift dependent. Large uop via his suprapubic cathter. Asking frequently about a possiblility of discharging today. On iv antibiotics q 24hrs via a piv. Stable. Continue with current cares.

## 2018-03-10 NOTE — PLAN OF CARE
Problem: Patient Care Overview  Goal: Plan of Care/Patient Progress Review  Outcome: Improving  D: POD#2 left perc nephrolithotomy with stent placement    Neuro: Alert and oriented x4. Pupils equal, reactive. Baseline quadriplegia. Make needs and wants known. Refusing turns at times.   Cardiac:  SR 80's -90's no ectopy noted. Pressures are within parameters.  Resp: Lung sounds clear, RA, SpO2 >95%. Occasionally desats to high 80's-rare and quick. CPAP on over night.   GI: BM x1. On regular diet, total feed.  : Suprapubic patent w/ adequate output. Left Nephrostomy patent w/ scant   Skin: Scattered redness.  Lines/Drains: R PIV tko . L PIV saline locked.      PLAN: Report given to 7B RN.

## 2018-03-10 NOTE — PLAN OF CARE
Problem: Patient Care Overview  Goal: Individualization & Mutuality  Outcome: No Change  Vitals:    03/09/18 2130 03/09/18 2209 03/10/18 0001 03/10/18 0427   BP: 121/67  116/58 103/43   BP Location: Left arm  Left arm Left arm   Resp: 16 18 18   Temp: 97.8  F (36.6  C)  97.9  F (36.6  C) 97.2  F (36.2  C)   TempSrc: Oral  Axillary Axillary   SpO2: 97%  99% 100%   Weight:  82.4 kg (181 lb 10.5 oz)       Patient transferred to  at 2130 and was oriented to room, has pressure call light. A & O x4. PERRLA. Baseline quadriplegia, garbled speech. Refused repositioning, ordered pulse mattress, noted small blanchable area on coccyx. Afebrile. VSS. O2 sats high 90s on RA and on CPAP. LS diminished in bases. On regular diet, total feed. Suprapubic catheter patent and has good amount of pink/cherry-colored urine. Left PNT has small amount of pink/cherry urine output. Right PIV TKO, left PIV saline locked. Denies pain. Refused Tylenol overnight. Slept in between cares. Continue POC.

## 2018-03-10 NOTE — PROGRESS NOTES
Urology  Progress Note  -Transferred to floor  -No acute events overnight  -Tolerating diet without n/v  -BM yesterday    Exam  /43 (BP Location: Left arm)  Temp 97.2  F (36.2  C) (Axillary)  Resp 18  Wt 82.4 kg (181 lb 10.5 oz)  SpO2 100%  BMI 26.83 kg/m2  No acute distress  Unlabored breathing  Abdomen soft, nt/nd. Flank incision C/D/I with nephroureteral stent in place with watermelon urine in urostomy device  Lower extremities non-edematous bilaterally  SP tube with pink urine    UOP - /0; SPT 4200/1400    Labs  WBC 5.9  Hgb 7.5  Cr 0.94    AM labs pending    Assessment/Plan  42 year old y/o male with a history of C4 quadriplegia and nephrolithiasis now POD#3 s/p PCNL. Post op course notable for development of sepsis with both preoperative and postoperative cultures growing proteus, pseudomonas and MRSA.  Patient now afebrile and HDS with resolution of leukocytosis    Neuro: APAP, oxycodone for pain control  CV: HDS  Pulm: No active issues  FEN/GI: Regular diet; BM+  Endo: No active issues  : Continue nephroureteral stent and SP tube - repeat CT reveals residual stone burden - will discuss repeat procedure at later date  Heme/ID: Currently on gentamicin, vancomycin, and fluconazole - postoperative cultures growing MRSA ( sensitive to  gent, linezolid, nitro, tetra, bactrim vanc - resistant to  cirpo, levo, penicillin), pseudomonas (sensitive to amikacin, ceftaz, gent, [place], zosyn, tobra - resistant levo, cirpo) and proteus (sensitivites pending - previous strain sensitive to zosyn, gentamicin, ceftax and cefepime).  Plan to continue gentamicin and discontinue both vancomycin and fluconazole.  Once final proteus sensitivities are available will discuss outpatient regimen with ID  Activity: Ad orly  PPx: SCDs    Seen and examined with Dr Becki Bonilla, PGY-3  Urology Resident    Patient seen and examined with the resident.    I agree with the resident's note and plan of care.        Malissa Connell MD  Urology Staff         Contacting the Urology Team     Please use the following job codes to reach the Urology Team. Note that you must use an in house phone and that job codes cannot receive text pages.     On weekdays, dial [phone] (or star-star-star 777 on the new [employer] telephones) then [phone] to reach the Adult Urology resident or PA on call    On weekdays, dial 893 (or star-star-star 777 on the new [employer] telephones) then [phone] to reach the Pediatric Urology resident    On weeknights and weekends, dial 893 (or star-star-star 777 on the new [employer] telephones) then [phone] to reach the Urology resident on call (for both Adult and Pediatrics)

## 2018-03-10 NOTE — CONSULTS
[facility] ID SERVICE CONSULTATION     Patient:  Maicol Carrera   Date of birth 1975, Medical record number [id number]  Date of Visit:  03/10/2018  Date of Admission: 3/7/2018  Consult Requester:Dejon Horn MD         Assessment and Recommendations:   ASSESSMENT:  1. Complicated urinary tract infection  2. Sepsis s/p nephrolithotomy    RECOMMENDATION:  1. Stop Gentamicin  2. Start Ceftazidime 2gm IV q8 hours.  Complete a total treatment course of 8 days.  3. Start Bactrim 2 DS tabs BID for a total treatment course of 7 days.  4. With future invasive urologic procedures we would be happy to see him in the ID clinic prior.  The treatment of asymptomatic bacteruria prior to future procedure may be complicated and require IV antibiotics given his colonization with drug resistant organisms.     DISCUSSION:   Mr. Carrera had a nephrolithectomy complicated by sepsis almost certainly drive by breaking up of a bacterially colonized stone. He has responded well to treatment appropriate to his organisms. Unfortunately, there is no easy treatment regimen that is oral.  I would recommend changing to Ceftazidime which requires less lab monitoring in combination with Bactrim.  Hopefully they will be able to accommodate this at his nursing home.     Attestation:  This patient has been seen and evaluated by me, Shawna Mcghee MD. The plan was discussed with the patient and the primary team. I have reviewed today's vital signs, medications, labs and imaging.     Shawna Saldivar MD MPH  Infectious Diseases  Pager [phone]  ________________________________________________________________    Consult Question:.  Admission Diagnosis: Nephrolithiasis [N20.0]         History of Present Illness:   Mr. Carrera is a 42 year old man with a history of C4 quadriplegia, GERD, neurogenic bladder, JUAN, seizures, and L renal calculus who underwent  left percutaneous nephrolithotomy, access to kidney, uteroscopy,  cystoscopy, and stent placement with Holmium laser 3/7. He subsequently became septic post-procedure requiring hospitalization. He developed hypotension, tachycardia, low grade fever and leukocytosis.   His urine was positive on culture for Pseudomonas, proteus, MRSA. On a Abd CT done on 3/8/18, he had a fragment of retained stone left in place. He has been on Vancomycin and Gentamicin. He has responded well clinically and feels pretty good today.  He denies diarrhea.  No rashes.  No active sweats or fevers.  He has a indwelling suprapubic catheter.  Prior to placement of this he was having recurrent UTIs.     Recent culture results include:  Culture Micro   Date Value Ref Range Status   03/08/2018 No growth after 2 days  Preliminary   03/08/2018 No growth after 2 days  Preliminary   03/07/2018 (A)  Final    <10,000 colonies/mL  Methicillin resistant Staphylococcus aureus (MRSA)     03/07/2018 <10,000 colonies/mL  Pseudomonas aeruginosa   (A)  Final   03/07/2018 <10,000 colonies/mL  Proteus mirabilis   (A)  Final   12/29/2017 (A)  Final    50,000 to 100,000 colonies/mL  Pseudomonas aeruginosa     12/29/2017 10,000 to 50,000 colonies/mL  Proteus mirabilis   (A)  Final   12/29/2017 (A)  Final    10,000 to 50,000 colonies/mL  Methicillin resistant Staphylococcus aureus (MRSA)     10/20/2017 >100,000 colonies/mL  Gram positive cocci   (A)  Final   10/20/2017 <10,000 colonies/mL  str2  Gram positive cocci   (A)  Final   10/20/2017 10,000 to 50,000 colonies/mL  Gram negative rods   (A)  Final   10/20/2017 (A)  Final    10,000 to 50,000 colonies/mL  Strain 2  Gram negative rods     10/20/2017 Susceptibility upon request  Final              Review of Systems:   CONSTITUTIONAL:  No fevers or chills actively  EYES: negative for icterus  ENT:  negative for hearing loss, tinnitus and sore throat  RESPIRATORY:  negative for cough with sputum and dyspnea  CARDIOVASCULAR:  negative for chest pain, dyspnea  GASTROINTESTINAL:   negative for nausea, vomiting, diarrhea and constipation  GENITOURINARY:  See HPI  HEME:  No easy bruising  INTEGUMENT:  negative for rash and pruritus  NEURO:  Negative for headache         Past Medical History:     Past Medical History:   Diagnosis Date     Constipation, chronic      GERD (gastroesophageal reflux disease)      Head injury      Neurogenic bladder     SP catheter     JUAN (obstructive sleep apnea)      Quadriplegia (H)      Seizure (H)      Spastic quadriplegia (H)      Urinary tract infection             Past Surgical History:     Past Surgical History:   Procedure Laterality Date     APPENDECTOMY OPEN       BACK SURGERY       INSERT PUMP BACLOFEN       LASER HOLMIUM NEPHROLITHOTOMY VIA PERCUTANEOUS NEPHROSTOMY  10/19/2011    Procedure:LASER HOLMIUM NEPHROLITHOTOMY VIA PERCUTANEOUS NEPHROSTOMY; Left Ureteral Stent Placement, Left Percutaneous Access, Left Percutaneous Nephrostomy  *Latex Allergy*  ; Surgeon:YAN ADDISON; Location:[facility] OR     LASER HOLMIUM NEPHROLITHOTOMY VIA PERCUTANEOUS NEPHROSTOMY Left 3/7/2018    Procedure: LASER HOLMIUM NEPHROLITHOTOMY VIA PERCUTANEOUS NEPHROSTOMY;  Left Percutaneous Nephrolithotomy, Access To Kidney, Ureteroscopy, Cystoscopy, Stent Placement With Holmium Laser standby;  Surgeon: Dejon Horn MD;  Location: [facility] OR     ORTHOPEDIC SURGERY       supra pubic catheter              Family History:   History reviewed. No pertinent family history.         Social History:     Social History   Substance Use Topics     Smoking status: Former Smoker     Packs/day: 0.30     Years: 5.00     Types: Cigarettes     Quit date: 9/23/1994     Smokeless tobacco: Never Used     Alcohol use No     History   Sexual Activity     Sexual activity: Not on file            Current Medications (antimicrobials listed in bold):       sodium chloride (PF)  3 mL Intracatheter Q8H     acetaminophen  650 mg Oral Q4H     albumin human  25 g Intravenous Once     gentamicin  120 mg  Intravenous Q24H     heparin  5,000 Units Subcutaneous Q8H     bisacodyl  10 mg Rectal Every Other Day     cyanocobalamin  1,000 mcg Oral Daily     docusate sodium  100 mg Oral BID     ferrous sulfate  325 mg Oral Daily with breakfast     levETIRAcetam (KEPPRA) tablet 1,000 mg  1,000 mg Oral BID     miconazole   Topical BID     mometasone   Topical At Bedtime     oxybutynin  5 mg Oral TID            Allergies:     Allergies   Allergen Reactions     Latex Rash     Latex Rash            Physical Exam:   Vitals were reviewed  Patient Vitals for the past 24 hrs:   BP Temp Temp src Heart Rate Resp SpO2 Weight   03/10/18 0741 102/46 98  F (36.7  C) Axillary 74 18 99 % -   03/10/18 0427 103/43 97.2  F (36.2  C) Axillary 72 18 100 % -   03/10/18 0001 116/58 97.9  F (36.6  C) Axillary 80 18 99 % -   03/09/18 2209 - - - - - - 82.4 kg (181 lb 10.5 oz)   03/09/18 2130 121/67 97.8  F (36.6  C) Oral 92 16 97 % -   03/09/18 2000 - 98.6  F (37  C) Oral - - - -   03/09/18 1900 113/70 - - 89 14 96 % -   03/09/18 1800 128/75 - - 98 18 94 % -   03/09/18 1700 110/63 - - 87 16 94 % -   03/09/18 1600 113/61 98.5  F (36.9  C) Oral 94 14 96 % -   03/09/18 1500 115/60 - - 92 16 95 % -     Ranges for his vital signs:  Temp:  [97.2  F (36.2  C)-98.6  F (37  C)] 98  F (36.7  C)  Heart Rate:  [72-98] 74  Resp:  [14-18] 18  BP: (102-128)/(43-75) 102/46  SpO2:  [94 %-100 %] 99 %    Physical Examination:  GENERAL:  well-developed, well-nourished, laying flat in bed in no acute distress. Easily conversant  HEENT:  Head is normocephalic, atraumatic   EYES:  Eyes have anicteric sclerae without conjunctival injection or stigmata of endocarditis.    ENT:  Oropharynx is moist without exudates or ulcers  NECK:  Supple. No cervical lymphadenopathy  LUNGS:  Clear to auscultation bilateral.   CARDIOVASCULAR:  Regular rate and rhythm with no murmurs, gallops or rubs.  ABDOMEN:  Normal bowel sounds, soft, nontender.   SKIN:  No acute rashes. No stigmata of  endocarditis.  NEUROLOGIC:  Grossly nonfocal. + Quadraplegia         Laboratory Data:     Inflammatory Markers    Recent Labs   Lab Test  12/29/11   1500  11/29/11   1230  11/06/11   1530  10/22/11   1300   SED  16*  32*   --    --    CRP  31.3*  36.5*  71.1*  198.9*       Hematology Studies  Recent Labs   Lab Test  03/10/18   0859  03/09/18   0341  03/08/18   1612  03/08/18   0544  03/08/18   0035  03/07/18   1725   03/07/18   1102   12/29/11   1500  11/29/11   1230   10/30/11   0400  10/29/11   0904   10/23/11   2053   10/20/11   0640   WBC  7.0  5.9   --   10.2  11.0  21.4*   --   9.0   --   8.1  6.0   < >  10.5  11.9*   < >  11.5*   < >  21.2*   ANEU   --    --    --    --    --    --    --    --    --   5.5  4.0   --   7.1  9.6*   --   8.0   --   18.5*   AEOS   --    --    --    --    --    --    --    --    --   0.4  0.3   --   0.2  0.1   --   0.2   --   0.0   HGB  7.2*  7.5*  8.2*  8.1*  8.6*  11.4*   < >  13.4   < >  13.8  11.6*   < >  8.2*  8.8*   < >  9.4*   < >  8.0*   MCV  87  88   --   84  86  85   --   85   --   87  88   < >  92  94   < >  86   < >  83   PLT  218  200   --   122*  145*  307   --   263   < >  237  279   < >  515*  482*   < >  259   < >  301    < > = values in this interval not displayed.       Immune Globulin Studies  No lab results found.    Metabolic Studies   Recent Labs   Lab Test  03/10/18   0859  03/09/18   0341  03/08/18   1612  03/08/18   0544  03/08/18   0035   NA  142  146*  144  144  140   POTASSIUM  4.1  4.0  4.1  4.7  4.9   CHLORIDE  111*  112*  114*  113*  111*   CO2  24  22  24  21  23   BUN  16  15  14  15  16   CR  0.99  0.94  1.04  1.11  1.20   GFRESTIMATED  83  89  79  73  67       Hepatic Studies  Recent Labs   Lab Test  12/29/11   1500  11/07/11   0633  10/31/11   0400  10/30/11   0400  10/29/11   0904  10/25/11   0335   BILITOTAL  0.4  0.5  0.4  0.5  0.4  0.4   ALKPHOS  81  61  56  62  60  48   ALBUMIN  4.1  3.6*  2.4*  2.5*  2.6*  2.1*   AST  36  19  21  28  17   21   ALT  58  18  18  18  13  21       Thyroid Studies  No lab results found.    Invalid input(s): FT2    Microbiology:  Culture Micro   Date Value Ref Range Status   03/08/2018 No growth after 2 days  Preliminary   03/08/2018 No growth after 2 days  Preliminary   03/07/2018 (A)  Final    <10,000 colonies/mL  Methicillin resistant Staphylococcus aureus (MRSA)     03/07/2018 <10,000 colonies/mL  Pseudomonas aeruginosa   (A)  Final   03/07/2018 <10,000 colonies/mL  Proteus mirabilis   (A)  Final   12/29/2017 (A)  Final    50,000 to 100,000 colonies/mL  Pseudomonas aeruginosa     12/29/2017 10,000 to 50,000 colonies/mL  Proteus mirabilis   (A)  Final   12/29/2017 (A)  Final    10,000 to 50,000 colonies/mL  Methicillin resistant Staphylococcus aureus (MRSA)     10/20/2017 >100,000 colonies/mL  Gram positive cocci   (A)  Final   10/20/2017 <10,000 colonies/mL  str2  Gram positive cocci   (A)  Final   10/20/2017 10,000 to 50,000 colonies/mL  Gram negative rods   (A)  Final   10/20/2017 (A)  Final    10,000 to 50,000 colonies/mL  Strain 2  Gram negative rods     10/20/2017 Susceptibility upon request  Final   11/06/2011   Final    10 to 50,000 colonies/mL Candida albicans / dubliniensis Candida albicans and   Candida dubliniensis are not routinely speciated   10/31/2011   Final    Incorrectly ordered by PCU/Clinic  Canceled, Test credited   10/31/2011 No anaerobes isolated  Final   10/31/2011 No growth  Final   10/31/2011 No growth  Final   10/30/2011   Final    Heavy growth Halle albicans / dubliniensis Candida albicans and Candida   dubliniensis are not routinely speciated   10/24/2011 No growth  Final   10/24/2011 No growth  Final   10/22/2011 No anaerobes isolated  Final   10/22/2011 No growth  Final   10/22/2011 No growth  Final   10/22/2011 No growth  Final   10/22/2011   Final    Canceled, Test credited  Incorrectly ordered by PCU/Clinic (Only 1 specimen type may share an accession   number)  For test result see  Sputum culture [id number]   10/22/2011   Final    Canceled, Test credited  Incorrectly ordered by PCU/Clinic (Only 1 specimen type may share an accession   number)  For culture result see [id number]   10/22/2011   Final    Canceled, Test credited  Incorrectly ordered by PCU/Clinic (Only 1 specimen type may share an accession   number)  For culture result see [id number]   10/22/2011   Final    Moderate growth Candida albicans / dubliniensis Candida albicans and Candida   dubliniensis are not routinely speciated   10/22/2011 No growth  Final   10/20/2011 No growth  Final   10/19/2011   Final    No MRSA isolated: susceptibilities not available. PCR assay is more sensitive   than culture.   10/12/2011 Duplicate request Canceled, Test credited  Final   10/12/2011   Final    >100,000 colonies/mL Escherichia coli  >100,000 colonies/mL Pseudomonas aeruginosa   04/21/2008 >100,000 colonies/mL Citrobacter species  Final       Urine Studies    Recent Labs   Lab Test  12/29/17   1145  11/13/17   1500  10/20/17   1200  11/06/11   1530  10/22/11   1200   LEUKEST  Large*  Canceled, Test credited*  Large*  Large*  Large*   WBCU  >182*  Canceled, Test credited*  >182*  >182*  >182*       Vancomycin Levels  Recent Labs   Lab Test  03/08/18   1737  10/27/11   1215  10/26/11   0351   VANCOMYCIN  20.3  14.4  16.5       Imaging:  Recent Results (from the past 744 hour(s))   XR Surgery CASA Fluoro L/T 5 Min w Stills    Narrative    This exam was marked as non-reportable because it will not be read by a   radiologist or a [facility] non-radiologist provider.             XR Chest Port 1 View    Narrative    Exam:  Chest X-ray 3/7/2018 5:30 PM    History: Post-operative;     Comparison: CT abdomen pelvis 10/20/2017. Chest x-ray 11/3/2011.    Findings: Portable chest x-ray at 30 degrees. Patient is rotated. The  cardiomediastinal silhouette is within normal limits. Left basilar  opacity consistent with subpleural fatty deposition/mediastinal  lipomatosis  as seen on CT from 10/20/2017. No pleural effusion. The  pulmonary vasculature is distinct. No focal consolidative opacity. No  pneumothorax. The upper abdomen is unremarkable.      Impression    Impression: No acute cardiopulmonary findings. Apparent left basilar  opacities seem to correspond to mediastinal lipomatosis on comparison  CT.    I have personally reviewed the examination and initial interpretation  and I agree with the findings.    JAMILAH ESCALANTE MD   XR Chest Port 1 View    Narrative    1 VIEW CHEST X-RAY 3/7/2018 9:47 PM    History: CT abdomen    Comparison: Radiograph earlier on the same day.    Findings:   2 portable supine views of the chest are obtained. The lung volumes  are stable. The trachea is midline. The cardiac mediastinal silhouette  is stable. Stable left basilar density corresponds to mediastinal  lipomatosis. No focal pulmonary opacities. No pleural effusion or  pneumothorax.      Impression    IMPRESSION:  No acute cardiopulmonary process identified.    I have personally reviewed the examination and initial interpretation  and I agree with the findings.    JAMILAH ESCALANTE MD   CT Abdomen w/o Contrast    Narrative    CT ABDOMEN PELVIS WITHOUT CONTRAST 3/8/2018    CLINICAL HISTORY: Evaluate for residual left stone after PCNL.    COMPARISON: Intraoperative fluoroscopy images from nephrolithotomy  3/7/2018. CT abdomen pelvis 10/20/2017.    TECHNIQUE: Unenhanced CT performed through the abdomen and pelvis.  Coronal and sagittal reconstructions were created.    DOSE (DLP): 990 mGy*cm    FINDINGS: Lack of contrast limits evaluation of the abdominal and  pelvic viscera.    Left renal pelvis stone clearance with Interval placement of a left  percutaneous nephroureteral drain. Left perinephric and periureteral  fat stranding likely related to the procedure.    8 x 11 x 9 mm stone fragment remains in the left pelviureteral  junction/proximal ureter alongside the nephroureteral drain. No  other  fragment identified through the left ureter or in the bladder.     Left infundibula have largely been cleared of stone. A portion of the  upper pole calyceal stone has been cleared with postprocedural  antidependent intracalyceal air. Calyceal stones remain through the  rest of the left kidney.    Unchanged right renal staghorn calculus. Unchanged bilateral atrophic  kidneys with thin cortices.    Inferior vena cava filter leg penetration through the caval wall,  unchanged.    Right lower quadrant intrathecal pump, unchanged. Catheter appears  intact.    Cholelithiasis without CT evidence for cholecystitis.    Bibasilar fibroatelectatic changes.    Right femoral gera.      Impression    IMPRESSION:  1. Post left PCNL changes. Left percutaneous nephroureteral drain in  place. 8 x 11 x 9 mm stone fragment remains in the left pelviureteral  junction/proximal ureter alongside the nephroureteral drain. No other  stone fragment identified through the left ureter into the bladder.    2. Left renal pelvis and infundibula have largely been cleared of  stones. Stones remain in the calyces.    3. Unchanged right staghorn calculus. Unchanged bilateral atrophic  kidneys with thin cortices.    JEFF RICKS MD

## 2018-03-11 LAB
ANION GAP SERPL CALCULATED.3IONS-SCNC: 9 MMOL/L (ref 3–14)
APPEARANCE STONE: NORMAL
BUN SERPL-MCNC: 17 MG/DL (ref 7–30)
CALCIUM SERPL-MCNC: 9.2 MG/DL (ref 8.5–10.1)
CHLORIDE SERPL-SCNC: 108 MMOL/L (ref 94–109)
CO2 SERPL-SCNC: 24 MMOL/L (ref 20–32)
COMPN STONE: NORMAL
CREAT SERPL-MCNC: 1 MG/DL (ref 0.66–1.25)
ERYTHROCYTE [DISTWIDTH] IN BLOOD BY AUTOMATED COUNT: 17.2 % (ref 10–15)
GENTAMICIN SERPL-MCNC: 3.6 MG/L
GFR SERPL CREATININE-BSD FRML MDRD: 83 ML/MIN/1.7M2
GLUCOSE SERPL-MCNC: 87 MG/DL (ref 70–99)
HCT VFR BLD AUTO: 27.1 % (ref 40–53)
HGB BLD-MCNC: 8.1 G/DL (ref 13.3–17.7)
MCH RBC QN AUTO: 25.2 PG (ref 26.5–33)
MCHC RBC AUTO-ENTMCNC: 29.9 G/DL (ref 31.5–36.5)
MCV RBC AUTO: 84 FL (ref 78–100)
NUMBER STONE: NORMAL
PLATELET # BLD AUTO: 240 10E9/L (ref 150–450)
POTASSIUM SERPL-SCNC: 4.5 MMOL/L (ref 3.4–5.3)
RBC # BLD AUTO: 3.21 10E12/L (ref 4.4–5.9)
SIZE STONE: NORMAL MM
SODIUM SERPL-SCNC: 140 MMOL/L (ref 133–144)
WBC # BLD AUTO: 8.5 10E9/L (ref 4–11)
WT STONE: 45 MG

## 2018-03-11 PROCEDURE — 36415 COLL VENOUS BLD VENIPUNCTURE: CPT | Performed by: STUDENT IN AN ORGANIZED HEALTH CARE EDUCATION/TRAINING PROGRAM

## 2018-03-11 PROCEDURE — 80048 BASIC METABOLIC PNL TOTAL CA: CPT | Performed by: STUDENT IN AN ORGANIZED HEALTH CARE EDUCATION/TRAINING PROGRAM

## 2018-03-11 PROCEDURE — 85027 COMPLETE CBC AUTOMATED: CPT | Performed by: STUDENT IN AN ORGANIZED HEALTH CARE EDUCATION/TRAINING PROGRAM

## 2018-03-11 PROCEDURE — 12000008 ZZH R&B INTERMEDIATE UMMC

## 2018-03-11 PROCEDURE — 25000128 H RX IP 250 OP 636: Performed by: SURGERY

## 2018-03-11 PROCEDURE — 25000128 H RX IP 250 OP 636: Performed by: STUDENT IN AN ORGANIZED HEALTH CARE EDUCATION/TRAINING PROGRAM

## 2018-03-11 PROCEDURE — 25000132 ZZH RX MED GY IP 250 OP 250 PS 637: Performed by: STUDENT IN AN ORGANIZED HEALTH CARE EDUCATION/TRAINING PROGRAM

## 2018-03-11 PROCEDURE — 27210995 ZZH RX 272: Performed by: STUDENT IN AN ORGANIZED HEALTH CARE EDUCATION/TRAINING PROGRAM

## 2018-03-11 RX ORDER — CEFTAZIDIME 2 G/1
2 INJECTION, POWDER, FOR SOLUTION INTRAVENOUS EVERY 8 HOURS
Status: DISCONTINUED | OUTPATIENT
Start: 2018-03-11 | End: 2018-03-13 | Stop reason: HOSPADM

## 2018-03-11 RX ORDER — SULFAMETHOXAZOLE/TRIMETHOPRIM 800-160 MG
1 TABLET ORAL 2 TIMES DAILY
Status: DISCONTINUED | OUTPATIENT
Start: 2018-03-11 | End: 2018-03-13 | Stop reason: HOSPADM

## 2018-03-11 RX ORDER — HEPARIN SODIUM,PORCINE 10 UNIT/ML
2-5 VIAL (ML) INTRAVENOUS
Status: COMPLETED | OUTPATIENT
Start: 2018-03-11 | End: 2018-03-13

## 2018-03-11 RX ORDER — LIDOCAINE 40 MG/G
CREAM TOPICAL
Status: DISCONTINUED | OUTPATIENT
Start: 2018-03-11 | End: 2018-03-13 | Stop reason: HOSPADM

## 2018-03-11 RX ADMIN — ACETAMINOPHEN 650 MG: 325 TABLET, FILM COATED ORAL at 18:05

## 2018-03-11 RX ADMIN — LEVETIRACETAM 1000 MG: 500 TABLET ORAL at 19:50

## 2018-03-11 RX ADMIN — HEPARIN SODIUM 5000 UNITS: 5000 INJECTION, SOLUTION INTRAVENOUS; SUBCUTANEOUS at 09:39

## 2018-03-11 RX ADMIN — OXYBUTYNIN CHLORIDE 5 MG: 5 TABLET ORAL at 19:50

## 2018-03-11 RX ADMIN — WATER 2 G: 1 INJECTION INTRAMUSCULAR; INTRAVENOUS; SUBCUTANEOUS at 15:51

## 2018-03-11 RX ADMIN — SULFAMETHOXAZOLE AND TRIMETHOPRIM 1 TABLET: 800; 160 TABLET ORAL at 09:39

## 2018-03-11 RX ADMIN — HEPARIN SODIUM 5000 UNITS: 5000 INJECTION, SOLUTION INTRAVENOUS; SUBCUTANEOUS at 16:02

## 2018-03-11 RX ADMIN — LEVETIRACETAM 1000 MG: 500 TABLET ORAL at 09:39

## 2018-03-11 RX ADMIN — SULFAMETHOXAZOLE AND TRIMETHOPRIM 1 TABLET: 800; 160 TABLET ORAL at 19:50

## 2018-03-11 RX ADMIN — DOCUSATE SODIUM 100 MG: 100 CAPSULE, LIQUID FILLED ORAL at 09:40

## 2018-03-11 RX ADMIN — OXYBUTYNIN CHLORIDE 5 MG: 5 TABLET ORAL at 13:48

## 2018-03-11 RX ADMIN — ACETAMINOPHEN 650 MG: 325 TABLET, FILM COATED ORAL at 13:48

## 2018-03-11 RX ADMIN — FERROUS SULFATE 325 MG: 325 TABLET, FILM COATED ORAL at 09:38

## 2018-03-11 RX ADMIN — HEPARIN SODIUM 5000 UNITS: 5000 INJECTION, SOLUTION INTRAVENOUS; SUBCUTANEOUS at 00:08

## 2018-03-11 RX ADMIN — WATER 2 G: 1 INJECTION INTRAMUSCULAR; INTRAVENOUS; SUBCUTANEOUS at 09:40

## 2018-03-11 RX ADMIN — Medication 1000 MCG: at 09:39

## 2018-03-11 RX ADMIN — DOCUSATE SODIUM 100 MG: 100 CAPSULE, LIQUID FILLED ORAL at 19:50

## 2018-03-11 RX ADMIN — OXYBUTYNIN CHLORIDE 5 MG: 5 TABLET ORAL at 09:41

## 2018-03-11 NOTE — PLAN OF CARE
Problem: Patient Care Overview  Goal: Individualization & Mutuality  Outcome: No Change  Vitals:    03/10/18 1539 03/10/18 2101 03/11/18 0014 03/11/18 0416   BP: 104/56 130/70 105/59 106/57   BP Location: Left arm Left arm Left arm Left arm   Resp: 18 18 16   Temp: 97.4  F (36.3  C)  96.2  F (35.7  C) 96.3  F (35.7  C)   TempSrc: Oral  Axillary Axillary   SpO2: 98% 98% 98% 100%   Weight:         Baseline quadriplegia, garbled speech. Refused repositioning, on pulse mattress. Afebrile. VSS. O2 sats high 90s on CPAP. LS diminished in bases. On regular diet, total feed. Suprapubic catheter patent and has good amount of pink urine. Left PNT pouched and has scant pink urine output. No BM this shift. PIV saline locked. Denies pain. Refused Tylenol overnight. Slept in between cares. Continue POC.

## 2018-03-11 NOTE — PLAN OF CARE
Problem: Patient Care Overview  Goal: Plan of Care/Patient Progress Review  Outcome: No Change  Vitals:    03/10/18 2101 03/11/18 0014 03/11/18 0416 03/11/18 0800   BP: 130/70 105/59 106/57 105/52   BP Location: Left arm Left arm Left arm Left arm   Resp:  18 16 16   Temp:  96.2  F (35.7  C) 96.3  F (35.7  C) 97.2  F (36.2  C)   TempSrc:  Axillary Axillary Oral   SpO2: 98% 98% 100% 100%   Weight:       AVSS. Pain well controled with tylenol given x 1. Good oral intake with assist. UOP>1250cc through the suprapubic cathter. Had a small loose BM. Declined to be OOB. Continues on iv antimitotics via a piv. Picc line ordered today -waiting for [facility] to call with placement time. Stable. Continue with current cares.

## 2018-03-11 NOTE — PLAN OF CARE
Problem: Patient Care Overview  Goal: Plan of Care/Patient Progress Review  Outcome: No Change  VSS. L and R PIV SL. L nephrostomy output of 0cc. Some red urine in pouch. Supra pubic output of 1400cc pink urine. Suppository given, no BM. BS active. Tolerating regular diet well, total feed assist. Pt able to make needs known and calls appropriately. Pt has many questions regarding D/C. Cont POC.

## 2018-03-11 NOTE — PHARMACY-AMINOGLYCOSIDE DOSING SERVICE
Pharmacy Aminoglycoside Follow-Up Note  Date of Service 2018  Patient's  1975   42 year old, male    Weight (Actual): 82.4 kg    Indication: Urinary Tract Infection  Current Gentamicin regimen:  120 mg IV q24h  Day of therapy: Therapy discontinued 3/11/18 (4 total days of therapy)    Target goals based on conventional dosing  Goal Peak level: 4-6 mg/L  Goal Trough level: </= 1 mg/L    Current estimated CrCl: Estimated Creatinine Clearance: 115.6 mL/min (based on Cr of 0.99).    Creatinine for last 3 days  3/8/2018:  4:12 PM Creatinine 1.04 mg/dL  3/9/2018:  3:41 AM Creatinine 0.94 mg/dL  3/10/2018:  8:59 AM Creatinine 0.99 mg/dL    Nephrotoxins and other renal medications     None          Contrast Orders - past 72 hours     None          Aminoglycoside Levels - past 2 days  3/10/2018:  7:38 PM Gentamycin Level Single Daily Dose 4.6 mg/L; 11:56 PM Gentamycin Level Single Daily Dose 3.6 mg/L    Aminoglycosides IV Administrations (past 72 hours)                   gentamicin (GARAMYCIN) 120 mg in sodium chloride 0.9 % 50 mL intermittent infusion (mg) 120 mg New Bag 03/10/18 1638     120 mg New Bag 18 1708     120 mg New Bag 18 1710                Dose Given 120 mg         Pre-Dose Level  mcg/ml         First Post-Dose Level 4.6 mcg/ml Drawn at: 2.00 Hours post-infusion   2nd Post-dose level 3.6  Drawn at: 6.30 Hours post-infusion   Time between levels 4.30 hours         Dosing Interval 24 hours                    Kd 0.057 hr-1 T 1/2 =  12.2 hours      Extrapolated Peak 5.2 mcg/ml         Extrapolated trough 1.39 mcg/ml         Volume of Distribution 22.6 L (uses pre-dose level for calculations)    L/Kg = 0.27 L/kg           Pharmacokinetic Analysis  Calculated Peak level: 5.2 mg/L  Calculated Trough level: 1.39 mg/L  Volume of distribution: 0.27 L/kg  Half-life: 12.2 hours        Interpretation of levels and current regimen:  Aminoglycoside levels are within goal range    Has serum  creatinine changed greater than 50% in the last 72 hours: No    Urine output:  good urine output    Renal function: Stable    Plan  1. Gentamicin was discontinued on 3/11/2018.       Sudha Capellan, PharmD  PGY-1 Pharmacy Practice Resident

## 2018-03-11 NOTE — PROGRESS NOTES
Urology  Progress Note  -No acute events overnight  -Pain well controlled  -Tolerating diet    Exam  /57 (BP Location: Left arm)  Temp 96.3  F (35.7  C) (Axillary)  Resp 16  Wt 82.4 kg (181 lb 10.5 oz)  SpO2 100%  BMI 26.83 kg/m2  No acute distress  Unlabored breathing  Abdomen soft, nt/nd.  Incision C/D/I with nephroureteral stent in place with scant watermelon urine in urostomy device  Lower extremities non-edematous bilaterally  SP tube with pink urine    PNT 0/10  SPT 4800/1500    Labs  WBC 7.0  Hgb 7.2  Cr 1    Assessment/Plan  42 year old y/o male with a history of C4 quadriplegia and nephrolithiasis now POD#4 s/p PCNL. Post op course notable for development of sepsis with both preoperative and postoperative cultures growing proteus, pseudomonas and MRSA.  Patient now afebrile and HDS with resolution of leukocytosis     Neuro: APAP, oxycodone for pain control  CV: HDS  Pulm: No active issues  FEN/GI: Regular diet  Endo: No active issues  : Continue nephroureteral stent and SP tube - repeat CT reveals residual stone burden - will discuss repeat procedure at later date  Heme/ID: Currently on gentamicin, vancomycin, and fluconazole - postoperative cultures growing MRSA, pseudomonas, and proteus.  Appreciate ID recs.  Will transition from gentamicin to ceftizadime for 8 day course from date of surgery.  Will also start bactrim for 7 day course from date of surgery.    Activity: Ad orly  PPx: SCDs    Seen and examined with Dr Becki Bonilla, PGY-3  Urology Resident    Patient seen and examined with the resident.    I agree with the resident's note and plan of care.       Malissa Connell MD  Urology Staff       Contacting the Urology Team     Please use the following job codes to reach the Urology Team. Note that you must use an in house phone and that job codes cannot receive text pages.     On weekdays, dial [phone] (or star-star-star [phone] on the new [employer] telephones) then [phone] to reach  the Adult Urology resident or PA on call    On weekdays, dial [phone] (or star-star-star [phone] on the new "[employer] telephones) then [phone] to reach the Pediatric Urology resident    On weeknights and weekends, dial [phone] (or star-star-star [phone] on the new "[employer] telephones) then [phone] to reach the Urology resident on call (for both Adult and Pediatrics)

## 2018-03-12 LAB
ANION GAP SERPL CALCULATED.3IONS-SCNC: 8 MMOL/L (ref 3–14)
BUN SERPL-MCNC: 20 MG/DL (ref 7–30)
CALCIUM SERPL-MCNC: 10 MG/DL (ref 8.5–10.1)
CHLORIDE SERPL-SCNC: 104 MMOL/L (ref 94–109)
CO2 SERPL-SCNC: 24 MMOL/L (ref 20–32)
CREAT SERPL-MCNC: 1.06 MG/DL (ref 0.66–1.25)
ERYTHROCYTE [DISTWIDTH] IN BLOOD BY AUTOMATED COUNT: 17.2 % (ref 10–15)
GFR SERPL CREATININE-BSD FRML MDRD: 77 ML/MIN/1.7M2
GLUCOSE SERPL-MCNC: 98 MG/DL (ref 70–99)
HCT VFR BLD AUTO: 25.9 % (ref 40–53)
HGB BLD-MCNC: 7.5 G/DL (ref 13.3–17.7)
MCH RBC QN AUTO: 25.1 PG (ref 26.5–33)
MCHC RBC AUTO-ENTMCNC: 29 G/DL (ref 31.5–36.5)
MCV RBC AUTO: 87 FL (ref 78–100)
PLATELET # BLD AUTO: 253 10E9/L (ref 150–450)
POTASSIUM SERPL-SCNC: 4.5 MMOL/L (ref 3.4–5.3)
RBC # BLD AUTO: 2.99 10E12/L (ref 4.4–5.9)
SODIUM SERPL-SCNC: 137 MMOL/L (ref 133–144)
WBC # BLD AUTO: 8.2 10E9/L (ref 4–11)

## 2018-03-12 PROCEDURE — 40000275 ZZH STATISTIC RCP TIME EA 10 MIN

## 2018-03-12 PROCEDURE — 27210995 ZZH RX 272: Performed by: STUDENT IN AN ORGANIZED HEALTH CARE EDUCATION/TRAINING PROGRAM

## 2018-03-12 PROCEDURE — 25000128 H RX IP 250 OP 636: Performed by: SURGERY

## 2018-03-12 PROCEDURE — 12000008 ZZH R&B INTERMEDIATE UMMC

## 2018-03-12 PROCEDURE — 85027 COMPLETE CBC AUTOMATED: CPT | Performed by: STUDENT IN AN ORGANIZED HEALTH CARE EDUCATION/TRAINING PROGRAM

## 2018-03-12 PROCEDURE — 02H633Z INSERTION OF INFUSION DEVICE INTO RIGHT ATRIUM, PERCUTANEOUS APPROACH: ICD-10-PCS | Performed by: RADIOLOGY

## 2018-03-12 PROCEDURE — 36415 COLL VENOUS BLD VENIPUNCTURE: CPT | Performed by: STUDENT IN AN ORGANIZED HEALTH CARE EDUCATION/TRAINING PROGRAM

## 2018-03-12 PROCEDURE — 25000128 H RX IP 250 OP 636: Performed by: STUDENT IN AN ORGANIZED HEALTH CARE EDUCATION/TRAINING PROGRAM

## 2018-03-12 PROCEDURE — 25000132 ZZH RX MED GY IP 250 OP 250 PS 637: Performed by: STUDENT IN AN ORGANIZED HEALTH CARE EDUCATION/TRAINING PROGRAM

## 2018-03-12 PROCEDURE — 80048 BASIC METABOLIC PNL TOTAL CA: CPT | Performed by: STUDENT IN AN ORGANIZED HEALTH CARE EDUCATION/TRAINING PROGRAM

## 2018-03-12 RX ORDER — SULFAMETHOXAZOLE/TRIMETHOPRIM 800-160 MG
1 TABLET ORAL 2 TIMES DAILY
Qty: 18 TABLET | Refills: 0 | Status: ON HOLD | OUTPATIENT
Start: 2018-03-12 | End: 2018-03-21

## 2018-03-12 RX ADMIN — OXYBUTYNIN CHLORIDE 5 MG: 5 TABLET ORAL at 13:14

## 2018-03-12 RX ADMIN — MICONAZOLE NITRATE: 2 POWDER TOPICAL at 10:06

## 2018-03-12 RX ADMIN — SULFAMETHOXAZOLE AND TRIMETHOPRIM 1 TABLET: 800; 160 TABLET ORAL at 19:57

## 2018-03-12 RX ADMIN — MICONAZOLE NITRATE: 2 POWDER TOPICAL at 19:58

## 2018-03-12 RX ADMIN — OXYBUTYNIN CHLORIDE 5 MG: 5 TABLET ORAL at 10:04

## 2018-03-12 RX ADMIN — SULFAMETHOXAZOLE AND TRIMETHOPRIM 1 TABLET: 800; 160 TABLET ORAL at 10:04

## 2018-03-12 RX ADMIN — WATER 2 G: 1 INJECTION INTRAMUSCULAR; INTRAVENOUS; SUBCUTANEOUS at 00:38

## 2018-03-12 RX ADMIN — HEPARIN SODIUM 5000 UNITS: 5000 INJECTION, SOLUTION INTRAVENOUS; SUBCUTANEOUS at 00:38

## 2018-03-12 RX ADMIN — HEPARIN SODIUM 5000 UNITS: 5000 INJECTION, SOLUTION INTRAVENOUS; SUBCUTANEOUS at 10:05

## 2018-03-12 RX ADMIN — WATER 2 G: 1 INJECTION INTRAMUSCULAR; INTRAVENOUS; SUBCUTANEOUS at 08:21

## 2018-03-12 RX ADMIN — FERROUS SULFATE 325 MG: 325 TABLET, FILM COATED ORAL at 10:04

## 2018-03-12 RX ADMIN — LEVETIRACETAM 1000 MG: 500 TABLET ORAL at 19:57

## 2018-03-12 RX ADMIN — LEVETIRACETAM 1000 MG: 500 TABLET ORAL at 10:05

## 2018-03-12 RX ADMIN — HEPARIN SODIUM 5000 UNITS: 5000 INJECTION, SOLUTION INTRAVENOUS; SUBCUTANEOUS at 16:58

## 2018-03-12 RX ADMIN — DOCUSATE SODIUM 100 MG: 100 CAPSULE, LIQUID FILLED ORAL at 19:57

## 2018-03-12 RX ADMIN — Medication 1000 MCG: at 10:04

## 2018-03-12 RX ADMIN — DOCUSATE SODIUM 100 MG: 100 CAPSULE, LIQUID FILLED ORAL at 10:04

## 2018-03-12 RX ADMIN — OXYBUTYNIN CHLORIDE 5 MG: 5 TABLET ORAL at 19:57

## 2018-03-12 RX ADMIN — WATER 2 G: 1 INJECTION INTRAMUSCULAR; INTRAVENOUS; SUBCUTANEOUS at 16:11

## 2018-03-12 NOTE — PROGRESS NOTES
Patient needed a PICC for home antibiotics, no viable veins for PICC insertion was noted upon ultrasound of both upper extremities.  Spoke personally with LATOYA Monroe PA-C about the situation. Thanks

## 2018-03-12 NOTE — DISCHARGE SUMMARY
[facility] Discharge Summary    Patient: Maicol Carrera    MRN: [id number]   : 1975         Date of Admission:  3/7/2018   Date of Discharge::  3/13/2018  Admitting Physician:  Dejon Horn MD  Discharge Physician:  Chacha Monroe PA-C             Admission Diagnoses:   Nephrolithiasis [N20.0]    Past Medical History:   Diagnosis Date     Constipation, chronic      GERD (gastroesophageal reflux disease)      Head injury      Neurogenic bladder     SP catheter     JUAN (obstructive sleep apnea)      Quadriplegia (H)      Seizure (H)      Spastic quadriplegia (H)      Urinary tract infection              Discharge Diagnosis:   Nephrolithiasis   Sepsis requiring ICU admission, secondary to infected stone/ complicated urinary tract infection  Complicated urinary tract infection secondary to infected stone    Past Medical History:   Diagnosis Date     Constipation, chronic      GERD (gastroesophageal reflux disease)      Head injury      Neurogenic bladder     SP catheter     JUAN (obstructive sleep apnea)      Quadriplegia (H)      Seizure (H)      Spastic quadriplegia (H)      Urinary tract infection             Procedures:   Procedure(s): 3/7/18-   1. Antegrade nephrostogram.   2. Removal of indwelling nephrostomy tube.   3. Percutaneous nephrolithotomy, stone burden >4 cm.   4. Use of CyberWand.   5. Use of C-arm and interpretation of the fluoroscopic image.   6. 10 x 26 left nephroureteral stent  Dr. Dejon Horn (Urology)            Medications Prior to Admission:     Prescriptions Prior to Admission   Medication Sig Dispense Refill Last Dose     Multiple Vitamins-Minerals (MULTIVITAMIN ADULT PO) Take 1 tablet by mouth daily   3/6/2018 at 0800     clindamycin (CLINDAMAX) 1 % topical gel Apply topically 2 times daily   3/6/2018 at 2000     Cranberry 400 MG TABS Take 400 mg by mouth 2 times daily   3/6/2018 at 2000     LevETIRAcetam (KEPPRA PO) Take 1,000 mg by mouth 2 times daily    3/7/2018 at 0400     mometasone (ELOCON) 0.1 % cream Apply topically At Bedtime   3/6/2018 at 2000     selenium sulfide (SELSUN) 2.5 % lotion Apply to scalp topically in the morning every Sun, Tue, Thursday. Place for 5 min then rinse.   3/6/2018 at 1000     VITAMIN D, CHOLECALCIFEROL, PO Take 5,000 Units by mouth daily   3/6/2018 at 1200     bisacodyl (DULCOLAX) 10 MG suppository Place 1 suppository rectally every other day. 12 suppository  3/6/2018 at 2000     cyanocolbalamin (VITAMIN B-12) 1000 MCG tablet Take 1 tablet by mouth daily.   3/6/2018 at 2000     ferrous sulfate 325 (65 FE) MG tablet Take 1 tablet by mouth daily (with breakfast). 100 tablet  3/6/2018 at 2000     magnesium gluconate (MAGONATE) 500 MG tablet Take 1 tablet by mouth daily.   3/6/2018 at 0800     miconazole (MICATIN; MICRO GUARD) 2 % powder Apply  topically 2 times daily. 70 g  3/6/2018 at 1600     oxybutynin (DITROPAN) 5 MG tablet Take 1 tablet by mouth 3 times daily. 270 tablet  3/6/2018 at 2000     senna-docusate (SENOKOT-S;PERICOLACE) 8.6-50 MG per tablet Take 1-2 tablets by mouth 2 times daily. 60 tablet  3/7/2018 at 0800     vitamin C 250 MG TABS Take 1 tablet by mouth 2 times daily.   3/6/2018 at 2000     docusate sodium (COLACE) 100 MG capsule Take 100 mg by mouth 2 times daily.   3/6/2018 at 2000     Bisacodyl (DULCOLAX PO) Take 10 mg by mouth At Bedtime.   3/6/2018 at 100     BACLOFEN IT Via implanted IT pump  Baclofen 2000 mc/gmL   Dose: 277.1 mcg/day  Low reservoir alarm date: 4/26/2018   3/7/2018 at Unknown time     Nutritional Supplements (OSTEO-MULTIVITAMINS/MINERALS OR) Take  by mouth daily. 12 pm     3/6/2018 at 1200     [DISCONTINUED] CIPROFLOXACIN PO Take 500 mg by mouth   3/7/2018 at 0400     polyvinyl alcohol (LIQUIFILM TEARS) 1.4 % ophthalmic solution Place 2 drops into both eyes every 2 hours as needed for dry eyes   Unknown at Unknown time     ketoconazole (NIZORAL) 2 % cream Apply topically 2 times daily as needed    Unknown at Unknown time     polyethylene glycol (MIRALAX/GLYCOLAX) powder Take 1 capful by mouth daily as needed for constipation (if no BM x3 days)   Unknown at Unknown time     ACETAMINOPHEN PO Take 650 mg by mouth every 4 hours as needed for pain   Unknown at Unknown time     albuterol (2.5 MG/3ML) 0.083% nebulizer solution Take 3 mLs by nebulization every 2 hours as needed. 1 Box  Unknown at Unknown time     ondansetron (ZOFRAN-ODT) 4 MG disintegrating tablet Take 1 tablet by mouth every 6 hours as needed for nausea. 20 tablet  Unknown at Unknown time     zinc oxide (DESITIN) 40 % ointment Apply  topically every hour as needed. 56.7 g  Unknown at Unknown time     baclofen (LIORESAL) 10 MG tablet Take 10 mg by mouth 4 times daily as needed    Unknown at Unknown time             Discharge Medications:     Current Discharge Medication List      START taking these medications    Details   cefTAZidime (FORTAZ) 2 GM vial Inject 2 g into the vein every 8 hours for 9 days  Qty: 270 mL, Refills: 0    Associated Diagnoses: Sepsis due to urinary tract infection (H); Kidney stones         CONTINUE these medications which have CHANGED    Details   sulfamethoxazole-trimethoprim (BACTRIM DS/SEPTRA DS) 800-160 MG per tablet Take 1 tablet by mouth 2 times daily  Qty: 18 tablet, Refills: 0    Associated Diagnoses: Kidney stones; Sepsis due to urinary tract infection (H)         CONTINUE these medications which have NOT CHANGED    Details   Multiple Vitamins-Minerals (MULTIVITAMIN ADULT PO) Take 1 tablet by mouth daily      clindamycin (CLINDAMAX) 1 % topical gel Apply topically 2 times daily      Cranberry 400 MG TABS Take 400 mg by mouth 2 times daily      LevETIRAcetam (KEPPRA PO) Take 1,000 mg by mouth 2 times daily      mometasone (ELOCON) 0.1 % cream Apply topically At Bedtime      selenium sulfide (SELSUN) 2.5 % lotion Apply to scalp topically in the morning every Sun, Tue, Thursday. Place for 5 min then rinse.      VITAMIN  D, CHOLECALCIFEROL, PO Take 5,000 Units by mouth daily      bisacodyl (DULCOLAX) 10 MG suppository Place 1 suppository rectally every other day.  Qty: 12 suppository    Associated Diagnoses: Chronic constipation      cyanocolbalamin (VITAMIN B-12) 1000 MCG tablet Take 1 tablet by mouth daily.    Associated Diagnoses: Vitamin B 12 deficiency      ferrous sulfate 325 (65 FE) MG tablet Take 1 tablet by mouth daily (with breakfast).  Qty: 100 tablet    Associated Diagnoses: Anemia due to blood loss, acute      magnesium gluconate (MAGONATE) 500 MG tablet Take 1 tablet by mouth daily.    Associated Diagnoses: Magnesium deficiency      miconazole (MICATIN; MICRO GUARD) 2 % powder Apply  topically 2 times daily.  Qty: 70 g    Associated Diagnoses: Candida infection of flexural skin      oxybutynin (DITROPAN) 5 MG tablet Take 1 tablet by mouth 3 times daily.  Qty: 270 tablet    Associated Diagnoses: Neurogenic bladder      senna-docusate (SENOKOT-S;PERICOLACE) 8.6-50 MG per tablet Take 1-2 tablets by mouth 2 times daily.  Qty: 60 tablet    Associated Diagnoses: Chronic constipation      vitamin C 250 MG TABS Take 1 tablet by mouth 2 times daily.    Associated Diagnoses: Recurrent UTI      docusate sodium (COLACE) 100 MG capsule Take 100 mg by mouth 2 times daily.      Bisacodyl (DULCOLAX PO) Take 10 mg by mouth At Bedtime.      BACLOFEN IT Via implanted IT pump  Baclofen 2000 mc/gmL   Dose: 277.1 mcg/day  Low reservoir alarm date: 4/26/2018      Nutritional Supplements (OSTEO-MULTIVITAMINS/MINERALS OR) Take  by mouth daily. 12 pm        polyvinyl alcohol (LIQUIFILM TEARS) 1.4 % ophthalmic solution Place 2 drops into both eyes every 2 hours as needed for dry eyes      ketoconazole (NIZORAL) 2 % cream Apply topically 2 times daily as needed      polyethylene glycol (MIRALAX/GLYCOLAX) powder Take 1 capful by mouth daily as needed for constipation (if no BM x3 days)      ACETAMINOPHEN PO Take 650 mg by mouth every 4 hours as  needed for pain      albuterol (2.5 MG/3ML) 0.083% nebulizer solution Take 3 mLs by nebulization every 2 hours as needed.  Qty: 1 Box    Associated Diagnoses: SOB (shortness of breath)      ondansetron (ZOFRAN-ODT) 4 MG disintegrating tablet Take 1 tablet by mouth every 6 hours as needed for nausea.  Qty: 20 tablet    Associated Diagnoses: Nausea and vomiting      zinc oxide (DESITIN) 40 % ointment Apply  topically every hour as needed.  Qty: 56.7 g    Associated Diagnoses: Breakdown of skin tissue      baclofen (LIORESAL) 10 MG tablet Take 10 mg by mouth 4 times daily as needed          STOP taking these medications       CIPROFLOXACIN PO Comments:   Reason for Stopping:                     Consultations:   Consultation during this admission received: SICU, Infectious Disease, Interventional Radiology,           Brief History of Illness:   Reason for admission requiring a surgical or invasive procedure:   Nephrolithiasis    The patient underwent the following procedure(s):   See above   There were no immediate complications during this procedure.    Please refer to the full operative summary for details.           Hospital Course:   The patient's hospital course was remarkable for:    - Postoperative hypotension requiring SICU admission for hypotension with tachycardia and concern for impending sepsis secondary to his infected stone.  Of note, previous urine cultures (12/29/17) had grown: 50-100K Pseudomonas, 10-50K Proteus and 10-50K MRSA.     Fluid resuscitation and pressors were begun and broad spectrum antibiotics were continued pending final urine culture results which showed <10K MRSA, <10K Pseudomonas aeruginosa, <10K Proteus mirabilis.  Blood cultures remained negative. By POD#2 pressors had been weaned and Mr. Carrera was able to transfer out of the SICU. On POD#3 Infectious Disease was consulted to assist with antibiotic management for Mr. Carrera's complicated urinary tract infection.  Per a discussion  with ID, it was recommended that Maicol complete a total 14 day course of antibiotics.  He was to be discharged on ceftazidime 2g IV q8 hours in conjunction with Bactrim DS twice daily.  On POD#4 a PICC was ordered but the nurses were unable to place the line due to tortuous vasculature / lack of viable veins.  Therefore an IR consult was placed and the PICC was introduced under fluoroscopic guidance by their team on POD#5.     On POD #5 Maicol's activity was at baseline, he was tolerating the discharge diet, had pain controlled with PO medications to go home with, and was requiring no IV medications or fluids. He was discharged to a SNF with appropriate contact information, follow-up and instructions as seen below in the discharge paperwork.         Discharge Instructions and Follow-Up:   Diet:  - Regular/ home diet    Activity:   - May return to home activities  - Do not strain with bowel movements.    - Do not drive until you can press the brake pedal quickly and fully without pain.    - Do not operate a motor vehicle while taking narcotic pain medications.     Medications:   1) PAIN: ContinueTylenol (acetaminophen 625mg)every 4-6 hours as needed for mild pain.  Do not take more than 4,000mg of Tylenol (acetaminophen/ APAP) from all sources in any 24 hour period since this can cause liver damage.  Do not take more than 2400mg of ibuprofen in any 24 hour period since this can cause kidney damage. Never drive, operate machinery or drink alcoholic beverages while you are taking narcotic pain medications.      2) CONSTIPATION: Continue your home bowel regimen.  Please reduce your home bowel regimen if you develop loose stools. Other over the counter solutions such as prune juice, miralax, fiber products, senna, and dulcolax can also be used. If you are taking the home bowel regimen but still have not had a bowel movement in 3 days, start over-the-counter Milk of Magnesia taken twice daily until you have a nice bowel  movement.  Call the office with any concerns.     3) ANTIBIOTICS:  - Continue fortaz (ceftazidime) 2g every 8 hours for 9 more days (through 3/21/18) to complete a total 14 day course of antibiotics for urosepsis following your stone procedure  - Continue Bactrim DS (trimethoprim/sulfamethoxazole) twice daily for 9 more days (through 3/21/18) to complete a total 14 day course of antibiotics for urosepsis following your stone procedure.   - Follow up with Infectious Disease about 1-2 weeks prior to your next stone procedure with Dr. Horn.  You will need an antibiotic plan prior to your next stone procedure.     Percutaneous Nephrostomy Tube (PNT) / Nephroureteral stent:  - You are going home with a left percutaneous nephroureteral stent   - For the first few weeks, you may having bruising or soreness at the tube site.  Use a warm heating pad to relieve discomfort.    - Blood in the urine can be expected.  Drink 8-10 glasses of fluid per day to keep urine light pink or light yellow.    - Daily showering is important but keep your tube site dry.  Avoid baths, swimming, etc.   - Clean around the tube site every other day using soap and water, then cover the site with a sterile bandage. Take care not to tug the tube or remove the suture.   - Keep the tube securely taped to your back at all times and do not let it get caught, snagged or tugged.  The tubing should always remain tension-free and without kinks.  In order to drain best (and prevent urinary infections), the tubing and bag should always hang lower than your kidney.     Suprapubic Belle Catheter:  - Continue your home suprapubic Belle catheter to gravity drainage.   - Protect the catheter and treat it like an extension of your body - keep it secured to your leg and do not let it get caught, snagged, or tugged. The catheter tubing should remain tension-free and without kinks. In order to drain best, the tubing and bag should always be lower than your body.  -  You may notice a little blood, small amount of white discharge, or caking at the catheter insertion site. This is normal but it can irritate the skin so it is best to wash this off in the daily shower. You are encouraged to wash the catheter tubing to keep it clean, and the urine drainage bag also can be gotten wet.   - Catheters can sometimes cause bladder spasms (lower abdominal pain that feels like the need to urinate). Continue your home dose of oxybutynin for this.     Follow-Up:   - Call your primary care provider to touch base regarding your recent admission.   - Follow up with Dr. Horn as scheduled on 3/30/18 for a postop checkup and to make plans for your next stone procedure.   - Follow up with Infectious Disease (Shawna Saldivar MD - 4/5/18) for a checkup and to discuss an antibiotic plan prior to your next stone procedure.   - Call or return sooner than your regularly scheduled visit if you develop any of the following:  Fever (greater than 101.3F) or flu-like symptoms, uncontrolled pain, uncontrolled nausea or vomiting, as well as increased redness, swelling, or drainage from your wound.    Phone numbers:  - Nursing phone helpline at the Urology Clinic (8A-5P M-F):  [phone].    - Nights or weekends, call [phone] and ask the  to page the urology resident on call.   - For emergencies, always call [phone]         Discharge Disposition:   Discharged to home      Attestation: I have reviewed today's vital signs, notes, medications, labs and imaging.    Magdalene Monroe PA-C  Urology Physician Assistant  Personal Pager: [phone]    Please call Job Code:   [phone] to reach the Urology resident or PA on call - Weekdays  [phone] to reach the Urology resident or PA on call - Weeknights and weekends    March 13, 2018

## 2018-03-12 NOTE — PLAN OF CARE
Problem: Patient Care Overview  Goal: Individualization & Mutuality  Outcome: No Change  Vitals:    03/11/18 1901 03/11/18 2110 03/12/18 0052 03/12/18 0459   BP:   116/64 96/56   BP Location:   Left arm Left arm   Resp:   18 18   Temp:   95.7  F (35.4  C) 98.8  F (37.1  C)   TempSrc:   Oral Oral   SpO2:  100% 98% 98%   Weight: 84.9 kg (187 lb 2.7 oz)        Baseline quadriplegia, garbled speech. Refused repositioning, on pulse mattress. Afebrile. VSS. O2 sats high 90s on CPAP. LS diminished in bases. Encouraged deep breathing and coughing. On regular diet, total feed. Suprapubic catheter patent and has good amount of pink urine. Left PNT pouched and has scant cherry colored urine output. Ceftaz administered per orders. No BM this shift. Left and right PIVs saline locked. Denies pain. Refused Tylenol overnight. Slept in between cares. Continue POC.

## 2018-03-12 NOTE — PROGRESS NOTES
Urology  Progress Note    -No acute events overnight  -Awaiting PICC line placement  -Pain well controlled  -Tolerating diet    Exam  BP 96/56 (BP Location: Left arm)  Temp 98.8  F (37.1  C) (Oral)  Resp 18  Wt 84.9 kg (187 lb 2.7 oz)  SpO2 98%  BMI 27.64 kg/m2  No acute distress  Unlabored breathing  Abdomen soft, nt/nd.  Incision C/D/I with nephroureteral stent in place with scant watermelon urine in urostomy device  Lower extremities non-edematous bilaterally  SP tube with pink urine    UOP SPT 4150/1075; PNT 10/25      Labs  Am labs pending    Assessment/Plan  42 year old y/o male with a history of C4 quadriplegia and nephrolithiasis now POD# 5 s/p PCNL. Post op course notable for development of sepsis with both preoperative and postoperative cultures growing proteus, pseudomonas and MRSA.  Patient now afebrile and HDS with resolution of leukocytosis      Neuro: APAP, oxycodone for pain control  CV: HDS  Pulm: No active issues  FEN/GI: Regular diet  Endo: No active issues  : Continue nephroureteral stent and SP tube - repeat CT reveals residual stone burden - will discuss repeat procedure at later date  Heme/ID: Currently on gentamicin, vancomycin, and fluconazole - postoperative cultures growing MRSA, pseudomonas, and proteus.  Appreciate ID recs.  Will transition from gentamicin to ceftizadime for 8 day course from date of surgery.  Will also start bactrim for 7 day course from date of surgery.    Activity: Ad orly  PPx: SCDs       Seen and examined with the chief resident. Will discuss with Dr. Kory Bonilla, PGY-3  Urology Resident     Contacting the Urology Team     Please use the following job codes to reach the Urology Team. Note that you must use an in house phone and that job codes cannot receive text pages.     On weekdays, dial [phone] (or star-star-star [phone] on the new [employer] telephones) then [phone] to reach the Adult Urology resident or PA on call    On weekdays, dial [phone] (or  star-star-star [phone] on the new [employer] telephones) then [phone] to reach the Pediatric Urology resident    On weeknights and weekends, dial [phone] (or star-star-star [phone] on the new [employer] telephones) then [phone] to reach the Urology resident on call (for both Adult and Pediatrics)

## 2018-03-12 NOTE — PROGRESS NOTES
"SPIRITUAL HEALTH SERVICES  SPIRITUAL ASSESSMENT Progress Note  [facility] ([place]) [facility]    REFERRAL SOURCE:  initiated length of stay.    Reviewed documentation. Reflective conversation shared with Maicol which integrated elements of illness, family and spiritual narratives. Maicol is a resident of [facility] in [place]. He is engaged in spiritual support there, through staff chaplains and community Rastafarian. I am familiar with Maicol from  encounters at [facility] last year.    Maicol offered that he feels good, \"you know I'm doing good when I am joking around ... acting 'normal'.\"    Prayer invited and shared.    PLAN: I will follow up once per week for spiritual support as Maicol remains on 7B.                                                                                                                                           Travis Lopez   Intern  Pager [phone]    "

## 2018-03-12 NOTE — CONSULTS
Patient is on IR schedule 3/13/18 for a PICC placement.    No NPO required.  Consent will be done prior to procedure.    Please contact the IR charge RN at [phone] for estimated time of procedure.     Case discussed with Magdalene Monroe from urology.    Yahaira Hall DNP, APRN  Interventional Radiology  Phone: [phone]  Pager: [phone]

## 2018-03-12 NOTE — PLAN OF CARE
Problem: Patient Care Overview  Goal: Plan of Care/Patient Progress Review  Outcome: Improving  Vitals:    03/11/18 2110 03/12/18 0052 03/12/18 0459 03/12/18 0700   BP:  116/64 96/56 106/52   BP Location:  Left arm Left arm Left arm   Resp:  18 18 18   Temp:  95.7  F (35.4  C) 98.8  F (37.1  C) 96.9  F (36.1  C)   TempSrc:  Oral Oral Oral   SpO2: 100% 98% 98% 99%   Weight:         AVSS, pt A&O x 4, no complaints of pain. PNT draining scant output, s/p ly draining large amounts. Pt refusing to turn while in bed, on pulsate mattress. Plan for PICC this afternoon and then possible discharge. Continuing to monitor per POC.

## 2018-03-12 NOTE — PLAN OF CARE
Problem: Patient Care Overview  Goal: Plan of Care/Patient Progress Review  Outcome: Improving    VSS. Pain well managed with tylenol. Suprapubic catheter in place with 1400 cc pink output. Left PNT with scant output. Good PO intake. Total assist with cares. No BM this shift. Refusing repositioning. PIVx2 SL. Vascular team unable to place PICC today. CPAP on overnight. Continue with poc.
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
## 2018-03-12 NOTE — PROGRESS NOTES
"Social Work Services Progress Note    Hospital Day: 5  Date of Initial Social Work Evaluation:  Not yet completed  Collaborated with:  Chart review, team rounds, Urology team, [facility] (p. [phone], f. [phone])     Data:  Pt is a 42 year old quadriplegic male admitted from [facility] who is s/p percutaneous nephrolithotomy, access to kidney, uteroscopy, cystoscopy, and stent placement.   Per Urology team pt is ready to d/c today with a total of 14 days of abx (IV ceftazidine and oral bactrum) once PICC line is placed.     Intervention:  Tejinder contacted [facility] and spoke with Юлия in admissions who reported that pt is a LTC resident and has lived there 20 years. Юлия requested sensitivity and culture reports for pt's abx. Tejinder faxed this information as well as updated MAR and notes for review.     Tejinder contacted [facility] ([phone]) and spoke with Miranda who reported that all information needs to go through [facility] for authorization for transport but that agency can transport pt back to LTC today. Tejinder then was transferred to 's [facility] which the message indicated transport has to be arranged 3 days ahead of time. Per other MA transport plans hospital d/c cannot be arranged until pt is physically ready to d/c and has d/c orders completed. Due to this transport was arranged with [facility] instead.     Tejinder was informed by Urology PA at about 1:45pm that Vascular staff were unable to place a PICC for pt and recommended a subclavian line be placed. Tejinder placed call to [facility] and left  for Юлия in admissions to see if a peripheral line could be accommodated or if facility can manage a subclavian line. Юлия called back at about 3pm reporting that she would prefer pt have a subclavian line due to being a \"hard stick\" and not wanting to have to send pt back to hospital if a new line " cannot be placed at facility. Urology team was updated and d/c today is very unlikely today due to this now. [facility] transport was rescheduled for Tuesday 3pm.    W/c transport with pt's personal w/c and isolation precautions arranged via [employer] ([phone]) for Tuesday 3/13 at 3pm.     Assessment:  See bedside RN, medical team notes    Plan:    Anticipated Disposition:  Facility:  Return to LTC at [facility]    Barriers to d/c plan:  PICC line placement/subclavian placement    Follow Up:  juancho MCNEIL will continue to follow    MANDY Fleming, MSW  7B   [phone] (pager) [phone]  3/12/2018

## 2018-03-13 ENCOUNTER — APPOINTMENT (OUTPATIENT)
Dept: INTERVENTIONAL RADIOLOGY/VASCULAR | Facility: CLINIC | Age: 43
End: 2018-03-13
Attending: NURSE PRACTITIONER
Payer: COMMERCIAL

## 2018-03-13 VITALS
DIASTOLIC BLOOD PRESSURE: 80 MMHG | SYSTOLIC BLOOD PRESSURE: 124 MMHG | HEART RATE: 77 BPM | OXYGEN SATURATION: 98 % | TEMPERATURE: 96.5 F | BODY MASS INDEX: 27.64 KG/M2 | RESPIRATION RATE: 19 BRPM | WEIGHT: 187.17 LBS

## 2018-03-13 LAB
ANION GAP SERPL CALCULATED.3IONS-SCNC: 10 MMOL/L (ref 3–14)
BUN SERPL-MCNC: 20 MG/DL (ref 7–30)
CALCIUM SERPL-MCNC: 9.9 MG/DL (ref 8.5–10.1)
CHLORIDE SERPL-SCNC: 104 MMOL/L (ref 94–109)
CO2 SERPL-SCNC: 22 MMOL/L (ref 20–32)
CREAT SERPL-MCNC: 1.18 MG/DL (ref 0.66–1.25)
ERYTHROCYTE [DISTWIDTH] IN BLOOD BY AUTOMATED COUNT: 17.2 % (ref 10–15)
GFR SERPL CREATININE-BSD FRML MDRD: 68 ML/MIN/1.7M2
GLUCOSE SERPL-MCNC: 83 MG/DL (ref 70–99)
HCT VFR BLD AUTO: 26.9 % (ref 40–53)
HGB BLD-MCNC: 7.9 G/DL (ref 13.3–17.7)
MCH RBC QN AUTO: 25.2 PG (ref 26.5–33)
MCHC RBC AUTO-ENTMCNC: 29.4 G/DL (ref 31.5–36.5)
MCV RBC AUTO: 86 FL (ref 78–100)
PLATELET # BLD AUTO: 274 10E9/L (ref 150–450)
POTASSIUM SERPL-SCNC: 4.3 MMOL/L (ref 3.4–5.3)
RBC # BLD AUTO: 3.13 10E12/L (ref 4.4–5.9)
SODIUM SERPL-SCNC: 135 MMOL/L (ref 133–144)
WBC # BLD AUTO: 9.5 10E9/L (ref 4–11)

## 2018-03-13 PROCEDURE — 27210903 ZZH KIT CR5

## 2018-03-13 PROCEDURE — 27210995 ZZH RX 272: Performed by: STUDENT IN AN ORGANIZED HEALTH CARE EDUCATION/TRAINING PROGRAM

## 2018-03-13 PROCEDURE — 27210732 ZZH ACCESSORY CR1

## 2018-03-13 PROCEDURE — 27210886 ZZH ACCESSORY CR5

## 2018-03-13 PROCEDURE — 25000128 H RX IP 250 OP 636: Performed by: RADIOLOGY

## 2018-03-13 PROCEDURE — 25000128 H RX IP 250 OP 636: Performed by: STUDENT IN AN ORGANIZED HEALTH CARE EDUCATION/TRAINING PROGRAM

## 2018-03-13 PROCEDURE — 36415 COLL VENOUS BLD VENIPUNCTURE: CPT | Performed by: UROLOGY

## 2018-03-13 PROCEDURE — 77001 FLUOROGUIDE FOR VEIN DEVICE: CPT

## 2018-03-13 PROCEDURE — 36569 INSJ PICC 5 YR+ W/O IMAGING: CPT

## 2018-03-13 PROCEDURE — 25000128 H RX IP 250 OP 636: Performed by: SURGERY

## 2018-03-13 PROCEDURE — 25000132 ZZH RX MED GY IP 250 OP 250 PS 637: Performed by: STUDENT IN AN ORGANIZED HEALTH CARE EDUCATION/TRAINING PROGRAM

## 2018-03-13 PROCEDURE — C1769 GUIDE WIRE: HCPCS

## 2018-03-13 PROCEDURE — 25000128 H RX IP 250 OP 636: Performed by: UROLOGY

## 2018-03-13 PROCEDURE — 85027 COMPLETE CBC AUTOMATED: CPT | Performed by: UROLOGY

## 2018-03-13 PROCEDURE — 27210908 ZZH NEEDLE CR4

## 2018-03-13 PROCEDURE — 80048 BASIC METABOLIC PNL TOTAL CA: CPT | Performed by: UROLOGY

## 2018-03-13 PROCEDURE — C1751 CATH, INF, PER/CENT/MIDLINE: HCPCS

## 2018-03-13 PROCEDURE — 27210995 ZZH RX 272: Performed by: RADIOLOGY

## 2018-03-13 RX ORDER — HEPARIN SODIUM,PORCINE 10 UNIT/ML
5 VIAL (ML) INTRAVENOUS
Status: DISCONTINUED | OUTPATIENT
Start: 2018-03-13 | End: 2018-03-13 | Stop reason: HOSPADM

## 2018-03-13 RX ORDER — LIDOCAINE HYDROCHLORIDE 10 MG/ML
1-30 INJECTION, SOLUTION EPIDURAL; INFILTRATION; INTRACAUDAL; PERINEURAL
Status: COMPLETED | OUTPATIENT
Start: 2018-03-13 | End: 2018-03-13

## 2018-03-13 RX ORDER — IODIXANOL 320 MG/ML
50 INJECTION, SOLUTION INTRAVASCULAR ONCE
Status: COMPLETED | OUTPATIENT
Start: 2018-03-13 | End: 2018-03-13

## 2018-03-13 RX ADMIN — IODIXANOL 5 ML: 320 INJECTION, SOLUTION INTRAVASCULAR at 16:20

## 2018-03-13 RX ADMIN — WATER 2 G: 1 INJECTION INTRAMUSCULAR; INTRAVENOUS; SUBCUTANEOUS at 08:44

## 2018-03-13 RX ADMIN — SULFAMETHOXAZOLE AND TRIMETHOPRIM 1 TABLET: 800; 160 TABLET ORAL at 08:43

## 2018-03-13 RX ADMIN — LEVETIRACETAM 1000 MG: 500 TABLET ORAL at 08:43

## 2018-03-13 RX ADMIN — DOCUSATE SODIUM 100 MG: 100 CAPSULE, LIQUID FILLED ORAL at 08:44

## 2018-03-13 RX ADMIN — HEPARIN SODIUM 5000 UNITS: 5000 INJECTION, SOLUTION INTRAVENOUS; SUBCUTANEOUS at 00:51

## 2018-03-13 RX ADMIN — WATER 2 G: 1 INJECTION INTRAMUSCULAR; INTRAVENOUS; SUBCUTANEOUS at 00:50

## 2018-03-13 RX ADMIN — WATER 2 G: 1 INJECTION INTRAMUSCULAR; INTRAVENOUS; SUBCUTANEOUS at 16:47

## 2018-03-13 RX ADMIN — OXYBUTYNIN CHLORIDE 5 MG: 5 TABLET ORAL at 08:43

## 2018-03-13 RX ADMIN — LIDOCAINE HYDROCHLORIDE 8 ML: 10 INJECTION, SOLUTION EPIDURAL; INFILTRATION; INTRACAUDAL; PERINEURAL at 16:18

## 2018-03-13 RX ADMIN — OXYBUTYNIN CHLORIDE 5 MG: 5 TABLET ORAL at 16:47

## 2018-03-13 RX ADMIN — Medication 1000 MCG: at 08:43

## 2018-03-13 RX ADMIN — FERROUS SULFATE 325 MG: 325 TABLET, FILM COATED ORAL at 08:44

## 2018-03-13 RX ADMIN — HEPARIN SODIUM 5000 UNITS: 5000 INJECTION, SOLUTION INTRAVENOUS; SUBCUTANEOUS at 08:44

## 2018-03-13 RX ADMIN — SODIUM CHLORIDE, PRESERVATIVE FREE 5 ML: 5 INJECTION INTRAVENOUS at 16:18

## 2018-03-13 NOTE — PLAN OF CARE
Problem: Patient Care Overview  Goal: Plan of Care/Patient Progress Review  Outcome: No Change  Afebrile,vss. Needs to have a picc placed by IR tomorrow. Receiving ceftaz iv push q8 hrs. Refusing tylenol. Refuses to turn except for a skin check. Bipap on at night. Saline locked. Pt. Was fed his dinner. Neph tube pulling a small amount of bloody urine. Suprapubic cath pulling large amounts of yellow urine.

## 2018-03-13 NOTE — PROGRESS NOTES
Social Work Services Discharge Note      Patient Name:  Maicol Carrera     Anticipated Discharge Date:  3/13/18    Discharge Disposition:   [facility]:  [address]  [place]  P. [phone], F. [phone]     Pre-Admission Screening (PAS) online form has been completed.  The Level of Care (LOC) is:  Determined  Confirmation Code is:  Not needed as pt is returning to facility  Patient/caregiver informed of referral to [facility] for Pre-Admission Screening for skilled nursing facility (SNF) placement and to expect a phone call post discharge from SNF.     Additional Services/Equipment Arranged:  W/c transport with pt's personal w/c and isolation precautions arranged via [employer] ([phone]) for today at 6pm.      Patient / Family response to discharge plan:  Pt is agreeable and asked that sw update his dad     Persons notified of above discharge plan:  Pt, pt's dad Javon ([phone]), bedside nurse,charge nurse, NST, receiving facility, Urology team    Staff Discharge Instructions:  Please fax discharge orders and signed hard scripts for any controlled substances.  Please print a packet and send with patient.     CTS Handoff completed:  No PCP listed    Medicare Notice of Rights provided to the patient/family:  LUIS Li, MANDY, MSW  7B   [phone] (pager) [phone]  3/13/2018

## 2018-03-13 NOTE — PROGRESS NOTES
Interventional Radiology Intra-procedural Nursing Note    Patient Name: Maicol Carrera  Medical Record Number: [id number]  Today's Date: March 13, 2018         Start Time: 1545  End of procedure time:   Procedure: peripheral insertion of central venous catheter placement  Fire Safety Score: 0    Consent Review/Timeout Performed by: Dr. Arsenio Srivastava  Procedure Performed By: Dr. Arsenio Srivastava    Procedure start time: 1545  Puncture time: 1550  Procedure end time:     Report given to:   Time pt departs:      Other Notes:  Alert male transported via cart from IR holding room to IR Procedure Room 2 for planned intervention.  ID band confirmed and patient acknowledges understanding of planned procedure. Patient repositioned to procedure table via hover-mat and positioned supine.  Patient prepped and draped per policy see VS flowsheet, MAR for further information.      Right arm site secured and site identified using image guidance.  New 4 F Bioflow PICC catheter (LOT # [id number] / Expiration date 2020-02-29) inserted under sterile protocol.   Single lumen flushed and heparinized using 10 u/cc Heparin.  Site cleansed and dressed per protocol.    RN to RN transition of care report given to Es Rolon RN at 1600.  Patient name, identifiers, allergies and status reviewed and acknowledged at this time       Patient condition post procedure is stable.   Patient returned to inpatient room for post-procedure monitoring.    Aggie Hickey RN

## 2018-03-13 NOTE — PLAN OF CARE
Problem: Patient Care Overview  Goal: Plan of Care/Patient Progress Review  Patient is on Bipap at hs. Clear LS. +BS, no BM. Nephrostomy intact in left side, small dark red output, most urine drains in suprapubic, large cloudy yellow in appearence but adequate amount. Declines scheduled Tylenol. Ibuprofen pump intact in RLQ, pain is well controlled. Refused repositioning. Quad, on pulsate mattress. Sleeps most of this shift. Continue poc.

## 2018-03-13 NOTE — PROGRESS NOTES
Urology  Progress Note    -No acute events overnight  -Pain well controlled  -NPO pending IR placement of PICC    Exam  /65 (BP Location: Left arm)  Temp 97.1  F (36.2  C) (Oral)  Resp 16  Wt 84.9 kg (187 lb 2.7 oz)  SpO2 100%  BMI 27.64 kg/m2  No acute distress  Unlabored breathing  Abdomen soft, nt/nd.  Incision C/D/I with nephroureteral stent in place with scant watermelon urine in urostomy device  Lower extremities non-edematous bilaterally  SP tube with pink urine     UOP SPT - 4252/950 PNT - 25/40      Labs  AM labs pending  Yesterday's labs:  WBC 8.2  Hgb 7.5  Cr 1.1    Assessment/Plan  42 year old y/o male with a history of C4 quadriplegia and nephrolithiasis s/p PCNL. Post op course notable for development of sepsis with both preoperative and postoperative cultures growing proteus, pseudomonas and MRSA.  Patient now afebrile and HDS with resolution of leukocytosis.  Awaiting placement of PICC.        Neuro: APAP, oxycodone for pain control  CV: HDS  Pulm: No active issues  FEN/GI: Regular diet  Endo: No active issues  : Continue nephroureteral stent and SP tube - repeat CT reveals residual stone burden - will discuss repeat procedure at later date  Heme/ID: Currently on gentamicin, vancomycin, and fluconazole - postoperative cultures growing MRSA, pseudomonas, and proteus.  Appreciate ID recs.  Ceftizadime for 8 day course from date of surgery.  Bactrim for 7 day course from date of surgery.    Activity: Ad orly  PPx: SCDs    Seen and examined with the chief resident. Will discuss with Dr. Kory Bonilla, PGY-3  Urology Resident     Contacting the Urology Team     Please use the following job codes to reach the Urology Team. Note that you must use an in house phone and that job codes cannot receive text pages.     On weekdays, dial [phone] (or star-star-star 777 on the new [employer] telephones) then [phone] to reach the Adult Urology resident or PA on call    On weekdays, dial [phone] (or  star-star-star [phone] on the new [employer] telephones) then [phone] to reach the Pediatric Urology resident    On weeknights and weekends, dial [phone] (or star-star-star 777 on the new [employer] telephones) then [phone] to reach the Urology resident on call (for both Adult and Pediatrics)

## 2018-03-13 NOTE — PROGRESS NOTES
Social Work Services Progress Note    Hospital Day: 6  Date of Initial Social Work Evaluation:  3/12/18- please see for details  Collaborated with:  Chart review, team rounds, bedside RN, [facility] (p. [phone], f. [phone])     Data:  Pt is a 42 year old quadriplegic male admitted from [facility] who is s/p percutaneous nephrolithotomy, access to kidney, uteroscopy, cystoscopy, and stent placement.   Per Urology team pt is ready to d/c today with a total of 14 days of abx (IV ceftazidine and oral bactrum) once PICC line is placed.   Per bedside RN pt will go to IR for PICC placement today at approximately 2pm or 3pm.     Intervention:  W/c transport with pt's personal w/c was rescheduled via [employer] ([phone]) from 3pm to 6pm due to anticipated time of PICC placement.   Sw called [facility] admissions- left  for Юлия with update and faxed updated notes for review, waiting to hear back.     Assessment:  See bedside RN, medical team notes    Plan:    Anticipated Disposition:  Facility:  Return to LTC at [facility]    Barriers to d/c plan:  IR PICC placement    Follow Up:  juancho MCNEIL will continue to follow    MANDY Fleming, DONIS  7B   [phone] (pager) [phone]  3/13/2018

## 2018-03-13 NOTE — PLAN OF CARE
Problem: Patient Care Overview  Goal: Plan of Care/Patient Progress Review  Outcome: Adequate for Discharge Date Met: 03/13/18  Vitals:    03/13/18 1530 03/13/18 1545 03/13/18 1600 03/13/18 1615   BP: (!) 128/94 (!) 131/104 116/78 124/80   BP Location: Left arm Left arm Left arm Left arm   Pulse: 78 77     Resp: 16 18 19 19   Temp:       TempSrc:       SpO2: 99% 99% 98% 98%   Weight:         AVSS, pt A&O x 4, no complaints of pain. Pt adequate for discharge, report given to nurse at [facility], Pt discharging to TCU via [facility] transport.

## 2018-03-14 ENCOUNTER — CARE COORDINATION (OUTPATIENT)
Dept: CARE COORDINATION | Facility: CLINIC | Age: 43
End: 2018-03-14

## 2018-03-14 LAB
BACTERIA SPEC CULT: NO GROWTH
BACTERIA SPEC CULT: NO GROWTH
SPECIMEN SOURCE: NORMAL
SPECIMEN SOURCE: NORMAL

## 2018-03-15 DIAGNOSIS — N20.0 NEPHROLITHIASIS: Primary | ICD-10-CM

## 2018-03-15 RX ORDER — ACETAMINOPHEN 325 MG/1
975 TABLET ORAL ONCE
Status: CANCELLED | OUTPATIENT
Start: 2018-03-15 | End: 2018-03-15

## 2018-03-16 ENCOUNTER — TELEPHONE (OUTPATIENT)
Dept: UROLOGY | Facility: CLINIC | Age: 43
End: 2018-03-16

## 2018-03-16 NOTE — TELEPHONE ENCOUNTER
Called and left a voicemail  message regarding surgery orders. Left my direct number for a call back. Surgery scheduled on 3/21/18 @ 12 pm with Dr Horn @ [place] OR. Surgery packet sent via [employer] on 3/16/18.

## 2018-03-20 ENCOUNTER — ANESTHESIA EVENT (OUTPATIENT)
Dept: SURGERY | Facility: CLINIC | Age: 43
End: 2018-03-20
Payer: COMMERCIAL

## 2018-03-21 ENCOUNTER — SURGERY (OUTPATIENT)
Age: 43
End: 2018-03-21

## 2018-03-21 ENCOUNTER — ANESTHESIA (OUTPATIENT)
Dept: SURGERY | Facility: CLINIC | Age: 43
End: 2018-03-21
Payer: COMMERCIAL

## 2018-03-21 ENCOUNTER — APPOINTMENT (OUTPATIENT)
Dept: GENERAL RADIOLOGY | Facility: CLINIC | Age: 43
End: 2018-03-21
Attending: UROLOGY
Payer: COMMERCIAL

## 2018-03-21 ENCOUNTER — HOSPITAL ENCOUNTER (OUTPATIENT)
Facility: CLINIC | Age: 43
Discharge: NURSING, REHAB OR LONG-TERM CARE | End: 2018-03-21
Attending: UROLOGY | Admitting: UROLOGY
Payer: COMMERCIAL

## 2018-03-21 VITALS
DIASTOLIC BLOOD PRESSURE: 84 MMHG | HEART RATE: 66 BPM | BODY MASS INDEX: 28.45 KG/M2 | SYSTOLIC BLOOD PRESSURE: 134 MMHG | OXYGEN SATURATION: 100 % | WEIGHT: 177 LBS | RESPIRATION RATE: 16 BRPM | HEIGHT: 66 IN | TEMPERATURE: 97 F

## 2018-03-21 DIAGNOSIS — N39.0 SEPSIS DUE TO URINARY TRACT INFECTION (H): ICD-10-CM

## 2018-03-21 DIAGNOSIS — N20.0 KIDNEY STONES: ICD-10-CM

## 2018-03-21 DIAGNOSIS — A41.9 SEPSIS DUE TO URINARY TRACT INFECTION (H): ICD-10-CM

## 2018-03-21 PROCEDURE — 25000566 ZZH SEVOFLURANE, EA 15 MIN: Performed by: UROLOGY

## 2018-03-21 PROCEDURE — 27210794 ZZH OR GENERAL SUPPLY STERILE: Performed by: UROLOGY

## 2018-03-21 PROCEDURE — 25000132 ZZH RX MED GY IP 250 OP 250 PS 637: Performed by: UROLOGY

## 2018-03-21 PROCEDURE — 82962 GLUCOSE BLOOD TEST: CPT

## 2018-03-21 PROCEDURE — C2617 STENT, NON-COR, TEM W/O DEL: HCPCS | Performed by: UROLOGY

## 2018-03-21 PROCEDURE — C1769 GUIDE WIRE: HCPCS | Performed by: UROLOGY

## 2018-03-21 PROCEDURE — 25000128 H RX IP 250 OP 636: Performed by: NURSE ANESTHETIST, CERTIFIED REGISTERED

## 2018-03-21 PROCEDURE — 36000070 ZZH SURGERY LEVEL 5 EA 15 ADDTL MIN - UMMC: Performed by: UROLOGY

## 2018-03-21 PROCEDURE — 36000072 ZZH SURGERY LEVEL 5 W FLUORO 1ST 30 MIN - UMMC: Performed by: UROLOGY

## 2018-03-21 PROCEDURE — 25000125 ZZHC RX 250: Performed by: NURSE ANESTHETIST, CERTIFIED REGISTERED

## 2018-03-21 PROCEDURE — 25000128 H RX IP 250 OP 636: Performed by: STUDENT IN AN ORGANIZED HEALTH CARE EDUCATION/TRAINING PROGRAM

## 2018-03-21 PROCEDURE — 40000277 XR SURGERY CARM FLUORO LESS THAN 5 MIN W STILLS: Mod: TC

## 2018-03-21 PROCEDURE — 40000171 ZZH STATISTIC PRE-PROCEDURE ASSESSMENT III: Performed by: UROLOGY

## 2018-03-21 PROCEDURE — 71000016 ZZH RECOVERY PHASE 1 LEVEL 3 FIRST HR: Performed by: UROLOGY

## 2018-03-21 PROCEDURE — 37000008 ZZH ANESTHESIA TECHNICAL FEE, 1ST 30 MIN: Performed by: UROLOGY

## 2018-03-21 PROCEDURE — 37000009 ZZH ANESTHESIA TECHNICAL FEE, EACH ADDTL 15 MIN: Performed by: UROLOGY

## 2018-03-21 PROCEDURE — 71000027 ZZH RECOVERY PHASE 2 EACH 15 MINS: Performed by: UROLOGY

## 2018-03-21 PROCEDURE — C1758 CATHETER, URETERAL: HCPCS | Performed by: UROLOGY

## 2018-03-21 PROCEDURE — 25500064 ZZH RX 255 OP 636: Performed by: UROLOGY

## 2018-03-21 PROCEDURE — C9399 UNCLASSIFIED DRUGS OR BIOLOG: HCPCS | Performed by: NURSE ANESTHETIST, CERTIFIED REGISTERED

## 2018-03-21 DEVICE — IMPLANTABLE DEVICE
Type: IMPLANTABLE DEVICE | Site: URETER | Status: NON-FUNCTIONAL
Removed: 2019-09-19

## 2018-03-21 RX ORDER — NALOXONE HYDROCHLORIDE 0.4 MG/ML
.1-.4 INJECTION, SOLUTION INTRAMUSCULAR; INTRAVENOUS; SUBCUTANEOUS
Status: DISCONTINUED | OUTPATIENT
Start: 2018-03-21 | End: 2018-03-21 | Stop reason: HOSPADM

## 2018-03-21 RX ORDER — SODIUM CHLORIDE, SODIUM LACTATE, POTASSIUM CHLORIDE, CALCIUM CHLORIDE 600; 310; 30; 20 MG/100ML; MG/100ML; MG/100ML; MG/100ML
INJECTION, SOLUTION INTRAVENOUS CONTINUOUS PRN
Status: DISCONTINUED | OUTPATIENT
Start: 2018-03-21 | End: 2018-03-21

## 2018-03-21 RX ORDER — ONDANSETRON 2 MG/ML
4 INJECTION INTRAMUSCULAR; INTRAVENOUS EVERY 30 MIN PRN
Status: DISCONTINUED | OUTPATIENT
Start: 2018-03-21 | End: 2018-03-21 | Stop reason: HOSPADM

## 2018-03-21 RX ORDER — CEFAZOLIN SODIUM 1 G/3ML
1 INJECTION, POWDER, FOR SOLUTION INTRAMUSCULAR; INTRAVENOUS SEE ADMIN INSTRUCTIONS
Status: DISCONTINUED | OUTPATIENT
Start: 2018-03-21 | End: 2018-03-21 | Stop reason: HOSPADM

## 2018-03-21 RX ORDER — ONDANSETRON 2 MG/ML
INJECTION INTRAMUSCULAR; INTRAVENOUS PRN
Status: DISCONTINUED | OUTPATIENT
Start: 2018-03-21 | End: 2018-03-21

## 2018-03-21 RX ORDER — SULFAMETHOXAZOLE/TRIMETHOPRIM 800-160 MG
1 TABLET ORAL 2 TIMES DAILY
Qty: 14 TABLET | Refills: 0 | Status: SHIPPED | OUTPATIENT
Start: 2018-03-21 | End: 2018-03-23

## 2018-03-21 RX ORDER — ONDANSETRON 4 MG/1
4 TABLET, ORALLY DISINTEGRATING ORAL EVERY 30 MIN PRN
Status: DISCONTINUED | OUTPATIENT
Start: 2018-03-21 | End: 2018-03-21 | Stop reason: HOSPADM

## 2018-03-21 RX ORDER — FENTANYL CITRATE 50 UG/ML
25-50 INJECTION, SOLUTION INTRAMUSCULAR; INTRAVENOUS
Status: DISCONTINUED | OUTPATIENT
Start: 2018-03-21 | End: 2018-03-21 | Stop reason: HOSPADM

## 2018-03-21 RX ORDER — ACETAMINOPHEN 325 MG/1
975 TABLET ORAL ONCE
Status: COMPLETED | OUTPATIENT
Start: 2018-03-21 | End: 2018-03-21

## 2018-03-21 RX ORDER — SODIUM CHLORIDE, SODIUM LACTATE, POTASSIUM CHLORIDE, CALCIUM CHLORIDE 600; 310; 30; 20 MG/100ML; MG/100ML; MG/100ML; MG/100ML
INJECTION, SOLUTION INTRAVENOUS CONTINUOUS
Status: DISCONTINUED | OUTPATIENT
Start: 2018-03-21 | End: 2018-03-21 | Stop reason: HOSPADM

## 2018-03-21 RX ORDER — FENTANYL CITRATE 50 UG/ML
INJECTION, SOLUTION INTRAMUSCULAR; INTRAVENOUS PRN
Status: DISCONTINUED | OUTPATIENT
Start: 2018-03-21 | End: 2018-03-21

## 2018-03-21 RX ORDER — HYDROCODONE BITARTRATE AND ACETAMINOPHEN 5; 325 MG/1; MG/1
1 TABLET ORAL ONCE
Status: DISCONTINUED | OUTPATIENT
Start: 2018-03-21 | End: 2018-03-21 | Stop reason: HOSPADM

## 2018-03-21 RX ORDER — EPHEDRINE SULFATE 50 MG/ML
INJECTION, SOLUTION INTRAMUSCULAR; INTRAVENOUS; SUBCUTANEOUS PRN
Status: DISCONTINUED | OUTPATIENT
Start: 2018-03-21 | End: 2018-03-21

## 2018-03-21 RX ORDER — PROPOFOL 10 MG/ML
INJECTION, EMULSION INTRAVENOUS PRN
Status: DISCONTINUED | OUTPATIENT
Start: 2018-03-21 | End: 2018-03-21

## 2018-03-21 RX ORDER — HYDRALAZINE HYDROCHLORIDE 20 MG/ML
2.5-5 INJECTION INTRAMUSCULAR; INTRAVENOUS EVERY 10 MIN PRN
Status: DISCONTINUED | OUTPATIENT
Start: 2018-03-21 | End: 2018-03-21 | Stop reason: HOSPADM

## 2018-03-21 RX ORDER — FLUMAZENIL 0.1 MG/ML
0.2 INJECTION, SOLUTION INTRAVENOUS
Status: DISCONTINUED | OUTPATIENT
Start: 2018-03-21 | End: 2018-03-21 | Stop reason: HOSPADM

## 2018-03-21 RX ORDER — METOPROLOL TARTRATE 1 MG/ML
1-2 INJECTION, SOLUTION INTRAVENOUS EVERY 5 MIN PRN
Status: DISCONTINUED | OUTPATIENT
Start: 2018-03-21 | End: 2018-03-21 | Stop reason: HOSPADM

## 2018-03-21 RX ORDER — LIDOCAINE HYDROCHLORIDE 20 MG/ML
INJECTION, SOLUTION INFILTRATION; PERINEURAL PRN
Status: DISCONTINUED | OUTPATIENT
Start: 2018-03-21 | End: 2018-03-21

## 2018-03-21 RX ORDER — CEFAZOLIN SODIUM 2 G/100ML
2 INJECTION, SOLUTION INTRAVENOUS
Status: DISCONTINUED | OUTPATIENT
Start: 2018-03-21 | End: 2018-03-21 | Stop reason: HOSPADM

## 2018-03-21 RX ADMIN — PROPOFOL 120 MG: 10 INJECTION, EMULSION INTRAVENOUS at 13:51

## 2018-03-21 RX ADMIN — PHENYLEPHRINE HYDROCHLORIDE 100 MCG: 10 INJECTION, SOLUTION INTRAMUSCULAR; INTRAVENOUS; SUBCUTANEOUS at 14:21

## 2018-03-21 RX ADMIN — ROCURONIUM BROMIDE 50 MG: 10 INJECTION INTRAVENOUS at 13:51

## 2018-03-21 RX ADMIN — SODIUM CHLORIDE, POTASSIUM CHLORIDE, SODIUM LACTATE AND CALCIUM CHLORIDE: 600; 310; 30; 20 INJECTION, SOLUTION INTRAVENOUS at 13:40

## 2018-03-21 RX ADMIN — SUGAMMADEX 160 MG: 100 INJECTION, SOLUTION INTRAVENOUS at 16:00

## 2018-03-21 RX ADMIN — CEFTAZIDIME 2 G: 2 INJECTION, POWDER, FOR SOLUTION INTRAVENOUS at 14:02

## 2018-03-21 RX ADMIN — ONDANSETRON 4 MG: 2 INJECTION INTRAMUSCULAR; INTRAVENOUS at 15:55

## 2018-03-21 RX ADMIN — Medication 5 MG: at 14:21

## 2018-03-21 RX ADMIN — Medication 5 MG: at 14:00

## 2018-03-21 RX ADMIN — ACETAMINOPHEN 975 MG: 325 TABLET, FILM COATED ORAL at 11:36

## 2018-03-21 RX ADMIN — Medication 5 MG: at 14:54

## 2018-03-21 RX ADMIN — IOTHALAMATE MEGLUMINE 7.5 ML: 600 INJECTION INTRAVASCULAR at 15:58

## 2018-03-21 RX ADMIN — GENTAMICIN SULFATE 160 MG: 40 INJECTION, SOLUTION INTRAMUSCULAR; INTRAVENOUS at 14:27

## 2018-03-21 RX ADMIN — MIDAZOLAM 1 MG: 1 INJECTION INTRAMUSCULAR; INTRAVENOUS at 13:51

## 2018-03-21 RX ADMIN — LIDOCAINE HYDROCHLORIDE 100 MG: 20 INJECTION, SOLUTION INFILTRATION; PERINEURAL at 13:51

## 2018-03-21 RX ADMIN — FENTANYL CITRATE 50 MCG: 50 INJECTION, SOLUTION INTRAMUSCULAR; INTRAVENOUS at 13:51

## 2018-03-21 RX ADMIN — MIDAZOLAM 1 MG: 1 INJECTION INTRAMUSCULAR; INTRAVENOUS at 13:40

## 2018-03-21 NOTE — ANESTHESIA CARE TRANSFER NOTE
Patient: Maicol Kingel    Procedure(s):  Left Holmium Laser Percutaneous Nephrolithotomy, Access To Kidney, Left Ureteroscopy, Stent Placement  general with block **Latex Allergy** - Wound Class: I-Clean    Diagnosis: Nephrolithiasis  Diagnosis Additional Information: No value filed.    Anesthesia Type:   General, ETT     Note:  Airway :Face Mask  Patient transferred to:PACU  Comments: Pt transferred to PACU.  Maintaining airway and oxygenation via FM.  VSS.  Pt denies pain and nausea.  Report to RN.  All questions answered. Handoff Report: Identifed the Patient, Identified the Reponsible Provider, Reviewed the pertinent medical history, Discussed the surgical course, Reviewed Intra-OP anesthesia mangement and issues during anesthesia, Set expectations for post-procedure period and Allowed opportunity for questions and acknowledgement of understanding      Vitals: (Last set prior to Anesthesia Care Transfer)    CRNA VITALS  3/21/2018 1541 - 3/21/2018 1638      3/21/2018             Pulse: 86    SpO2: 100 %    Resp Rate (observed): 12                Electronically Signed By: ANNIE Early CRNA  March 21, 2018  4:38 PM

## 2018-03-21 NOTE — OR NURSING
Hien Thomas RN at [facility] was called with The discharge orders Stanley  read them over the phone. She asked for an extra ostomy pouch in case of a leak over the left neph tube. This was sent  Along with an extra leg bag and stat locks and catheter stabilizing securing device. She was faxed the IV antibiotic which she called back and stated she had received.. The urine in the neph tube became dark pink when he was dressed and transferred to his chair. Dr Hensley was notified and 250 cc lactated ringer flush given. The urine in the nephtubing changed to clear very light pink urine.  Hien was instructed to push fluids on the patient.
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
## 2018-03-21 NOTE — OP NOTE
PREOPERATIVE DIAGNOSIS: Left renal calculi     POSTOPERATIVE DIAGNOSIS: Same    PROCEDURES PERFORMED:   1. Antegrade nephrostogram.   2. Removal of indwelling nephroureteral stent  3. Percutaneous nephrolithotomy, stone burden more than 2 cm.   4. Holmium laser lithotripsy  5. Use of C-arm and interpretation of the fluoroscopic image.   6. Placement of new left 10 Greenlandic x 26 cm nephroureteral stent    STAFF SURGEON: Dejon Horn MD. Please note that Dr. Horn was present for the entire procedure.      : Elmo Ahuja MD; Vamsi Bonilla MD     ESTIMATED BLOOD LOSS:  Minimal    SPECIMENS: None    SIGNIFICANT FINDINGS: Significant residual stone burden found at the UPJ and within upper and lower pole calyces.  The infundibula were too small to admit a rigid nephroscope.  All stone fragmented with HLL - no residual fragments > 1 mm.  The stone was very friable and we felt it would be very difficult to basket the stone. Unremarkable nephroureteral stent exchange     BRIEF OPERATIVE INDICATIONS: Mr Carrera is a 42 year old man with a complex past medical history along with a urologic history of urolithiasis who was recently found to have a large left sided staghorn calculus.  He underwent PCNL on 3/7 at which time significant residual stone burden remained at the end of the case.  Postoperative CT confirmed residual stone burden at the left UPJ and within both upper and lower pole calyces.  In light of these findings he now returns for repeat PCNL.      PROCEDURE PERFORMED: After identifying and marking the patient in the preoperative holding area, the patient was brought to the operating room and placed in supine position. Preoperative timeout was then completed to verify the patient and procedure to be performed. Once adequate anesthesia was obtained, the patient was placed in prone position and all the pressure points were well supported. Subsequently, the patient was then prepped and  draped in a standard sterile fashion.   We began our procedure by doing the antegrade nephrostogram to delineate the anatomy of the renal pelvis. Images were saved.  Evaluation of the images revealed notable residual stone burden that corresponded to the available CT images.  We then placed a sensor guidewire into the existing nephroureteral stent and advanced it down to the bladder under radiographic guidance.  We then cut the stitch and removed the nephroureteral stent.  After removing the nephroureteral stent we advanced a dual lumen catheter over the wire to establish access with a second wire that was advanced into the bladder.   A flexible cystoscope was then introduced along the existing tract into the collecting system.  Complete pyeloscopy revealed significant residual stone burden in the renal pelvis and the upper and lower pole calyces.  We introduced the Holmium laser fiber and fragmented the upper pole, pelvic and lower pole stones on dusting settings.  No fragments > 1mm remained at the end of the case. Hemostasis was excellent.    At this point one of the wires was removed and a 10 x 26 left nephroureteral stent was placed under radiographic guidance. Proximal curl in the upper pole and distal curl in the bladder were confirmed fluoroscopically.  We closed the PCN site with 3-0 Vicryl suture and then attached a urostomy bag over the site.      This concluded our procedure. The patient was awakened from anesthesia, brought to the recovery room without any immediate postoperative complication.     Postoperative Plan:  -DC to home  -Continue antibiotics for 1 weeks  -Follow up with Dr Horn in 1 month with CT prior    Attestation:  I was present for the entire procedure on 3/21/18.  JEREMIE Horn MD

## 2018-03-21 NOTE — IP AVS SNAPSHOT
MRN:[id number]                      After Visit Summary   3/21/2018    Maicol Carrera    MRN: [id number]           Thank you!     Thank you for choosing [facility] for your care. Our goal is always to provide you with excellent care. Hearing back from our patients is one way we can continue to improve our services. Please take a few minutes to complete the written survey that you may receive in the mail after you visit with us. Thank you!        Patient Information     Date Of Birth          1975        About your hospital stay     You were admitted on:  March 21, 2018 You last received care in the:  Same Day Surgery [facility]    You were discharged on:  March 21, 2018       Who to Call     For medical emergencies, please call 911.  For non-urgent questions about your medical care, please call your primary care provider or clinic, [phone]  For questions related to your surgery, please call your surgery clinic        Attending Provider     Provider Dejon Alston MD Urology       Primary Care Provider Office Phone # Fax #    [facility] [phone]      After Care Instructions     Diet Instructions       Resume pre procedure diet            Discharge Instructions       Activity  - Resume normal activity    Urination  - Some light redness (up to a watermelon-like-pink in color) is expected while your nephroureteral stent remains in place  - If having hematuria (blood in the urine), make sure to increase your water intake and monitor for improvement  - If you are your suprapubic tube stops draining, return urgently to the ED or call clinic to try to arrange for an urgent visit same day.   - You are going home with the following tubes or drains:     1) Suprapubic Tube - Nurse/ to write care and instructions.  Treat the catheter like an extension of your body; do not let it get caught or snagged on anything.  Leave the  "catheter in place until your follow up. Call the numbers listed above for any concerns.     2) Left nephroureteral stent - You will be discharged to home with a new nephroureteral stent in place.  The tube is open and draining to a urostomy device.  Your nurse will provide instructions on device drainage - it may be attached to leg bag for convenience.  We will discuss the plan for future stent removal at your follow up visit in one month.      Medications  Antibiotics  -We will continue your pre-operative antibiotics for one week     Follow-Up:  - Call your primary care provider to touch base regarding your recent admission.    - Call or return sooner than your regularly scheduled visit if you develop any of the following: fever (greater than 101.5), uncontrolled pain, uncontrolled nausea or vomiting, as well as increased redness, swelling, or drainage from your wound.   - Follow up in one month with Dr Horn.  CT scan prior.      Phone numbers:   - Monday through Friday 8am to 4:30pm: Call [phone] with questions or to schedule or confirm appointment.    - Nights or weekends: call the after hours emergency pager - [phone] and tell the  \"I would like to page the Urology Resident on call.\"  - For emergencies, call [phone]            Discharge Instructions       Follow up in clinic in 1 month with CT scan prior.            Encourage fluids       Encourage fluids at home to keep urine clear to light pink                  Your next 10 appointments already scheduled     Apr 05, 2018 10:00 AM CDT   (Arrive by 9:45 AM)   Return Visit with Shawna Saldivar MD   [facility] ([facility])    [address] [place] [address]  [address]  [facility] [address] [phone]            Apr 06, 2018 12:00 PM CDT   (Arrive by 11:45 AM)   Cystoscopy with Dejon Horn MD   [facility] ([facility] " [facility])    [address]  [address] [facility] [address] [phone]              Future tests that were ordered for you     CT Abdomen Pelvis w/o Contrast                 Further instructions from your care team       [facility] [place]  Same-Day Surgery   Adult Discharge Orders & Instructions     For 24 hours after surgery    1. Get plenty of rest.  A responsible adult must stay with you for at least 24 hours after you leave the hospital.   2. Do not drive or use heavy equipment.  If you have weakness or tingling, don't drive or use heavy equipment until this feeling goes away.  3. Do not drink alcohol.  4. Avoid strenuous or risky activities.  Ask for help when climbing stairs.   5. You may feel lightheaded.  IF so, sit for a few minutes before standing.  Have someone help you get up.   6. If you have nausea (feel sick to your stomach): Drink only clear liquids such as apple juice, ginger ale, broth or 7-Up.  Rest may also help.  Be sure to drink enough fluids.  Move to a regular diet as you feel able.  7. You may have a slight fever. Call the doctor if your fever is over 100.5 F (taken under the tongue) or lasts longer than 24 hours.  8. You may have a dry mouth, a sore throat, muscle aches or trouble sleeping.  These should go away after 24 hours.  9. Do not make important or legal decisions.   Call your doctor for any of the followin.  Signs of infection (fever, growing tenderness at the surgery site, a large amount of drainage or bleeding, severe pain, foul-smelling drainage, redness, swelling).    2. It has been over 8 to 10 hours since surgery and you are still not able to urinate (pass water).    3.  Headache for over 24 hours.      To contact a doctor, call Dr Dejon Horn's office at [phone]--Urology  or:        After hours, call [phone] and ask for the resident on call for UROLOGY  (answered 24 hours a day)       "Emergency Department:    [facility]: [phone]       (TTY for hearing impaired: [phone])            Pending Results     No orders found from 3/19/2018 to 3/22/2018.            Admission Information     Date & Time Provider Department Dept. Phone    3/21/2018 Dejon Horn MD Same Day Surgery [facility] [phone]      Your Vitals Were     Blood Pressure Pulse Temperature Respirations Height Weight    132/92 66 96.3  F (35.7  C) (Temporal) 16 1.676 m (5' 6\") 80.3 kg (177 lb)    Pulse Oximetry BMI (Body Mass Index)                100% 28.57 kg/m2          MyChart Information     [employer] lets you send messages to your doctor, view your test results, renew your prescriptions, schedule appointments and more. To sign up, go to [email or web address] . Click on \"Log in\" on the left side of the screen, which will take you to the Welcome page. Then click on \"Sign up Now\" on the right side of the page.     You will be asked to enter the access code listed below, as well as some personal information. Please follow the directions to create your username and password.     Your access code is: [email or web address]  Expires: 2018  1:37 PM     Your access code will  in 90 days. If you need help or a new code, please call your [facility] clinic or [phone].        Care EveryWhere ID     This is your Care EveryWhere ID. This could be used by other organizations to access your [facility] medical records  [id number]        Equal Access to Services     [facility]: Hadii trupti ortiz Sonavneet, waaxda luqadaha, qaybta kaalmakathy rodas . So [facility] [phone].    ATENCIÓN: Si habla español, tiene a da silva disposición servicios gratuitos de asistencia lingüística. Llame al [phone].    We comply with applicable federal civil rights laws and [place] laws. We do not discriminate on the basis of race, color, national origin, age, disability, sex, sexual " orientation, or gender identity.               Review of your medicines      CONTINUE these medicines which have NOT CHANGED        Dose / Directions    ACETAMINOPHEN PO        Dose:  650 mg   Take 650 mg by mouth every 4 hours as needed for pain   Refills:  0       albuterol (2.5 MG/3ML) 0.083% neb solution   Used for:  SOB (shortness of breath)        Dose:  2.5 mg   Take 3 mLs by nebulization every 2 hours as needed.   Quantity:  1 Box   Refills:  0       ascorbic acid 250 MG Tabs tablet   Used for:  Recurrent UTI        Dose:  250 mg   Take 1 tablet by mouth 2 times daily.   Refills:  0       * baclofen 10 MG tablet   Commonly known as:  LIORESAL        Dose:  10 mg   Take 10 mg by mouth 4 times daily as needed   Refills:  0       * BACLOFEN IT        Via implanted IT pump Baclofen 2000 mc/gmL  Dose: 277.1 mcg/day Low reservoir alarm date: 4/26/2018   Refills:  0       cefTAZidime 2 GM vial   Commonly known as:  FORTAZ   Used for:  Sepsis due to urinary tract infection (H), Kidney stones        Dose:  2 g   Inject 2 g into the vein every 8 hours for 7 days   Quantity:  210 mL   Refills:  0       clindamycin 1 % topical gel   Commonly known as:  CLINDAMAX        Apply topically 2 times daily   Refills:  0       COLACE 100 MG capsule   Generic drug:  docusate sodium        Dose:  100 mg   Take 100 mg by mouth 2 times daily.   Refills:  0       Cranberry 400 MG Tabs        Dose:  400 mg   Take 400 mg by mouth 2 times daily   Refills:  0       cyanocobalamin 1000 MCG tablet   Commonly known as:  vitamin  B-12   Used for:  Vitamin B 12 deficiency        Dose:  1000 mcg   Take 1 tablet by mouth daily.   Refills:  0       * bisacodyl 10 MG Suppository   Commonly known as:  DULCOLAX   Used for:  Chronic constipation        Dose:  10 mg   Place 1 suppository rectally every other day.   Quantity:  12 suppository   Refills:  0       * DULCOLAX PO        Dose:  10 mg   Take 10 mg by mouth At Bedtime.   Refills:  0        ferrous sulfate 325 (65 FE) MG tablet   Commonly known as:  IRON   Used for:  Anemia due to blood loss, acute        Dose:  325 mg   Take 1 tablet by mouth daily (with breakfast).   Quantity:  100 tablet   Refills:  0       KEPPRA PO        Dose:  1000 mg   Take 1,000 mg by mouth 2 times daily   Refills:  0       ketoconazole 2 % cream   Commonly known as:  NIZORAL        Apply topically 2 times daily as needed   Refills:  0       magnesium gluconate 500 MG tablet   Commonly known as:  MAGONATE   Used for:  Magnesium deficiency        Dose:  500 mg   Take 1 tablet by mouth daily.   Refills:  0       miconazole 2 % powder   Commonly known as:  MICATIN; MICRO GUARD   Used for:  Candida infection of flexural skin        Apply  topically 2 times daily.   Quantity:  70 g   Refills:  0       mometasone 0.1 % cream   Commonly known as:  ELOCON        Apply topically At Bedtime   Refills:  0       MULTIVITAMIN ADULT PO        Dose:  1 tablet   Take 1 tablet by mouth daily   Refills:  0       ondansetron 4 MG ODT tab   Commonly known as:  ZOFRAN-ODT   Used for:  Nausea and vomiting        Dose:  4 mg   Take 1 tablet by mouth every 6 hours as needed for nausea.   Quantity:  20 tablet   Refills:  0       OSTEO-MULTIVITAMINS/MINERALS OR        Take  by mouth daily. 12 pm   Refills:  0       oxybutynin 5 MG tablet   Commonly known as:  DITROPAN   Used for:  Neurogenic bladder        Dose:  5 mg   Take 1 tablet by mouth 3 times daily.   Quantity:  270 tablet   Refills:  0       polyethylene glycol powder   Commonly known as:  MIRALAX/GLYCOLAX        Dose:  1 capful   Take 1 capful by mouth daily as needed for constipation (if no BM x3 days)   Refills:  0       polyvinyl alcohol 1.4 % ophthalmic solution   Commonly known as:  LIQUIFILM TEARS        Dose:  2 drop   Place 2 drops into both eyes every 2 hours as needed for dry eyes   Refills:  0       selenium sulfide 2.5 % lotion   Commonly known as:  SELSUN        Apply to scalp  topically in the morning every Sun, Tue, Thursday. Place for 5 min then rinse.   Refills:  0       senna-docusate 8.6-50 MG per tablet   Commonly known as:  SENOKOT-S;PERICOLACE   Used for:  Chronic constipation        Dose:  1-2 tablet   Take 1-2 tablets by mouth 2 times daily.   Quantity:  60 tablet   Refills:  0       sulfamethoxazole-trimethoprim 800-160 MG per tablet   Commonly known as:  BACTRIM DS/SEPTRA DS   Used for:  Kidney stones, Sepsis due to urinary tract infection (H)        Dose:  1 tablet   Take 1 tablet by mouth 2 times daily for 7 days   Quantity:  14 tablet   Refills:  0       VITAMIN D (CHOLECALCIFEROL) PO        Dose:  5000 Units   Take 5,000 Units by mouth daily   Refills:  0       zinc oxide 40 % ointment   Commonly known as:  DESITIN   Used for:  Breakdown of skin tissue        Apply  topically every hour as needed.   Quantity:  56.7 g   Refills:  0       * Notice:  This list has 4 medication(s) that are the same as other medications prescribed for you. Read the directions carefully, and ask your doctor or other care provider to review them with you.      STOP taking     CIPROFLOXACIN PO                Where to get your medicines      Some of these will need a paper prescription and others can be bought over the counter. Ask your nurse if you have questions.     Bring a paper prescription for each of these medications     cefTAZidime 2 GM vial    sulfamethoxazole-trimethoprim 800-160 MG per tablet                Protect others around you: Learn how to safely use, store and throw away your medicines at www.disposemymeds.org.        ANTIBIOTIC INSTRUCTION     You've Been Prescribed an Antibiotic - Now What?  Your healthcare team thinks that you or your loved one might have an infection. Some infections can be treated with antibiotics, which are powerful, life-saving drugs. Like all medications, antibiotics have side effects and should only be used when necessary. There are some important  things you should know about your antibiotic treatment.      Your healthcare team may run tests before you start taking an antibiotic.    Your team may take samples (e.g., from your blood, urine or other areas) to run tests to look for bacteria. These test can be important to determine if you need an antibiotic at all and, if you do, which antibiotic will work best.      Within a few days, your healthcare team might change or even stop your antibiotic.    Your team may start you on an antibiotic while they are working to find out what is making you sick.    Your team might change your antibiotic because test results show that a different antibiotic would be better to treat your infection.    In some cases, once your team has more information, they learn that you do not need an antibiotic at all. They may find out that you don't have an infection, or that the antibiotic you're taking won't work against your infection. For example, an infection caused by a virus can't be treated with antibiotics. Staying on an antibiotic when you don't need it is more likely to be harmful than helpful.      You may experience side effects from your antibiotic.    Like all medications, antibiotics have side effects. Some of these can be serious.    Let you healthcare team know if you have any known allergies when you are admitted to the hospital.    One significant side effect of nearly all antibiotics is the risk of severe and sometimes deadly diarrhea caused by Clostridium difficile (C. Difficile). This occurs when a person takes antibiotics because some good germs are destroyed. Antibiotic use allows C. diificile to take over, putting patients at high risk for this serious infection.    As a patient or caregiver, it is important to understand your or your loved one's antibiotic treatment. It is especially important for caregivers to speak up when patients can't speak for themselves. Here are some important questions to ask your  healthcare team.    What infection is this antibiotic treating and how do you know I have that infection?    What side effects might occur from this antibiotic?    How long will I need to take this antibiotic?    Is it safe to take this antibiotic with other medications or supplements (e.g., vitamins) that I am taking?     Are there any special directions I need to know about taking this antibiotic? For example, should I take it with food?    How will I be monitored to know whether my infection is responding to the antibiotic?    What tests may help to make sure the right antibiotic is prescribed for me?      Information provided by:  www.cdc.gov/getsmart  U.S. Department of Health and Human Services  Centers for disease Control and Prevention  National Center for Emerging and Zoonotic Infectious Diseases  Division of Healthcare Quality Promotion             Medication List: This is a list of all your medications and when to take them. Check marks below indicate your daily home schedule. Keep this list as a reference.      Medications           Morning Afternoon Evening Bedtime As Needed    ACETAMINOPHEN PO   Take 650 mg by mouth every 4 hours as needed for pain   Last time this was given:  975 mg on 3/21/2018 11:36 AM                                albuterol (2.5 MG/3ML) 0.083% neb solution   Take 3 mLs by nebulization every 2 hours as needed.                                ascorbic acid 250 MG Tabs tablet   Take 1 tablet by mouth 2 times daily.                                * baclofen 10 MG tablet   Commonly known as:  LIORESAL   Take 10 mg by mouth 4 times daily as needed                                * BACLOFEN IT   Via implanted IT pump Baclofen 2000 mc/gmL  Dose: 277.1 mcg/day Low reservoir alarm date: 4/26/2018                                cefTAZidime 2 GM vial   Commonly known as:  FORTAZ   Inject 2 g into the vein every 8 hours for 7 days   Last time this was given:  2 g on 3/21/2018  2:02 PM                                 clindamycin 1 % topical gel   Commonly known as:  CLINDAMAX   Apply topically 2 times daily                                COLACE 100 MG capsule   Take 100 mg by mouth 2 times daily.   Generic drug:  docusate sodium                                Cranberry 400 MG Tabs   Take 400 mg by mouth 2 times daily                                cyanocobalamin 1000 MCG tablet   Commonly known as:  vitamin  B-12   Take 1 tablet by mouth daily.                                * bisacodyl 10 MG Suppository   Commonly known as:  DULCOLAX   Place 1 suppository rectally every other day.                                * DULCOLAX PO   Take 10 mg by mouth At Bedtime.                                ferrous sulfate 325 (65 FE) MG tablet   Commonly known as:  IRON   Take 1 tablet by mouth daily (with breakfast).                                KEPPRA PO   Take 1,000 mg by mouth 2 times daily                                ketoconazole 2 % cream   Commonly known as:  NIZORAL   Apply topically 2 times daily as needed                                magnesium gluconate 500 MG tablet   Commonly known as:  MAGONATE   Take 1 tablet by mouth daily.                                miconazole 2 % powder   Commonly known as:  MICATIN; MICRO GUARD   Apply  topically 2 times daily.                                mometasone 0.1 % cream   Commonly known as:  ELOCON   Apply topically At Bedtime                                MULTIVITAMIN ADULT PO   Take 1 tablet by mouth daily                                ondansetron 4 MG ODT tab   Commonly known as:  ZOFRAN-ODT   Take 1 tablet by mouth every 6 hours as needed for nausea.                                OSTEO-MULTIVITAMINS/MINERALS OR   Take  by mouth daily. 12 pm                                oxybutynin 5 MG tablet   Commonly known as:  DITROPAN   Take 1 tablet by mouth 3 times daily.                                polyethylene glycol powder   Commonly known as:  MIRALAX/GLYCOLAX    Take 1 capful by mouth daily as needed for constipation (if no BM x3 days)                                polyvinyl alcohol 1.4 % ophthalmic solution   Commonly known as:  LIQUIFILM TEARS   Place 2 drops into both eyes every 2 hours as needed for dry eyes                                selenium sulfide 2.5 % lotion   Commonly known as:  SELSUN   Apply to scalp topically in the morning every Sun, Tue, Thursday. Place for 5 min then rinse.                                senna-docusate 8.6-50 MG per tablet   Commonly known as:  SENOKOT-S;PERICOLACE   Take 1-2 tablets by mouth 2 times daily.                                sulfamethoxazole-trimethoprim 800-160 MG per tablet   Commonly known as:  BACTRIM DS/SEPTRA DS   Take 1 tablet by mouth 2 times daily for 7 days                                VITAMIN D (CHOLECALCIFEROL) PO   Take 5,000 Units by mouth daily                                zinc oxide 40 % ointment   Commonly known as:  DESITIN   Apply  topically every hour as needed.                                * Notice:  This list has 4 medication(s) that are the same as other medications prescribed for you. Read the directions carefully, and ask your doctor or other care provider to review them with you.

## 2018-03-21 NOTE — DISCHARGE INSTRUCTIONS
[facility]  Same-Day Surgery   Adult Discharge Orders & Instructions     For 24 hours after surgery    1. Get plenty of rest.  A responsible adult must stay with you for at least 24 hours after you leave the hospital.   2. Do not drive or use heavy equipment.  If you have weakness or tingling, don't drive or use heavy equipment until this feeling goes away.  3. Do not drink alcohol.  4. Avoid strenuous or risky activities.  Ask for help when climbing stairs.   5. You may feel lightheaded.  IF so, sit for a few minutes before standing.  Have someone help you get up.   6. If you have nausea (feel sick to your stomach): Drink only clear liquids such as apple juice, ginger ale, broth or 7-Up.  Rest may also help.  Be sure to drink enough fluids.  Move to a regular diet as you feel able.  7. You may have a slight fever. Call the doctor if your fever is over 100.5 F (taken under the tongue) or lasts longer than 24 hours.  8. You may have a dry mouth, a sore throat, muscle aches or trouble sleeping.  These should go away after 24 hours.  9. Do not make important or legal decisions.   Call your doctor for any of the followin.  Signs of infection (fever, growing tenderness at the surgery site, a large amount of drainage or bleeding, severe pain, foul-smelling drainage, redness, swelling).    2. It has been over 8 to 10 hours since surgery and you are still not able to urinate (pass water).    3.  Headache for over 24 hours.      To contact a doctor, call Dr Dejon Horn's office at [phone]--Urology  or:        After hours, call [phone] and ask for the resident on call for UROLOGY  (answered 24 hours a day)      Emergency Department:    [facility]: [phone]       (TTY for hearing impaired: [phone])

## 2018-03-21 NOTE — BRIEF OP NOTE
[facility], [place]    Brief Operative Note    Pre-operative diagnosis: Nephrolithiasis  Post-operative diagnosis Same  Procedure: Procedure(s):  Left Holmium Laser Percutaneous Nephrolithotomy,  Left pyeloscopy, Stent Placement  general with block **Latex Allergy** - Wound Class: I-Clean  Surgeon: Surgeon(s) and Role:     * Dejon Horn MD - Primary     * Vamsi Benson MD - Resident - Assisting     * Elmo Ahuja - Resident - Assisting  Anesthesia: Combined General with Block   Estimated blood loss: Minimal  Drains: 10 x 26 left nephroureteral stent   Specimens: None  Findings:  Notable  residual stone burden at UPJ and both upper and lower pole calyces; All stone dusted with HLL - no fragments larger than 1 mm remaining; Unremarkable replacement of existing nephroureteral stent with a new 10 x 26 left nephroureteral stent  Complications: None.

## 2018-03-21 NOTE — ANESTHESIA POSTPROCEDURE EVALUATION
Patient: Maicol Kingel    Procedure(s):  Left Holmium Laser Percutaneous Nephrolithotomy, Access To Kidney, Left Ureteroscopy, Stent Placement  general with block **Latex Allergy** - Wound Class: I-Clean    Diagnosis:Nephrolithiasis  Diagnosis Additional Information: No value filed.    Anesthesia Type:  General, ETT    Note:  Anesthesia Post Evaluation    Patient location during evaluation: PACU  Patient participation: Able to fully participate in evaluation  Level of consciousness: awake and alert  Pain management: satisfactory to patient  Airway patency: patent  Cardiovascular status: blood pressure returned to baseline and hemodynamically stable  Respiratory status: room air  Hydration status: euvolemic  PONV: controlled     Anesthetic complications: None          Last vitals:  Vitals:    03/21/18 1029   BP: 121/75   Pulse: 66   Resp: 18   Temp: 36.6  C (97.8  F)   SpO2: 100%         Electronically Signed By: Pavan Quintanilla MD  March 21, 2018  4:24 PM

## 2018-03-21 NOTE — IP AVS SNAPSHOT
[facility] Day [facility] [address] [address]    Phone:  [phone]                                       After Visit Summary   3/21/2018    Maicol Carrera    MRN: [id number]           After Visit Summary Signature Page     I have received my discharge instructions, and my questions have been answered. I have discussed any challenges I see with this plan with the nurse or doctor.    ..........................................................................................................................................  Patient/Patient Representative Signature      ..........................................................................................................................................  Patient Representative Print Name and Relationship to Patient    ..................................................               ................................................  Date                                            Time    ..........................................................................................................................................  Reviewed by Signature/Title    ...................................................              ..............................................  Date                                                            Time

## 2018-03-21 NOTE — OR NURSING
Surgery delayed. [facility] only open until 1800, pt unlikely to be ready to discharge by that time. Iveth Lema, , notified. [facility] to be called for transportation when pt is ready for discharge. Cannot be pre scheduled. Will continue to monitor.

## 2018-03-22 NOTE — OR NURSING
aT 1920 called his father Javon as Maicol had asked to make sure that his father was notified.  He was notified that patient did well with stable vitals.  He stated he did not receive a phone call from the MD but when asked if he wanted me to page the resident to call him he stated my phone call sufficed

## 2018-03-22 NOTE — OR NURSING
Hien Thomas was also notified that patient had received the ceftazidime in the OR at 1400.D/c instructions were all given over the phone.  She was also notified that the urine in the tubing was lightening up after he was given IV bolus of 250cc.  She stated he was good at drinking a lot of fluids. She stated he had a Nephtube and suprapubic before he came to the hospital and understood how to care for them.

## 2018-03-23 ENCOUNTER — CARE COORDINATION (OUTPATIENT)
Dept: UROLOGY | Facility: CLINIC | Age: 43
End: 2018-03-23

## 2018-03-23 DIAGNOSIS — A41.9 SEPSIS DUE TO URINARY TRACT INFECTION (H): ICD-10-CM

## 2018-03-23 DIAGNOSIS — N20.0 KIDNEY STONES: ICD-10-CM

## 2018-03-23 DIAGNOSIS — N39.0 SEPSIS DUE TO URINARY TRACT INFECTION (H): ICD-10-CM

## 2018-03-23 LAB — GLUCOSE BLDC GLUCOMTR-MCNC: 80 MG/DL (ref 70–99)

## 2018-03-23 RX ORDER — SULFAMETHOXAZOLE/TRIMETHOPRIM 800-160 MG
1 TABLET ORAL 2 TIMES DAILY
Qty: 14 TABLET | Refills: 0 | Status: SHIPPED | OUTPATIENT
Start: 2018-03-23 | End: 2018-05-23

## 2018-03-23 NOTE — PROGRESS NOTES
Left a message for care facility on  to see how alexandra is doing following procedure and also to get him set up for an appt with Dr. Horn and follow up CT in 4 weeks or next available. Alexandra Carrera,

## 2018-03-25 DIAGNOSIS — N20.0 NEPHROLITHIASIS: Primary | ICD-10-CM

## 2018-04-05 ENCOUNTER — RADIANT APPOINTMENT (OUTPATIENT)
Dept: CT IMAGING | Facility: CLINIC | Age: 43
End: 2018-04-05
Payer: COMMERCIAL

## 2018-04-05 ENCOUNTER — MEDICAL CORRESPONDENCE (OUTPATIENT)
Dept: HEALTH INFORMATION MANAGEMENT | Facility: CLINIC | Age: 43
End: 2018-04-05

## 2018-04-05 ENCOUNTER — OFFICE VISIT (OUTPATIENT)
Dept: INFECTIOUS DISEASES | Facility: CLINIC | Age: 43
End: 2018-04-05
Attending: INTERNAL MEDICINE
Payer: COMMERCIAL

## 2018-04-05 VITALS — TEMPERATURE: 97.6 F | SYSTOLIC BLOOD PRESSURE: 117 MMHG | DIASTOLIC BLOOD PRESSURE: 74 MMHG | HEART RATE: 84 BPM

## 2018-04-05 DIAGNOSIS — N39.0 COMPLICATED UTI (URINARY TRACT INFECTION): ICD-10-CM

## 2018-04-05 DIAGNOSIS — N20.0 KIDNEY STONE: ICD-10-CM

## 2018-04-05 DIAGNOSIS — N20.0 KIDNEY STONES: ICD-10-CM

## 2018-04-05 DIAGNOSIS — A41.9 SEPSIS, DUE TO UNSPECIFIED ORGANISM: Primary | ICD-10-CM

## 2018-04-05 PROCEDURE — G0463 HOSPITAL OUTPT CLINIC VISIT: HCPCS | Mod: ZF

## 2018-04-05 ASSESSMENT — PAIN SCALES - GENERAL: PAINLEVEL: NO PAIN (0)

## 2018-04-05 NOTE — LETTER
4/5/2018      RE: Maicol Carrera  [address] [id number]        [facility]  Dr. Shawna Saldivar, [facility], [address]  [address] [place] [address]   Patient:  Maicol Carrera, Date of birth 1975, Medical record number [id number]  Date of Visit:  Apr 5, 2018         Assessment and Recommendations:     Renal Calculi with complicating sepsis with lithotripsy  Mr. Carrera is clinically well and has responded well to therapy. We will stop antibiotics today and pull his PICC.  I contacted Dr. Horn, his urologist today to confirm that he does no have an impending procedure.  The plan is to reassess Mr. Carrera to see if there is ongoing retained stone and make a plan for stone removal based on that.  He will contact our service for pre-procedural management and urine sterilization as needed.     Shawna Saldivar MD  Division of Infectious Diseases and International Medicine  [phone]         History of Infectious Disease Illness:   Mr. Carrera is a 42 year old man who I saw in the hospital for sepsis in the setting of a complex renal calculus.  He had multiple organism on urine culture including MRSA, Proteus and Pseudomonas.  We treated him with Bactrim and Ceftazidime and he is doing clinically well.  He denies fever or chills.  No sweats.  No urinary issues - he has in indwelling suprapubic catheter in the setting of neurogenic bladder. There is some concern that there was retained stones, but at the time of discharge there was not a clear plan on whether and when he would need a subsequent procedure. He has had no issues with his antibiotic therapy - no rashes.  No diarrhea, nausea or vomiting.          PMSHx:     Past Medical History:   Diagnosis Date     Constipation, chronic      GERD (gastroesophageal reflux disease)      Head injury      Neurogenic bladder     SP catheter     JUAN (obstructive sleep apnea)      Quadriplegia (H)       Seizure (H)      Spastic quadriplegia (H)      Urinary tract infection      Past Surgical History:   Procedure Laterality Date     APPENDECTOMY OPEN       BACK SURGERY       INSERT PUMP BACLOFEN       LASER HOLMIUM NEPHROLITHOTOMY VIA PERCUTANEOUS NEPHROSTOMY  10/19/2011    Procedure:LASER HOLMIUM NEPHROLITHOTOMY VIA PERCUTANEOUS NEPHROSTOMY; Left Ureteral Stent Placement, Left Percutaneous Access, Left Percutaneous Nephrostomy  *Latex Allergy*  ; Surgeon:YAN ADDISON; Location:[facility] OR     LASER HOLMIUM NEPHROLITHOTOMY VIA PERCUTANEOUS NEPHROSTOMY Left 3/7/2018    Procedure: LASER HOLMIUM NEPHROLITHOTOMY VIA PERCUTANEOUS NEPHROSTOMY;  Left Percutaneous Nephrolithotomy, Access To Kidney, Ureteroscopy, Cystoscopy, Stent Placement With Holmium Laser standby;  Surgeon: Dejon Horn MD;  Location: [facility] OR     LASER HOLMIUM NEPHROLITHOTOMY VIA PERCUTANEOUS NEPHROSTOMY Left 3/21/2018    Procedure: LASER HOLMIUM NEPHROLITHOTOMY VIA PERCUTANEOUS NEPHROSTOMY;  Left Holmium Laser Percutaneous Nephrolithotomy, Access To Kidney, Left Ureteroscopy, Stent Placement  general with block **Latex Allergy**;  Surgeon: Dejon Horn MD;  Location: [facility] OR     ORTHOPEDIC SURGERY       supra pubic catheter              Review of Systems:   CONSTITUTIONAL:  No fevers or chills  EYES: negative for icterus  RESPIRATORY:  negative for cough, sputum or dyspnea  GASTROINTESTINAL:  negative for nausea, vomiting, diarrhea or constipation  GENITOURINARY:  negative for dysuria  INTEGUMENT:  negative for rash or pruritus         Current Medications:     Current Outpatient Prescriptions   Medication Sig Dispense Refill     sulfamethoxazole-trimethoprim (BACTRIM DS/SEPTRA DS) 800-160 MG per tablet Take 1 tablet by mouth 2 times daily 14 tablet 0     Multiple Vitamins-Minerals (MULTIVITAMIN ADULT PO) Take 1 tablet by mouth daily       polyvinyl alcohol (LIQUIFILM TEARS) 1.4 % ophthalmic solution Place 2 drops into both eyes every  2 hours as needed for dry eyes       clindamycin (CLINDAMAX) 1 % topical gel Apply topically 2 times daily       Cranberry 400 MG TABS Take 400 mg by mouth 2 times daily       ketoconazole (NIZORAL) 2 % cream Apply topically 2 times daily as needed       LevETIRAcetam (KEPPRA PO) Take 1,000 mg by mouth 2 times daily       polyethylene glycol (MIRALAX/GLYCOLAX) powder Take 1 capful by mouth daily as needed for constipation (if no BM x3 days)       mometasone (ELOCON) 0.1 % cream Apply topically At Bedtime       selenium sulfide (SELSUN) 2.5 % lotion Apply to scalp topically in the morning every Sun, Tue, Thursday. Place for 5 min then rinse.       ACETAMINOPHEN PO Take 650 mg by mouth every 4 hours as needed for pain       VITAMIN D, CHOLECALCIFEROL, PO Take 5,000 Units by mouth daily       albuterol (2.5 MG/3ML) 0.083% nebulizer solution Take 3 mLs by nebulization every 2 hours as needed. 1 Box      bisacodyl (DULCOLAX) 10 MG suppository Place 1 suppository rectally every other day. 12 suppository      cyanocolbalamin (VITAMIN B-12) 1000 MCG tablet Take 1 tablet by mouth daily.       ferrous sulfate 325 (65 FE) MG tablet Take 1 tablet by mouth daily (with breakfast). 100 tablet      magnesium gluconate (MAGONATE) 500 MG tablet Take 1 tablet by mouth daily.       miconazole (MICATIN; MICRO GUARD) 2 % powder Apply  topically 2 times daily. 70 g      ondansetron (ZOFRAN-ODT) 4 MG disintegrating tablet Take 1 tablet by mouth every 6 hours as needed for nausea. 20 tablet      oxybutynin (DITROPAN) 5 MG tablet Take 1 tablet by mouth 3 times daily. 270 tablet      senna-docusate (SENOKOT-S;PERICOLACE) 8.6-50 MG per tablet Take 1-2 tablets by mouth 2 times daily. 60 tablet      vitamin C 250 MG TABS Take 1 tablet by mouth 2 times daily.       zinc oxide (DESITIN) 40 % ointment Apply  topically every hour as needed. 56.7 g      baclofen (LIORESAL) 10 MG tablet Take 10 mg by mouth 4 times daily as needed        docusate  sodium (COLACE) 100 MG capsule Take 100 mg by mouth 2 times daily.       Bisacodyl (DULCOLAX PO) Take 10 mg by mouth At Bedtime.       BACLOFEN IT Via implanted IT pump  Baclofen 2000 mc/gmL   Dose: 277.1 mcg/day  Low reservoir alarm date: 4/26/2018       Nutritional Supplements (OSTEO-MULTIVITAMINS/MINERALS OR) Take  by mouth daily. 12 pm                Immunization History:     There is no immunization history on file for this patient.         Allergies:     Allergies   Allergen Reactions     Latex Rash     Latex Rash            Physical Exam:   Vital signs:  /74  Pulse 84  Temp 97.6  F (36.4  C) (Oral)    Physical Examination:  GENERAL:  well-developed, well-nourished, seated in his wheelchair in no acute distress.  HEENT:  Head is normocephalic, atraumatic   EYES:  Eyes have anicteric sclerae without conjunctival injection   LUNGS:  Clear to auscultation bilateral.   CARDIOVASCULAR:  Regular rate and rhythm with no murmurs, gallops or rubs.  SKIN:  No acute rashes.  PICC line entry site without erythema.   NEUROLOGIC:  Grossly nonfocal. Active x4 extremities  PSYCH: Good mood, easily conversant         Laboratory Data:     Metabolic Studies   Sodium   Date Value Ref Range Status   03/13/2018 135 133 - 144 mmol/L Final   03/12/2018 137 133 - 144 mmol/L Final     Potassium   Date Value Ref Range Status   03/13/2018 4.3 3.4 - 5.3 mmol/L Final   03/12/2018 4.5 3.4 - 5.3 mmol/L Final     Chloride   Date Value Ref Range Status   03/13/2018 104 94 - 109 mmol/L Final   03/12/2018 104 94 - 109 mmol/L Final     Carbon Dioxide   Date Value Ref Range Status   03/13/2018 22 20 - 32 mmol/L Final   03/12/2018 24 20 - 32 mmol/L Final     Anion Gap   Date Value Ref Range Status   03/13/2018 10 3 - 14 mmol/L Final   03/12/2018 8 3 - 14 mmol/L Final     Urea Nitrogen   Date Value Ref Range Status   03/13/2018 20 7 - 30 mg/dL Final   03/12/2018 20 7 - 30 mg/dL Final     Creatinine   Date Value Ref Range Status   03/13/2018  1.18 0.66 - 1.25 mg/dL Final   03/12/2018 1.06 0.66 - 1.25 mg/dL Final     GFR Estimate   Date Value Ref Range Status   03/13/2018 68 >60 mL/min/1.7m2 Final     Comment:     Non  GFR Calc   03/12/2018 77 >60 mL/min/1.7m2 Final     Comment:     Non  GFR Calc     Glucose   Date Value Ref Range Status   03/13/2018 83 70 - 99 mg/dL Final   03/12/2018 98 70 - 99 mg/dL Final     Calcium   Date Value Ref Range Status   03/13/2018 9.9 8.5 - 10.1 mg/dL Final   03/12/2018 10.0 8.5 - 10.1 mg/dL Final     Phosphorus   Date Value Ref Range Status   03/09/2018 3.3 2.5 - 4.5 mg/dL Final   10/30/2011 2.4 (L) 2.5 - 4.5 mg/dL Final     Magnesium   Date Value Ref Range Status   03/09/2018 1.9 1.6 - 2.3 mg/dL Final   11/01/2011 2.4 (H) 1.6 - 2.3 mg/dL Final     Lactic Acid   Date Value Ref Range Status   03/08/2018 0.9 0.7 - 2.0 mmol/L Final   10/24/2011 1.8 0.7 - 2.1 mmol/L Final     CRP Inflammation   Date Value Ref Range Status   12/29/2011 31.3 (H) 0.0 - 8.0 mg/L Final   11/29/2011 36.5 (H) 0.0 - 8.0 mg/L Final   11/06/2011 71.1 (H) 0.0 - 8.0 mg/L Final   10/22/2011 198.9 (H) 0.0 - 8.0 mg/L Final       Inflammatory Markers   CRP Inflammation   Date Value Ref Range Status   12/29/2011 31.3 (H) 0.0 - 8.0 mg/L Final   11/29/2011 36.5 (H) 0.0 - 8.0 mg/L Final   11/06/2011 71.1 (H) 0.0 - 8.0 mg/L Final   10/22/2011 198.9 (H) 0.0 - 8.0 mg/L Final       Hepatic Studies    Bilirubin Total   Date Value Ref Range Status   12/29/2011 0.4 0.2 - 1.3 mg/dL Final   11/07/2011 0.5 0.2 - 1.3 mg/dL Final     Alkaline Phosphatase   Date Value Ref Range Status   12/29/2011 81 40 - 150 U/L Final   11/07/2011 61 40 - 150 U/L Final     Albumin   Date Value Ref Range Status   12/29/2011 4.1 3.9 - 5.1 g/dL Final   11/07/2011 3.6 (L) 3.9 - 5.1 g/dL Final     AST   Date Value Ref Range Status   12/29/2011 36 0 - 55 U/L Final   11/07/2011 19 0 - 55 U/L Final     ALT   Date Value Ref Range Status   12/29/2011 58 0 - 70 U/L Final    11/07/2011 18 0 - 70 U/L Final       Hematology Studies      WBC   Date Value Ref Range Status   03/13/2018 9.5 4.0 - 11.0 10e9/L Final   03/12/2018 8.2 4.0 - 11.0 10e9/L Final     Absolute Neutrophil   Date Value Ref Range Status   12/29/2011 5.5 1.6 - 8.3 10e9/L Final   11/29/2011 4.0 1.6 - 8.3 10e9/L Final     Absolute Lymphocytes   Date Value Ref Range Status   12/29/2011 1.4 0.8 - 5.3 10e9/L Final   11/29/2011 1.3 0.8 - 5.3 10e9/L Final     Absolute Monocytes   Date Value Ref Range Status   12/29/2011 0.7 0.0 - 1.3 10e9/L Final   11/29/2011 0.4 0.0 - 1.3 10e9/L Final     Absolute Eosinophils   Date Value Ref Range Status   12/29/2011 0.4 0.0 - 0.7 10e9/L Final   11/29/2011 0.3 0.0 - 0.7 10e9/L Final     Hemoglobin   Date Value Ref Range Status   03/13/2018 7.9 (L) 13.3 - 17.7 g/dL Final   03/12/2018 7.5 (L) 13.3 - 17.7 g/dL Final     Hematocrit   Date Value Ref Range Status   03/13/2018 26.9 (L) 40.0 - 53.0 % Final   03/12/2018 25.9 (L) 40.0 - 53.0 % Final     Platelet Count   Date Value Ref Range Status   03/13/2018 274 150 - 450 10e9/L Final   03/12/2018 253 150 - 450 10e9/L Final     Imaging:  Recent Results (from the past 744 hour(s))   IR PICC Placement > 5 Yrs of Age    Narrative    PROCEDURE: Single-lumen PICC placement    INDICATION: Medication administration    PHYSICIAN: Arsenio Srivastava M.D.    CONSENT: Written and verbal informed consent obtained from the patient    ANESTHESIA: Local with buffered lidocaine    TECHNIQUE: Right arm prepped and draped and skin anesthetized.  Ultrasound showed patent basilic and brachial veins and permanent  image stored. Ultrasound guidance used to access the basilic vein.  Wire would not advance past the axilla. Venogram performed through the  sheath showing occlusion of the basilic vein centrally with collateral  flow via chest wall veins.    4 [place] single-lumen PICC was cut to 22 cm and left with the tip in  the right axillary vein. The port flushed and aspirated  well and is  ready for immediate use.      Impression    IMPRESSION:   1. 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 Right single-lumen PICC with tip in the peripheral axillary vein is  ready for immediate use    PENNY SMITH MD   XR Surgery [facility] Fluoro L/T 5 Min w Stills    Narrative    This exam was marked as non-reportable because it will not be read by a   radiologist or a [place] non-radiologist provider.             CT Abdomen Pelvis w/o Contrast    Narrative    EXAMINATION: CT ABDOMEN PELVIS W/O CONTRAST, 4/5/2018 11:38 AM    TECHNIQUE:  Helical CT images from the lung bases through the  symphysis pubis were obtained without IV contrast.    COMPARISON: CT abdomen/pelvis 3/8/2018    HISTORY: Evaluation of residual stone burden - CT stone protocol;  Kidney stones    FINDINGS:    Right kidney and ureter: Since 3/8/2018, no significant change in  large staghorn calculus filling the right-sided renal collecting  system and extending into the renal pelvis. Unchanged diffuse cortical  thinning. No hydronephrosis or hydroureter.    Left kidney and ureter: Multiple left-sided renal calculi are again  demonstrated. The calculus previously seen within the far superior  aspect of the upper pole the left kidney which was previously surround  air is completely resolved. There are 3 additional upper pole calculi  remaining. The lateralmost upper pole calculus calculus measures 12 mm  in diameter, previously 24 mm. Calculus in the middle of the upper  pole measures 14 mm, previously 19 mm. The medial most calculus  measures 11 mm, previously 23 mm. A dominant lower pole calculus  measuring 15 mm is unchanged in size as are multiple smaller lower  pole calculi. Multiple residual stone fragments are positioned in the  left renal pelvis and mid left ureter. A left-sided percutaneous  nephrostomy tube is in place. There is soft tissue stranding about the  renal pelvis and ureter consistent with postprocedural change.    Dilated renal calyces are not significantly changed since the prior  exam. No hydroureter.    Urinary bladder: Mostly decompressed with a suprapubic catheter in  place. There is a small amount of layering hyperdensity in the urinary  bladder, likely representing residual gravel from recent lithotripsy.    Remainder of the abdomen/pelvis: Cholelithiasis. Noncontrast  evaluation of the liver, spleen, pancreas, and adrenals is normal. IVC  stent is in place with limbs extending beyond the walls of the IVC. No  abdominal aortic aneurysm. No dilated small or large bowel. Impacted  stool in the rectum with associated rectal and sigmoid colon wall  thickening, similar to previous exam.    Lung bases: Small left pleural effusion. Bibasilar atelectasis.    Bones and soft tissues: Severe degenerative changes of both hip  joints. Bilateral sacroiliac joint fusion and extensive posterior  element fusion throughout the visualized thoracolumbar spine.  Intrathecal pump with device in the right lower quadrant subcutaneous  tissues.      Impression    IMPRESSION:   1. Complete resolution of a large calculus previously seen in an upper  pole of the left kidney with significant decrease in size of multiple  other large calyceal stones in the left kidney. There are multiple  residual stone fragments layering in the left renal pelvis, in the mid  left ureter, and in the urinary bladder. The largest of these measure  4 mm in the mid left ureter.  2. Stable positioning of left percutaneous nephroureteral drain with  persistent perinephric and periureteral stranding likely reflecting  postprocedural changes.  3. Unchanged right staghorn calculus.  4. New small left pleural effusion, partially visualized.  5. Cholelithiasis.    I have personally reviewed the examination and initial interpretation  and I agree with the findings.    WALESKA Saldivar MD

## 2018-04-05 NOTE — MR AVS SNAPSHOT
After Visit Summary   4/5/2018    Maicol Carrera    MRN: [id number]           Patient Information     Date Of Birth          1975        Visit Information        Provider Department      4/5/2018 10:00 AM Shawna Saldivar MD [facility] and Infectious Diseases         Follow-ups after your visit        Your next 10 appointments already scheduled     Apr 05, 2018 11:20 AM CDT   CT ABDOMEN PELVIS W/O CONTRAST with UCCT1   [facility] CT ([facility])    [address] [address] [phone]           Please bring any scans or X-rays taken at other hospitals, if similar tests were done. Also bring a list of your medicines, including vitamins, minerals and over-the-counter drugs. It is safest to leave personal items at home.  Be sure to tell your doctor:   If you have any allergies.   If there s any chance you are pregnant.   If you are breastfeeding.  You may have contrast for this exam. To prepare:   Do not eat or drink for 2 hours before your exam. If you need to take medicine, you may take it with small sips of water. (We may ask you to take liquid medicine as well.)   The day before your exam, drink extra fluids at least six 8-ounce glasses (unless your doctor tells you to restrict your fluids).   You will be given instructions on how to drink the contrast.  Patients over 70 or patients with diabetes or kidney problems:   If you haven t had a blood test (creatinine test) within the last 30 days, the Cardiologist/Radiologist may require you to get this test prior to your exam.  If you have diabetes:   Continue to take your metformin medication on the day of your exam  Please wear loose clothing, such as a sweat suit or jogging clothes. Avoid snaps, zippers and other metal. We may ask you to undress and put on a hospital gown.  If you have any questions, please call the Imaging Department where you will  "have your exam.            2018  1:00 PM CDT   (Arrive by 12:45 PM)   Cystoscopy with Dejon oHrn MD   [facility] ([facility])    [address] [facility]  [address] [facility] [address] [phone]              Who to contact     If you have questions or need follow up information about today's clinic visit or your schedule please contact [facility] AND INFECTIOUS DISEASES directly at [phone].  Normal or non-critical lab and imaging results will be communicated to you by [employer], letter or phone within 4 business days after the clinic has received the results. If you do not hear from us within 7 days, please contact the clinic through [employer] or phone. If you have a critical or abnormal lab result, we will notify you by phone as soon as possible.  Submit refill requests through [employer] or call your pharmacy and they will forward the refill request to us. Please allow 3 business days for your refill to be completed.          Additional Information About Your Visit        [employer] Information     [employer] lets you send messages to your doctor, view your test results, renew your prescriptions, schedule appointments and more. To sign up, go to [email or web address] . Click on \"Log in\" on the left side of the screen, which will take you to the Welcome page. Then click on \"Sign up Now\" on the right side of the page.     You will be asked to enter the access code listed below, as well as some personal information. Please follow the directions to create your username and password.     Your access code is: [email or web address]  Expires: 2018  1:37 PM     Your access code will  in 90 days. If you need help or a new code, please call your [facility] clinic or [phone].        Care EveryWhere ID     This is your Care EveryWhere ID. This could be used by other organizations to access your [facility] medical " records  [id number]        Your Vitals Were     Pulse Temperature                84 97.6  F (36.4  C) (Oral)           Blood Pressure from Last 3 Encounters:   04/05/18 117/74   03/21/18 134/84   03/13/18 124/80    Weight from Last 3 Encounters:   03/21/18 80.3 kg (177 lb)   03/11/18 84.9 kg (187 lb 2.7 oz)   12/29/17 83.9 kg (185 lb)              Today, you had the following     No orders found for display       Primary Care Provider Office Phone # Fax #    [facility] [phone] [address] [facility] [address]        Equal Access to Services     AUSTIN VIEIRA : Hadcheng israelo Dickson, wapavanda luqadaha, qaybta kaalmada enedeliayabrynn, kathy frazier. [facility] [phone].    ATENCIÓN: Si habla español, tiene a da silva disposición servicios gratuitos de asistencia lingüística. [facility] [phone].    We comply with applicable federal civil rights laws and [place] laws. We do not discriminate on the basis of race, color, national origin, age, disability, sex, sexual orientation, or gender identity.            Thank you!     Thank you for choosing [facility] AND INFECTIOUS DISEASES  for your care. Our goal is always to provide you with excellent care. Hearing back from our patients is one way we can continue to improve our services. Please take a few minutes to complete the written survey that you may receive in the mail after your visit with us. Thank you!             Your Updated Medication List - Protect others around you: Learn how to safely use, store and throw away your medicines at www.disposemymeds.org.          This list is accurate as of 4/5/18 10:30 AM.  Always use your most recent med list.                   Brand Name Dispense Instructions for use Diagnosis    ACETAMINOPHEN PO      Take 650 mg by mouth every 4 hours as needed for pain        albuterol (2.5 MG/3ML) 0.083% neb solution     1 Box    Take 3 mLs  by nebulization every 2 hours as needed.    SOB (shortness of breath)       ascorbic acid 250 MG Tabs tablet      Take 1 tablet by mouth 2 times daily.    Recurrent UTI       * baclofen 10 MG tablet    LIORESAL     Take 10 mg by mouth 4 times daily as needed        * BACLOFEN IT      Via implanted IT pump Baclofen 2000 mc/gmL  Dose: 277.1 mcg/day Low reservoir alarm date: 4/26/2018        clindamycin 1 % topical gel    CLINDAMAX     Apply topically 2 times daily        COLACE 100 MG capsule   Generic drug:  docusate sodium      Take 100 mg by mouth 2 times daily.        Cranberry 400 MG Tabs      Take 400 mg by mouth 2 times daily        cyanocobalamin 1000 MCG tablet    vitamin  B-12     Take 1 tablet by mouth daily.    Vitamin B 12 deficiency       * bisacodyl 10 MG Suppository    DULCOLAX    12 suppository    Place 1 suppository rectally every other day.    Chronic constipation       * DULCOLAX PO      Take 10 mg by mouth At Bedtime.        ferrous sulfate 325 (65 FE) MG tablet    IRON    100 tablet    Take 1 tablet by mouth daily (with breakfast).    Anemia due to blood loss, acute       KEPPRA PO      Take 1,000 mg by mouth 2 times daily        ketoconazole 2 % cream    NIZORAL     Apply topically 2 times daily as needed        magnesium gluconate 500 MG tablet    MAGONATE     Take 1 tablet by mouth daily.    Magnesium deficiency       miconazole 2 % powder    MICATIN; MICRO GUARD    70 g    Apply  topically 2 times daily.    Candida infection of flexural skin       mometasone 0.1 % cream    ELOCON     Apply topically At Bedtime        MULTIVITAMIN ADULT PO      Take 1 tablet by mouth daily        ondansetron 4 MG ODT tab    ZOFRAN-ODT    20 tablet    Take 1 tablet by mouth every 6 hours as needed for nausea.    Nausea and vomiting       OSTEO-MULTIVITAMINS/MINERALS OR      Take  by mouth daily. 12 pm        oxybutynin 5 MG tablet    DITROPAN    270 tablet    Take 1 tablet by mouth 3 times daily.    Neurogenic  bladder       polyethylene glycol powder    MIRALAX/GLYCOLAX     Take 1 capful by mouth daily as needed for constipation (if no BM x3 days)        polyvinyl alcohol 1.4 % ophthalmic solution    LIQUIFILM TEARS     Place 2 drops into both eyes every 2 hours as needed for dry eyes        selenium sulfide 2.5 % lotion    SELSUN     Apply to scalp topically in the morning every Sun, Tue, Thursday. Place for 5 min then rinse.        senna-docusate 8.6-50 MG per tablet    SENOKOT-S;PERICOLACE    60 tablet    Take 1-2 tablets by mouth 2 times daily.    Chronic constipation       sulfamethoxazole-trimethoprim 800-160 MG per tablet    BACTRIM DS/SEPTRA DS    14 tablet    Take 1 tablet by mouth 2 times daily    Kidney stones, Sepsis due to urinary tract infection (H)       VITAMIN D (CHOLECALCIFEROL) PO      Take 5,000 Units by mouth daily        zinc oxide 40 % ointment    DESITIN    56.7 g    Apply  topically every hour as needed.    Breakdown of skin tissue       * Notice:  This list has 4 medication(s) that are the same as other medications prescribed for you. Read the directions carefully, and ask your doctor or other care provider to review them with you.

## 2018-04-05 NOTE — LETTER
April 6, 2018       TO: Maicol Carrera  [address]  [facility] [address]     Mr. Carrera,     Good news!  Your left kidney stone is markedly smaller, however there are still some remaining fragments that I think might pass.  I will plan to have another CT scan in 3-4 weeks.  If there are stones still remaining, I will plan one more procedure to remove them.     Thank you, Lio Horn.     Resulted Orders   CT Abdomen Pelvis w/o Contrast    Narrative    EXAMINATION: CT ABDOMEN PELVIS W/O CONTRAST, 4/5/2018 11:38 AM    TECHNIQUE:  Helical CT images from the lung bases through the  symphysis pubis were obtained without IV contrast.    COMPARISON: CT abdomen/pelvis 3/8/2018    HISTORY: Evaluation of residual stone burden - CT stone protocol;  Kidney stones    FINDINGS:    Right kidney and ureter: Since 3/8/2018, no significant change in  large staghorn calculus filling the right-sided renal collecting  system and extending into the renal pelvis. Unchanged diffuse cortical  thinning. No hydronephrosis or hydroureter.    Left kidney and ureter: Multiple left-sided renal calculi are again  demonstrated. The calculus previously seen within the far superior  aspect of the upper pole the left kidney which was previously surround  air is completely resolved. There are 3 additional upper pole calculi  remaining. The lateralmost upper pole calculus calculus measures 12 mm  in diameter, previously 24 mm. Calculus in the middle of the upper  pole measures 14 mm, previously 19 mm. The medial most calculus  measures 11 mm, previously 23 mm. A dominant lower pole calculus  measuring 15 mm is unchanged in size as are multiple smaller lower  pole calculi. Multiple residual stone fragments are positioned in the  left renal pelvis and mid left ureter. A left-sided percutaneous  nephrostomy tube is in place. There is soft tissue stranding about the  renal pelvis and ureter consistent with postprocedural change.   Dilated renal  calyces are not significantly changed since the prior  exam. No hydroureter.    Urinary bladder: Mostly decompressed with a suprapubic catheter in  place. There is a small amount of layering hyperdensity in the urinary  bladder, likely representing residual gravel from recent lithotripsy.    Remainder of the abdomen/pelvis: Cholelithiasis. Noncontrast  evaluation of the liver, spleen, pancreas, and adrenals is normal. IVC  stent is in place with limbs extending beyond the walls of the IVC. No  abdominal aortic aneurysm. No dilated small or large bowel. Impacted  stool in the rectum with associated rectal and sigmoid colon wall  thickening, similar to previous exam.    Lung bases: Small left pleural effusion. Bibasilar atelectasis.    Bones and soft tissues: Severe degenerative changes of both hip  joints. Bilateral sacroiliac joint fusion and extensive posterior  element fusion throughout the visualized thoracolumbar spine.  Intrathecal pump with device in the right lower quadrant subcutaneous  tissues.      Impression    IMPRESSION:   1. Complete resolution of a large calculus previously seen in an upper  pole of the left kidney with significant decrease in size of multiple  other large calyceal stones in the left kidney. There are multiple  residual stone fragments layering in the left renal pelvis, in the mid  left ureter, and in the urinary bladder. The largest of these measure  4 mm in the mid left ureter.  2. Stable positioning of left percutaneous nephroureteral drain with  persistent perinephric and periureteral stranding likely reflecting  postprocedural changes.  3. Unchanged right staghorn calculus.  4. New small left pleural effusion, partially visualized.  5. Cholelithiasis.    I have personally reviewed the examination and initial interpretation  and I agree with the findings.    WALESKA RIVERA

## 2018-04-05 NOTE — NURSING NOTE
PICC line pulled without complications, occlusive dressing and pressure wrap (coban) applied, site assessed within normal limits, no bleeding or infection observed, catheter intact. Pt education given along with home care instructions. Pt left in care of self.  Babs Alonso RN

## 2018-04-05 NOTE — NURSING NOTE
"Chief Complaint   Patient presents with     Hospital F/U     sepsis       Initial /74  Pulse 84  Temp 97.6  F (36.4  C) (Oral) Estimated body mass index is 28.57 kg/(m^2) as calculated from the following:    Height as of 3/21/18: 1.676 m (5' 6\").    Weight as of 3/21/18: 80.3 kg (177 lb).  Medication Reconciliation: complete   Buffy Montemayor, CECI      "

## 2018-04-06 ENCOUNTER — TELEPHONE (OUTPATIENT)
Dept: UROLOGY | Facility: CLINIC | Age: 43
End: 2018-04-06

## 2018-04-06 DIAGNOSIS — N20.0 CALCULUS OF KIDNEY: Primary | ICD-10-CM

## 2018-04-06 NOTE — TELEPHONE ENCOUNTER
Called to schedule surgery for patient with Dr Horn.  Surgery scheduled on 6/6/18 @ 12:30 pm @ [facility].  PAC visit scheduled on 5/23/18 @ 10:30 am.  Surgery packet mailed on 4/6/18. Spoke with Catina at [facility].

## 2018-04-06 NOTE — PROGRESS NOTES
Mr. Carrera,  Good news!  Your left kidney stone is markedly smaller, however there are still some remaining fragments that I think might pass.  I will plan to have another CT scan in 3-4 weeks.  If there are stones still remaining, I will plan one more procedure to remove them. Thank you, Lio Horn.

## 2018-04-12 NOTE — PROGRESS NOTES
[facility]  Dr. Shawna Saldivar, [facility], Floor 3  [address] [place] [address]   Patient:  Maicol Carrera, Date of birth 1975, Medical record number [id number]  Date of Visit:  Apr 5, 2018         Assessment and Recommendations:     Renal Calculi with complicating sepsis with lithotripsy  Mr. Carrera is clinically well and has responded well to therapy. We will stop antibiotics today and pull his PICC.  I contacted Dr. Horn, his urologist today to confirm that he does no have an impending procedure.  The plan is to reassess Mr. Carrera to see if there is ongoing retained stone and make a plan for stone removal based on that.  He will contact our service for pre-procedural management and urine sterilization as needed.     Shawna Saldivar MD  Division of Infectious Diseases and International Medicine  [phone]         History of Infectious Disease Illness:   Mr. Carrera is a 42 year old man who I saw in the hospital for sepsis in the setting of a complex renal calculus.  He had multiple organism on urine culture including MRSA, Proteus and Pseudomonas.  We treated him with Bactrim and Ceftazidime and he is doing clinically well.  He denies fever or chills.  No sweats.  No urinary issues - he has in indwelling suprapubic catheter in the setting of neurogenic bladder. There is some concern that there was retained stones, but at the time of discharge there was not a clear plan on whether and when he would need a subsequent procedure. He has had no issues with his antibiotic therapy - no rashes.  No diarrhea, nausea or vomiting.          PMSHx:     Past Medical History:   Diagnosis Date     Constipation, chronic      GERD (gastroesophageal reflux disease)      Head injury      Neurogenic bladder     SP catheter     JUAN (obstructive sleep apnea)      Quadriplegia (H)      Seizure (H)      Spastic quadriplegia (H)      Urinary tract infection       Past Surgical History:   Procedure Laterality Date     APPENDECTOMY OPEN       BACK SURGERY       INSERT PUMP BACLOFEN       LASER HOLMIUM NEPHROLITHOTOMY VIA PERCUTANEOUS NEPHROSTOMY  10/19/2011    Procedure:LASER HOLMIUM NEPHROLITHOTOMY VIA PERCUTANEOUS NEPHROSTOMY; Left Ureteral Stent Placement, Left Percutaneous Access, Left Percutaneous Nephrostomy  *Latex Allergy*  ; Surgeon:YAN ADDISON; Location:[facility] OR     LASER HOLMIUM NEPHROLITHOTOMY VIA PERCUTANEOUS NEPHROSTOMY Left 3/7/2018    Procedure: LASER HOLMIUM NEPHROLITHOTOMY VIA PERCUTANEOUS NEPHROSTOMY;  Left Percutaneous Nephrolithotomy, Access To Kidney, Ureteroscopy, Cystoscopy, Stent Placement With Holmium Laser standby;  Surgeon: Dejon Horn MD;  Location: [facility] OR     LASER HOLMIUM NEPHROLITHOTOMY VIA PERCUTANEOUS NEPHROSTOMY Left 3/21/2018    Procedure: LASER HOLMIUM NEPHROLITHOTOMY VIA PERCUTANEOUS NEPHROSTOMY;  Left Holmium Laser Percutaneous Nephrolithotomy, Access To Kidney, Left Ureteroscopy, Stent Placement  general with block **Latex Allergy**;  Surgeon: Dejon Horn MD;  Location: [facility] OR     ORTHOPEDIC SURGERY       supra pubic catheter              Review of Systems:   CONSTITUTIONAL:  No fevers or chills  EYES: negative for icterus  RESPIRATORY:  negative for cough, sputum or dyspnea  GASTROINTESTINAL:  negative for nausea, vomiting, diarrhea or constipation  GENITOURINARY:  negative for dysuria  INTEGUMENT:  negative for rash or pruritus         Current Medications:     Current Outpatient Prescriptions   Medication Sig Dispense Refill     sulfamethoxazole-trimethoprim (BACTRIM DS/SEPTRA DS) 800-160 MG per tablet Take 1 tablet by mouth 2 times daily 14 tablet 0     Multiple Vitamins-Minerals (MULTIVITAMIN ADULT PO) Take 1 tablet by mouth daily       polyvinyl alcohol (LIQUIFILM TEARS) 1.4 % ophthalmic solution Place 2 drops into both eyes every 2 hours as needed for dry eyes       clindamycin (CLINDAMAX) 1 % topical  gel Apply topically 2 times daily       Cranberry 400 MG TABS Take 400 mg by mouth 2 times daily       ketoconazole (NIZORAL) 2 % cream Apply topically 2 times daily as needed       LevETIRAcetam (KEPPRA PO) Take 1,000 mg by mouth 2 times daily       polyethylene glycol (MIRALAX/GLYCOLAX) powder Take 1 capful by mouth daily as needed for constipation (if no BM x3 days)       mometasone (ELOCON) 0.1 % cream Apply topically At Bedtime       selenium sulfide (SELSUN) 2.5 % lotion Apply to scalp topically in the morning every Sun, Tue, Thursday. Place for 5 min then rinse.       ACETAMINOPHEN PO Take 650 mg by mouth every 4 hours as needed for pain       VITAMIN D, CHOLECALCIFEROL, PO Take 5,000 Units by mouth daily       albuterol (2.5 MG/3ML) 0.083% nebulizer solution Take 3 mLs by nebulization every 2 hours as needed. 1 Box      bisacodyl (DULCOLAX) 10 MG suppository Place 1 suppository rectally every other day. 12 suppository      cyanocolbalamin (VITAMIN B-12) 1000 MCG tablet Take 1 tablet by mouth daily.       ferrous sulfate 325 (65 FE) MG tablet Take 1 tablet by mouth daily (with breakfast). 100 tablet      magnesium gluconate (MAGONATE) 500 MG tablet Take 1 tablet by mouth daily.       miconazole (MICATIN; MICRO GUARD) 2 % powder Apply  topically 2 times daily. 70 g      ondansetron (ZOFRAN-ODT) 4 MG disintegrating tablet Take 1 tablet by mouth every 6 hours as needed for nausea. 20 tablet      oxybutynin (DITROPAN) 5 MG tablet Take 1 tablet by mouth 3 times daily. 270 tablet      senna-docusate (SENOKOT-S;PERICOLACE) 8.6-50 MG per tablet Take 1-2 tablets by mouth 2 times daily. 60 tablet      vitamin C 250 MG TABS Take 1 tablet by mouth 2 times daily.       zinc oxide (DESITIN) 40 % ointment Apply  topically every hour as needed. 56.7 g      baclofen (LIORESAL) 10 MG tablet Take 10 mg by mouth 4 times daily as needed        docusate sodium (COLACE) 100 MG capsule Take 100 mg by mouth 2 times daily.        Bisacodyl (DULCOLAX PO) Take 10 mg by mouth At Bedtime.       BACLOFEN IT Via implanted IT pump  Baclofen 2000 mc/gmL   Dose: 277.1 mcg/day  Low reservoir alarm date: 4/26/2018       Nutritional Supplements (OSTEO-MULTIVITAMINS/MINERALS OR) Take  by mouth daily. 12 pm                Immunization History:     There is no immunization history on file for this patient.         Allergies:     Allergies   Allergen Reactions     Latex Rash     Latex Rash            Physical Exam:   Vital signs:  /74  Pulse 84  Temp 97.6  F (36.4  C) (Oral)    Physical Examination:  GENERAL:  well-developed, well-nourished, seated in his wheelchair in no acute distress.  HEENT:  Head is normocephalic, atraumatic   EYES:  Eyes have anicteric sclerae without conjunctival injection   LUNGS:  Clear to auscultation bilateral.   CARDIOVASCULAR:  Regular rate and rhythm with no murmurs, gallops or rubs.  SKIN:  No acute rashes.  PICC line entry site without erythema.   NEUROLOGIC:  Grossly nonfocal. Active x4 extremities  PSYCH: Good mood, easily conversant         Laboratory Data:     Metabolic Studies   Sodium   Date Value Ref Range Status   03/13/2018 135 133 - 144 mmol/L Final   03/12/2018 137 133 - 144 mmol/L Final     Potassium   Date Value Ref Range Status   03/13/2018 4.3 3.4 - 5.3 mmol/L Final   03/12/2018 4.5 3.4 - 5.3 mmol/L Final     Chloride   Date Value Ref Range Status   03/13/2018 104 94 - 109 mmol/L Final   03/12/2018 104 94 - 109 mmol/L Final     Carbon Dioxide   Date Value Ref Range Status   03/13/2018 22 20 - 32 mmol/L Final   03/12/2018 24 20 - 32 mmol/L Final     Anion Gap   Date Value Ref Range Status   03/13/2018 10 3 - 14 mmol/L Final   03/12/2018 8 3 - 14 mmol/L Final     Urea Nitrogen   Date Value Ref Range Status   03/13/2018 20 7 - 30 mg/dL Final   03/12/2018 20 7 - 30 mg/dL Final     Creatinine   Date Value Ref Range Status   03/13/2018 1.18 0.66 - 1.25 mg/dL Final   03/12/2018 1.06 0.66 - 1.25 mg/dL Final      GFR Estimate   Date Value Ref Range Status   03/13/2018 68 >60 mL/min/1.7m2 Final     Comment:     Non  GFR Calc   03/12/2018 77 >60 mL/min/1.7m2 Final     Comment:     Non  GFR Calc     Glucose   Date Value Ref Range Status   03/13/2018 83 70 - 99 mg/dL Final   03/12/2018 98 70 - 99 mg/dL Final     Calcium   Date Value Ref Range Status   03/13/2018 9.9 8.5 - 10.1 mg/dL Final   03/12/2018 10.0 8.5 - 10.1 mg/dL Final     Phosphorus   Date Value Ref Range Status   03/09/2018 3.3 2.5 - 4.5 mg/dL Final   10/30/2011 2.4 (L) 2.5 - 4.5 mg/dL Final     Magnesium   Date Value Ref Range Status   03/09/2018 1.9 1.6 - 2.3 mg/dL Final   11/01/2011 2.4 (H) 1.6 - 2.3 mg/dL Final     Lactic Acid   Date Value Ref Range Status   03/08/2018 0.9 0.7 - 2.0 mmol/L Final   10/24/2011 1.8 0.7 - 2.1 mmol/L Final     CRP Inflammation   Date Value Ref Range Status   12/29/2011 31.3 (H) 0.0 - 8.0 mg/L Final   11/29/2011 36.5 (H) 0.0 - 8.0 mg/L Final   11/06/2011 71.1 (H) 0.0 - 8.0 mg/L Final   10/22/2011 198.9 (H) 0.0 - 8.0 mg/L Final       Inflammatory Markers   CRP Inflammation   Date Value Ref Range Status   12/29/2011 31.3 (H) 0.0 - 8.0 mg/L Final   11/29/2011 36.5 (H) 0.0 - 8.0 mg/L Final   11/06/2011 71.1 (H) 0.0 - 8.0 mg/L Final   10/22/2011 198.9 (H) 0.0 - 8.0 mg/L Final       Hepatic Studies    Bilirubin Total   Date Value Ref Range Status   12/29/2011 0.4 0.2 - 1.3 mg/dL Final   11/07/2011 0.5 0.2 - 1.3 mg/dL Final     Alkaline Phosphatase   Date Value Ref Range Status   12/29/2011 81 40 - 150 U/L Final   11/07/2011 61 40 - 150 U/L Final     Albumin   Date Value Ref Range Status   12/29/2011 4.1 3.9 - 5.1 g/dL Final   11/07/2011 3.6 (L) 3.9 - 5.1 g/dL Final     AST   Date Value Ref Range Status   12/29/2011 36 0 - 55 U/L Final   11/07/2011 19 0 - 55 U/L Final     ALT   Date Value Ref Range Status   12/29/2011 58 0 - 70 U/L Final   11/07/2011 18 0 - 70 U/L Final       Hematology Studies      WBC    Date Value Ref Range Status   03/13/2018 9.5 4.0 - 11.0 10e9/L Final   03/12/2018 8.2 4.0 - 11.0 10e9/L Final     Absolute Neutrophil   Date Value Ref Range Status   12/29/2011 5.5 1.6 - 8.3 10e9/L Final   11/29/2011 4.0 1.6 - 8.3 10e9/L Final     Absolute Lymphocytes   Date Value Ref Range Status   12/29/2011 1.4 0.8 - 5.3 10e9/L Final   11/29/2011 1.3 0.8 - 5.3 10e9/L Final     Absolute Monocytes   Date Value Ref Range Status   12/29/2011 0.7 0.0 - 1.3 10e9/L Final   11/29/2011 0.4 0.0 - 1.3 10e9/L Final     Absolute Eosinophils   Date Value Ref Range Status   12/29/2011 0.4 0.0 - 0.7 10e9/L Final   11/29/2011 0.3 0.0 - 0.7 10e9/L Final     Hemoglobin   Date Value Ref Range Status   03/13/2018 7.9 (L) 13.3 - 17.7 g/dL Final   03/12/2018 7.5 (L) 13.3 - 17.7 g/dL Final     Hematocrit   Date Value Ref Range Status   03/13/2018 26.9 (L) 40.0 - 53.0 % Final   03/12/2018 25.9 (L) 40.0 - 53.0 % Final     Platelet Count   Date Value Ref Range Status   03/13/2018 274 150 - 450 10e9/L Final   03/12/2018 253 150 - 450 10e9/L Final       Imaging:  Recent Results (from the past 744 hour(s))   IR PICC Placement > 5 Yrs of Age    Narrative    PROCEDURE: Single-lumen PICC placement    INDICATION: Medication administration    PHYSICIAN: Arsenio Srivastava M.D.    CONSENT: Written and verbal informed consent obtained from the patient    ANESTHESIA: Local with buffered lidocaine    TECHNIQUE: Right arm prepped and draped and skin anesthetized.  Ultrasound showed patent basilic and brachial veins and permanent  image stored. Ultrasound guidance used to access the basilic vein.  Wire would not advance past the axilla. Venogram performed through the  sheath showing occlusion of the basilic vein centrally with collateral  flow via chest wall veins.    4 Stateless single-lumen PICC was cut to 22 cm and left with the tip in  the right axillary vein. The port flushed and aspirated well and is  ready for immediate use.      Impression     IMPRESSION:   1. 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 Right single-lumen PICC with tip in the peripheral axillary vein is  ready for immediate use    PENNY SMITH MD   XR Surgery [facility] Fluoro L/T 5 Min w Stills    Narrative    This exam was marked as non-reportable because it will not be read by a   radiologist or a [place] non-radiologist provider.             CT Abdomen Pelvis w/o Contrast    Narrative    EXAMINATION: CT ABDOMEN PELVIS W/O CONTRAST, 4/5/2018 11:38 AM    TECHNIQUE:  Helical CT images from the lung bases through the  symphysis pubis were obtained without IV contrast.    COMPARISON: CT abdomen/pelvis 3/8/2018    HISTORY: Evaluation of residual stone burden - CT stone protocol;  Kidney stones    FINDINGS:    Right kidney and ureter: Since 3/8/2018, no significant change in  large staghorn calculus filling the right-sided renal collecting  system and extending into the renal pelvis. Unchanged diffuse cortical  thinning. No hydronephrosis or hydroureter.    Left kidney and ureter: Multiple left-sided renal calculi are again  demonstrated. The calculus previously seen within the far superior  aspect of the upper pole the left kidney which was previously surround  air is completely resolved. There are 3 additional upper pole calculi  remaining. The lateralmost upper pole calculus calculus measures 12 mm  in diameter, previously 24 mm. Calculus in the middle of the upper  pole measures 14 mm, previously 19 mm. The medial most calculus  measures 11 mm, previously 23 mm. A dominant lower pole calculus  measuring 15 mm is unchanged in size as are multiple smaller lower  pole calculi. Multiple residual stone fragments are positioned in the  left renal pelvis and mid left ureter. A left-sided percutaneous  nephrostomy tube is in place. There is soft tissue stranding about the  renal pelvis and ureter consistent with postprocedural change.   Dilated renal calyces are not significantly changed  since the prior  exam. No hydroureter.    Urinary bladder: Mostly decompressed with a suprapubic catheter in  place. There is a small amount of layering hyperdensity in the urinary  bladder, likely representing residual gravel from recent lithotripsy.    Remainder of the abdomen/pelvis: Cholelithiasis. Noncontrast  evaluation of the liver, spleen, pancreas, and adrenals is normal. IVC  stent is in place with limbs extending beyond the walls of the IVC. No  abdominal aortic aneurysm. No dilated small or large bowel. Impacted  stool in the rectum with associated rectal and sigmoid colon wall  thickening, similar to previous exam.    Lung bases: Small left pleural effusion. Bibasilar atelectasis.    Bones and soft tissues: Severe degenerative changes of both hip  joints. Bilateral sacroiliac joint fusion and extensive posterior  element fusion throughout the visualized thoracolumbar spine.  Intrathecal pump with device in the right lower quadrant subcutaneous  tissues.      Impression    IMPRESSION:   1. Complete resolution of a large calculus previously seen in an upper  pole of the left kidney with significant decrease in size of multiple  other large calyceal stones in the left kidney. There are multiple  residual stone fragments layering in the left renal pelvis, in the mid  left ureter, and in the urinary bladder. The largest of these measure  4 mm in the mid left ureter.  2. Stable positioning of left percutaneous nephroureteral drain with  persistent perinephric and periureteral stranding likely reflecting  postprocedural changes.  3. Unchanged right staghorn calculus.  4. New small left pleural effusion, partially visualized.  5. Cholelithiasis.    I have personally reviewed the examination and initial interpretation  and I agree with the findings.    WALESKA RIVERA

## 2018-05-23 ENCOUNTER — APPOINTMENT (OUTPATIENT)
Dept: SURGERY | Facility: CLINIC | Age: 43
End: 2018-05-23
Payer: COMMERCIAL

## 2018-05-23 ENCOUNTER — OFFICE VISIT (OUTPATIENT)
Dept: SURGERY | Facility: CLINIC | Age: 43
End: 2018-05-23
Payer: COMMERCIAL

## 2018-05-23 ENCOUNTER — ANESTHESIA EVENT (OUTPATIENT)
Dept: SURGERY | Facility: CLINIC | Age: 43
End: 2018-05-23
Payer: COMMERCIAL

## 2018-05-23 ENCOUNTER — ALLIED HEALTH/NURSE VISIT (OUTPATIENT)
Dept: SURGERY | Facility: CLINIC | Age: 43
End: 2018-05-23
Payer: COMMERCIAL

## 2018-05-23 VITALS
TEMPERATURE: 97.4 F | RESPIRATION RATE: 20 BRPM | WEIGHT: 182.6 LBS | HEIGHT: 71 IN | SYSTOLIC BLOOD PRESSURE: 119 MMHG | HEART RATE: 68 BPM | DIASTOLIC BLOOD PRESSURE: 80 MMHG | BODY MASS INDEX: 25.56 KG/M2 | OXYGEN SATURATION: 100 %

## 2018-05-23 DIAGNOSIS — D50.8 OTHER IRON DEFICIENCY ANEMIA: ICD-10-CM

## 2018-05-23 DIAGNOSIS — N28.89 RENAL HEMORRHAGE, LEFT: Primary | ICD-10-CM

## 2018-05-23 DIAGNOSIS — Z01.818 PREOPERATIVE GENERAL PHYSICAL EXAMINATION: ICD-10-CM

## 2018-05-23 DIAGNOSIS — N20.0 RENAL STONE: Primary | ICD-10-CM

## 2018-05-23 DIAGNOSIS — R30.0 DYSURIA: ICD-10-CM

## 2018-05-23 DIAGNOSIS — Z01.818 PREOP EXAMINATION: Primary | ICD-10-CM

## 2018-05-23 LAB
ALBUMIN UR-MCNC: 100 MG/DL
ANION GAP SERPL CALCULATED.3IONS-SCNC: 8 MMOL/L (ref 3–14)
APPEARANCE UR: ABNORMAL
BACTERIA #/AREA URNS HPF: ABNORMAL /HPF
BILIRUB UR QL STRIP: NEGATIVE
BUN SERPL-MCNC: 13 MG/DL (ref 7–30)
CALCIUM SERPL-MCNC: 9.7 MG/DL (ref 8.5–10.1)
CHLORIDE SERPL-SCNC: 105 MMOL/L (ref 94–109)
CO2 SERPL-SCNC: 25 MMOL/L (ref 20–32)
COLOR UR AUTO: YELLOW
CREAT SERPL-MCNC: 0.84 MG/DL (ref 0.66–1.25)
ERYTHROCYTE [DISTWIDTH] IN BLOOD BY AUTOMATED COUNT: 15.9 % (ref 10–15)
GFR SERPL CREATININE-BSD FRML MDRD: >90 ML/MIN/1.7M2
GLUCOSE SERPL-MCNC: 76 MG/DL (ref 70–99)
GLUCOSE UR STRIP-MCNC: NEGATIVE MG/DL
HCT VFR BLD AUTO: 39.1 % (ref 40–53)
HGB BLD-MCNC: 11.3 G/DL (ref 13.3–17.7)
HGB UR QL STRIP: ABNORMAL
KETONES UR STRIP-MCNC: NEGATIVE MG/DL
LEUKOCYTE ESTERASE UR QL STRIP: ABNORMAL
MCH RBC QN AUTO: 23.9 PG (ref 26.5–33)
MCHC RBC AUTO-ENTMCNC: 28.9 G/DL (ref 31.5–36.5)
MCV RBC AUTO: 83 FL (ref 78–100)
NITRATE UR QL: POSITIVE
PH UR STRIP: 8 PH (ref 5–7)
PLATELET # BLD AUTO: 263 10E9/L (ref 150–450)
POTASSIUM SERPL-SCNC: 3.9 MMOL/L (ref 3.4–5.3)
RBC # BLD AUTO: 4.73 10E12/L (ref 4.4–5.9)
RBC #/AREA URNS AUTO: 88 /HPF (ref 0–2)
SODIUM SERPL-SCNC: 139 MMOL/L (ref 133–144)
SOURCE: ABNORMAL
SP GR UR STRIP: 1.01 (ref 1–1.03)
TRANS CELLS #/AREA URNS HPF: 2 /HPF
UROBILINOGEN UR STRIP-MCNC: 0 MG/DL (ref 0–2)
WBC # BLD AUTO: 7.7 10E9/L (ref 4–11)
WBC #/AREA URNS AUTO: >182 /HPF (ref 0–5)
WBC CLUMPS #/AREA URNS HPF: PRESENT /HPF

## 2018-05-23 ASSESSMENT — ENCOUNTER SYMPTOMS: SEIZURES: 1

## 2018-05-23 NOTE — PROGRESS NOTES
Preoperative Assessment Center medication history for May 23, 2018 is complete.    See Epic admission navigator for prior to admission medications.   Operating room staff will still need to confirm medications and last dose information on day of surgery.     Medication history interview sources:  Patient's medication list from [facility]     Changes made to [facility] medication list (reason)  Added: None    Deleted:   -- bactrim - finished course    Changed: None    Additional medication history information (including reliability of information, actions taken by pharmacist):    -- No recent (within 30 days) course of antibiotics  -- No recent (within 30 days) course of steroids  -- No recent (within 30 days) chronic daily medications stopped   -- Patient declines being on any other prescription or over-the-counter medications  --Patient's baclofen pump was scanned today too. Last changed 1/15/18 with the next refill being 10/16/18    Prior to Admission medications    Medication Sig Last Dose Taking? Auth Provider   ACETAMINOPHEN PO Take 650 mg by mouth every 4 hours as needed for pain Taking Yes Unknown, Entered By History   albuterol (2.5 MG/3ML) 0.083% nebulizer solution Take 3 mLs by nebulization every 2 hours as needed.  Patient not taking: Reported on 5/23/2018 Not Taking  Stella Cano MD   baclofen (LIORESAL) 10 MG tablet Take 10 mg by mouth 4 times daily as needed  Taking Yes Reported, Patient   BACLOFEN IT Via implanted IT pump  Baclofen Concentration 2000 mc/gmL   Dose: 277.1 mcg/day  Low reservoir alarm date: 10/16/2018  Low reservoir Alarm Volume: 2ml  Interrogated 5/23/18 Taking Yes Reported, Patient   Bisacodyl (DULCOLAX PO) Take 10 mg by mouth At Bedtime. Taking Yes Reported, Patient   bisacodyl (DULCOLAX) 10 MG suppository Place 1 suppository rectally every other day. Taking Yes Stella Cano MD   clindamycin (CLINDAMAX) 1 % topical gel Apply topically 2 times daily Taking Yes Unknown, Entered By  History   Cranberry 400 MG TABS Take 400 mg by mouth 2 times daily Taking Yes Unknown, Entered By History   cyanocolbalamin (VITAMIN B-12) 1000 MCG tablet Take 1 tablet by mouth daily. Taking Yes Stella Cano MD   docusate sodium (COLACE) 100 MG capsule Take 100 mg by mouth 2 times daily. Taking Yes Reported, Patient   ferrous sulfate 325 (65 FE) MG tablet Take 1 tablet by mouth daily (with breakfast). Taking Yes Stella Cano MD   ketoconazole (NIZORAL) 2 % cream Apply topically 2 times daily as needed Taking Yes Unknown, Entered By History   LevETIRAcetam (KEPPRA PO) Take 1,000 mg by mouth 2 times daily Taking Yes Unknown, Entered By History   magnesium gluconate (MAGONATE) 500 MG tablet Take 1 tablet by mouth daily. Taking Yes Stella Cano MD   miconazole (MICATIN; MICRO GUARD) 2 % powder Apply  topically 2 times daily. Taking Yes Stella Cano MD   mometasone (ELOCON) 0.1 % cream Apply topically At Bedtime Taking Yes Unknown, Entered By History   Multiple Vitamins-Minerals (MULTIVITAMIN ADULT PO) Take 1 tablet by mouth daily Taking Yes Reported, Patient   Nutritional Supplements (OSTEO-MULTIVITAMINS/MINERALS OR) Take  by mouth daily. 12 pm     Reported, Patient   ondansetron (ZOFRAN-ODT) 4 MG disintegrating tablet Take 1 tablet by mouth every 6 hours as needed for nausea. Taking Yes Stella Cano MD   oxybutynin (DITROPAN) 5 MG tablet Take 1 tablet by mouth 3 times daily. Taking Yes Stella Cano MD   polyethylene glycol (MIRALAX/GLYCOLAX) powder Take 1 capful by mouth daily as needed for constipation (if no BM x3 days) Taking Yes Unknown, Entered By History   polyvinyl alcohol (LIQUIFILM TEARS) 1.4 % ophthalmic solution Place 2 drops into both eyes every 2 hours as needed for dry eyes Taking Yes Unknown, Entered By History   selenium sulfide (SELSUN) 2.5 % lotion Apply to scalp topically in the morning every Sun, Tue, Thursday. Place for 5 min then rinse. Taking Yes Unknown, Entered By  History   senna-docusate (SENOKOT-S;PERICOLACE) 8.6-50 MG per tablet Take 1-2 tablets by mouth 2 times daily. Taking Yes Stella Cano MD   vitamin C 250 MG TABS Take 1 tablet by mouth 2 times daily. Taking Yes Stella Cano MD   VITAMIN D, CHOLECALCIFEROL, PO Take 5,000 Units by mouth daily Taking Yes Unknown, Entered By History   zinc oxide (DESITIN) 40 % ointment Apply  topically every hour as needed. Taking Yes Stella Cano MD         Medication history completed by: Viet Reynolds [facility]
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
## 2018-05-23 NOTE — PATIENT INSTRUCTIONS
Preparing for Your Surgery      Name:  Maicol Carrera   MRN:  [id number]   :  1975   Today's Date:  2018     Arriving for surgery:  Surgery date:  18  Arrival time:  10 am    Please come to:   [facility] [address]  [address] [place]  [address]    -   parking is available in front of the hospital from 5:15 am to 8:00 pm    -  Stop at the Information Desk in the lobby    -   Inform the information person that you are here for surgery. An escort to 3C will be provided. If you would not like an escort, please proceed to 3C on the 3rd floor. [phone]   -  Bring your ID and insurance card.    What can I eat or drink?  -  You may have solid food or milk products until 8 hours prior to your surgery. (4:30 am )  -  You may have water, apple juice or 7up/Sprite until 2 hours prior to your surgery. (10 am- Stop on arrival to hospital. )    Which medicines can I take?       -  Do not bring your own medications to the hospital.        -  Follow Urology Clinic instructions regarding Ibuprofen. If no instructions given, NO Ibuprofen the day prior to surgery.         -  Hold Aspirin products, Multivitamin, and Vitamins 7 days prior to surgery.    -  Do NOT take these medications in the morning, the day of surgery:  Docusate, Ferrous Sulfate, Magnesium, Senokot,      No creams, ointments, gels, or powders on the skin.    -  Please take these medications the morning of surgery:  Levetiracetam, Oxybutynin,      Acetaminophen (Tylenol) if needed      Albuterol neb if needed      Baclofen if needed    How do I prepare myself?      -  Bring Bipap.  -  Take two showers: one the night before surgery; and one the morning of surgery.         Use Scrubcare or Hibiclens to wash from neck down.  You may use your own shampoo and conditioner. No other hair products.   -  Do NOT use lotion, powder, colognes, deodorant, or antiperspirant the day of your surgery.  -  Do NOT wear  any jewelry.    Questions or Concerns:  If you have questions or concerns, please call the  Preoperative Assessment Center, Monday-Friday 7AM-7PM:  [phone]    AFTER YOUR SURGERY  Breathing exercises   Breathing exercises help you recover faster. Take deep breaths and let the air out slowly. This will:     Help you wake up after surgery.    Help prevent complications like pneumonia.  Preventing complications will help you go home sooner.   We may give you a breathing device (incentive spirometer) to encourage you to breathe deeply.   Nausea and vomiting   You may feel sick to your stomach after surgery; if so, let your nurse know.    Pain control:  After surgery, you may have pain. Our goal is to help you manage your pain. Pain medicine will help you feel comfortable enough to do activities that will help you heal.  These activities may include breathing exercises, walking and physical therapy.   To help your health care team treat your pain we will ask: 1) If you have pain  2) where it is located 3) describe your pain in your words  Methods of pain control include medications given by mouth, vein or by nerve block for some surgeries.  Sequential Compression Device (SCD) or Pneumo Boots:  You may need to wear SCD S on your legs or feet. These are wraps connected to a machine that pumps in air and releases it. The repeated pumping helps prevent blood clots from forming.

## 2018-05-23 NOTE — MR AVS SNAPSHOT
After Visit Summary   5/23/2018    Maicol Carrera    MRN: [id number]           Patient Information     Date Of Birth          1975        Visit Information        Provider Department      5/23/2018 10:00 AM Pharmacist, Libby Romo [facility] Assessment [place]        Today's Diagnoses     Preop examination    -  1       Follow-ups after your visit        Your next 10 appointments already scheduled     May 23, 2018 11:30 AM CDT   (Arrive by 11:15 AM)   PAC RN ASSESSMENT with Libby Pac Rn   [facility] Preoperative Assessment [place] ([facility])    [address] [place] [id number] [phone]            May 23, 2018 12:00 PM CDT   (Arrive by 11:45 AM)   PAC Anesthesia Consult with Libby Pac Anesthesiologist   [facility] Preoperative Assessment [place] ([facility])    [address] [place] [id number] [phone]            Jun 06, 2018   Procedure with Dejon Horn MD   [facility] [place], Same Day Surgery (--)    [address] [facility] [id number] [phone]            Jun 29, 2018 11:40 AM CDT   (Arrive by 11:25 AM)   Cystoscopy with Dejon Horn MD   [facility] ([facility])    [address] [place] [id number] [phone]              Future tests that were ordered for you today     Open Future Orders        Priority Expected Expires Ordered    Urine Culture Aerobic Bacterial Routine 5/23/2018 7/23/2018 5/23/2018            Who to contact     Please call your clinic at [phone] to:    Ask questions about your health    Make or cancel appointments    Discuss your medicines    Learn about your test results    Speak to your doctor            Additional Information About Your Visit        Care EveryWhere ID     This is your Care EveryWhere ID. This could be used by other organizations  to access your [place] medical records  [id number]         Blood Pressure from Last 3 Encounters:   05/23/18 119/80   04/05/18 117/74   03/21/18 134/84    Weight from Last 3 Encounters:   05/23/18 82.8 kg (182 lb 9.6 oz)   03/21/18 80.3 kg (177 lb)   03/11/18 84.9 kg (187 lb 2.7 oz)              Today, you had the following     No orders found for display       Primary Care Provider Office Phone # Fax #    [facility] [phone] [address] [facility] [address]        Equal Access to Services     AUSTIN VIEIRA : Lory Forman, naomie blanco, jada gilbertmabrynn kunz, kathy frazier. So [facility] [phone].    ATENCIÓN: Si habla español, tiene a da silva disposición servicios gratuitos de asistencia lingüística. Llame al [phone].    We comply with applicable federal civil rights laws and [place] laws. We do not discriminate on the basis of race, color, national origin, age, disability, sex, sexual orientation, or gender identity.            Thank you!     Thank you for choosing [facility] PREOPERATIVE ASSESSMENT CENTER  for your care. Our goal is always to provide you with excellent care. Hearing back from our patients is one way we can continue to improve our services. Please take a few minutes to complete the written survey that you may receive in the mail after your visit with us. Thank you!             Your Updated Medication List - Protect others around you: Learn how to safely use, store and throw away your medicines at www.disposemymeds.org.          This list is accurate as of 5/23/18 11:01 AM.  Always use your most recent med list.                   Brand Name Dispense Instructions for use Diagnosis    ACETAMINOPHEN PO      Take 650 mg by mouth every 4 hours as needed for pain        albuterol (2.5 MG/3ML) 0.083% neb solution     1 Box    Take 3 mLs by nebulization every 2 hours as needed.    SOB (shortness  of breath)       ascorbic acid 250 MG Tabs tablet      Take 1 tablet by mouth 2 times daily.    Recurrent UTI       * baclofen 10 MG tablet    LIORESAL     Take 10 mg by mouth 4 times daily as needed        * BACLOFEN IT      Via implanted IT pump Baclofen Concentration 2000 mc/gmL  Dose: 277.1 mcg/day Low reservoir alarm date: 10/16/2018 Low reservoir Alarm Volume: 2ml Interrogated 5/23/18        clindamycin 1 % topical gel    CLINDAMAX     Apply topically 2 times daily        COLACE 100 MG capsule   Generic drug:  docusate sodium      Take 100 mg by mouth 2 times daily.        Cranberry 400 MG Tabs      Take 400 mg by mouth 2 times daily        cyanocobalamin 1000 MCG tablet    vitamin  B-12     Take 1 tablet by mouth daily.    Vitamin B 12 deficiency       * bisacodyl 10 MG Suppository    DULCOLAX    12 suppository    Place 1 suppository rectally every other day.    Chronic constipation       * DULCOLAX PO      Take 10 mg by mouth At Bedtime.        ferrous sulfate 325 (65 Fe) MG tablet    IRON    100 tablet    Take 1 tablet by mouth daily (with breakfast).    Anemia due to blood loss, acute       KEPPRA PO      Take 1,000 mg by mouth 2 times daily        ketoconazole 2 % cream    NIZORAL     Apply topically 2 times daily as needed        magnesium gluconate 500 MG tablet    MAGONATE     Take 1 tablet by mouth daily.    Magnesium deficiency       miconazole 2 % powder    MICATIN; MICRO GUARD    70 g    Apply  topically 2 times daily.    Candida infection of flexural skin       mometasone 0.1 % cream    ELOCON     Apply topically At Bedtime        MULTIVITAMIN ADULT PO      Take 1 tablet by mouth daily        ondansetron 4 MG ODT tab    ZOFRAN-ODT    20 tablet    Take 1 tablet by mouth every 6 hours as needed for nausea.    Nausea and vomiting       OSTEO-MULTIVITAMINS/MINERALS OR      Take  by mouth daily. 12 pm        oxybutynin 5 MG tablet    DITROPAN    270 tablet    Take 1 tablet by mouth 3 times daily.     Neurogenic bladder       polyethylene glycol powder    MIRALAX/GLYCOLAX     Take 1 capful by mouth daily as needed for constipation (if no BM x3 days)        polyvinyl alcohol 1.4 % ophthalmic solution    LIQUIFILM TEARS     Place 2 drops into both eyes every 2 hours as needed for dry eyes        selenium sulfide 2.5 % lotion    SELSUN     Apply to scalp topically in the morning every Sun, Tue, Thursday. Place for 5 min then rinse.        senna-docusate 8.6-50 MG per tablet    SENOKOT-S;PERICOLACE    60 tablet    Take 1-2 tablets by mouth 2 times daily.    Chronic constipation       VITAMIN D (CHOLECALCIFEROL) PO      Take 5,000 Units by mouth daily        zinc oxide 40 % ointment    DESITIN    56.7 g    Apply  topically every hour as needed.    Breakdown of skin tissue       * Notice:  This list has 4 medication(s) that are the same as other medications prescribed for you. Read the directions carefully, and ask your doctor or other care provider to review them with you.
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
## 2018-05-23 NOTE — MR AVS SNAPSHOT
After Visit Summary   2018    Maicol Carrera    MRN: [id number]           Patient Information     Date Of Birth          1975        Visit Information        Provider Department      2018 11:30 AM Rn, [facility]        Care Instructions    Preparing for Your Surgery      Name:  Maicol Carrera   MRN:  [id number]   :  1975   Today's Date:  2018     Arriving for surgery:  Surgery date:  18  Arrival time:  10 am    Please come to:   [facility] [address]  [address] [place]  [address]    -   parking is available in front of the hospital from 5:15 am to 8:00 pm    -  Stop at the Information Desk in the lobby    -   Inform the information person that you are here for surgery. An escort to 3C will be provided. If you would not like an escort, please proceed to 3C on the 3rd floor. [phone]   -  Bring your ID and insurance card.    What can I eat or drink?  -  You may have solid food or milk products until 8 hours prior to your surgery. (4:30 am )  -  You may have water, apple juice or 7up/Sprite until 2 hours prior to your surgery. (10 am- Stop on arrival to hospital. )    Which medicines can I take?       -  Do not bring your own medications to the hospital.        -  Follow Urology Clinic instructions regarding Ibuprofen. If no instructions given, NO Ibuprofen the day prior to surgery.         -  Hold Aspirin products, Multivitamin, and Vitamins 7 days prior to surgery.    -  Do NOT take these medications in the morning, the day of surgery:  Docusate, Ferrous Sulfate, Magnesium, Senokot,      No creams, ointments, gels, or powders on the skin.    -  Please take these medications the morning of surgery:  Levetiracetam, Oxybutynin,      Acetaminophen (Tylenol) if needed      Albuterol neb if needed      Baclofen if needed    How do I prepare myself?      -  Bring Bipap.  -  Take two showers:  one the night before surgery; and one the morning of surgery.         Use Scrubcare or Hibiclens to wash from neck down.  You may use your own shampoo and conditioner. No other hair products.   -  Do NOT use lotion, powder, colognes, deodorant, or antiperspirant the day of your surgery.  -  Do NOT wear any jewelry.    Questions or Concerns:  If you have questions or concerns, please call the  Preoperative Assessment Center, Monday-Friday 7AM-7PM:  [phone]    AFTER YOUR SURGERY  Breathing exercises   Breathing exercises help you recover faster. Take deep breaths and let the air out slowly. This will:     Help you wake up after surgery.    Help prevent complications like pneumonia.  Preventing complications will help you go home sooner.   We may give you a breathing device (incentive spirometer) to encourage you to breathe deeply.   Nausea and vomiting   You may feel sick to your stomach after surgery; if so, let your nurse know.    Pain control:  After surgery, you may have pain. Our goal is to help you manage your pain. Pain medicine will help you feel comfortable enough to do activities that will help you heal.  These activities may include breathing exercises, walking and physical therapy.   To help your health care team treat your pain we will ask: 1) If you have pain  2) where it is located 3) describe your pain in your words  Methods of pain control include medications given by mouth, vein or by nerve block for some surgeries.  Sequential Compression Device (SCD) or Pneumo Boots:  You may need to wear SCD S on your legs or feet. These are wraps connected to a machine that pumps in air and releases it. The repeated pumping helps prevent blood clots from forming.                     Follow-ups after your visit        Your next 10 appointments already scheduled     May 23, 2018 11:30 AM CDT   (Arrive by 11:15 AM)   PAC RN ASSESSMENT with  Pac Rn   [facility] ([facility]  Critical access hospital Surgery Center)    [address] [address] [phone]            May 23, 2018 12:00 PM CDT   (Arrive by 11:45 AM)   PAC Anesthesia Consult with  Pac Anesthesiologist   [facility] ([facility])    [address] [place] [address] [phone]            Jun 06, 2018   Procedure with Dejon Horn MD   [facility], [place], Same Day Surgery (--)    [address] [facility] [address] [phone]            Jun 29, 2018 11:40 AM CDT   (Arrive by 11:25 AM)   Cystoscopy with Dejon Horn MD   [email or web address] for Prostate and Urologic Cancers ([facility])    [address] [place] [address] [phone]              Future tests that were ordered for you today     Open Future Orders        Priority Expected Expires Ordered    Urine Culture Aerobic Bacterial Routine 5/23/2018 7/23/2018 5/23/2018            Who to contact     Please call your clinic at [phone] to:    Ask questions about your health    Make or cancel appointments    Discuss your medicines    Learn about your test results    Speak to your doctor            Additional Information About Your Visit        Care EveryWhere ID     This is your Care EveryWhere ID. This could be used by other organizations to access your [place] medical records  [id number]         Blood Pressure from Last 3 Encounters:   05/23/18 119/80   04/05/18 117/74   03/21/18 134/84    Weight from Last 3 Encounters:   05/23/18 82.8 kg (182 lb 9.6 oz)   03/21/18 80.3 kg (177 lb)   03/11/18 84.9 kg (187 lb 2.7 oz)              Today, you had the following     No orders found for display       Primary Care Provider Office Phone # Fax #    [facility] [phone] [address] [facility] [address]        Equal Access to Services     [place]USTIN  GAAR : Hadii aad ku diana Forman, waaxda luqadaha, qaybta kadomo kunz, kathy arielin hayaatj mcdaniels brian alan . So [facility] [phone].    ATENCIÓN: Si habla español, tiene a da silva disposición servicios gratuitos de asistencia lingüística. Llame al [phone].    We comply with applicable federal civil rights laws and [place] laws. We do not discriminate on the basis of race, color, national origin, age, disability, sex, sexual orientation, or gender identity.            Thank you!     Thank you for choosing [facility]  for your care. Our goal is always to provide you with excellent care. Hearing back from our patients is one way we can continue to improve our services. Please take a few minutes to complete the written survey that you may receive in the mail after your visit with us. Thank you!             Your Updated Medication List - Protect others around you: Learn how to safely use, store and throw away your medicines at www.disposemymeds.org.          This list is accurate as of 5/23/18 11:04 AM.  Always use your most recent med list.                   Brand Name Dispense Instructions for use Diagnosis    ACETAMINOPHEN PO      Take 650 mg by mouth every 4 hours as needed for pain        albuterol (2.5 MG/3ML) 0.083% neb solution     1 Box    Take 3 mLs by nebulization every 2 hours as needed.    SOB (shortness of breath)       ascorbic acid 250 MG Tabs tablet      Take 1 tablet by mouth 2 times daily.    Recurrent UTI       * baclofen 10 MG tablet    LIORESAL     Take 10 mg by mouth 4 times daily as needed        * BACLOFEN IT      Via implanted IT pump Baclofen Concentration 2000 mc/gmL  Dose: 277.1 mcg/day Low reservoir alarm date: 10/16/2018 Low reservoir Alarm Volume: 2ml Interrogated 5/23/18        clindamycin 1 % topical gel    CLINDAMAX     Apply topically 2 times daily        COLACE 100 MG capsule   Generic drug:  docusate sodium      Take 100 mg by mouth 2  times daily.        Cranberry 400 MG Tabs      Take 400 mg by mouth 2 times daily        cyanocobalamin 1000 MCG tablet    vitamin  B-12     Take 1 tablet by mouth daily.    Vitamin B 12 deficiency       * bisacodyl 10 MG Suppository    DULCOLAX    12 suppository    Place 1 suppository rectally every other day.    Chronic constipation       * DULCOLAX PO      Take 10 mg by mouth At Bedtime.        ferrous sulfate 325 (65 Fe) MG tablet    IRON    100 tablet    Take 1 tablet by mouth daily (with breakfast).    Anemia due to blood loss, acute       KEPPRA PO      Take 1,000 mg by mouth 2 times daily        ketoconazole 2 % cream    NIZORAL     Apply topically 2 times daily as needed        magnesium gluconate 500 MG tablet    MAGONATE     Take 1 tablet by mouth daily.    Magnesium deficiency       miconazole 2 % powder    MICATIN; MICRO GUARD    70 g    Apply  topically 2 times daily.    Candida infection of flexural skin       mometasone 0.1 % cream    ELOCON     Apply topically At Bedtime        MULTIVITAMIN ADULT PO      Take 1 tablet by mouth daily        ondansetron 4 MG ODT tab    ZOFRAN-ODT    20 tablet    Take 1 tablet by mouth every 6 hours as needed for nausea.    Nausea and vomiting       OSTEO-MULTIVITAMINS/MINERALS OR      Take  by mouth daily. 12 pm        oxybutynin 5 MG tablet    DITROPAN    270 tablet    Take 1 tablet by mouth 3 times daily.    Neurogenic bladder       polyethylene glycol powder    MIRALAX/GLYCOLAX     Take 1 capful by mouth daily as needed for constipation (if no BM x3 days)        polyvinyl alcohol 1.4 % ophthalmic solution    LIQUIFILM TEARS     Place 2 drops into both eyes every 2 hours as needed for dry eyes        selenium sulfide 2.5 % lotion    SELSUN     Apply to scalp topically in the morning every Sun, Tue, Thursday. Place for 5 min then rinse.        senna-docusate 8.6-50 MG per tablet    SENOKOT-S;PERICOLACE    60 tablet    Take 1-2 tablets by mouth 2 times daily.     Chronic constipation       VITAMIN D (CHOLECALCIFEROL) PO      Take 5,000 Units by mouth daily        zinc oxide 40 % ointment    DESITIN    56.7 g    Apply  topically every hour as needed.    Breakdown of skin tissue       * Notice:  This list has 4 medication(s) that are the same as other medications prescribed for you. Read the directions carefully, and ask your doctor or other care provider to review them with you.

## 2018-05-26 LAB
BACTERIA SPEC CULT: ABNORMAL
BACTERIA SPEC CULT: ABNORMAL
Lab: ABNORMAL
SPECIMEN SOURCE: ABNORMAL

## 2018-06-01 ENCOUNTER — CARE COORDINATION (OUTPATIENT)
Dept: UROLOGY | Facility: CLINIC | Age: 43
End: 2018-06-01

## 2018-06-01 DIAGNOSIS — R30.0 DYSURIA: Primary | ICD-10-CM

## 2018-06-01 RX ORDER — CEFEPIME HYDROCHLORIDE 2 G/1
2 INJECTION, POWDER, FOR SOLUTION INTRAVENOUS EVERY 8 HOURS
Qty: 300 ML | Refills: 0 | Status: ON HOLD | OUTPATIENT
Start: 2018-06-01 | End: 2018-06-07

## 2018-06-01 NOTE — PROGRESS NOTES
Order for Cefepime 2 grams q 8 hours IV for UTI called to [facility]. VO received from  . Priscilla Wong RN

## 2018-06-06 ENCOUNTER — ANESTHESIA (OUTPATIENT)
Dept: SURGERY | Facility: CLINIC | Age: 43
End: 2018-06-06
Payer: COMMERCIAL

## 2018-06-06 ENCOUNTER — HOSPITAL ENCOUNTER (OUTPATIENT)
Facility: CLINIC | Age: 43
Discharge: HOME | End: 2018-06-07
Attending: UROLOGY | Admitting: UROLOGY
Payer: COMMERCIAL

## 2018-06-06 ENCOUNTER — APPOINTMENT (OUTPATIENT)
Dept: GENERAL RADIOLOGY | Facility: CLINIC | Age: 43
End: 2018-06-06
Attending: UROLOGY
Payer: COMMERCIAL

## 2018-06-06 ENCOUNTER — MEDICAL CORRESPONDENCE (OUTPATIENT)
Dept: HEALTH INFORMATION MANAGEMENT | Facility: CLINIC | Age: 43
End: 2018-06-06

## 2018-06-06 PROBLEM — N20.0 LEFT NEPHROLITHIASIS: Status: ACTIVE | Noted: 2018-06-06

## 2018-06-06 LAB
ABO + RH BLD: NORMAL
ABO + RH BLD: NORMAL
BLD GP AB SCN SERPL QL: NORMAL
BLOOD BANK CMNT PATIENT-IMP: NORMAL
BLOOD BANK CMNT PATIENT-IMP: NORMAL
GLUCOSE BLDC GLUCOMTR-MCNC: 71 MG/DL (ref 70–99)
SPECIMEN EXP DATE BLD: NORMAL

## 2018-06-06 PROCEDURE — 27210794 ZZH OR GENERAL SUPPLY STERILE: Performed by: UROLOGY

## 2018-06-06 PROCEDURE — 40000277 XR SURGERY CARM FLUORO LESS THAN 5 MIN W STILLS: Mod: TC

## 2018-06-06 PROCEDURE — 36000072 ZZH SURGERY LEVEL 5 W FLUORO 1ST 30 MIN - UMMC: Performed by: UROLOGY

## 2018-06-06 PROCEDURE — 25500064 ZZH RX 255 OP 636: Performed by: UROLOGY

## 2018-06-06 PROCEDURE — C1769 GUIDE WIRE: HCPCS | Performed by: UROLOGY

## 2018-06-06 PROCEDURE — 25000128 H RX IP 250 OP 636: Performed by: UROLOGY

## 2018-06-06 PROCEDURE — 82962 GLUCOSE BLOOD TEST: CPT

## 2018-06-06 PROCEDURE — C1758 CATHETER, URETERAL: HCPCS | Performed by: UROLOGY

## 2018-06-06 PROCEDURE — 40000171 ZZH STATISTIC PRE-PROCEDURE ASSESSMENT III: Performed by: UROLOGY

## 2018-06-06 PROCEDURE — 25000128 H RX IP 250 OP 636: Performed by: NURSE ANESTHETIST, CERTIFIED REGISTERED

## 2018-06-06 PROCEDURE — 82365 CALCULUS SPECTROSCOPY: CPT | Performed by: UROLOGY

## 2018-06-06 PROCEDURE — C1726 CATH, BAL DIL, NON-VASCULAR: HCPCS | Performed by: UROLOGY

## 2018-06-06 PROCEDURE — C1729 CATH, DRAINAGE: HCPCS | Performed by: UROLOGY

## 2018-06-06 PROCEDURE — 94660 CPAP INITIATION&MGMT: CPT

## 2018-06-06 PROCEDURE — 37000009 ZZH ANESTHESIA TECHNICAL FEE, EACH ADDTL 15 MIN: Performed by: UROLOGY

## 2018-06-06 PROCEDURE — 71000014 ZZH RECOVERY PHASE 1 LEVEL 2 FIRST HR: Performed by: UROLOGY

## 2018-06-06 PROCEDURE — 37000008 ZZH ANESTHESIA TECHNICAL FEE, 1ST 30 MIN: Performed by: UROLOGY

## 2018-06-06 PROCEDURE — 25000565 ZZH ISOFLURANE, EA 15 MIN: Performed by: UROLOGY

## 2018-06-06 PROCEDURE — 36000070 ZZH SURGERY LEVEL 5 EA 15 ADDTL MIN - UMMC: Performed by: UROLOGY

## 2018-06-06 PROCEDURE — 25000132 ZZH RX MED GY IP 250 OP 250 PS 637: Performed by: UROLOGY

## 2018-06-06 PROCEDURE — 40000275 ZZH STATISTIC RCP TIME EA 10 MIN

## 2018-06-06 PROCEDURE — C9399 UNCLASSIFIED DRUGS OR BIOLOG: HCPCS | Performed by: NURSE ANESTHETIST, CERTIFIED REGISTERED

## 2018-06-06 RX ORDER — SODIUM CHLORIDE, SODIUM LACTATE, POTASSIUM CHLORIDE, CALCIUM CHLORIDE 600; 310; 30; 20 MG/100ML; MG/100ML; MG/100ML; MG/100ML
INJECTION, SOLUTION INTRAVENOUS CONTINUOUS
Status: DISCONTINUED | OUTPATIENT
Start: 2018-06-06 | End: 2018-06-06 | Stop reason: HOSPADM

## 2018-06-06 RX ORDER — ONDANSETRON 2 MG/ML
4 INJECTION INTRAMUSCULAR; INTRAVENOUS EVERY 6 HOURS PRN
Status: DISCONTINUED | OUTPATIENT
Start: 2018-06-06 | End: 2018-06-07 | Stop reason: HOSPADM

## 2018-06-06 RX ORDER — BISACODYL 5 MG
10 TABLET, DELAYED RELEASE (ENTERIC COATED) ORAL AT BEDTIME
Status: DISCONTINUED | OUTPATIENT
Start: 2018-06-06 | End: 2018-06-07 | Stop reason: HOSPADM

## 2018-06-06 RX ORDER — AMPICILLIN 1 G/1
1 INJECTION, POWDER, FOR SOLUTION INTRAMUSCULAR; INTRAVENOUS EVERY 6 HOURS
Status: COMPLETED | OUTPATIENT
Start: 2018-06-06 | End: 2018-06-07

## 2018-06-06 RX ORDER — LABETALOL HYDROCHLORIDE 5 MG/ML
10 INJECTION, SOLUTION INTRAVENOUS
Status: DISCONTINUED | OUTPATIENT
Start: 2018-06-06 | End: 2018-06-06 | Stop reason: HOSPADM

## 2018-06-06 RX ORDER — HYDROMORPHONE HCL/0.9% NACL/PF 0.2MG/0.2
0.2 SYRINGE (ML) INTRAVENOUS
Status: DISCONTINUED | OUTPATIENT
Start: 2018-06-06 | End: 2018-06-07 | Stop reason: HOSPADM

## 2018-06-06 RX ORDER — LIDOCAINE 40 MG/G
CREAM TOPICAL
Status: DISCONTINUED | OUTPATIENT
Start: 2018-06-06 | End: 2018-06-07 | Stop reason: HOSPADM

## 2018-06-06 RX ORDER — LEVETIRACETAM 500 MG/1
1000 TABLET ORAL 2 TIMES DAILY
Status: DISCONTINUED | OUTPATIENT
Start: 2018-06-06 | End: 2018-06-07 | Stop reason: HOSPADM

## 2018-06-06 RX ORDER — FLUMAZENIL 0.1 MG/ML
0.2 INJECTION, SOLUTION INTRAVENOUS
Status: DISCONTINUED | OUTPATIENT
Start: 2018-06-06 | End: 2018-06-06 | Stop reason: HOSPADM

## 2018-06-06 RX ORDER — CEFAZOLIN SODIUM 1 G/3ML
1 INJECTION, POWDER, FOR SOLUTION INTRAMUSCULAR; INTRAVENOUS SEE ADMIN INSTRUCTIONS
Status: DISCONTINUED | OUTPATIENT
Start: 2018-06-06 | End: 2018-06-06 | Stop reason: HOSPADM

## 2018-06-06 RX ORDER — UREA 10 %
500 LOTION (ML) TOPICAL DAILY
Status: DISCONTINUED | OUTPATIENT
Start: 2018-06-07 | End: 2018-06-07 | Stop reason: HOSPADM

## 2018-06-06 RX ORDER — FENTANYL CITRATE 50 UG/ML
25-50 INJECTION, SOLUTION INTRAMUSCULAR; INTRAVENOUS
Status: DISCONTINUED | OUTPATIENT
Start: 2018-06-06 | End: 2018-06-06 | Stop reason: HOSPADM

## 2018-06-06 RX ORDER — BISACODYL 10 MG
10 SUPPOSITORY, RECTAL RECTAL EVERY OTHER DAY
Status: DISCONTINUED | OUTPATIENT
Start: 2018-06-06 | End: 2018-06-07 | Stop reason: HOSPADM

## 2018-06-06 RX ORDER — DOCUSATE SODIUM 100 MG/1
100 CAPSULE, LIQUID FILLED ORAL 2 TIMES DAILY
Status: DISCONTINUED | OUTPATIENT
Start: 2018-06-06 | End: 2018-06-07 | Stop reason: HOSPADM

## 2018-06-06 RX ORDER — ACETAMINOPHEN 325 MG/1
650 TABLET ORAL EVERY 4 HOURS PRN
Status: DISCONTINUED | OUTPATIENT
Start: 2018-06-06 | End: 2018-06-07 | Stop reason: HOSPADM

## 2018-06-06 RX ORDER — ONDANSETRON 2 MG/ML
INJECTION INTRAMUSCULAR; INTRAVENOUS PRN
Status: DISCONTINUED | OUTPATIENT
Start: 2018-06-06 | End: 2018-06-06

## 2018-06-06 RX ORDER — DEXAMETHASONE SODIUM PHOSPHATE 4 MG/ML
INJECTION, SOLUTION INTRA-ARTICULAR; INTRALESIONAL; INTRAMUSCULAR; INTRAVENOUS; SOFT TISSUE PRN
Status: DISCONTINUED | OUTPATIENT
Start: 2018-06-06 | End: 2018-06-06

## 2018-06-06 RX ORDER — ONDANSETRON 4 MG/1
4 TABLET, ORALLY DISINTEGRATING ORAL EVERY 6 HOURS PRN
Status: DISCONTINUED | OUTPATIENT
Start: 2018-06-06 | End: 2018-06-07 | Stop reason: HOSPADM

## 2018-06-06 RX ORDER — CEFAZOLIN SODIUM 2 G/100ML
2 INJECTION, SOLUTION INTRAVENOUS
Status: COMPLETED | OUTPATIENT
Start: 2018-06-06 | End: 2018-06-06

## 2018-06-06 RX ORDER — NALOXONE HYDROCHLORIDE 0.4 MG/ML
.1-.4 INJECTION, SOLUTION INTRAMUSCULAR; INTRAVENOUS; SUBCUTANEOUS
Status: ACTIVE | OUTPATIENT
Start: 2018-06-06 | End: 2018-06-07

## 2018-06-06 RX ORDER — AMPICILLIN 1 G/1
1 INJECTION, POWDER, FOR SOLUTION INTRAMUSCULAR; INTRAVENOUS EVERY 24 HOURS
Status: DISCONTINUED | OUTPATIENT
Start: 2018-06-06 | End: 2018-06-06

## 2018-06-06 RX ORDER — POLYETHYLENE GLYCOL 3350 17 G/17G
17 POWDER, FOR SOLUTION ORAL DAILY PRN
Status: DISCONTINUED | OUTPATIENT
Start: 2018-06-06 | End: 2018-06-07 | Stop reason: HOSPADM

## 2018-06-06 RX ORDER — SODIUM CHLORIDE, SODIUM LACTATE, POTASSIUM CHLORIDE, CALCIUM CHLORIDE 600; 310; 30; 20 MG/100ML; MG/100ML; MG/100ML; MG/100ML
INJECTION, SOLUTION INTRAVENOUS CONTINUOUS PRN
Status: DISCONTINUED | OUTPATIENT
Start: 2018-06-06 | End: 2018-06-06

## 2018-06-06 RX ORDER — NALOXONE HYDROCHLORIDE 0.4 MG/ML
.1-.4 INJECTION, SOLUTION INTRAMUSCULAR; INTRAVENOUS; SUBCUTANEOUS
Status: DISCONTINUED | OUTPATIENT
Start: 2018-06-07 | End: 2018-06-07 | Stop reason: HOSPADM

## 2018-06-06 RX ORDER — NALOXONE HYDROCHLORIDE 0.4 MG/ML
.1-.4 INJECTION, SOLUTION INTRAMUSCULAR; INTRAVENOUS; SUBCUTANEOUS
Status: DISCONTINUED | OUTPATIENT
Start: 2018-06-06 | End: 2018-06-06 | Stop reason: HOSPADM

## 2018-06-06 RX ORDER — OXYBUTYNIN CHLORIDE 5 MG/1
5 TABLET ORAL 3 TIMES DAILY
Status: DISCONTINUED | OUTPATIENT
Start: 2018-06-06 | End: 2018-06-07 | Stop reason: HOSPADM

## 2018-06-06 RX ORDER — HYDROMORPHONE HYDROCHLORIDE 1 MG/ML
.3-.5 INJECTION, SOLUTION INTRAMUSCULAR; INTRAVENOUS; SUBCUTANEOUS EVERY 5 MIN PRN
Status: DISCONTINUED | OUTPATIENT
Start: 2018-06-06 | End: 2018-06-06 | Stop reason: HOSPADM

## 2018-06-06 RX ORDER — BACLOFEN 10 MG/1
10 TABLET ORAL 4 TIMES DAILY PRN
Status: DISCONTINUED | OUTPATIENT
Start: 2018-06-06 | End: 2018-06-07 | Stop reason: HOSPADM

## 2018-06-06 RX ORDER — ONDANSETRON 4 MG/1
4 TABLET, ORALLY DISINTEGRATING ORAL EVERY 30 MIN PRN
Status: DISCONTINUED | OUTPATIENT
Start: 2018-06-06 | End: 2018-06-06 | Stop reason: HOSPADM

## 2018-06-06 RX ORDER — PROPOFOL 10 MG/ML
INJECTION, EMULSION INTRAVENOUS PRN
Status: DISCONTINUED | OUTPATIENT
Start: 2018-06-06 | End: 2018-06-06

## 2018-06-06 RX ORDER — AMOXICILLIN 250 MG
1-2 CAPSULE ORAL 2 TIMES DAILY
Status: DISCONTINUED | OUTPATIENT
Start: 2018-06-06 | End: 2018-06-07 | Stop reason: HOSPADM

## 2018-06-06 RX ORDER — OXYCODONE HYDROCHLORIDE 5 MG/1
5-10 TABLET ORAL
Status: DISCONTINUED | OUTPATIENT
Start: 2018-06-06 | End: 2018-06-07 | Stop reason: HOSPADM

## 2018-06-06 RX ORDER — ONDANSETRON 2 MG/ML
4 INJECTION INTRAMUSCULAR; INTRAVENOUS EVERY 30 MIN PRN
Status: DISCONTINUED | OUTPATIENT
Start: 2018-06-06 | End: 2018-06-06 | Stop reason: HOSPADM

## 2018-06-06 RX ORDER — FERROUS SULFATE 325(65) MG
325 TABLET ORAL
Status: DISCONTINUED | OUTPATIENT
Start: 2018-06-07 | End: 2018-06-07 | Stop reason: HOSPADM

## 2018-06-06 RX ORDER — FENTANYL CITRATE 50 UG/ML
INJECTION, SOLUTION INTRAMUSCULAR; INTRAVENOUS PRN
Status: DISCONTINUED | OUTPATIENT
Start: 2018-06-06 | End: 2018-06-06

## 2018-06-06 RX ORDER — SODIUM CHLORIDE 9 MG/ML
INJECTION, SOLUTION INTRAVENOUS CONTINUOUS
Status: DISCONTINUED | OUTPATIENT
Start: 2018-06-06 | End: 2018-06-07 | Stop reason: HOSPADM

## 2018-06-06 RX ADMIN — AMPICILLIN SODIUM 1 G: 1 INJECTION, POWDER, FOR SOLUTION INTRAMUSCULAR; INTRAVENOUS at 23:03

## 2018-06-06 RX ADMIN — CEFAZOLIN SODIUM 2 G: 2 INJECTION, SOLUTION INTRAVENOUS at 12:55

## 2018-06-06 RX ADMIN — LEVETIRACETAM 1000 MG: 500 TABLET ORAL at 21:46

## 2018-06-06 RX ADMIN — PHENYLEPHRINE HYDROCHLORIDE 150 MCG: 10 INJECTION, SOLUTION INTRAMUSCULAR; INTRAVENOUS; SUBCUTANEOUS at 13:00

## 2018-06-06 RX ADMIN — MIDAZOLAM 1 MG: 1 INJECTION INTRAMUSCULAR; INTRAVENOUS at 12:18

## 2018-06-06 RX ADMIN — SODIUM CHLORIDE 100 ML/HR: 9 INJECTION, SOLUTION INTRAVENOUS at 16:30

## 2018-06-06 RX ADMIN — ROCURONIUM BROMIDE 50 MG: 10 INJECTION INTRAVENOUS at 12:30

## 2018-06-06 RX ADMIN — ONDANSETRON 4 MG: 2 INJECTION INTRAMUSCULAR; INTRAVENOUS at 14:45

## 2018-06-06 RX ADMIN — FENTANYL CITRATE 50 MCG: 50 INJECTION, SOLUTION INTRAMUSCULAR; INTRAVENOUS at 12:29

## 2018-06-06 RX ADMIN — OXYBUTYNIN CHLORIDE 5 MG: 5 TABLET ORAL at 17:45

## 2018-06-06 RX ADMIN — PHENYLEPHRINE HYDROCHLORIDE 100 MCG: 10 INJECTION, SOLUTION INTRAMUSCULAR; INTRAVENOUS; SUBCUTANEOUS at 12:50

## 2018-06-06 RX ADMIN — SUGAMMADEX 150 MG: 100 INJECTION, SOLUTION INTRAVENOUS at 14:49

## 2018-06-06 RX ADMIN — PHENYLEPHRINE HYDROCHLORIDE 100 MCG: 10 INJECTION, SOLUTION INTRAMUSCULAR; INTRAVENOUS; SUBCUTANEOUS at 13:36

## 2018-06-06 RX ADMIN — OXYBUTYNIN CHLORIDE 5 MG: 5 TABLET ORAL at 21:46

## 2018-06-06 RX ADMIN — PROPOFOL 120 MG: 10 INJECTION, EMULSION INTRAVENOUS at 12:29

## 2018-06-06 RX ADMIN — DEXAMETHASONE SODIUM PHOSPHATE 6 MG: 4 INJECTION, SOLUTION INTRA-ARTICULAR; INTRALESIONAL; INTRAMUSCULAR; INTRAVENOUS; SOFT TISSUE at 13:05

## 2018-06-06 RX ADMIN — GENTAMICIN SULFATE 160 MG: 40 INJECTION, SOLUTION INTRAMUSCULAR; INTRAVENOUS at 13:40

## 2018-06-06 RX ADMIN — SODIUM CHLORIDE, POTASSIUM CHLORIDE, SODIUM LACTATE AND CALCIUM CHLORIDE: 600; 310; 30; 20 INJECTION, SOLUTION INTRAVENOUS at 12:15

## 2018-06-06 RX ADMIN — GENTAMICIN SULFATE 160 MG: 40 INJECTION, SOLUTION INTRAMUSCULAR; INTRAVENOUS at 21:47

## 2018-06-06 RX ADMIN — MIDAZOLAM 1 MG: 1 INJECTION INTRAMUSCULAR; INTRAVENOUS at 12:35

## 2018-06-06 RX ADMIN — PHENYLEPHRINE HYDROCHLORIDE 150 MCG: 10 INJECTION, SOLUTION INTRAMUSCULAR; INTRAVENOUS; SUBCUTANEOUS at 12:48

## 2018-06-06 NOTE — OR NURSING
Circulating nurse notified that coccyx reddened and he is predisposed to skin breakdown.  He stated he would look into ordering an air bed.  Dr Horn told patient he would likely be admitted

## 2018-06-06 NOTE — LETTER
Transition Communication Hand-off for Care Transitions to Next Level of Care Provider    Name: Maicol Carrera  : 1975  MRN #: [id number]  Primary Care Provider: Dejon Horn     Primary Clinic: [address] [place] [id number]     Reason for Hospitalization:  Nephrolithiasis  Left nephrolithiasis  Admit Date/Time: 2018 10:20 AM  Discharge Date: 18  Payor Source: Payor: [employer] / Plan: [employer] SNBC / Product Type: Indemnity /          Reason for Communication Hand-off Referral: Multiple providers/specialties    Discharge Plan:  Pt is returning to LTC, [facility] ([phone])    Discharge Needs Assessment:  Pt agreeable to d/c plan, and facility willing to accept him back with no concerns reported    Follow-up specialty is recommended: Yes    Follow-up plan:  Future Appointments  Date Time Provider Department Center   2018 3:00 PM [address]   2018 11:40 AM Dejon Horn MD [facility]       Any outstanding tests or procedures:     N/A; IR scheduled to remove percutaneous nephrostomy tube at 1500 on .  Small chance this will get cancelled and need to be done outpatient (LTC facility aware).              [facility]  Emergency Department /Assisting 6D  P: [phone]  Pager: [phone]      AVS/Discharge Summary is the source of truth; this is a helpful guide for improved communication of patient story

## 2018-06-06 NOTE — IP AVS SNAPSHOT
` ` Patient Information     Patient Name Sex     Maicol Carrera ([id number]) Male 1975       Room Bed    [facility] 6512-54      Patient Demographics     Address Phone    [address] [facility] [address] [phone] (Home) *Preferred*  [phone] (Mobile)      Patient Ethnicity & Race     Ethnic Group Patient Race    American White      Emergency Contact(s)     Name Relation Home Work Mobile    Javon Carrera Father [phone]      Maisha Montemayor Sister   [phone]    Lyndsay Carrera Mother [phone]      Documents on File        Status Date Received Description       Documents for the Patient    Privacy Notice - [employer] Received 10/26/11     Insurance Card       External Medication Information Consent       Patient ID       Consent for Services - Hospital/Clinic Received () 11     Consent for Services - Hospital/Clinic  () 11 CONSENT FOR SERVICES - CLINIC AND HOD    Consent for Services - Hospital/Clinic  () 12 CONSENT FOR SERVICES - CLINIC AND HOD    [facility] for Patient Signature       Consent for Services - Hospital/Clinic  () 12 CONSENT FOR SERVICES - CLINIC AND HOD    Consent for EHR Access  13 Copied from existing Consent for services - C/HOD collected on 2012    Consent for Services - [facility]       Consent for Services/Privacy Notice - Hospital/Clinic Received 17     Care Everywhere Prospective Auth Received 10/20/17     [facility] Specified Other       HIM FABRICIO Authorization  18     Consent for Services - Hospital and Clinic Received 18     HIE Auth Received 18     Patient Photo   Photo of Patient       Documents for the Encounter    CMS IM for Patient Signature         Admission Information     Attending Provider Admitting Provider Admission Type Admission Date/Time    Dejon Horn MD Anderson, James Kyle, MD Elective 18  1020    Discharge Date Hospital Service Auth/Cert Status Service  Area     Urology Incomplete [facility] SERVICES    Unit Room/Bed Admission Status       UU U6D OBSERVATION 6512/6512-01 Admission (Confirmed)       Admission     Complaint    Nephrolithiasis, Left nephrolithiasis      Hospital Account     Name Acct ID Class Status Primary Coverage    Maicol Carrera [id number] Outpatient Open [facility]            Guarantor Account (for Hospital Account #[id number])     Name Relation to Pt Service Area Active? Acct Type    Maicol Carrera  FCS Yes Personal/Family    Address Phone          [address] [address] [phone](H)              Coverage Information (for Hospital Account #[id number])     F/O Payor/Plan Precert #    [facility]     Subscriber Subscriber #    Maicol Carrera [id number]    Address Phone    [address]  [facility] [address]  [place] [address] [phone]

## 2018-06-06 NOTE — IP AVS SNAPSHOT
Unit [address] Observation [address] [address]    Phone:  [phone]    Fax:  [phone]                                       After Visit Summary   6/6/2018    Maicol Carrera    MRN: [id number]           After Visit Summary Signature Page     I have received my discharge instructions, and my questions have been answered. I have discussed any challenges I see with this plan with the nurse or doctor.    ..........................................................................................................................................  Patient/Patient Representative Signature      ..........................................................................................................................................  Patient Representative Print Name and Relationship to Patient    ..................................................               ................................................  Date                                            Time    ..........................................................................................................................................  Reviewed by Signature/Title    ...................................................              ..............................................  Date                                                            Time

## 2018-06-06 NOTE — IP AVS SNAPSHOT
Maicol Carrera #[id number] (CSN: [id number])  (42 year old M)  (Adm: 18)     [id number]               UNIT 6D OBSERVATION [facility]: [phone]            Patient Demographics     Patient Name Sex          Age SSN Address Phone    Maicol Carrera Male 1975 (42 year old) [id number] [facility] [address] [phone] (Home) *Preferred*  [phone] (Mobile)      Emergency Contact(s)     Name Relation Home Work Mobile    Javon Carrera Father [phone]      Maisha Montemayor Sister   [phone]    Lyndsay Carrera Mother [phone]      Admission Information     Attending Provider Admitting Provider Admission Type Admission Date/Time    Dejon Horn MD Anderson, Dejon Vaca MD Elective 18  1020    Discharge Date Hospital Service Auth/Cert Status Service Area     Urology [facility]    Unit Room/Bed Admission Status       UU U6D OBSERVATION 65 Admission (Confirmed)       Admission     Complaint    Nephrolithiasis, Left nephrolithiasis      Hospital Account     Name Acct ID Class Status Primary Coverage    Maicol Carrera [id number] Outpatient Open [facility]            Guarantor Account (for Hospital Account #[id number])     Name Relation to Pt Service Area Active? Acct Type    Maicol Carrera  FCS Yes Personal/Family    Address Phone          [address] [place] [address] [phone](H)              Coverage Information (for Hospital Account #[id number])     F/O Payor/Plan Precert #    [facility]     Subscriber Subscriber #    Maicol Carrera [id number]    Address Phone    [address]  [address]  [place] [address] [phone]                                                INTERAGENCY TRANSFER FORM - PHYSICIAN ORDERS   2018                       UNIT 6D OBSERVATION [facility]: [phone]            Attending Provider: Kory  "Dejon Vaca MD     Allergies:  Latex, Latex    Infection:  MRSA   Service:  UROLOGY    Ht:  1.791 m (5' 10.51\")   Wt:  81 kg (178 lb 9.2 oz)   Admission Wt:  81 kg (178 lb 9.2 oz)    BMI:  25.25 kg/m 2   BSA:  2.01 m 2            Hospital Problems as of 6/7/2018              Priority Class Noted POA    Left nephrolithiasis Medium  6/6/2018 Yes      Non-Hospital Problems as of 6/7/2018              Priority Class Noted    Kidney stones Medium  8/9/2011    Sepsis with multiple organ dysfunction (MOD) (H) Medium  10/24/2011    Anemia due to blood loss, acute Medium  10/24/2011    Postsurgical nonabsorption Medium  10/24/2011    Quadriplegia, post-traumatic (H) Medium  10/24/2011    Renal hemorrhage, left Medium  10/24/2011    Sepsis due to urinary tract infection (H) Medium  3/7/2018      Code Status History     Date Active Date Inactive Code Status Order ID Comments User Context    3/12/2018 12:28 PM 6/6/2018  8:04 PM Full Code [id number]  Chacha Monroe PA Outpatient    3/8/2018  2:10 AM 3/12/2018 12:28 PM Full Code [id number]  Vamsi Benson MD Inpatient    10/19/2011  7:06 PM 11/8/2011  7:05 PM Full Code [id number]  Dana Peng RN Inpatient      Current Code Status     Date Active Code Status Order ID Comments User Context       6/6/2018  8:04 PM Full Code [id number]  Elmo Ahuja MD Inpatient       Summary of Visit     Reason for your hospital stay       On 6/6/18 Mr. Carrera underwent a left percutaneous stone removal procedure with replacement of a new left percutaneous nephrostomy tube. Dr. Dejon Horn (Urology)                Medication Review      CONTINUE these medications which have NOT CHANGED        Dose / Directions Comments    ACETAMINOPHEN PO        Dose:  650 mg   Take 650 mg by mouth every 4 hours as needed for pain   Refills:  0        albuterol (2.5 MG/3ML) 0.083% neb solution   Used for:  SOB (shortness of breath)        Dose:  2.5 mg   Take 3 mLs by nebulization " every 2 hours as needed.   Quantity:  1 Box   Refills:  0        ascorbic acid 250 MG Tabs tablet   Used for:  Recurrent UTI        Dose:  250 mg   Take 1 tablet by mouth 2 times daily.   Refills:  0        * baclofen 10 MG tablet   Commonly known as:  LIORESAL        Dose:  10 mg   Take 10 mg by mouth 4 times daily as needed   Refills:  0        * BACLOFEN IT        Via implanted IT pump Baclofen Concentration 2000 mc/gmL  Dose: 277.1 mcg/day Low reservoir alarm date: 10/16/2018 Low reservoir Alarm Volume: 2ml Interrogated 5/23/18   Refills:  0        clindamycin 1 % topical gel   Commonly known as:  CLINDAMAX        Apply topically 2 times daily   Refills:  0        COLACE 100 MG capsule   Generic drug:  docusate sodium        Dose:  100 mg   Take 100 mg by mouth 2 times daily.   Refills:  0        Cranberry 400 MG Tabs        Dose:  400 mg   Take 400 mg by mouth 2 times daily   Refills:  0        cyanocobalamin 1000 MCG tablet   Commonly known as:  vitamin  B-12   Used for:  Vitamin B 12 deficiency        Dose:  1000 mcg   Take 1 tablet by mouth daily.   Refills:  0        * bisacodyl 10 MG Suppository   Commonly known as:  DULCOLAX   Used for:  Chronic constipation        Dose:  10 mg   Place 1 suppository rectally every other day.   Quantity:  12 suppository   Refills:  0        * DULCOLAX PO        Dose:  10 mg   Take 10 mg by mouth At Bedtime.   Refills:  0        ferrous sulfate 325 (65 Fe) MG tablet   Commonly known as:  IRON   Used for:  Anemia due to blood loss, acute        Dose:  325 mg   Take 1 tablet by mouth daily (with breakfast).   Quantity:  100 tablet   Refills:  0        KEPPRA PO        Dose:  1000 mg   Take 1,000 mg by mouth 2 times daily   Refills:  0        ketoconazole 2 % cream   Commonly known as:  NIZORAL        Apply topically 2 times daily as needed   Refills:  0        magnesium gluconate 500 MG tablet   Commonly known as:  MAGONATE   Used for:  Magnesium deficiency        Dose:  500  mg   Take 1 tablet by mouth daily.   Refills:  0        miconazole 2 % powder   Commonly known as:  MICATIN; MICRO GUARD   Used for:  Candida infection of flexural skin        Apply  topically 2 times daily.   Quantity:  70 g   Refills:  0        mometasone 0.1 % cream   Commonly known as:  ELOCON        Apply topically At Bedtime   Refills:  0        MULTIVITAMIN ADULT PO        Dose:  1 tablet   Take 1 tablet by mouth daily   Refills:  0        ondansetron 4 MG ODT tab   Commonly known as:  ZOFRAN-ODT   Used for:  Nausea and vomiting        Dose:  4 mg   Take 1 tablet by mouth every 6 hours as needed for nausea.   Quantity:  20 tablet   Refills:  0        OSTEO-MULTIVITAMINS/MINERALS OR        Take  by mouth daily. 12 pm   Refills:  0        oxybutynin 5 MG tablet   Commonly known as:  DITROPAN   Used for:  Neurogenic bladder        Dose:  5 mg   Take 1 tablet by mouth 3 times daily.   Quantity:  270 tablet   Refills:  0        polyethylene glycol powder   Commonly known as:  MIRALAX/GLYCOLAX        Dose:  1 capful   Take 1 capful by mouth daily as needed for constipation (if no BM x3 days)   Refills:  0        polyvinyl alcohol 1.4 % ophthalmic solution   Commonly known as:  LIQUIFILM TEARS        Dose:  2 drop   Place 2 drops into both eyes every 2 hours as needed for dry eyes   Refills:  0        selenium sulfide 2.5 % lotion   Commonly known as:  SELSUN        Apply to scalp topically in the morning every Sun, Tue, Thursday. Place for 5 min then rinse.   Refills:  0        senna-docusate 8.6-50 MG per tablet   Commonly known as:  SENOKOT-S;PERICOLACE   Used for:  Chronic constipation        Dose:  1-2 tablet   Take 1-2 tablets by mouth 2 times daily.   Quantity:  60 tablet   Refills:  0        VITAMIN D (CHOLECALCIFEROL) PO        Dose:  5000 Units   Take 5,000 Units by mouth daily   Refills:  0        zinc oxide 40 % ointment   Commonly known as:  DESITIN   Used for:  Breakdown of skin tissue        Apply   topically every hour as needed.   Quantity:  56.7 g   Refills:  0        * Notice:  This list has 4 medication(s) that are the same as other medications prescribed for you. Read the directions carefully, and ask your doctor or other care provider to review them with you.      STOP taking     Cefepime HCl 2 g Solr                   After Care     Activity       Your activity upon discharge: See discharge instructions       Diet       Follow this diet upon discharge: See discharge instructions       Discharge Instructions       Cares:   - Unless otherwise stated, resume group home cares.     Diet:  - Regular/ home diet    Activity:   - Resume normal activities  - Do not strain with bowel movements.      Medications:   1) PAIN: Continue Tylenol (acetaminophen 625mg) every 4-6 hours as needed for mild pain related to surgery.   Do not take more than 4,000mg of Tylenol (acetaminophen/ APAP) from all sources in any 24 hour period since this can cause liver damage.  Do not take more than 2400mg of ibuprofen in any 24 hour period since this can cause kidney damage. Never drive, operate machinery or drink alcoholic beverages while you are taking narcotic pain medications.      2) CONSTIPATION: Resume your home bowel regimen.If you are using the home bowel regimen but still have not had a bowel movement in 3-4 days, seek medical attention.  Call the office with any concerns.     Percutaneous Nephrostomy Tube (PNT):  - YOU MAY BE GOING HOME with a left percutaneous nephrostomy tube. You may having bruising or soreness at the tube site.  Use a warm heating pad to relieve discomfort. Blood in the urine can be expected.  Drink 8-10 glasses of fluid per day to keep urine light pink or light yellow. Daily showering is important but keep your tube site dry.  Avoid baths, swimming, etc. Clean around the tube site every other day using soap and water, then cover the site with a clean bandage. Take care not to tug the tube or remove  the suture. Keep the tube securely taped to your back at all times and do not let it get caught, snagged or tugged.  The tubing should always remain tension-free and without kinks.  In order to drain best (and prevent urinary infections), the tubing and bag should always hang lower than your kidney.   - If you are discharged with a percutaneous nephrostomy tube, follow up in Interventional Radiology early next week for an antegrade nephroureterogram (a picture of the kidney) then possible tube removal.  This can be coordinated by the Urology service.     SUPRAPUBIC PILLAI:  - You are going home with your Chronic Suprapubic Pillai catheter.   - Resume home cares.    - Protect the catheter and treat it like an extension of your body - keep it secured to your leg and do not let it get caught, snagged, or tugged. The catheter tubing should remain tension-free and without kinks. In order to drain best, the tubing and bag should always be lower than your body.  - You may notice a little blood, small amount of white discharge, or caking at the catheter insertion site. This is normal but it can irritate the skin so it is best to wash this off in the daily shower. You are encouraged to wash the catheter tubing to keep it clean, and the urine drainage bag also can be gotten wet.   - Change the tube monthly or per your home routine.             Follow-Up Appointment Instructions     Adult [facility] Follow-up and recommended labs and tests       Follow-Up:   - Call your primary care provider to touch base regarding your recent admission.   - Follow up with Dr. Horn as scheduled on 6/29/18.  Just prior to this appointment you should have a renal/kidney ultrasound in the Radiology department.  The Urology Clinic should be calling you to confirm both appointments.   - Call or return sooner than your regularly scheduled visit if you develop any of the following:  Fever (greater than 101.3F) or flu-like symptoms, uncontrolled pain,  "uncontrolled nausea or vomiting, as well as increased redness, swelling, or drainage from your wound or problems with your Belle catheter drainage.     Phone numbers:  - Nursing phone helpline at the Urology Clinic (8A-5P M-F):  [phone].    - Nights or weekends, call [phone] and ask the  to page the urology resident on call.   - For emergencies, always call 911    Appointments on [place] and/or [facility] (with [facility] or [facility] provider or service). Call [phone] if you haven't heard regarding these appointments within 7 days of discharge.             Your next 10 appointments already scheduled     Jun 29, 2018 11:40 AM CDT   (Arrive by 11:25 AM)   Cystoscopy with Dejon Horn MD   [facility] ([facility])    [address] [place] [address]  4th Floor  [facility] [id number] [phone]              Statement of Approval     Ordered          06/07/18 1507  I have reviewed and agree with all the recommendations and orders detailed in this document.  EFFECTIVE NOW     Approved and electronically signed by:  Elmo Ahuja MD                                                 INTERAGENCY TRANSFER FORM - NURSING   6/6/2018                       UNIT 6D OBSERVATION [facility] BANK: [phone]            Attending Provider: Dejon Horn MD     Allergies:  Latex, Latex    Infection:  MRSA   Service:  UROLOGY    Ht:  1.791 m (5' 10.51\")   Wt:  81 kg (178 lb 9.2 oz)   Admission Wt:  81 kg (178 lb 9.2 oz)    BMI:  25.25 kg/m 2   BSA:  2.01 m 2            Advance Directives        Scanned docmt in ACP Activity?           No scanned doc        Immunizations     None      ASSESSMENT     Discharge Profile Flowsheet     GASTROINTESTINAL (ADULT,PEDIATRIC,OB)     Skin WDL  ex 06/07/18 0858    GI WDL  ex (Bowel program) 06/07/18 0858   Skin Color/Characteristics  redness blanchable 06/07/18 0858    COMMUNICATION ASSESSMENT   " "  Skin Integrity  abrasion(s);drain/device(s);scar(s) 06/07/18 0858    Patient's communication style  spoken language (English or Bilingual) 10/26/11 1935   SAFETY      SKIN     Safety WDL  WDL 06/07/18 0858    Inspection of bony prominences  Procedural focused assessment (identify areas inspected) 06/07/18 0858   All Alarms  alarm(s) activated and audible 06/06/18 1544                 Assessment WDL (Within Defined Limits) Definitions           Safety WDL     Effective: 09/28/15    Row Information: <b>WDL Definition:</b> Bed in low position, wheels locked; call light in reach; upper side rails up x 2; ID band on<br> <font color=\"gray\"><i>Item=AS safety wdl>>List=AS safety wdl>>Version=F14</i></font>      Skin WDL     Effective: 09/28/15    Row Information: <b>WDL Definition:</b> Warm; dry; intact; elastic; without discoloration; pressure points without redness<br> <font color=\"gray\"><i>Item=AS skin wdl>>List=AS skin wdl>>Version=F14</i></font>      Vitals     Vital Signs Flowsheet     VITAL SIGNS     Patient Currently in Pain  denies 06/06/18 2038    Temp  97.9  F (36.6  C) 06/07/18 1514   HEIGHT AND WEIGHT      Temp src  Oral 06/07/18 1514   Height  1.791 m (5' 10.51\") 06/06/18 1107    Resp  16 06/07/18 1514   Weight  81 kg (178 lb 9.2 oz) 06/06/18 1107    Pulse  81 06/06/18 1049   BSA (Calculated - sq m)  2.01 06/06/18 1107    Heart Rate  97 06/07/18 1514   BMI (Calculated)  25.3 06/06/18 1107    Pulse/Heart Rate Source  Monitor 06/07/18 1514   POSITIONING      BP  113/72 06/07/18 1514   Body Position  supine, legs elevated (Turned to right side) 06/06/18 1622    BP Location  Right arm 06/07/18 1514   Head of Bed (HOB)  HOB at 20-30 degrees 06/06/18 1544    Patient Position  Lying 06/06/18 1538   ECG      OXYGEN THERAPY     ECG Rhythm  Sinus rhythm 06/06/18 1619    SpO2  96 % 06/07/18 1514   Ectopy  None 06/06/18 1538    O2 Device  None (Room air) 06/07/18 1514   Lead Monitored  Lead II 06/07/18 1453    Oxygen " Delivery  4 LPM 06/07/18 0632   ANALGESIA SIDE EFFECTS MONITORING      RESPIRATORY MONITORING     Side Effects Monitoring: Respiratory Quality  R 06/07/18 1453    Respiratory Monitoring (EtCO2)  38 mmHg 06/06/18 2012   Side Effects Monitoring: Respiratory Depth  N 06/07/18 1453    Integrated Pulmonary Index (IPI)  10 06/06/18 2012   Side Effects Monitoring: Sedation Level  1 06/07/18 1453    PAIN/COMFORT                   Patient Lines/Drains/Airways Status    Active LINES/DRAINS/AIRWAYS     Name: Placement date: Placement time: Site: Days: Last dressing change:    Suprapubic Catheter Double-lumen previous placement 03/07/18   1630   Double-lumen   92     Incision/Surgical Site 03/21/18 Left Flank 03/21/18   1637    78             Patient Lines/Drains/Airways Status    Active PICC/CVC     None            Intake/Output Detail Report     Date Intake     Output Net    Shift P.O. I.V. IV Piggyback Total Urine Total       Jennifer 06/06/18 0700 - 06/06/18 1459 -- 700 -- 700 725 725 -25    Noc 06/06/18 1500 - 06/06/18 2359 120 250 -- 370 785 785 -415    Day 06/07/18 0000 - 06/07/18 0659 380 -- -- 380 450 450 -70    Jennifer 06/07/18 0700 - 06/07/18 1459 540 -- -- 540 1050 1050 -510    Noc 06/07/18 1500 - 06/07/18 2359 -- -- -- -- 550 550 -550      Case Management/Discharge Planning     Case Management/Discharge Planning Flowsheet     LIVING ENVIRONMENT     QUESTION TO PATIENT:  Has a member of your family or a partner(now or in the past) intimidated, hurt, manipulated, or controlled you in any way?  no 06/06/18 1130    Lives With  facility resident 05/23/18 1104   QUESTION TO PATIENT: Do you feel safe going back to the place where you are living?  yes 06/06/18 1130    COPING/STRESS     OBSERVATION: Is there reason to believe there has been maltreatment of a vulnerable adult (ie. Physical/Sexual/Emotional abuse, self neglect, lack of adequate food, shelter, medical care, or financial exploitation)?  no 06/06/18 1130    Major  Change/Loss/Stressor  none 03/10/18 1348   OTHER      ABUSE RISK SCREEN     Are you depressed or being treated for depression?  No 06/06/18 2014                  UNIT 6D OBSERVATION [facility] BANK: [phone]            Medication Administration Report for Maicol Carrera as of 06/07/18 1828   Legend:    Given Hold Not Given Due Canceled Entry Other Actions    Time Time (Time) Time  Time-Action       Inactive    Active    Linked        Medications 06/01/18 06/02/18 06/03/18 06/04/18 06/05/18 06/06/18 06/07/18    acetaminophen (TYLENOL) tablet 650 mg  Dose: 650 mg  Freq: EVERY 4 HOURS PRN Route: PO  PRN Comment: mild pain  Start: 06/06/18 2004   Admin Instructions: Maximum acetaminophen dose from all sources = 75 mg/kg/day not to exceed 4 grams/day.    Admin. Amount: 2 tablet (2 × 325 mg tablet)  Dispense Loc: [facility] ADS 6DO               baclofen (LIORESAL) tablet 10 mg  Dose: 10 mg  Freq: 4 TIMES DAILY PRN Route: PO  PRN Reason: muscle spasms  Start: 06/06/18 1615   Admin. Amount: 1 tablet (1 × 10 mg tablet)  Dispense Loc: [facility]               bisacodyl (DULCOLAX) EC tablet 10 mg  Dose: 10 mg  Freq: AT BEDTIME Route: PO  Start: 06/06/18 2200   Admin. Amount: 2 tablet (2 × 5 mg tablet)  Dispense Loc: [facility]          (2124)-Not Given        [ ] 2200           bisacodyl (DULCOLAX) Suppository 10 mg  Dose: 10 mg  Freq: EVERY OTHER DAY Route: RE  Start: 06/06/18 2015   Admin. Amount: 1 suppository (1 × 10 mg suppository)  Dispense Loc: [facility] ADS 6DO          (2022)-Not Given            docusate sodium (COLACE) capsule 100 mg  Dose: 100 mg  Freq: 2 TIMES DAILY Route: PO  Start: 06/06/18 2015   Admin. Amount: 1 capsule (1 × 100 mg capsule)  Last Admin: 06/07/18 0748  Dispense Loc: [facility] ADS 6DO          (2022)-Not Given        0748 (100 mg)-Given       [ ] 2000           ferrous sulfate (IRON) tablet 325 mg  Dose: 325 mg  Freq: DAILY WITH BREAKFAST Route: PO  Start: 06/07/18 0800   Admin  "Instructions: Absorbed best on an empty stomach. If stomach upset occurs, can take with meals.    Admin. Amount: 1 tablet (1 × 325 mg tablet)  Last Admin: 06/07/18 0748  Dispense Loc: [facility] ADS 6DO           0748 (325 mg)-Given           HYDROmorphone (DILAUDID) injection 0.2 mg  Dose: 0.2 mg  Freq: EVERY 2 HOURS PRN Route: IV  PRN Reason: other  PRN Comment: pain control or improvement in physical function. Hold dose for analgesic side effects.  Start: 06/06/18 2004   Admin Instructions: Notify the provider to assess for uncontrolled pain or analgesic side effects.  Hold while on IV PCA or with regular IV opioid dosing.    Admin. Amount: 0.2 mg = 0.2 mL Conc: 1 mg/mL  Dispense Loc: [facility] ADS 6DO  Volume: 0.2 mL  POC: Post-procedure               levETIRAcetam (KEPPRA) tablet 1,000 mg  Dose: 1,000 mg  Freq: 2 TIMES DAILY Route: PO  Start: 06/06/18 2015   Admin. Amount: 2 tablet (2 × 500 mg tablet)  Last Admin: 06/07/18 0748  Dispense Loc: [facility]          2146 (1,000 mg)-Given        0748 (1,000 mg)-Given       [ ] 2000           lidocaine (LMX4) kit  Freq: EVERY 1 HOUR PRN Route: Top  PRN Reason: pain  PRN Comment: with VAD insertion or accessing implanted port.  Start: 06/06/18 2004   Admin Instructions: Do NOT give if patient has a history of allergy to any local anesthetic or any \"flash\" product.   Apply 30 minutes prior to VAD insertion or port access.  MAX Dose:  2.5 g (  of 5 g tube)    Dispense Loc: [facility] ADS 6DO  POC: Post-procedure               lidocaine 1 % 1 mL  Dose: 1 mL  Freq: EVERY 1 HOUR PRN Route: OTHER  PRN Comment: mild pain with VAD insertion or accessing implanted port  Start: 06/06/18 2004   Admin Instructions: Do NOT give if patient has a history of allergy to any local anesthetic or any \"flash\" product. MAX dose 1 mL subcutaneous OR intradermal in divided doses.    Admin. Amount: 1 mL  Dispense Loc: [facility] ADS 6DO  Volume: 2 mL  POC: Post-procedure               " magnesium gluconate (MAGONATE) tablet 500 mg  Dose: 500 mg  Freq: DAILY Route: PO  Start: 06/07/18 0800   Admin. Amount: 1 tablet (1 × 500 mg tablet)  Last Admin: 06/07/18 0748  Dispense Loc: [facility]           0748 (500 mg)-Given           naloxone (NARCAN) injection 0.1-0.4 mg  Dose: 0.1-0.4 mg  Freq: EVERY 2 MIN PRN Route: IV  PRN Reason: opioid reversal  Start: 06/07/18 1500   Admin Instructions: For respiratory rate LESS than or EQUAL to 8.  Partial reversal dose:  0.1 mg titrated q 2 minutes for Analgesia Side Effects Monitoring Sedation Level of 3 (frequently drowsy, arousable, drifts to sleep during conversation).Full reversal dose:  0.4 mg bolus for Analgesia Side Effects Monitoring Sedation Level of 4 (somnolent, minimal or no response to stimulation).  For ordered doses up to 2mg give IVP. Give each 0.4mg over 15 seconds in emergency situations. For non-emergent situations further dilute in 9mL of NS to facilitate titration of response.    Admin. Amount: 0.1-0.4 mg = 0.25-1 mL Conc: 0.4 mg/mL  Dispense Loc: [facility] ADS 6DO  Volume: 1 mL  POC: Post-procedure                 Dose: 0.1-0.4 mg  Freq: EVERY 2 MIN PRN Route: IV  PRN Reason: opioid reversal  Start: 06/06/18 1534   End: 06/07/18 1533   Admin Instructions: For apnea or imminent respiratory arrest: give 0.4 mg IV undiluted Q 2 minutes PRN until desired degree of reversal is obtained, stop opioid and notify provider. Continue monitoring until discharge criteria are met for a minimum of 2 hours.  For severe sedation, decrease in respiratory depth, quality or respiratory rate less than 8: give 0.1 mg IV Q 2 minutes x 3 doses, stop opioid and notify provider.  Try to minimize reversal of analgesia especially in end-of-life patients  For ordered doses up to 2mg give IVP. Give each 0.4mg over 15 seconds in emergency situations. For non-emergent situations further dilute in 9mL of NS to facilitate titration of response.    Admin. Amount:  0.1-0.4 mg = 0.25-1 mL Conc: 0.4 mg/mL  Dispense Loc: [facility] ADS 6DO  Volume: 1 mL  POC: Post-procedure           1533-Med Discontinued       ondansetron (ZOFRAN-ODT) ODT tab 4 mg  Dose: 4 mg  Freq: EVERY 6 HOURS PRN Route: PO  PRN Reasons: nausea,vomiting  Start: 06/06/18 2004   Admin Instructions: This is Step 1 of nausea and vomiting management.  If nausea not resolved in 15 minutes, go to Step 2 prochlorperazine (COMPAZINE). Do not push through foil backing. Peel back foil and gently remove. Place on tongue immediately. Administration with liquid unnecessary  With dry hands, peel back foil backing and gently remove tablet; do not push oral disintegrating tablet through foil backing; administer immediately on tongue and oral disintegrating tablet dissolves in seconds; then swallow with saliva; liquid not required.    Admin. Amount: 1 tablet (1 × 4 mg tablet)  Dispense Loc: [facility] ADS 6DO  POC: Post-procedure              Or  ondansetron (ZOFRAN) injection 4 mg  Dose: 4 mg  Freq: EVERY 6 HOURS PRN Route: IV  PRN Reasons: nausea,vomiting  Start: 06/06/18 2004   Admin Instructions: This is Step 1 of nausea and vomiting management.  If nausea not resolved in 15 minutes, go to Step 2 prochlorperazine (COMPAZINE).  Irritant. For ordered doses up to 4 mg, give IV Push undiluted over 2-5 minutes.    Admin. Amount: 4 mg = 2 mL Conc: 4 mg/2 mL  Dispense Loc: [facility] ADS 6DO  Infused Over: 2-5 Minutes  Volume: 2 mL  POC: Post-procedure               oxybutynin (DITROPAN) tablet 5 mg  Dose: 5 mg  Freq: 3 TIMES DAILY Route: PO  Start: 06/06/18 1715   Admin. Amount: 1 tablet (1 × 5 mg tablet)  Last Admin: 06/07/18 1223  Dispense Loc: [facility]          1745 (5 mg)-Given       2146 (5 mg)-Given        1223 (5 mg)-Given       [ ] 1400       [ ] 2000           oxyCODONE IR (ROXICODONE) tablet 5-10 mg  Dose: 5-10 mg  Freq: EVERY 3 HOURS PRN Route: PO  PRN Reason: other  PRN Comment: pain control or improvement in  physical function. Hold dose for analgesic side effects.  Start: 06/06/18 2004   Admin Instructions: Start with the lowest dose.  May adjust dose by  5 mg every 3 hours as needed.  Notify the provider to assess for uncontrolled pain or analgesic side effects.  Hold while on IV PCA or with regular IV opioid dosing.    Admin. Amount: 1-2 tablet (1-2 × 5 mg tablet)  Dispense Loc: [facility] ADS 6DO  POC: Post-procedure               polyethylene glycol (MIRALAX/GLYCOLAX) Packet 17 g  Dose: 17 g  Freq: DAILY PRN Route: PO  PRN Reason: constipation  PRN Comment: if no BM x3 days  Start: 06/06/18 2004   Admin Instructions: 1 Packet = 17 grams. Mixed prescribed dose in 8 ounces of water.  1 Packet = 17 grams. Mixed prescribed dose in 8 ounces of water. Follow with 8 oz. of water.    Admin. Amount: 17 g  Dispense Loc: [facility] ADS 6DO               senna-docusate (SENOKOT-S;PERICOLACE) 8.6-50 MG per tablet 1-2 tablet  Dose: 1-2 tablet  Freq: 2 TIMES DAILY Route: PO  Start: 06/06/18 2015   Admin. Amount: 1-2 tablet  Last Admin: 06/07/18 0748  Dispense Loc: [facility] ADS 6DO          (2022)-Not Given        0748 (2 tablet)-Given       [ ] 2000           sodium chloride (PF) 0.9% PF flush 3 mL  Dose: 3 mL  Freq: EVERY 8 HOURS Route: IK  Start: 06/06/18 2015   Admin Instructions: And Q1H PRN, to lock peripheral IV dormant line.    Admin. Amount: 3 mL  Dispense Loc: [facility] Floor Stock  Volume: 3 mL  POC: Post-procedure          (2023)-Not Given        (0402)-Not Given       (1224)-Not Given       [ ] 2015           sodium chloride (PF) 0.9% PF flush 3 mL  Dose: 3 mL  Freq: EVERY 1 HOUR PRN Route: IK  PRN Reason: line flush  PRN Comment: for peripheral IV flush post IV meds  Start: 06/06/18 2004   Admin. Amount: 3 mL  Dispense Loc: [facility] Floor Stock  Volume: 3 mL  POC: Post-procedure               sodium chloride 0.9% infusion  Rate: 100 mL/hr   Freq: CONTINUOUS Route: IV  Start: 06/06/18 1615   Last Admin: 06/07/18 0600  Dispense  Loc: [facility] Floor Stock  Volume: 1,000 mL  POC: Post-procedure          1630 (100 mL/hr)-New Bag       1700 ( )-Rate/Dose Verify       1900 ( )-Rate/Dose Verify       2031 ( )-Rate/Dose Verify       2200 ( )-Rate/Dose Verify       2300 ( )-Rate/Dose Verify        0000 ( )-Rate/Dose Verify       0100 ( )-Rate/Dose Verify       0200 ( )-Rate/Dose Verify       0300 ( )-Rate/Dose Verify       0400 ( )-Rate/Dose Verify       0500 ( )-Rate/Dose Verify       0600 ( )-Rate/Dose Verify          Completed Medications  Medications 18         Dose: 1 g  Freq: EVERY 6 HOURS Route: IV  Indications of Use: URINARY TRACT INFECTION  Last Dose: Stopped (18)  Start: 18   End: 18   Admin. Amount: 1 g  Last Admin: 18 1223  Dispense Loc: [facility]  Infused Over: 15 Minutes  Administrations Remainin  Volume: 4 mL          2303 (1 g)-New Bag       2327-Stopped        0629 (1 g)-New Bag       0647-Stopped       1223 (1 g)-New Bag       1426-Stopped             Dose: 2 g  Freq: PRE-OP/PRE-PROCEDURE Route: IV  Indications of Use: PERIOPERATIVE PHARMACOPROPHYLAXIS  Start: 18 105   End: 18 125   Admin Instructions: Give first dose within 1 hour PRIOR to incision. If patient weight is greater than or equal to 120 kg increase dose to 3 g.    Admin. Amount: 2 g = 100 mL Conc: 2 g/100 mL  Last Admin: 18 1255  Dispense Loc: [facility] ADS 3C  Infused Over: 30 Minutes  Administrations Remainin  Volume: 100 mL  POC: Pre-procedure          1255 (2 g)-Given              Dose: 2 mg/kg  Weight Dosing Info: 81 kg  Freq: EVERY 8 HOURS Route: IV  Indications of Use: PERIOPERATIVE PHARMACOPROPHYLAXIS  Last Dose: Stopped (18)  Start: 18   End: 18   Admin. Amount: 160 mg  Last Admin: 18 0512  Dispense Loc: [facility]  Infused Over: 60 Minutes  Administrations Remaining:  0  Volume: 50 mL   Mixture Administration Information:   Medication Type Amount   gentamicin 40 MG/ML SOLN Medications 2 mg/kg   sodium chloride 0.9 % SOLN Base 50 mL                  2147 (160 mg)-New Bag       230-Stopped        05 (160 mg)-New Bag       06-Stopped             Dose: 2 mg/kg  Weight Dosing Info: 81 kg  Freq: ONCE Route: IV  Indications of Use: PERIOPERATIVE PHARMACOPROPHYLAXIS  Last Dose: Stopped (18)  Start: 18   End: 18   Admin. Amount: 160 mg  Last Admin: 18 134  Dispense Loc: [facility]  Infused Over: 60 Minutes  Administrations Remainin  Volume: 50 mL   Mixture Administration Information:   Medication Type Amount   gentamicin 40 MG/ML SOLN Medications 2 mg/kg   sodium chloride 0.9 % SOLN Base 50 mL                  1340 (160 mg)-New Bag [C]       -Stopped [C]              Dose: 50 mL  Freq: ONCE Route: NEPHROSTOMY   Start: 18   End: 18 144   Admin. Amount: 50 mL  Last Admin: 18 144  Dispense Loc: [facility] RAD Floor Stock  Administrations Remainin  Volume: 50 mL           1442 (20 mL)-Given          Discontinued Medications  Medications 18         Dose: 1 g  Freq: EVERY 24 HOURS Route: IV  Indications of Use: URINARY TRACT INFECTION  Start: 18   End: 18   Admin. Amount: 1 g  Infused Over: 15 Minutes  Volume: 4 mL          [ ] 1630       8-Med Discontinued          Dose: 20 mL  Freq: DURING SURGERY Route: INFILTRATION  Start: 18 1050   End: 18 1506   Admin Instructions: Invert vial to re-suspend particles immediately prior to withdrawal from vial.  Stable for 4 hours at room temperature once removed from vial.    Admin. Amount: 20 mL  Dispense Loc: [facility]  Administrations Remainin  Volume: 20 mL  POC: Pre-procedure          1506-Med Discontinued          Freq: CONTINUOUS PRN Route:  XX  Start: 06/06/18 1534   End: 06/06/18 1612   Admin Instructions: NURSE to notify physician if patient has ringing in the ears, metallic taste in the mouth, or circumoral numbness    Dispense Loc: [facility]  POC: Post-procedure          1612-Med Discontinued          Dose: 1 g  Freq: SEE ADMIN INSTRUCTIONS Route: IV  Indications of Use: PERIOPERATIVE PHARMACOPROPHYLAXIS  Start: 06/06/18 1050   End: 06/06/18 1506   Admin Instructions: Intra-Op Dose.  Give every 2 hours while patient in surgery, starting 2 hours after pre-op dose.  DO NOT GIVE intra-op dose if CrCl less than 10 mL/min (on dialysis).  If CrCL less than 50 mL/min, double the time interval between doses.    Admin. Amount: 1 g  Dispense Loc: [facility] ADS 3C  Infused Over: 30 Minutes  POC: Pre-procedure          1506-Med Discontinued          Dose: 25-50 mcg  Freq: EVERY 2 MIN PRN Route: IV  PRN Reason: other  PRN Comment: acute pain  Start: 06/06/18 1533   End: 06/06/18 2002   Admin Instructions: MAX cumulative dose = 250 mcg.   Use fentaNYL (SUBLIMAZE) initially, as a short acting agent for acute pain control. If insufficient, or a longer acting agent is needed, begin morphine or HYDROmorphone (DILAUDID) if ordered.  For ordered doses up to 100 mcg give IV Push undiluted over a minimum of 3-5 minutes.    Admin. Amount: 25-50 mcg = 0.5-1 mL Conc: 50 mcg/mL  Dispense Loc: [facility] ADS PACU  Volume: 2 mL  POC: PACU          2002-Med Discontinued          Dose: 25-50 mcg  Freq: EVERY 2 MIN PRN Route: IV  PRN Reason: other  PRN Comment: acute pain. Max cumulative dose 250 mcg.   Start: 06/06/18 1050   End: 06/06/18 1506   Admin Instructions: Caution: may have synergistic effect when used with midazolam (VERSED). If inadequate response may repeat 25-50 mcg IV slowly Q 5 minutes PRN pain (Maximum of 200 mcg total dose in 60 minutes.) Doses can be exceeded under direct oversight of patient by provider.Only given for procedural sedation while provider  present. Nurse to discontinue this medication when procedure complete.  For ordered doses up to 100 mcg give IV Push undiluted over a minimum of 3-5 minutes.    Admin. Amount: 25-50 mcg = 0.5-1 mL Conc: 50 mcg/mL  Dispense Loc: [facility] ADS 3C  Volume: 2 mL  POC: Pre-procedure          1506-Med Discontinued          Dose: 0.2 mg  Freq: EVERY 1 MIN PRN Route: IV  PRN Reason: benzodiazepine reversal  Start: 06/06/18 1050   End: 06/06/18 1506   Admin Instructions: Give over 15 seconds. If inadequate response after 45 seconds, may repeat up to a MAX total dose of 1 mg. Continue monitoring until discharge criteria met of minimum of 2 hours.  Irritant. For ordered doses up to 1 mg, give IV Push undiluted. Administer each 0.2mg over 15 seconds.    Admin. Amount: 0.2 mg = 2 mL Conc: 0.1 mg/mL  Dispense Loc: [facility] ADS 3C  Infused Over: 1 Minutes  Volume: 5 mL  POC: Pre-procedure          1506-Med Discontinued          Dose: 0.3-0.5 mg  Freq: EVERY 5 MIN PRN Route: IV  PRN Reason: other  PRN Comment: acute pain.  May administer if Respiratory Rate is greater than 10  Start: 06/06/18 1533   End: 06/06/18 2002   Admin Instructions: Max cumulative dose = 2 mg  If fentaNYL (SUBLIMAZE) is also ordered, use HYDROmorphone (DILAUDID) if pain control insufficient with fentaNYL (SUBLIMAZE) or a longer acting agent is needed.  For ordered doses up to 4 mg give IV Push undiluted. Administer each 2mg over 2-5 minutes.    Admin. Amount: 0.3-0.5 mg  Dispense Loc: [facility] PACU  POC: PACU          2002-Med Discontinued          Freq: PRN  Start: 06/06/18 1446   End: 06/06/18 2002   Last Admin: 06/06/18 1446  POC: Intra-procedure          1446 (35 mL)-Given       2002-Med Discontinued          Dose: 10 mg  Freq: ONCE PRN Route: IV  PRN Reason: high blood pressure  PRN Comment: for Systemic Blood Pressure greater than 160 mmHg and Heart Rate greater than 60 bpm.    Start: 06/06/18 1533   End: 06/06/18 2002   Admin Instructions: For PACU  USE ONLY.  DC WHEN TRANSFERRED TO FLOOR.  For ordered doses up to 80 mg, give IV Push undiluted. Give each 20 mg over 2 minutes.    Admin. Amount: 10 mg = 2 mL Conc: 5 mg/mL  Dispense Loc: [facility] PACU  Infused Over: 2-8 Minutes  Administrations Remainin  Volume: 2 mL  POC: PACU          Med Discontinued          Rate: 100 mL/hr   Freq: CONTINUOUS Route: IV  Start: 18 1545   End: 18   Admin Instructions: Continue until IV catheter is weaned    Dispense Loc: [facility] Floor Stock  Volume: 1,000 mL  POC: PACU                 Med Discontinued          Dose: 1-2 mg  Freq: EVERY 4 MIN PRN Route: IV  PRN Reason: sedation  Start: 18   End: 18 150   Admin Instructions: Caution: when used with opioids, may need lower doses. If inadequate response may repeat 1 mg IV slowly Q 4 minutes PRN sedation until desired response (Maximum of 5 mg total dose.) Doses can be exceeded under direct oversight of patient by provider. Nurse to discontinue this medication when procedure complete.  For ordered doses up to 2.5 mg give IV Push slowly titrated over a minimum of 2 minutes. Dilute each 1mg in 4mL of NS.    Admin. Amount: 1-2 mg = 1-2 mL Conc: 1 mg/mL  Dispense Loc: [facility] 3C  Volume: 2 mL  POC: Pre-procedure          1506-Med Discontinued          Dose: 0.1-0.4 mg  Freq: EVERY 2 MIN PRN Route: IV  PRN Reason: opioid reversal  Start: 18   End: 18 1506   Admin Instructions: For apnea or imminent respiratory arrest: give 0.4 mg IV undiluted Q 2 minutes PRN until desired degree of reversal is obtained, stop opioid and notify provider. Continue monitoring until discharge are criteria met for a minimum of 2 hours. For severe sedation, decrease in respiratory depth, quality or Respiratory Rate less than 8: give 0.1 mg IV Q 2 minutes x 3 doses, stop opioid and notify provider.  Try to minimize reversal of analgesia especially in end-of-life patients.  Continue  monitoring until discharge criteria are met for a minimum of 2 hours.  For ordered doses up to 2mg give IVP. Give each 0.4mg over 15 seconds in emergency situations. For non-emergent situations further dilute in 9mL of NS to facilitate titration of response.    Admin. Amount: 0.1-0.4 mg = 0.25-1 mL Conc: 0.4 mg/mL  Dispense Loc: [facility] ADS 3C  Volume: 1 mL  POC: Pre-procedure          1506-Med Discontinued          Dose: 4 mg  Freq: EVERY 30 MIN PRN Route: PO  PRN Reason: nausea  Start: 18   End: 18   Admin Instructions: MAX total dose = 8 mg, including OR dosing. If not resolved in 15 minutes, then go to step 2 [prochlorperazine (COMPAZINE), if ordered].  With dry hands, peel back foil backing and gently remove tablet; do not push oral disintegrating tablet through foil backing; administer immediately on tongue and oral disintegrating tablet dissolves in seconds; then swallow with saliva; liquid not required.    Admin. Amount: 1 tablet (1 × 4 mg tablet)  Dispense Loc: [facility] ADS 3C  Administrations Remainin  POC: PACU          -Med Discontinued       Or    Dose: 4 mg  Freq: EVERY 30 MIN PRN Route: IV  PRN Reason: nausea  Start: 18   End: 18   Admin Instructions: MAX total dose = 8 mg, including OR dosing. If not resolved in 15 minutes, then go to step 2 [prochlorperazine (COMPAZINE), if ordered].  Irritant. For ordered doses up to 4 mg, give IV Push undiluted over 2-5 minutes.    Admin. Amount: 4 mg = 2 mL Conc: 4 mg/2 mL  Dispense Loc: [facility] ADS PACU  Infused Over: 2-5 Minutes  Administrations Remainin  Volume: 2 mL  POC: PACU          -Med Discontinued     Medications 18               INTERAGENCY TRANSFER FORM - NOTES (H&P, Discharge Summary, Consults, Procedures, Therapies)   2018                       UNIT [address] OBSERVATION [facility] BANK: [phone]               History & Physicals       H&P by Karena Escoto PA at 5/23/2018 10:30 AM     Author:  Karena Escoto PA Service:  (none) Author Type:  Physician Assistant    Filed:  5/27/2018 11:37 AM Encounter Date:  5/23/2018 Status:  Addendum    :  Karena Escoto PA (Physician Assistant)             Pre-Operative H & P     CC:  Preoperative exam to assess for increased cardiopulmonary risk while undergoing surgery and anesthesia.    Date of Encounter: 5/23/2018  Primary Care Physician:  [facility]  Maicol Carrera is a 42 year old male[SG1.1] with[SG1.2] C2 quadriplegia and a complex past medical and urological history who presents for pre-operative H & P in preparation for  for left Percutaneous nephrolithotomy (PCNL), ureterosocpy, cystoscopy, stent and laser, with Dr. Dejon Horn, on 6/6/18, at [facility], in treatment of residual stone burden.    History is obtained from the patient. Hospitalized for urosepsis 2/2 infected stone and pt currently with percutaneous nephrostomy tube.   He states it is not draining well but he is seeing minimal urine in the suprapubic catheter bag.  No fever or chills, no pain.    Past Medical History  Past Medical History:   Diagnosis Date     Constipation, chronic      GERD (gastroesophageal reflux disease)      Head injury      Neurogenic bladder     SP catheter     JUAN (obstructive sleep apnea)     bipap     Seizure (H)      Spastic quadriplegia (H)      Urinary tract infection        Past Surgical History  Past Surgical History:   Procedure Laterality Date     APPENDECTOMY OPEN       BACK SURGERY       INSERT PUMP BACLOFEN       LASER HOLMIUM NEPHROLITHOTOMY VIA PERCUTANEOUS NEPHROSTOMY  10/19/2011    Procedure:LASER HOLMIUM NEPHROLITHOTOMY VIA PERCUTANEOUS NEPHROSTOMY; Left Ureteral Stent Placement, Left Percutaneous Access, Left Percutaneous Nephrostomy  *Latex Allergy*  ; Surgeon:YAN ADDISON; Location:[facility]  OR     LASER HOLMIUM NEPHROLITHOTOMY VIA PERCUTANEOUS NEPHROSTOMY Left 3/7/2018    Procedure: LASER HOLMIUM NEPHROLITHOTOMY VIA PERCUTANEOUS NEPHROSTOMY;  Left Percutaneous Nephrolithotomy, Access To Kidney, Ureteroscopy, Cystoscopy, Stent Placement With Holmium Laser standby;  Surgeon: Dejon Horn MD;  Location: [facility] OR     LASER HOLMIUM NEPHROLITHOTOMY VIA PERCUTANEOUS NEPHROSTOMY Left 3/21/2018    Procedure: LASER HOLMIUM NEPHROLITHOTOMY VIA PERCUTANEOUS NEPHROSTOMY;  Left Holmium Laser Percutaneous Nephrolithotomy, Access To Kidney, Left Ureteroscopy, Stent Placement  general with block **Latex Allergy**;  Surgeon: Dejon Horn MD;  Location: [facility] OR     ORTHOPEDIC SURGERY       supra pubic catheter         Hx of Blood transfusions/reactions: yes, 5 years ago, no adverse reactions     Hx of abnormal bleeding or anti-platelet use: no    Menstrual history: No LMP for male patient.    Steroid use in the last year: no    Personal or FH with difficulty with Anesthesia:  hypotension    Prior to Admission Medications  Current Outpatient Prescriptions   Medication Sig Dispense Refill     ACETAMINOPHEN PO Take 650 mg by mouth every 4 hours as needed for pain       albuterol (2.5 MG/3ML) 0.083% nebulizer solution Take 3 mLs by nebulization every 2 hours as needed. (Patient not taking: Reported on 5/23/2018) 1 Box      baclofen (LIORESAL) 10 MG tablet Take 10 mg by mouth 4 times daily as needed        BACLOFEN IT Via implanted IT pump  Baclofen Concentration 2000 mc/gmL   Dose: 277.1 mcg/day  Low reservoir alarm date: 10/16/2018  Low reservoir Alarm Volume: 2ml  Interrogated 5/23/18       Bisacodyl (DULCOLAX PO) Take 10 mg by mouth At Bedtime.       bisacodyl (DULCOLAX) 10 MG suppository Place 1 suppository rectally every other day. 12 suppository      clindamycin (CLINDAMAX) 1 % topical gel Apply topically 2 times daily       Cranberry 400 MG TABS Take 400 mg by mouth 2 times daily       cyanocolbalamin  (VITAMIN B-12) 1000 MCG tablet Take 1 tablet by mouth daily.       docusate sodium (COLACE) 100 MG capsule Take 100 mg by mouth 2 times daily.       ferrous sulfate 325 (65 FE) MG tablet Take 1 tablet by mouth daily (with breakfast). 100 tablet      ketoconazole (NIZORAL) 2 % cream Apply topically 2 times daily as needed       LevETIRAcetam (KEPPRA PO) Take 1,000 mg by mouth 2 times daily       magnesium gluconate (MAGONATE) 500 MG tablet Take 1 tablet by mouth daily.       miconazole (MICATIN; MICRO GUARD) 2 % powder Apply  topically 2 times daily. 70 g      mometasone (ELOCON) 0.1 % cream Apply topically At Bedtime       Multiple Vitamins-Minerals (MULTIVITAMIN ADULT PO) Take 1 tablet by mouth daily       Nutritional Supplements (OSTEO-MULTIVITAMINS/MINERALS OR) Take  by mouth daily. 12 pm         ondansetron (ZOFRAN-ODT) 4 MG disintegrating tablet Take 1 tablet by mouth every 6 hours as needed for nausea. 20 tablet      oxybutynin (DITROPAN) 5 MG tablet Take 1 tablet by mouth 3 times daily. 270 tablet      polyethylene glycol (MIRALAX/GLYCOLAX) powder Take 1 capful by mouth daily as needed for constipation (if no BM x3 days)       polyvinyl alcohol (LIQUIFILM TEARS) 1.4 % ophthalmic solution Place 2 drops into both eyes every 2 hours as needed for dry eyes       selenium sulfide (SELSUN) 2.5 % lotion Apply to scalp topically in the morning every Sun, Tue, Thursday. Place for 5 min then rinse.       senna-docusate (SENOKOT-S;PERICOLACE) 8.6-50 MG per tablet Take 1-2 tablets by mouth 2 times daily. 60 tablet      vitamin C 250 MG TABS Take 1 tablet by mouth 2 times daily.       VITAMIN D, CHOLECALCIFEROL, PO Take 5,000 Units by mouth daily       zinc oxide (DESITIN) 40 % ointment Apply  topically every hour as needed. 56.7 g        Allergies  Allergies   Allergen Reactions     Latex Rash     Latex Rash       Social History  Social History     Social History     Marital status: Single       Social History Main  "Topics     Smoking status: Former Smoker     Packs/day: 0.30     Years: 5.00     Types: Cigarettes     Quit date: 9/23/1994     Smokeless tobacco: Never Used     Alcohol use No     Drug use: No     Sexual activity: Not on file     Other Topics Concern     Not on file     Social History Narrative   Lives at [facility] and is total care. Michael lift required for transfer.        ROS/MED HX    ENT/Pulmonary:     (+)sleep apnea, uses CPAP    Other pulmonary disease Using aerobika BID for prevention .    Neurologic:     (+)seizures on levetiracetam  Spinal cord injury year sustained: 1994 level of injury: C 4 with autonomic hyperflexia sx when full bladder?     Cardiovascular:  - neg cardiovascular ROS   No previous cardiac testing       METS/Exercise Tolerance: Comment: C4 quadriplegia    Hematologic:     (+) Anemia, History of Transfusion no previous transfusion reaction -      Musculoskeletal:  - neg musculoskeletal ROS       GI/Hepatic:    denies     Renal/Genitourinary:     (+) Nephrolithiasis , Other Renal/ Genitourinary, neurogenic bladder; suprapubic catheter      Endo:  - neg endo ROS       Psychiatric:  - neg psychiatric ROS       Infectious Disease:   (+) MRSA,       Malignancy:      - no malignancy   Other:    (+) No chance of pregnancy no H/O Chronic Pain,no other significant disability        The complete review of systems is negative other than noted in the HPI or here.   Temp: 97.4  F (36.3  C) Temp src: Oral BP: 119/80 Pulse: 68   Resp: 20 SpO2: 100 %         182 lbs 9.6 oz  5' 10.5\"   Body mass index is 25.83 kg/(m^2).       Physical Exam  Constitutional: Awake, alert, cooperative, sitting in wheelchair with mouthpiece on suspension cord for chair control. no apparent distress, and appears stated age.  Eyes: Lateral gaze right eye. Pupils equal, round and reactive to light, sclera clear, conjunctiva normal.  HENT: Normocephalic, oral pharynx with moist mucus membranes, good " dentition. No goiter appreciated.   Respiratory: Clear to auscultation bilaterally, no crackles or wheezing.  Cardiovascular: Regular rate and rhythm, normal S1 and S2, and no murmur noted.  Carotids - no bruits. No LE edema. Palpable pulses to radial  arteries.   GI: Normal bowel sounds, palpable pump in lower abdomen. soft, non-distended, non-tender.    Lymph/Hematologic: No cervical lymphadenopathy and no supraclavicular lymphadenopathy.  Genitourinary:  Deferred. Left lateral flank tube extending to front pocket and empty. Suprapubic tube extending to front pocket and with scant red tinged yellowish urine.  Skin: Warm and dry.    Musculoskeletal: Decreased extension ROM of neck. There is profound muscle wasting of BUE and BLE. LE with loss hair, shiny skin, some scabbed erythematous areas, no skin breakdown.  Neurologic: Awake, alert, oriented to name, place and time.   Neuropsychiatric: Calm, cooperative. Normal affect.     Labs: (personally reviewed)  Lab Results   Component Value Date    WBC 9.5 03/13/2018     Lab Results   Component Value Date    RBC 3.13 03/13/2018     Lab Results   Component Value Date    HGB 7.9 03/13/2018     Lab Results   Component Value Date    HCT 26.9 03/13/2018     Lab Results   Component Value Date    MCV 86 03/13/2018     Lab Results   Component Value Date    MCH 25.2 03/13/2018     Lab Results   Component Value Date    MCHC 29.4 03/13/2018     Lab Results   Component Value Date    RDW 17.2 03/13/2018     Lab Results   Component Value Date     03/13/2018       ASSESSMENT and PLAN  Maicol Carrera is a 42 year old male[SG1.1] with C2 quadriplegia and a complex past medical and urological history[SG1.3]  scheduled to undergo[SG1.1] for left Percutaneous nephrolithotomy (PCNL), ureterosocpy, cystoscopy, stent and laser, with Dr. Dejon Horn, on 6/6/18, for residual stone burden.     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 PCNL 3/21/18 for significant residual stone burden. Creatinine  1.18. Nephrostomy tube in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

## 2018-06-06 NOTE — ANESTHESIA CARE TRANSFER NOTE
Patient: Maicol Hernandezissel    Procedure(s):  Left Percutaneous Nephrolithotomy,  Ureteroscopy, retrogrades, extraction of stones with basket, laser standby  - Wound Class: I-Clean    Diagnosis: Nephrolithiasis  Diagnosis Additional Information: No value filed.    Anesthesia Type:   General, ETT     Note:  Airway :Face Mask  Patient transferred to:PACU  Comments: VSS. Report to RN. 137/101. HR 83. 100%.Handoff Report: Identifed the Patient, Identified the Reponsible Provider, Reviewed the pertinent medical history, Discussed the surgical course, Reviewed Intra-OP anesthesia mangement and issues during anesthesia, Set expectations for post-procedure period and Allowed opportunity for questions and acknowledgement of understanding      Vitals: (Last set prior to Anesthesia Care Transfer)    CRNA VITALS  6/6/2018 1433 - 6/6/2018 1512      6/6/2018             NIBP: (!)  137/101    Pulse: 80                Electronically Signed By: ANNIE Peres CRNA  June 6, 2018  3:12 PM

## 2018-06-06 NOTE — OR NURSING
Patient's father, Javon called and updated on patient'S condition and that patient will be staying overnight on 6D.

## 2018-06-06 NOTE — OP NOTE
Procedure Date: 06/06/18  PREOPERATIVE DIAGNOSIS: Left nephrolithiasis.     POSTOPERATIVE DIAGNOSIS: Left nephrolithiasis.     PROCEDURES:   1. Antegrade nephrostogram.  2. Removal of indwelling nephrostomy tube.  3. Percutaneous left nephrolithotomy with removal of stone fragments, stone burden more than 2 cm.  4. Ultrasound-based lithotripsy of stone >2cm; basket stone extraction.  5. Interpretation of intraoperative fluoroscopic imaging.   6. Placement of new left 8 Fr nephrostomy tube.    SURGEON: Dejon Horn MD    RESIDENT SURGEON: Elmo Ahuja MD    ESTIMATED BLOOD LOSS: 25 mL.     SPECIMENS: Stones for composition analysis/culture.    DRAINS: 8 Fr Nephrostomy tube; Suprapubic catheter.    COMPLICATIONS: None.     SIGNIFICANT FINDINGS:   1. Several large stones throughout mid and lower calyces.  2. All visible stones removed    OPERATIVE INDICATIONS: Maicol Carrera is a 42 year old male with a history of quadriplegia with neurogenic bladder, managed with an indwelling suprapubic tube. He has a history of recurrent urolithiasis with a left sided staghorn calculus that has been managed with a PCNL on 3/7 and 3/21. He presents today for additional cleanup. After a discussion of all risks, benefits, and alternatives, the patient presented for the aforementioned procedures on an elective basis.     OPERATIVE DETAILS: After informed consent was obtained, the patient was taken back to the operating room. Anesthesia was induced while the patient was on a gurney and he was intubated. He was then carefully placed in the prone position onto the operating room table. A timeout was taken to ensure proper patient, placement and procedure. He was then placed in the split-leg prone position and both his genitalia and left flank were prepped and draped in the usual sterile fashion. We began by performing an antegrade nephrostogram to delineate the anatomy of the renal pelvis.  Images were saved.  We identified  some filling defects within the collecting system that showed evidence for residual stone burden corresponding with the most recent CT scan.  We then placed a sensor guidewire into the existing nephrostomy tube and advanced this down to the bladder under radiographic guidance.  We then cut the stitch and remove the nephrostomy tube. We advanced a dual-lumen catheter over the wire to establish access with a second Super Stiff wire that was advanced into the bladder.  We then advanced a Bard X-Force balloon dilator over our Super Stiff wire and dilated the percutaneous tract; this was monitored with fluoroscopy.  A sheath was advanced into the kidney.  We then proceeded to advance the rigid nephroscope and identified a large stone approximately 2.5cm in the renal pelvis and fragmented this using the [employer] ultrasound based lithotripsy device.  We then proceeded to perform a flexible nephroscopy and identified a pocket of  numerous smaller stones that we evacuated using the Halo basket.  The remainder of the nephroscopy was unremarkable and we did not identify stone fragments larger than 2-3 mm. Total removed stone burden was estimated to be about 3 cm.  A repeat nephrostogram was performed and showed again calyces with infundibula that are quite narrow with a complex collecting system. We then placed an 8 Nepali nephrostomy tube into the renal pelvis over one of our safety wires, and then proceeded to secure the coil in place. The safety wires were then removed. The skin overlying the nephrostomy tract was closed with interrupted 3-0 Vicryl placed in a horizontal mattress fashion.  The nephrostomy tube was secured to the skin with a 4-0 nylon stitch and connected to gravity drainage bag.  A dressing was applied. The patient was then awakened from anesthesia and taken out of the prone position, transferred to postanesthesia care in stable condition, and there were no immediate complications.    Postoperative  plan:  The patient will be admitted to the urology service for monitoring  He will be continued on antibiotics for at least 24 hours   He will follow-up with Dr. Horn in 1 month with a CT scan prior to this    Attestation:  I was present during the key portions of the procedure, and I was immediately available for the entire procedure between start and finish on 6/6/18.  JEREMIE Horn MD

## 2018-06-06 NOTE — IP AVS SNAPSHOT
MRN:[id number]                      After Visit Summary   6/6/2018    Maicol Carrera    MRN: [id number]           Thank you!     Thank you for choosing [facility] for your care. Our goal is always to provide you with excellent care. Hearing back from our patients is one way we can continue to improve our services. Please take a few minutes to complete the written survey that you may receive in the mail after you visit with us. Thank you!        Patient Information     Date Of Birth          1975        About your hospital stay     You were admitted on:  June 6, 2018 You last received care in the:  Unit 6D Observation [facility]    You were discharged on:  June 7, 2018        Reason for your hospital stay       On 6/6/18 Mr. Carrera underwent a left percutaneous stone removal procedure with replacement of a new left percutaneous nephrostomy tube. Dr. Dejon Horn (Urology)                  Who to Call     For medical emergencies, please call 911.  For non-urgent questions about your medical care, please call your primary care provider or clinic, [phone]  For questions related to your surgery, please call your surgery clinic        Attending Provider     Provider Specialty    Dejon Horn MD Urology       Primary Care Provider Office Phone # Fax #    Dejon Honr [phone]      After Care Instructions     Activity       Your activity upon discharge: See discharge instructions            Diet       Follow this diet upon discharge: See discharge instructions            Discharge Instructions       Cares:   - Unless otherwise stated, resume group home cares.     Diet:  - Regular/ home diet    Activity:   - Resume normal activities  - Do not strain with bowel movements.      Medications:   1) PAIN: Continue Tylenol (acetaminophen 625mg) every 4-6 hours as needed for mild pain related to surgery.   Do not take more than 4,000mg of Tylenol  (acetaminophen/ APAP) from all sources in any 24 hour period since this can cause liver damage.  Do not take more than 2400mg of ibuprofen in any 24 hour period since this can cause kidney damage. Never drive, operate machinery or drink alcoholic beverages while you are taking narcotic pain medications.      2) CONSTIPATION: Resume your home bowel regimen.If you are using the home bowel regimen but still have not had a bowel movement in 3-4 days, seek medical attention.  Call the office with any concerns.     Percutaneous Nephrostomy Tube (PNT):  - YOU MAY BE GOING HOME with a left percutaneous nephrostomy tube. You may having bruising or soreness at the tube site.  Use a warm heating pad to relieve discomfort. Blood in the urine can be expected.  Drink 8-10 glasses of fluid per day to keep urine light pink or light yellow. Daily showering is important but keep your tube site dry.  Avoid baths, swimming, etc. Clean around the tube site every other day using soap and water, then cover the site with a clean bandage. Take care not to tug the tube or remove the suture. Keep the tube securely taped to your back at all times and do not let it get caught, snagged or tugged.  The tubing should always remain tension-free and without kinks.  In order to drain best (and prevent urinary infections), the tubing and bag should always hang lower than your kidney.   - If you are discharged with a percutaneous nephrostomy tube, follow up in Interventional Radiology early next week for an antegrade nephroureterogram (a picture of the kidney) then possible tube removal.  This can be coordinated by the Urology service.     SUPRAPUBIC PILLAI:  - You are going home with your Chronic Suprapubic Pillai catheter.   - Resume home cares.    - Protect the catheter and treat it like an extension of your body - keep it secured to your leg and do not let it get caught, snagged, or tugged. The catheter tubing should remain tension-free and without  kinks. In order to drain best, the tubing and bag should always be lower than your body.  - You may notice a little blood, small amount of white discharge, or caking at the catheter insertion site. This is normal but it can irritate the skin so it is best to wash this off in the daily shower. You are encouraged to wash the catheter tubing to keep it clean, and the urine drainage bag also can be gotten wet.   - Change the tube monthly or per your home routine.                  Follow-up Appointments     Adult [facility] Follow-up and recommended labs and tests       Follow-Up:   - Call your primary care provider to touch base regarding your recent admission.   - Follow up with Dr. Horn as scheduled on 6/29/18.  Just prior to this appointment you should have a renal/kidney ultrasound in the Radiology department.  The Urology Clinic should be calling you to confirm both appointments.   - Call or return sooner than your regularly scheduled visit if you develop any of the following:  Fever (greater than 101.3F) or flu-like symptoms, uncontrolled pain, uncontrolled nausea or vomiting, as well as increased redness, swelling, or drainage from your wound or problems with your Belle catheter drainage.     Phone numbers:  - Nursing phone helpline at the Urology Clinic (8A-5P M-F):  [phone].    - Nights or weekends, call [phone] and ask the  to page the urology resident on call.   - For emergencies, always call 911    Appointments on [place] and/or [facility] (with [facility] or [facility] provider or service). Call [phone] if you haven't heard regarding these appointments within 7 days of discharge.                  Your next 10 appointments already scheduled     Jun 29, 2018 11:40 AM CDT   (Arrive by 11:25 AM)   Cystoscopy with Dejon Horn MD   [facility] ([facility])    [address] [facility]  [address] [facility]  "[id number]   [phone]              Pending Results     Date and Time Order Name Status Description    6/7/2018 0843 IR Nephrostomy Tube Removal Left Preliminary     6/6/2018 1430 Stone analysis In process             Statement of Approval     Ordered          06/07/18 1507  I have reviewed and agree with all the recommendations and orders detailed in this document.  EFFECTIVE NOW     Approved and electronically signed by:  Elmo Ahuja MD             Admission Information     Date & Time Provider Department Dept. Phone    6/6/2018 Dejon Horn MD Unit 6D Observation [facility] [place] [phone]      Your Vitals Were     Blood Pressure Pulse Temperature Respirations Height Weight    113/72 (BP Location: Right arm) 81 97.9  F (36.6  C) (Oral) 16 1.791 m (5' 10.51\") 81 kg (178 lb 9.2 oz)    Pulse Oximetry BMI (Body Mass Index)                96% 25.25 kg/m2          Care EveryWhere ID     This is your Care EveryWhere ID. This could be used by other organizations to access your [facility] medical records  [id number]        Equal Access to Services     [facility] Center[facility] : Hadii trupti ortiz Sonavneet, waaxda luqadaha, qaybta kaalmabrynn kunz, kathy phillips . So [facility] [phone].    ATENCIÓN: Si habla español, tiene a da silva disposición servicios gratuitos de asistencia lingüística. Richar al [phone].    We comply with applicable federal civil rights laws and [place] laws. We do not discriminate on the basis of race, color, national origin, age, disability, sex, sexual orientation, or gender identity.               Review of your medicines      CONTINUE these medicines which have NOT CHANGED        Dose / Directions    ACETAMINOPHEN PO        Dose:  650 mg   Take 650 mg by mouth every 4 hours as needed for pain   Refills:  0       albuterol (2.5 MG/3ML) 0.083% neb solution   Used for:  SOB (shortness of breath)        Dose:  2.5 mg   Take 3 mLs by nebulization every 2 hours as " needed.   Quantity:  1 Box   Refills:  0       ascorbic acid 250 MG Tabs tablet   Used for:  Recurrent UTI        Dose:  250 mg   Take 1 tablet by mouth 2 times daily.   Refills:  0       * baclofen 10 MG tablet   Commonly known as:  LIORESAL        Dose:  10 mg   Take 10 mg by mouth 4 times daily as needed   Refills:  0       * BACLOFEN IT        Via implanted IT pump Baclofen Concentration 2000 mc/gmL  Dose: 277.1 mcg/day Low reservoir alarm date: 10/16/2018 Low reservoir Alarm Volume: 2ml Interrogated 5/23/18   Refills:  0       clindamycin 1 % topical gel   Commonly known as:  CLINDAMAX        Apply topically 2 times daily   Refills:  0       COLACE 100 MG capsule   Generic drug:  docusate sodium        Dose:  100 mg   Take 100 mg by mouth 2 times daily.   Refills:  0       Cranberry 400 MG Tabs        Dose:  400 mg   Take 400 mg by mouth 2 times daily   Refills:  0       cyanocobalamin 1000 MCG tablet   Commonly known as:  vitamin  B-12   Used for:  Vitamin B 12 deficiency        Dose:  1000 mcg   Take 1 tablet by mouth daily.   Refills:  0       * bisacodyl 10 MG Suppository   Commonly known as:  DULCOLAX   Used for:  Chronic constipation        Dose:  10 mg   Place 1 suppository rectally every other day.   Quantity:  12 suppository   Refills:  0       * DULCOLAX PO        Dose:  10 mg   Take 10 mg by mouth At Bedtime.   Refills:  0       ferrous sulfate 325 (65 Fe) MG tablet   Commonly known as:  IRON   Used for:  Anemia due to blood loss, acute        Dose:  325 mg   Take 1 tablet by mouth daily (with breakfast).   Quantity:  100 tablet   Refills:  0       KEPPRA PO        Dose:  1000 mg   Take 1,000 mg by mouth 2 times daily   Refills:  0       ketoconazole 2 % cream   Commonly known as:  NIZORAL        Apply topically 2 times daily as needed   Refills:  0       magnesium gluconate 500 MG tablet   Commonly known as:  MAGONATE   Used for:  Magnesium deficiency        Dose:  500 mg   Take 1 tablet by mouth  daily.   Refills:  0       miconazole 2 % powder   Commonly known as:  MICATIN; MICRO GUARD   Used for:  Candida infection of flexural skin        Apply  topically 2 times daily.   Quantity:  70 g   Refills:  0       mometasone 0.1 % cream   Commonly known as:  ELOCON        Apply topically At Bedtime   Refills:  0       MULTIVITAMIN ADULT PO        Dose:  1 tablet   Take 1 tablet by mouth daily   Refills:  0       ondansetron 4 MG ODT tab   Commonly known as:  ZOFRAN-ODT   Used for:  Nausea and vomiting        Dose:  4 mg   Take 1 tablet by mouth every 6 hours as needed for nausea.   Quantity:  20 tablet   Refills:  0       OSTEO-MULTIVITAMINS/MINERALS OR        Take  by mouth daily. 12 pm   Refills:  0       oxybutynin 5 MG tablet   Commonly known as:  DITROPAN   Used for:  Neurogenic bladder        Dose:  5 mg   Take 1 tablet by mouth 3 times daily.   Quantity:  270 tablet   Refills:  0       polyethylene glycol powder   Commonly known as:  MIRALAX/GLYCOLAX        Dose:  1 capful   Take 1 capful by mouth daily as needed for constipation (if no BM x3 days)   Refills:  0       polyvinyl alcohol 1.4 % ophthalmic solution   Commonly known as:  LIQUIFILM TEARS        Dose:  2 drop   Place 2 drops into both eyes every 2 hours as needed for dry eyes   Refills:  0       selenium sulfide 2.5 % lotion   Commonly known as:  SELSUN        Apply to scalp topically in the morning every Sun, Tue, Thursday. Place for 5 min then rinse.   Refills:  0       senna-docusate 8.6-50 MG per tablet   Commonly known as:  SENOKOT-S;PERICOLACE   Used for:  Chronic constipation        Dose:  1-2 tablet   Take 1-2 tablets by mouth 2 times daily.   Quantity:  60 tablet   Refills:  0       VITAMIN D (CHOLECALCIFEROL) PO        Dose:  5000 Units   Take 5,000 Units by mouth daily   Refills:  0       zinc oxide 40 % ointment   Commonly known as:  DESITIN   Used for:  Breakdown of skin tissue        Apply  topically every hour as needed.    Quantity:  56.7 g   Refills:  0       * Notice:  This list has 4 medication(s) that are the same as other medications prescribed for you. Read the directions carefully, and ask your doctor or other care provider to review them with you.      STOP taking     Cefepime HCl 2 g Solr                    Protect others around you: Learn how to safely use, store and throw away your medicines at www.disposemymeds.org.             Medication List: This is a list of all your medications and when to take them. Check marks below indicate your daily home schedule. Keep this list as a reference.      Medications           Morning Afternoon Evening Bedtime As Needed    ACETAMINOPHEN PO   Take 650 mg by mouth every 4 hours as needed for pain                                albuterol (2.5 MG/3ML) 0.083% neb solution   Take 3 mLs by nebulization every 2 hours as needed.                                ascorbic acid 250 MG Tabs tablet   Take 1 tablet by mouth 2 times daily.                                * baclofen 10 MG tablet   Commonly known as:  LIORESAL   Take 10 mg by mouth 4 times daily as needed                                * BACLOFEN IT   Via implanted IT pump Baclofen Concentration 2000 mc/gmL  Dose: 277.1 mcg/day Low reservoir alarm date: 10/16/2018 Low reservoir Alarm Volume: 2ml Interrogated 5/23/18                                clindamycin 1 % topical gel   Commonly known as:  CLINDAMAX   Apply topically 2 times daily                                COLACE 100 MG capsule   Take 100 mg by mouth 2 times daily.   Last time this was given:  100 mg on 6/7/2018  7:48 AM   Generic drug:  docusate sodium                                Cranberry 400 MG Tabs   Take 400 mg by mouth 2 times daily                                cyanocobalamin 1000 MCG tablet   Commonly known as:  vitamin  B-12   Take 1 tablet by mouth daily.                                * bisacodyl 10 MG Suppository   Commonly known as:  DULCOLAX   Place 1  suppository rectally every other day.                                * DULCOLAX PO   Take 10 mg by mouth At Bedtime.                                ferrous sulfate 325 (65 Fe) MG tablet   Commonly known as:  IRON   Take 1 tablet by mouth daily (with breakfast).   Last time this was given:  325 mg on 6/7/2018  7:48 AM                                KEPPRA PO   Take 1,000 mg by mouth 2 times daily   Last time this was given:  1,000 mg on 6/7/2018  7:48 AM                                ketoconazole 2 % cream   Commonly known as:  NIZORAL   Apply topically 2 times daily as needed                                magnesium gluconate 500 MG tablet   Commonly known as:  MAGONATE   Take 1 tablet by mouth daily.   Last time this was given:  500 mg on 6/7/2018  7:48 AM                                miconazole 2 % powder   Commonly known as:  MICATIN; MICRO GUARD   Apply  topically 2 times daily.                                mometasone 0.1 % cream   Commonly known as:  ELOCON   Apply topically At Bedtime                                MULTIVITAMIN ADULT PO   Take 1 tablet by mouth daily                                ondansetron 4 MG ODT tab   Commonly known as:  ZOFRAN-ODT   Take 1 tablet by mouth every 6 hours as needed for nausea.                                OSTEO-MULTIVITAMINS/MINERALS OR   Take  by mouth daily. 12 pm                                oxybutynin 5 MG tablet   Commonly known as:  DITROPAN   Take 1 tablet by mouth 3 times daily.   Last time this was given:  5 mg on 6/7/2018 12:23 PM                                polyethylene glycol powder   Commonly known as:  MIRALAX/GLYCOLAX   Take 1 capful by mouth daily as needed for constipation (if no BM x3 days)                                polyvinyl alcohol 1.4 % ophthalmic solution   Commonly known as:  LIQUIFILM TEARS   Place 2 drops into both eyes every 2 hours as needed for dry eyes                                selenium sulfide 2.5 % lotion   Commonly  known as:  SELSUN   Apply to scalp topically in the morning every Sun, Tue, Thursday. Place for 5 min then rinse.                                senna-docusate 8.6-50 MG per tablet   Commonly known as:  SENOKOT-S;PERICOLACE   Take 1-2 tablets by mouth 2 times daily.   Last time this was given:  2 tablets on 6/7/2018  7:48 AM                                VITAMIN D (CHOLECALCIFEROL) PO   Take 5,000 Units by mouth daily                                zinc oxide 40 % ointment   Commonly known as:  DESITIN   Apply  topically every hour as needed.                                * Notice:  This list has 4 medication(s) that are the same as other medications prescribed for you. Read the directions carefully, and ask your doctor or other care provider to review them with you.

## 2018-06-06 NOTE — OR NURSING
[facility] called regarding patient's condition and that patient will be staying overnight.

## 2018-06-06 NOTE — BRIEF OP NOTE
[facility] [place]    Brief Operative Note    Pre-operative diagnosis: Nephrolithiasis  Post-operative diagnosis Nephrolithiasis  Procedure: Procedure(s):  Left Percutaneous Nephrolithotomy,  Ureteroscopy, retrogrades, extraction of stones with basket, laser standby  - Wound Class: I-Clean  Surgeon: Surgeon(s) and Role:     * Dejon Horn MD - Primary     * Elmo Ahuja - Resident - Assisting  Anesthesia: Combined General with Block   Estimated blood loss: Less than 50 ml  Drains: 8 Fr nephrostomy tube  Specimens:   ID Type Source Tests Collected by Time Destination   1 : Left Kidney Stones Calculus/Stone Kidney, Left STONE ANALYSIS Dejon Horn MD 6/6/2018  2:30 PM      Findings:   Large stone in renal pelvis, multiple smaller stone fragmented and removed.  Complications: None.  Implants: None.

## 2018-06-07 ENCOUNTER — CARE COORDINATION (OUTPATIENT)
Dept: UROLOGY | Facility: CLINIC | Age: 43
End: 2018-06-07

## 2018-06-07 ENCOUNTER — APPOINTMENT (OUTPATIENT)
Dept: INTERVENTIONAL RADIOLOGY/VASCULAR | Facility: CLINIC | Age: 43
End: 2018-06-07
Attending: NURSE PRACTITIONER
Payer: COMMERCIAL

## 2018-06-07 VITALS
OXYGEN SATURATION: 96 % | WEIGHT: 178.57 LBS | TEMPERATURE: 97.9 F | SYSTOLIC BLOOD PRESSURE: 113 MMHG | BODY MASS INDEX: 25 KG/M2 | HEART RATE: 81 BPM | RESPIRATION RATE: 16 BRPM | DIASTOLIC BLOOD PRESSURE: 72 MMHG | HEIGHT: 71 IN

## 2018-06-07 DIAGNOSIS — N28.89 RENAL HEMORRHAGE, LEFT: Primary | ICD-10-CM

## 2018-06-07 LAB
ANION GAP SERPL CALCULATED.3IONS-SCNC: 8 MMOL/L (ref 3–14)
BUN SERPL-MCNC: 20 MG/DL (ref 7–30)
CALCIUM SERPL-MCNC: 8.6 MG/DL (ref 8.5–10.1)
CHLORIDE SERPL-SCNC: 108 MMOL/L (ref 94–109)
CO2 SERPL-SCNC: 23 MMOL/L (ref 20–32)
CREAT SERPL-MCNC: 0.96 MG/DL (ref 0.66–1.25)
ERYTHROCYTE [DISTWIDTH] IN BLOOD BY AUTOMATED COUNT: 17 % (ref 10–15)
GFR SERPL CREATININE-BSD FRML MDRD: 86 ML/MIN/1.7M2
GLUCOSE SERPL-MCNC: 85 MG/DL (ref 70–99)
HCT VFR BLD AUTO: 37.2 % (ref 40–53)
HGB BLD-MCNC: 10.8 G/DL (ref 13.3–17.7)
MCH RBC QN AUTO: 23.8 PG (ref 26.5–33)
MCHC RBC AUTO-ENTMCNC: 29 G/DL (ref 31.5–36.5)
MCV RBC AUTO: 82 FL (ref 78–100)
PLATELET # BLD AUTO: 211 10E9/L (ref 150–450)
POTASSIUM SERPL-SCNC: 4.2 MMOL/L (ref 3.4–5.3)
RBC # BLD AUTO: 4.54 10E12/L (ref 4.4–5.9)
SODIUM SERPL-SCNC: 140 MMOL/L (ref 133–144)
WBC # BLD AUTO: 10 10E9/L (ref 4–11)

## 2018-06-07 PROCEDURE — 40000558 ZZH STATISTIC CVC DRESSING CHANGE

## 2018-06-07 PROCEDURE — 50431 NJX PX NFROSGRM &/URTRGRM: CPT

## 2018-06-07 PROCEDURE — 80048 BASIC METABOLIC PNL TOTAL CA: CPT | Performed by: UROLOGY

## 2018-06-07 PROCEDURE — 40000802 ZZH SITE CHECK

## 2018-06-07 PROCEDURE — 85027 COMPLETE CBC AUTOMATED: CPT | Performed by: UROLOGY

## 2018-06-07 PROCEDURE — 25000128 H RX IP 250 OP 636: Performed by: RADIOLOGY

## 2018-06-07 PROCEDURE — 25000128 H RX IP 250 OP 636: Performed by: UROLOGY

## 2018-06-07 PROCEDURE — 36415 COLL VENOUS BLD VENIPUNCTURE: CPT | Performed by: UROLOGY

## 2018-06-07 PROCEDURE — 25000132 ZZH RX MED GY IP 250 OP 250 PS 637: Performed by: UROLOGY

## 2018-06-07 RX ORDER — IODIXANOL 320 MG/ML
50 INJECTION, SOLUTION INTRAVASCULAR ONCE
Status: COMPLETED | OUTPATIENT
Start: 2018-06-07 | End: 2018-06-07

## 2018-06-07 RX ADMIN — LEVETIRACETAM 1000 MG: 500 TABLET ORAL at 07:48

## 2018-06-07 RX ADMIN — AMPICILLIN SODIUM 1 G: 1 INJECTION, POWDER, FOR SOLUTION INTRAMUSCULAR; INTRAVENOUS at 06:29

## 2018-06-07 RX ADMIN — DOCUSATE SODIUM 100 MG: 100 CAPSULE, LIQUID FILLED ORAL at 07:48

## 2018-06-07 RX ADMIN — FERROUS SULFATE TAB 325 MG (65 MG ELEMENTAL FE) 325 MG: 325 (65 FE) TAB at 07:48

## 2018-06-07 RX ADMIN — GENTAMICIN SULFATE 160 MG: 40 INJECTION, SOLUTION INTRAMUSCULAR; INTRAVENOUS at 05:12

## 2018-06-07 RX ADMIN — AMPICILLIN SODIUM 1 G: 1 INJECTION, POWDER, FOR SOLUTION INTRAMUSCULAR; INTRAVENOUS at 12:23

## 2018-06-07 RX ADMIN — IODIXANOL 20 ML: 320 INJECTION, SOLUTION INTRAVASCULAR at 14:42

## 2018-06-07 RX ADMIN — OXYBUTYNIN CHLORIDE 5 MG: 5 TABLET ORAL at 12:23

## 2018-06-07 RX ADMIN — Medication 500 MG: at 07:48

## 2018-06-07 RX ADMIN — SENNOSIDES AND DOCUSATE SODIUM 2 TABLET: 8.6; 5 TABLET ORAL at 07:48

## 2018-06-07 NOTE — CONSULTS
Patient is on IR schedule tentatively for 6/7 for a Left PNT nephrostogram and possible removal.   INR pending.  No NPO required and team has already ordered appropriate antibiotics.  Consent will be done prior to procedure.     Please contact the IR charge RN at [phone] for estimated time of procedure.     Case discussed with Dr. Braswell from IR and Magdalene Monroe PA-C. If we are unable to accommodate case today, it may be moved to tomorrow.    Yahaira Hall DNP, APRN  Interventional Radiology  Phone: [phone]  Pager: [phone]

## 2018-06-07 NOTE — PLAN OF CARE
Problem: Patient Care Overview  Goal: Plan of Care/Patient Progress Review  Text paged urology kaden- Debi Milian 6D 512  Pt is d/c to his facility. Has a midline to L arm that he would like to be d/c before d/c. Need order to d/c midline.  Thanks,  Demi [phone]  Awaiting response

## 2018-06-07 NOTE — PROGRESS NOTES
Pt is alert and oriented x 4. Denies pain and discomfort, tolerating regular diet well, no nausea. Repositioned per pt request. Nephrostomy tube with bloody drainage. S/p catheter draining red urine. AVSS. Will continue to monitor

## 2018-06-07 NOTE — PHARMACY-AMINOGLYCOSIDE DOSING SERVICE
Pharmacy Aminoglycoside Initial Note  Date of Service 2018  Patient's  1975  42 year old, male    Weight (Actual): 81 kg    Indication: Surgical Prophylaxis and Urinary Tract Infection    Current estimated CrCl = Estimated Creatinine Clearance: 133.9 mL/min (based on Cr of 0.84).    Creatinine for last 3 days  No results found for requested labs within last 72 hours.     Nephrotoxins and other renal medications (Future)    Start     Dose/Rate Route Frequency Ordered Stop    18  ampicillin (OMNIPEN) 1 g vial to attach to  ml bag for ADULTS or 50 ml bag for PEDS      1 g  over 15 Minutes Intravenous EVERY 6 HOURS 18 1429    18  gentamicin (GARAMYCIN) 160 mg in sodium chloride 0.9 % 50 mL intermittent infusion      2 mg/kg × 81 kg  over 60 Minutes Intravenous EVERY 8 HOURS 18 1229          Contrast Orders - past 72 hours (72h ago through future)    Start     Dose/Rate Route Frequency Ordered Stop    18 144  iothalamate meglumine (CONRAY) 60 % injection  Status:  Discontinued        PRN 18 1446 18          Aminoglycoside Levels - past 2 days  No results found for requested labs within last 48 hours.    Aminoglycosides IV Administrations (past 72 hours)                   gentamicin (GARAMYCIN) 160 mg in sodium chloride 0.9 % 50 mL intermittent infusion (mg) 160 mg New Bag 18 1340                    Plan:  1.  Start Gentamicin 160 mg (2 mg/kg) IV q8h. Per discussion with MD, this should be for a total of 24 hr coverage.  2.  Target goals based on conventional dosing  3.  Goal peak level: 4-6 mg/L  4.  Goal trough level: </= 1 mg/L  5.  Levels likely unnecessary since gentamicin is planned for only 24hr.      Saundra Matta

## 2018-06-07 NOTE — PLAN OF CARE
Problem: Patient Care Overview  Goal: Plan of Care/Patient Progress Review  Patient alert, oriented x 4, denies any pain, slept most of the night, quadriplegic, repositioned per request. Tolerated food and fluids, denies nausea. Nephrostomy at left lower back flowing well, urine bright red adequate in amount, dressing in place slightly red. Suprapubic catheter in place flowing well, bright red urine, dressing slightly red. VSS

## 2018-06-07 NOTE — IR NOTE
Interventional Radiology Brief Post Procedure Note    Procedure: IR NEPHROSTOMY TUBE REMOVAL LEFT    Proceduralist: Jose Coles MD and Leland Henriquez MD    Assistant: None    Time Out: Prior to the start of the procedure and with procedural staff participation, I verbally confirmed the patient s identity using two indicators, relevant allergies, that the procedure was appropriate and matched the consent or emergent situation, and that the correct equipment/implants were available. Immediately prior to starting the procedure I conducted the Time Out with the procedural staff and re-confirmed the patient s name, procedure, and site/side. (The Joint Commission universal protocol was followed.)  Yes    Medications   Medication Event Details Admin User Admin Time   ampicillin (OMNIPEN) 1 g vial to attach to  ml bag for ADULTS or 50 ml bag for PEDS Medication Stopped Route: Intravenous; Scheduled Time:  2:26 PM Demi Moore RN 6/7/2018  2:26 PM   iodixanol (VISIPAQUE 320) injection 50 mL Medication Given Dose: 20 mL; Route: NEPHROSTOMY TUBE; Scheduled Time:  2:45 PM Sallie Taylor 6/7/2018  2:42 PM       Sedation: None    Findings:   Nephrostogram through existing tube performed.  Residual parenchymal stones present, but antegrade flow through ureter to bladder.  PNT removed.     Estimated Blood Loss: None    Fluoroscopy Time: 1 minute(s)    SPECIMENS: None    Complications: 1. None     Condition: Stable    Plan:   PNT removed.  Dressing placed. OK to DC immediately.  Return to Urology provider PRN.     Comments: See dictated procedure note for full details.    Jose Coles MD

## 2018-06-07 NOTE — PROGRESS NOTES
Social Work Services Discharge Note      Patient Name:  Maicol Carrera     Anticipated Discharge Date:  06/07/18    Discharge Disposition:   [facility]:  [place] [id number] [facility] AnneMALCOLM [facility]ao  P: [phone], F: [phone]    Following MD:  Per facility     Pre-Admission Screening (PAS) online form has been completed.  The Level of Care (LOC) is:  Determined  Confirmation Code is:  N/A - pt is returning to LTC  Patient/caregiver informed of referral to [facility] for Pre-Admission Screening for skilled nursing facility (SNF) placement and to expect a phone call post discharge from SNF.     Additional Services/Equipment Arranged:  W/c transport with pt's personal w/c and isolation precautions arranged via [employer] ([phone]) for today, 6/7/18 at 1900.     Patient / Family response to discharge plan:  SW discussed d/c plan with pt at bedside and pt verbalized agreement.  Pt requested SW contact his dad with update as well.     Persons notified of above discharge plan:  Pt, pt's dadJavon ([phone]), RN, charge RN, receiving facility, urology team    Staff Discharge Instructions:  Please fax discharge orders and signed hard scripts for any controlled substances (F: [phone]).  Please print a packet and send with patient.   # for RN Report: [phone]    CTS Handoff completed:  YES    Medicare Notice of Rights provided to the patient/family:  N/A; pt observation status    Sravanthi Kendall, [facility]  Emergency Department /Assisting 6D  P: [phone]  Pager: [phone]

## 2018-06-07 NOTE — PROGRESS NOTES
"Urology  Progress Note  No acute events  Feeling well - denies pain    Exam  BP (!) 125/99  Pulse 81  Temp 97.8  F (36.6  C) (Oral)  Resp 16  Ht 1.791 m (5' 10.51\")  Wt 81 kg (178 lb 9.2 oz)  SpO2 99%  BMI 25.25 kg/m2  No acute distress  Unlabored breathing  Abdomen soft, non-distended, Flank incision C/D/I with PNT in place, clear urine in bag. SP tube with augusto  urine in bag.   PNT with cherry ourtpurt - mild saturation    UOP SP   1100/250  UOP /200    Labs  No new labs, AM labs pending    Assessment/Plan  42 year old y/o male POD#1 s/p L PCNL.  Unremarkable postoperative course.      Neuro: Pain well controlled; Home meds ordered  CV: HDS  Pulm: incentive spirometry while awake  FEN/GI: Regular diet   Endo: No acute issues  : Continue cares - no further intervention at this time  Heme/ID: Ampicillin/gentamycin based on preop pseudomonas/proteus in UC.   Activity: As tolerated  PPx: SCDs    Seen and examined with the chief resident. Will discuss with Dr. Horn.    Vamsi Bonilla MD   Urology Resident     Contacting the Urology Team     Please use the following job codes to reach the Urology Team. Note that you must use an in house phone and that job codes cannot receive text pages.     On weekdays, dial [phone] (or star-star-star [phone] on the new [employer] telephones) then [phone] to reach the Adult Urology resident or PA on call    On weekdays, dial [phone] (or star-star-star [phone] on the new [employer] telephones) then [phone] to reach the Pediatric Urology resident    On weeknights and weekends, dial [phone] (or star-star-star [phone] on the new [employer] telephones) then [phone] to reach the Urology resident on call (for both Adult and Pediatrics)            "

## 2018-06-07 NOTE — DISCHARGE SUMMARY
[facility] Discharge Summary    Patient: Maicol Carrera    MRN: [id number]   : 1975         Date of Admission:  2018   Date of Discharge::  2018  Admitting Physician:  Dejon Horn MD  Discharge Physician:  Chacha Monroe PA-C             Admission Diagnoses:   Left nephrolithiasis    Past Medical History:   Diagnosis Date     Chronic infection     + MRSA     Constipation, chronic      GERD (gastroesophageal reflux disease)      Head injury      Neurogenic bladder     SP catheter     JUAN (obstructive sleep apnea)     bipap     Quadriplegia (H)      Seizure (H)      Spastic quadriplegia (H)      Urinary tract infection              Discharge Diagnosis:   Left Nephrolithiasis    Past Medical History:   Diagnosis Date     Chronic infection     + MRSA     Constipation, chronic      GERD (gastroesophageal reflux disease)      Head injury      Neurogenic bladder     SP catheter     JUAN (obstructive sleep apnea)     bipap     Quadriplegia (H)      Seizure (H)      Spastic quadriplegia (H)      Urinary tract infection             Procedures:   Procedure(s): 18 -    1. Antegrade nephrostogram.  2. Removal of indwelling nephrostomy tube.  3. Percutaneous left nephrolithotomy with removal of stone fragments, stone burden more than 2 cm.  4. Ultrasound-based lithotripsy of stone; basket stone extraction.  5. Interpretation of intraoperative fluoroscopic imaging.   6. Placement of new left 8 Fr nephrostomy tube.   Dr. Dejon Horn (Urology)    18 -   . Interventional Radiology - left antegrade nephroureterogram with left PNT removal             Medications Prior to Admission:     Prescriptions Prior to Admission   Medication Sig Dispense Refill Last Dose     baclofen (LIORESAL) 10 MG tablet Take 10 mg by mouth 4 times daily as needed    2018 at Unknown time     BACLOFEN IT Via implanted IT pump  Baclofen Concentration 2000 mc/gmL   Dose: 277.1 mcg/day  Low reservoir alarm  date: 10/16/2018  Low reservoir Alarm Volume: 2ml  Interrogated 5/23/18 6/6/2018 at Unknown time     Bisacodyl (DULCOLAX PO) Take 10 mg by mouth At Bedtime.   Past Week at Unknown time     bisacodyl (DULCOLAX) 10 MG suppository Place 1 suppository rectally every other day. 12 suppository  Past Week at Unknown time     clindamycin (CLINDAMAX) 1 % topical gel Apply topically 2 times daily   6/5/2018 at 0800 time     Cranberry 400 MG TABS Take 400 mg by mouth 2 times daily   6/5/2018 at 2000     cyanocolbalamin (VITAMIN B-12) 1000 MCG tablet Take 1 tablet by mouth daily.   6/5/2018 at 0800 time     docusate sodium (COLACE) 100 MG capsule Take 100 mg by mouth 2 times daily.   6/6/2018 at 0800     ferrous sulfate 325 (65 FE) MG tablet Take 1 tablet by mouth daily (with breakfast). 100 tablet  6/5/2018 at 0800     ketoconazole (NIZORAL) 2 % cream Apply topically 2 times daily as needed   6/5/2018 at 0800 time     LevETIRAcetam (KEPPRA PO) Take 1,000 mg by mouth 2 times daily   6/6/2018 at 0700     magnesium gluconate (MAGONATE) 500 MG tablet Take 1 tablet by mouth daily.   6/5/2018 at 0800     mometasone (ELOCON) 0.1 % cream Apply topically At Bedtime   6/5/2018 at 2000     Multiple Vitamins-Minerals (MULTIVITAMIN ADULT PO) Take 1 tablet by mouth daily   6/5/2018 at 0800     oxybutynin (DITROPAN) 5 MG tablet Take 1 tablet by mouth 3 times daily. 270 tablet  6/6/2018 at 0700     polyethylene glycol (MIRALAX/GLYCOLAX) powder Take 1 capful by mouth daily as needed for constipation (if no BM x3 days)   Past Month at Unknown time     polyvinyl alcohol (LIQUIFILM TEARS) 1.4 % ophthalmic solution Place 2 drops into both eyes every 2 hours as needed for dry eyes   Past Month at Unknown time     selenium sulfide (SELSUN) 2.5 % lotion Apply to scalp topically in the morning every Sun, Tue, Thursday. Place for 5 min then rinse.   6/5/2018 at 0900 time     senna-docusate (SENOKOT-S;PERICOLACE) 8.6-50 MG per tablet Take 1-2 tablets  by mouth 2 times daily. 60 tablet  Past Month at Unknown time     vitamin C 250 MG TABS Take 1 tablet by mouth 2 times daily.   6/5/2018 at 0800     VITAMIN D, CHOLECALCIFEROL, PO Take 5,000 Units by mouth daily   6/5/2018 at 0800     ACETAMINOPHEN PO Take 650 mg by mouth every 4 hours as needed for pain   More than a month at Unknown time     albuterol (2.5 MG/3ML) 0.083% nebulizer solution Take 3 mLs by nebulization every 2 hours as needed. (Patient not taking: Reported on 5/23/2018) 1 Box  More than a month at Unknown time     miconazole (MICATIN; MICRO GUARD) 2 % powder Apply  topically 2 times daily. 70 g  6/5/2018 at 0800     Nutritional Supplements (OSTEO-MULTIVITAMINS/MINERALS OR) Take  by mouth daily. 12 pm     Unknown at Unknown time     ondansetron (ZOFRAN-ODT) 4 MG disintegrating tablet Take 1 tablet by mouth every 6 hours as needed for nausea. 20 tablet  More than a month at Unknown time     zinc oxide (DESITIN) 40 % ointment Apply  topically every hour as needed. 56.7 g  Unknown at Unknown time     [DISCONTINUED] Cefepime HCl 2 g SOLR Inject 20 mLs (2,000 mg) into the vein every 8 hours for 5 days 300 mL 0 6/5/2018 at 2355             Discharge Medications:     Current Discharge Medication List      CONTINUE these medications which have NOT CHANGED    Details   baclofen (LIORESAL) 10 MG tablet Take 10 mg by mouth 4 times daily as needed       BACLOFEN IT Via implanted IT pump  Baclofen Concentration 2000 mc/gmL   Dose: 277.1 mcg/day  Low reservoir alarm date: 10/16/2018  Low reservoir Alarm Volume: 2ml  Interrogated 5/23/18      Bisacodyl (DULCOLAX PO) Take 10 mg by mouth At Bedtime.      bisacodyl (DULCOLAX) 10 MG suppository Place 1 suppository rectally every other day.  Qty: 12 suppository    Associated Diagnoses: Chronic constipation      clindamycin (CLINDAMAX) 1 % topical gel Apply topically 2 times daily      Cranberry 400 MG TABS Take 400 mg by mouth 2 times daily      cyanocolbalamin (VITAMIN  B-12) 1000 MCG tablet Take 1 tablet by mouth daily.    Associated Diagnoses: Vitamin B 12 deficiency      docusate sodium (COLACE) 100 MG capsule Take 100 mg by mouth 2 times daily.      ferrous sulfate 325 (65 FE) MG tablet Take 1 tablet by mouth daily (with breakfast).  Qty: 100 tablet    Associated Diagnoses: Anemia due to blood loss, acute      ketoconazole (NIZORAL) 2 % cream Apply topically 2 times daily as needed      LevETIRAcetam (KEPPRA PO) Take 1,000 mg by mouth 2 times daily      magnesium gluconate (MAGONATE) 500 MG tablet Take 1 tablet by mouth daily.    Associated Diagnoses: Magnesium deficiency      mometasone (ELOCON) 0.1 % cream Apply topically At Bedtime      Multiple Vitamins-Minerals (MULTIVITAMIN ADULT PO) Take 1 tablet by mouth daily      oxybutynin (DITROPAN) 5 MG tablet Take 1 tablet by mouth 3 times daily.  Qty: 270 tablet    Associated Diagnoses: Neurogenic bladder      polyethylene glycol (MIRALAX/GLYCOLAX) powder Take 1 capful by mouth daily as needed for constipation (if no BM x3 days)      polyvinyl alcohol (LIQUIFILM TEARS) 1.4 % ophthalmic solution Place 2 drops into both eyes every 2 hours as needed for dry eyes      selenium sulfide (SELSUN) 2.5 % lotion Apply to scalp topically in the morning every Sun, Tue, Thursday. Place for 5 min then rinse.      senna-docusate (SENOKOT-S;PERICOLACE) 8.6-50 MG per tablet Take 1-2 tablets by mouth 2 times daily.  Qty: 60 tablet    Associated Diagnoses: Chronic constipation      vitamin C 250 MG TABS Take 1 tablet by mouth 2 times daily.    Associated Diagnoses: Recurrent UTI      VITAMIN D, CHOLECALCIFEROL, PO Take 5,000 Units by mouth daily      ACETAMINOPHEN PO Take 650 mg by mouth every 4 hours as needed for pain      albuterol (2.5 MG/3ML) 0.083% nebulizer solution Take 3 mLs by nebulization every 2 hours as needed.  Qty: 1 Box    Associated Diagnoses: SOB (shortness of breath)      miconazole (MICATIN; MICRO GUARD) 2 % powder Apply   topically 2 times daily.  Qty: 70 g    Associated Diagnoses: Candida infection of flexural skin      Nutritional Supplements (OSTEO-MULTIVITAMINS/MINERALS OR) Take  by mouth daily. 12 pm        ondansetron (ZOFRAN-ODT) 4 MG disintegrating tablet Take 1 tablet by mouth every 6 hours as needed for nausea.  Qty: 20 tablet    Associated Diagnoses: Nausea and vomiting      zinc oxide (DESITIN) 40 % ointment Apply  topically every hour as needed.  Qty: 56.7 g    Associated Diagnoses: Breakdown of skin tissue         STOP taking these medications       Cefepime HCl 2 g SOLR Comments:   Reason for Stopping:                     Consultations:   Consultation during this admission received: Interventional Radiology          Brief History of Illness:   Reason for admission requiring a surgical or invasive procedure:   Nephrolithiasis   The patient underwent the following procedure(s):   See above   There were no immediate complications during this procedure.    Please refer to the full operative summary for details.           Hospital Course:   The patient's hospital course was unremarkable.  He stayed one night in the hospital for IV antibiotics (amp and gent) given history of recurrent MDR urinary tract infections and chronic bacteriuria. Maicol Carrera otherwise recovered as anticipated and experienced no post-operative complications.      On POD #1 his activity was at baseline, he was tolerating the discharge diet, had pain controlled with PO medications to go home with, and was requiring no IV medications or fluids. Prior to discharge he was seen by Interventional Radiology for a left antegrade nephroureterogram which showed that residual parenchymal stones were present, but antegrade flow through ureter to the bladder was also seen therefore the PNT was removed.     He was discharged to his group home with his chronic suprapubic catheter.  He was to continue a 7 day course of Augmentin.  He was given appropriate contact  information, follow-up and instructions as seen below in the discharge paperwork.         Discharge Labs:     No results found for: PSA    Recent Labs  Lab 06/07/18  0702   WBC 10.0   HGB 10.8*          Recent Labs  Lab 06/07/18  0702      POTASSIUM 4.2   CHLORIDE 108   CO2 23   BUN 20   CR 0.96   GLC 85   BALAJI 8.6     No lab results found in last 7 days.    Invalid input(s): URINEBLOOD  Results for orders placed or performed in visit on 05/23/18   Urine Culture Aerobic Bacterial   Result Value Ref Range    Specimen Description Catheterized Urine     Special Requests Specimen received in preservative     Culture Micro >100,000 colonies/mL  Proteus mirabilis   (A)     Culture Micro (A)      10,000 to 50,000 colonies/mL  Strain 2  Pseudomonas aeruginosa         Susceptibility    Proteus mirabilis - MARY     AMPICILLIN <=2 Sensitive ug/mL     CEFAZOLIN* 8 Sensitive ug/mL      * Cefazolin MARY breakpoints are for the treatment of uncomplicated urinary tract infections.  For the treatment of systemic infections, please contact the laboratory for additional testing.     CEFOXITIN <=4 Sensitive ug/mL     CEFTAZIDIME <=1 Sensitive ug/mL     CEFTRIAXONE <=1 Sensitive ug/mL     CIPROFLOXACIN >=4 Resistant ug/mL     GENTAMICIN <=1 Sensitive ug/mL     LEVOFLOXACIN >=8 Resistant ug/mL     NITROFURANTOIN* 256 Resistant ug/mL      * Intrinsically Resistant     TOBRAMYCIN <=1 Sensitive ug/mL     Trimethoprim/Sulfa >=16/304 Resistant ug/mL     AMPICILLIN/SULBACTAM <=2 Sensitive ug/mL     Piperacillin/Tazo <=4 Sensitive ug/mL     CEFEPIME <=1 Sensitive ug/mL    Pseudomonas aeruginosa - MARY     AMIKACIN 4 Sensitive ug/mL     CEFEPIME 4 Sensitive ug/mL     CEFTAZIDIME <=1 Sensitive ug/mL     CIPROFLOXACIN >=4 Resistant ug/mL     GENTAMICIN 2 Sensitive ug/mL     LEVOFLOXACIN >=8 Resistant ug/mL     Piperacillin/Tazo <=4 Sensitive ug/mL     TOBRAMYCIN <=1 Sensitive ug/mL     MEROPENEM <=0.25 Sensitive ug/mL   Results for orders  placed or performed during the hospital encounter of 03/07/18   Urine Culture Aerobic Bacterial   Result Value Ref Range    Specimen Description Catheterized Urine Left Nephrostomy     Culture Micro (A)      <10,000 colonies/mL  Methicillin resistant Staphylococcus aureus (MRSA)      Culture Micro <10,000 colonies/mL  Pseudomonas aeruginosa   (A)     Culture Micro <10,000 colonies/mL  Proteus mirabilis   (A)        Susceptibility    Methicillin resistant staphylococcus aureus (mrsa) - MARY     CIPROFLOXACIN >=8 Resistant ug/mL     GENTAMICIN <=0.5 Sensitive ug/mL     LEVOFLOXACIN >=8 Resistant ug/mL     NITROFURANTOIN <=16 Sensitive ug/mL     OXACILLIN >=4 Resistant ug/mL     PENICILLIN >=0.5 Resistant ug/mL     TETRACYCLINE <=1 Sensitive ug/mL     Trimethoprim/Sulfa <=0.5/9.5 Sensitive ug/mL     VANCOMYCIN <=0.5 Sensitive ug/mL     LINEZOLID 2 Sensitive ug/mL    Proteus mirabilis - MARY     AMPICILLIN 16 Intermediate ug/mL     CEFAZOLIN* 32 Resistant ug/mL      * Cefazolin MARY breakpoints are for the treatment of uncomplicated urinary tract infections.  For the treatment of systemic infections, please contact the laboratory for additional testing.     CEFOXITIN >=64 Resistant ug/mL     CEFTAZIDIME <=1.0 Sensitive ug/mL     CEFTRIAXONE <=1.0 Sensitive ug/mL     CIPROFLOXACIN >=4 Resistant ug/mL     GENTAMICIN <=1 Sensitive ug/mL     LEVOFLOXACIN >=8 Resistant ug/mL     NITROFURANTOIN*  Resistant ug/mL      * Intrinsically Resistant     TOBRAMYCIN <=1 Sensitive ug/mL     Trimethoprim/Sulfa >=16/304 Resistant ug/mL     AMPICILLIN/SULBACTAM 8 Sensitive ug/mL     Piperacillin/Tazo <=4.0 Sensitive ug/mL     CEFEPIME <=1.0 Sensitive ug/mL    Pseudomonas aeruginosa - MARY     AMIKACIN 8 Sensitive ug/mL     CEFEPIME 16 Intermediate ug/mL     CEFTAZIDIME 4 Sensitive ug/mL     CIPROFLOXACIN >=4 Resistant ug/mL     GENTAMICIN 4 Sensitive ug/mL     LEVOFLOXACIN >=8 Resistant ug/mL     Piperacillin/Tazo <=4 Sensitive ug/mL      TOBRAMYCIN <=1 Sensitive ug/mL     MEROPENEM <=0.25 Sensitive ug/mL       Results for orders placed or performed during the hospital encounter of 06/06/18   XR Surgery CASA Fluoro L/T 5 Min w Stills    Narrative    This exam was marked as non-reportable because it will not be read by a   radiologist or a [place] non-radiologist provider.             Glucose by meter   Result Value Ref Range    Glucose 71 70 - 99 mg/dL   Basic metabolic panel   Result Value Ref Range    Sodium 140 133 - 144 mmol/L    Potassium 4.2 3.4 - 5.3 mmol/L    Chloride 108 94 - 109 mmol/L    Carbon Dioxide 23 20 - 32 mmol/L    Anion Gap 8 3 - 14 mmol/L    Glucose 85 70 - 99 mg/dL    Urea Nitrogen 20 7 - 30 mg/dL    Creatinine 0.96 0.66 - 1.25 mg/dL    GFR Estimate 86 >60 mL/min/1.7m2    GFR Estimate If Black >90 >60 mL/min/1.7m2    Calcium 8.6 8.5 - 10.1 mg/dL   CBC with platelets   Result Value Ref Range    WBC 10.0 4.0 - 11.0 10e9/L    RBC Count 4.54 4.4 - 5.9 10e12/L    Hemoglobin 10.8 (L) 13.3 - 17.7 g/dL    Hematocrit 37.2 (L) 40.0 - 53.0 %    MCV 82 78 - 100 fl    MCH 23.8 (L) 26.5 - 33.0 pg    MCHC 29.0 (L) 31.5 - 36.5 g/dL    RDW 17.0 (H) 10.0 - 15.0 %    Platelet Count 211 150 - 450 10e9/L   Interventional Radiology Adult/Peds IP Consult: Patient to be seen: Routine within 24 hours; Call back #: [phone]; POD#1 s/p L PCNL.  Please do left antegrade nephroureterogram and d/c PNT if theres antegrade flow.    Narrative    Yahaira Hall, ANNIE CNP     6/7/2018  8:56 AM  Patient is on IR schedule tentatively for 6/7 for a Left PNT   nephrostogram and possible removal.   INR pending.  No NPO required and team has already ordered appropriate   antibiotics.  Consent will be done prior to procedure.     Please contact the IR charge RN at [phone] for estimated time of   procedure.     Case discussed with Dr. Braswell from IR and Magdalene Monroe PA-C. If we are unable to accommodate case today, it may be moved   to  tomorrow.    Yahaira Hall DNP, APRN  Interventional Radiology  Phone: [phone]  Pager: [phone]            Discharge Instructions and Follow-Up:   Cares:   - Unless otherwise stated, resume group home cares.     Diet:  - Regular/ home diet    Activity:   - Resume normal activities  - Do not strain with bowel movements.      Medications:   1) PAIN: Continue Tylenol (acetaminophen 625mg) every 4-6 hours as needed for mild pain related to surgery.   Do not take more than 4,000mg of Tylenol (acetaminophen/ APAP) from all sources in any 24 hour period since this can cause liver damage.  Do not take more than 2400mg of ibuprofen in any 24 hour period since this can cause kidney damage. Never drive, operate machinery or drink alcoholic beverages while you are taking narcotic pain medications.      2) CONSTIPATION: Resume your home bowel regimen.If you are using the home bowel regimen but still have not had a bowel movement in 3-4 days, seek medical attention.  Call the office with any concerns.     Percutaneous Nephrostomy Tube (PNT):  - YOU MAY BE GOING HOME with a left percutaneous nephrostomy tube. You may having bruising or soreness at the tube site.  Use a warm heating pad to relieve discomfort. Blood in the urine can be expected.  Drink 8-10 glasses of fluid per day to keep urine light pink or light yellow. Daily showering is important but keep your tube site dry.  Avoid baths, swimming, etc. Clean around the tube site every other day using soap and water, then cover the site with a clean bandage. Take care not to tug the tube or remove the suture. Keep the tube securely taped to your back at all times and do not let it get caught, snagged or tugged.  The tubing should always remain tension-free and without kinks.  In order to drain best (and prevent urinary infections), the tubing and bag should always hang lower than your kidney.   - If you are discharged with a percutaneous nephrostomy tube, follow up  in Interventional Radiology early next week for an antegrade nephroureterogram (a picture of the kidney) then possible tube removal.  This can be coordinated by the Urology service.     SUPRAPUBIC PILLAI:  - You are going home with your Chronic Suprapubic Pillai catheter.   - Resume home cares.    - Protect the catheter and treat it like an extension of your body - keep it secured to your leg and do not let it get caught, snagged, or tugged. The catheter tubing should remain tension-free and without kinks. In order to drain best, the tubing and bag should always be lower than your body.  - You may notice a little blood, small amount of white discharge, or caking at the catheter insertion site. This is normal but it can irritate the skin so it is best to wash this off in the daily shower. You are encouraged to wash the catheter tubing to keep it clean, and the urine drainage bag also can be gotten wet.   - Change the tube monthly or per your home routine.     Follow-Up:   - Call your primary care provider to touch base regarding your recent admission.   - Follow up with Dr. Horn as scheduled on 6/29/18.  Just prior to this appointment you should have a renal/kidney ultrasound in the Radiology department.  The Urology Clinic should be calling you to confirm both appointments.   - Call or return sooner than your regularly scheduled visit if you develop any of the following:  Fever (greater than 101.3F) or flu-like symptoms, uncontrolled pain, uncontrolled nausea or vomiting, as well as increased redness, swelling, or drainage from your wound or problems with your Pillai catheter drainage.     Phone numbers:  - Nursing phone helpline at the Urology Clinic (8A-5P M-F):  [phone].    - Nights or weekends, call [phone] and ask the  to page the urology resident on call.   - For emergencies, always call [phone]         Discharge Disposition:   Discharged to home      Attestation: I have reviewed today's vital  signs, notes, medications, labs and imaging.    Magdalene Monroe PA-C  Urology Physician Assistant  Personal Pager: [phone]    Please call Job Code:   [phone] to reach the Urology resident or PA on call - Weekdays  [phone] to reach the Urology resident or PA on call - Weeknights and weekends    June 7, 2018

## 2018-06-08 NOTE — PROGRESS NOTES
POST OP CALL~Spoke with Nurse Babs at [facility] who cares for Maicol. He is doing great! Denies pain, BM is normal. Denies any issues or concerns. Reminded of follow up appointment with Dr. Horn on June 29th. Priscilla Wong RN

## 2018-06-08 NOTE — PLAN OF CARE
Problem: Patient Care Overview  Goal: Plan of Care/Patient Progress Review  Patient discharged, [facility] paramedic came and will bring pt to his facility, belongings and papers given to paramedic. Pt went down using his motorized wheelchair.

## 2018-06-10 LAB
APPEARANCE STONE: NORMAL
COMPN STONE: NORMAL
NUMBER STONE: NORMAL
SIZE STONE: NORMAL MM
WT STONE: 402 MG

## 2018-06-25 ENCOUNTER — PRE VISIT (OUTPATIENT)
Dept: UROLOGY | Facility: CLINIC | Age: 43
End: 2018-06-25

## 2018-06-25 NOTE — TELEPHONE ENCOUNTER
Post op.  S/P Left Percutaneous Nephrolithotomy,  Ureteroscopy, retrogrades, extraction of stones with basket on 6-6-18.  Imaging prior to appointment.  Records available in [facility].

## 2018-06-29 ENCOUNTER — OFFICE VISIT (OUTPATIENT)
Dept: UROLOGY | Facility: CLINIC | Age: 43
End: 2018-06-29
Payer: COMMERCIAL

## 2018-06-29 ENCOUNTER — RADIANT APPOINTMENT (OUTPATIENT)
Dept: ULTRASOUND IMAGING | Facility: CLINIC | Age: 43
End: 2018-06-29
Attending: UROLOGY
Payer: COMMERCIAL

## 2018-06-29 VITALS
SYSTOLIC BLOOD PRESSURE: 119 MMHG | WEIGHT: 186 LBS | HEART RATE: 83 BPM | HEIGHT: 71 IN | BODY MASS INDEX: 26.04 KG/M2 | DIASTOLIC BLOOD PRESSURE: 77 MMHG | OXYGEN SATURATION: 96 %

## 2018-06-29 DIAGNOSIS — N20.0 LEFT NEPHROLITHIASIS: Primary | ICD-10-CM

## 2018-06-29 DIAGNOSIS — N28.89 RENAL HEMORRHAGE, LEFT: ICD-10-CM

## 2018-06-29 ASSESSMENT — PAIN SCALES - GENERAL: PAINLEVEL: NO PAIN (0)

## 2018-06-29 NOTE — PATIENT INSTRUCTIONS
Follow up with Dr. Horn in 6 months with imaging prior to appointment.    It was a pleasure meeting with you today.  Thank you for allowing me and my team the privilege of caring for you today.  YOU are the reason we are here, and I truly hope we provided you with the excellent service you deserve.  Please let us know if there is anything else we can do for you so that we can be sure you are leaving completely satisfied with your care experience.        MAIRA Leon

## 2018-06-29 NOTE — LETTER
6/29/2018       RE: Maicol Carrera  [address]  [place] [address]     Dear Colleague,    Thank you for referring your patient, Maicol Carrera, to the [facility] UROLOGY AND INST FOR PROSTATE AND UROLOGIC CANCERS at [facility]. Please see a copy of my visit note below.    CC: Maicol Carrera is post-op from LEFT PCNL 6/6/18 for residual left renal stone.    HPI:  History of quadriplegia with neurogenic bladder, managed with an indwelling suprapubic tube. He has a history of recurrent urolithiasis with a left sided staghorn calculus that has been managed with a PCNL on 3/7, 3/21 and 6/6/18.    Patient is now 3 weeks post-op. He has been doing well since surgery with improved energy. No fevers or chills. Denies pain. Tolerating a regular diet, BMs normal per patient. Completed his post-op course of Augmentin. Denies any issues with the SP tube, no hematuria.    Exam: Afebrile. NAD.  Unable to assess PNT site, however per the patient his home placed a bandaid over it post-op which has now been removed for 1+ week without any drainage.  SPT site intact, no erythema or purulence.    Renal US 6/29/18  IMPRESSION: In correlating with prior CT exam 4/5/2018:  1. Right kidney staghorn calculus with decreased renal size and focal  areas of cortical thinning due to scarring. No convincing  hydronephrosis.  2. Multiple left kidney stones. No hydronephrosis. Scattered areas of  cortical thinning due to scarring.  3. Decompressed urinary bladder with a suprapubic catheter.    IR nephrostogram 6/6/18  IMPRESSION:   1. Nephrostogram through the existing catheter demonstrated patent  left ureter, with residual intraparenchymal stones and patulous  collecting system with prompt drainage into the bladder.  2. Existing catheter was removed  3. Patient will follow-up with urology as needed or previously  scheduled.    PLAN:  -F/U 6 months with CT abdomen/pelvis prior  -Discussed stone prevention: 3L+  of water, low salt, low animal protein, increase dairy    Scribe Disclosure:   I, Rashaad Carlos, am serving as a scribe; to document services personally performed by Dr. Dejon Horn- -based on data collection and the provider's statements to me.     Rashaad Carlos served as the scribe for this patient's visit and documented my history and physical exam.  I performed the history and physical exam.  I have edited and agree with the note.  JEREMIE Horn MD

## 2018-06-29 NOTE — NURSING NOTE
Chief Complaint   Patient presents with     Surgical Followup     renal stone       Tereso Murguia MA

## 2018-06-29 NOTE — MR AVS SNAPSHOT
After Visit Summary   6/29/2018    Maicol Carrera    MRN: [id number]           Patient Information     Date Of Birth          1975        Visit Information        Provider Department      6/29/2018 11:40 AM Dejon Horn MD [facility] Urology and [facility] for Prostate and Urologic Cancers        Today's Diagnoses     Left nephrolithiasis    -  1      Care Instructions    Follow up with Dr. Horn in 6 months with imaging prior to appointment.    It was a pleasure meeting with you today.  Thank you for allowing me and my team the privilege of caring for you today.  YOU are the reason we are here, and I truly hope we provided you with the excellent service you deserve.  Please let us know if there is anything else we can do for you so that we can be sure you are leaving completely satisfied with your care experience.        MAIRA Leon          Follow-ups after your visit        Your next 10 appointments already scheduled     Dec 14, 2018 10:20 AM CST   CT ABDOMEN PELVIS W/O CONTRAST with UCCT2   [facility] [place] [facility] ([facility])    [address] [address] [phone]           Please bring any scans or X-rays taken at other hospitals, if similar tests were done. Also bring a list of your medicines, including vitamins, minerals and over-the-counter drugs. It is safest to leave personal items at home.  Be sure to tell your doctor:   If you have any allergies.   If there s any chance you are pregnant.   If you are breastfeeding.  How to prepare:   Do not eat or drink for 2 hours before your exam. If you need to take medicine, you may take it with small sips of water. (We may ask you to take liquid medicine as well.)   Please wear loose clothing, such as a sweat suit or jogging clothes. Avoid snaps, zippers and other metal. We may ask you to undress and put on a hospital gown.  Please arrive 30 minutes early for your CT.  "Once in the department you might be asked to drink water 15-20 minutes prior to your exam.  If indicated you may be asked to drink an oral contrast in advance of your CT.  If this is the case, the imaging team will let you know or be in contact with you prior to your appointment  Patients over 70 or patients with diabetes or kidney problems:   If you haven t had a blood test (creatinine test) within the last 30 days, the Cardiologist/Radiologist may require you to get this test prior to your exam.  If you have diabetes:   Continue to take your metformin medication on the day of your exam  If you have any questions, please call the Imaging Department where you will have your exam.            Dec 14, 2018 11:20 AM CST   (Arrive by 11:05 AM)   Return Renal Calculi with Dejon Horn MD   [facility] Urology and [facility] for Prostate and Urologic Cancers ([facility])    [address] [place] [id number] [phone]              Future tests that were ordered for you today     Open Future Orders        Priority Expected Expires Ordered    CT Abdomen Pelvis w/o Contrast Routine  6/28/2019 6/29/2018            Who to contact     Please call your clinic at [phone] to:    Ask questions about your health    Make or cancel appointments    Discuss your medicines    Learn about your test results    Speak to your doctor            Additional Information About Your Visit        Care EveryWhere ID     This is your Care EveryWhere ID. This could be used by other organizations to access your [place] medical records  [id number]        Your Vitals Were     Pulse Height Pulse Oximetry BMI (Body Mass Index)          83 1.791 m (5' 10.5\") 96% 26.31 kg/m2         Blood Pressure from Last 3 Encounters:   06/29/18 119/77   06/07/18 113/72   05/23/18 119/80    Weight from Last 3 Encounters:   06/29/18 84.4 kg (186 lb)   06/06/18 81 kg (178 lb 9.2 oz)   05/23/18 82.8 kg (182 lb 9.6 " oz)               Primary Care Provider Office Phone # Fax #    Dejon Horn [phone] [address] [facility] [address]        Equal Access to Services     AUSTIN VIEIRA : Lory trupti vigil diana Forman, waaxda luqadaha, qaybta kaalmada joaquina, kathy torres laLowbertin frazier. So [facility] [phone].    ATENCIÓN: Si habla español, tiene a da silva disposición servicios gratuitos de asistencia lingüística. Llame al [phone].    We comply with applicable federal civil rights laws and [place] laws. We do not discriminate on the basis of race, color, national origin, age, disability, sex, sexual orientation, or gender identity.            Thank you!     Thank you for choosing [facility] UROLOGY AND [facility] FOR PROSTATE AND UROLOGIC CANCERS  for your care. Our goal is always to provide you with excellent care. Hearing back from our patients is one way we can continue to improve our services. Please take a few minutes to complete the written survey that you may receive in the mail after your visit with us. Thank you!             Your Updated Medication List - Protect others around you: Learn how to safely use, store and throw away your medicines at www.disposemymeds.org.          This list is accurate as of 6/29/18 11:46 AM.  Always use your most recent med list.                   Brand Name Dispense Instructions for use Diagnosis    ACETAMINOPHEN PO      Take 650 mg by mouth every 4 hours as needed for pain        albuterol (2.5 MG/3ML) 0.083% neb solution     1 Box    Take 3 mLs by nebulization every 2 hours as needed.    SOB (shortness of breath)       ascorbic acid 250 MG Tabs tablet      Take 1 tablet by mouth 2 times daily.    Recurrent UTI       * baclofen 10 MG tablet    LIORESAL     Take 10 mg by mouth 4 times daily as needed        * BACLOFEN IT      Via implanted IT pump Baclofen Concentration 2000 mc/gmL  Dose: 277.1 mcg/day Low reservoir alarm date: 10/16/2018 Low  reservoir Alarm Volume: 2ml Interrogated 5/23/18        clindamycin 1 % topical gel    CLINDAMAX     Apply topically 2 times daily        COLACE 100 MG capsule   Generic drug:  docusate sodium      Take 100 mg by mouth 2 times daily.        Cranberry 400 MG Tabs      Take 400 mg by mouth 2 times daily        cyanocobalamin 1000 MCG tablet    vitamin  B-12     Take 1 tablet by mouth daily.    Vitamin B 12 deficiency       * bisacodyl 10 MG Suppository    DULCOLAX    12 suppository    Place 1 suppository rectally every other day.    Chronic constipation       * DULCOLAX PO      Take 10 mg by mouth At Bedtime.        ferrous sulfate 325 (65 Fe) MG tablet    IRON    100 tablet    Take 1 tablet by mouth daily (with breakfast).    Anemia due to blood loss, acute       KEPPRA PO      Take 1,000 mg by mouth 2 times daily        ketoconazole 2 % cream    NIZORAL     Apply topically 2 times daily as needed        magnesium gluconate 500 MG tablet    MAGONATE     Take 1 tablet by mouth daily.    Magnesium deficiency       miconazole 2 % powder    MICATIN; MICRO GUARD    70 g    Apply  topically 2 times daily.    Candida infection of flexural skin       mometasone 0.1 % cream    ELOCON     Apply topically At Bedtime        MULTIVITAMIN ADULT PO      Take 1 tablet by mouth daily        ondansetron 4 MG ODT tab    ZOFRAN-ODT    20 tablet    Take 1 tablet by mouth every 6 hours as needed for nausea.    Nausea and vomiting       OSTEO-MULTIVITAMINS/MINERALS OR      Take  by mouth daily. 12 pm        oxybutynin 5 MG tablet    DITROPAN    270 tablet    Take 1 tablet by mouth 3 times daily.    Neurogenic bladder       polyethylene glycol powder    MIRALAX/GLYCOLAX     Take 1 capful by mouth daily as needed for constipation (if no BM x3 days)        polyvinyl alcohol 1.4 % ophthalmic solution    LIQUIFILM TEARS     Place 2 drops into both eyes every 2 hours as needed for dry eyes        selenium sulfide 2.5 % lotion    SELSUN     Apply  to scalp topically in the morning every Sun, Tue, Thursday. Place for 5 min then rinse.        senna-docusate 8.6-50 MG per tablet    SENOKOT-S;PERICOLACE    60 tablet    Take 1-2 tablets by mouth 2 times daily.    Chronic constipation       VITAMIN D (CHOLECALCIFEROL) PO      Take 5,000 Units by mouth daily        zinc oxide 40 % ointment    DESITIN    56.7 g    Apply  topically every hour as needed.    Breakdown of skin tissue       * Notice:  This list has 4 medication(s) that are the same as other medications prescribed for you. Read the directions carefully, and ask your doctor or other care provider to review them with you.

## 2018-06-29 NOTE — PROGRESS NOTES
CC: Maicol Carrera is post-op from LEFT PCNL 6/6/18 for residual left renal stone.    HPI:  History of quadriplegia with neurogenic bladder, managed with an indwelling suprapubic tube. He has a history of recurrent urolithiasis with a left sided staghorn calculus that has been managed with a PCNL on 3/7, 3/21 and 6/6/18.    Patient is now 3 weeks post-op. He has been doing well since surgery with improved energy. No fevers or chills. Denies pain. Tolerating a regular diet, BMs normal per patient. Completed his post-op course of Augmentin. Denies any issues with the SP tube, no hematuria.    Exam: Afebrile. NAD.  Unable to assess PNT site, however per the patient his home placed a bandaid over it post-op which has now been removed for 1+ week without any drainage.  SPT site intact, no erythema or purulence.    Renal US 6/29/18  IMPRESSION: In correlating with prior CT exam 4/5/2018:  1. Right kidney staghorn calculus with decreased renal size and focal  areas of cortical thinning due to scarring. No convincing  hydronephrosis.  2. Multiple left kidney stones. No hydronephrosis. Scattered areas of  cortical thinning due to scarring.  3. Decompressed urinary bladder with a suprapubic catheter.    IR nephrostogram 6/6/18  IMPRESSION:   1. Nephrostogram through the existing catheter demonstrated patent  left ureter, with residual intraparenchymal stones and patulous  collecting system with prompt drainage into the bladder.  2. Existing catheter was removed  3. Patient will follow-up with urology as needed or previously  scheduled.    PLAN:  -F/U 6 months with CT abdomen/pelvis prior  -Discussed stone prevention: 3L+ of water, low salt, low animal protein, increase dairy    Scribe Disclosure:   I, Rashaad Carlos, am serving as a scribe; to document services personally performed by Dr. Dejon Horn- -based on data collection and the provider's statements to me.     Rashaad Carlos served as the scribe for this  patient's visit and documented my history and physical exam.  I performed the history and physical exam.  I have edited and agree with the note.  JEREMIE Horn MD

## 2019-02-01 ENCOUNTER — OFFICE VISIT (OUTPATIENT)
Dept: UROLOGY | Facility: CLINIC | Age: 44
End: 2019-02-01
Payer: COMMERCIAL

## 2019-02-01 ENCOUNTER — ANCILLARY PROCEDURE (OUTPATIENT)
Dept: CT IMAGING | Facility: CLINIC | Age: 44
End: 2019-02-01
Attending: UROLOGY
Payer: COMMERCIAL

## 2019-02-01 VITALS
HEART RATE: 79 BPM | DIASTOLIC BLOOD PRESSURE: 77 MMHG | HEIGHT: 71 IN | WEIGHT: 186 LBS | BODY MASS INDEX: 26.04 KG/M2 | SYSTOLIC BLOOD PRESSURE: 121 MMHG

## 2019-02-01 DIAGNOSIS — N20.0 LEFT NEPHROLITHIASIS: ICD-10-CM

## 2019-02-01 DIAGNOSIS — N20.0 KIDNEY STONES: Primary | ICD-10-CM

## 2019-02-01 ASSESSMENT — PAIN SCALES - GENERAL: PAINLEVEL: NO PAIN (0)

## 2019-02-01 ASSESSMENT — MIFFLIN-ST. JEOR: SCORE: 1762.88

## 2019-02-01 NOTE — LETTER
2019       RE: Maicol Carrera  [facility]  [address] [address]     Dear Colleague,    Thank you for referring your patient, Maicol Carrera, to the [facility] UROLOGY AND INST FOR PROSTATE AND UROLOGIC CANCERS at [facility]. Please see a copy of my visit note below.      Urology Clinic    Dejon Horn MD  Date of Service: 2019     Name: Maicol Carrera  MRN: [id number]  Age: 43 year old  : 1975  Referring provider: [facility]     Assessment and Plan:  Assessment   Nephrolithiasis status post CT showing bulky bilateral renal stones  We covered treatment options including observation, ureteroscopy, and percutaneous nephrolithotomy (PCNL), including the risks and benefits of each procedure. Mr. Carrera and I spoke about his kidney stones potentially causing him issues in the future even as he is not in pain currently. However, at the present moment we mutually decided to follow up with a renogram within the next month or 6 weeks and follow up given the many stones present.     During counseling for this visit, we covered the natural history of kidney stones, the risk of progression to symptomatic pain/infection, and the possibility of renal failure/kidney damage.  We covered treatment options including observation, ureteroscopy, extracorporeal shock wave lithotripsy (ESWL), and percutaneous nephrolithotomy (PCNL).  We covered surgical risks which include but are not limited to heart attack, stroke, blood clot in the legs or lungs, death, injury to surrounding organs (intestine, liver, spleen, pancreas, lung, muscles, nerves), loss of sensation around incisions, decreased renal function, infection, injury to ureter, injury to bladder, and urethral strictures.  Additional procedures may be necessary in the perioperative period.  We discussed the risks of blood transfusion including HIV and hepatitis.   There are other  additional unexpected complications which may occur.    Plan:   Follow up in one month to 6 weeks following NM renogram. Present earlier with new or worsening symptoms.     Chief Complaint:   Follow-Up; Left-sided Nephrolithiasis.     HPI:   Maicol Carrera  is a 43 year old male with a history of quadriplegia, and neurogenic bladder, managed with an indwelling suprapubic catheter, here for follow-up regarding left-sided nephrolithiasis. He has a history of recurrent urolithiasis with a left-sided staghorn calculus that has been managed with a percutaneous nephrolithotomy on 03/07/2018, 03/21/2018, and 06/06/2018. Maicol is approximately 8 months status-post his most recent left percutaneous nephrolithotomy completed on 06/06/2018. I last evaluated the patient in post-operative follow-up on 06/29/2019; please see this note for further details. At that time, he was doing well with improved energy. Stone prevention was discussed and he was encouraged to follow up in 6 months for repeat evaluation with CT imaging. CT scan here today showed:    1. Bulky bilateral renal stones without ureteral stone or obstruction.  Staghorn calculus on the right extends into the renal pelvis. Small  left renal pelvis stones are also present. Stone burden is similar to  previous exam with shifting of stones in the left kidney.  2. Cholelithiasis without noncontrast CT evidence of: Cystitis.    Today, the patient states that he is feeling quite well and has not had any new or worsening symptoms. I spoke with the patient about his options for either proactively removing these stones via procedure or waiting conservatively until symptoms arise.     Review of Systems:   Pertinent items are noted in HPI or as below, remainder of complete ROS is negative.      Active Medications:      ACETAMINOPHEN PO, Take 650 mg by mouth every 4 hours as needed for pain, Disp: , Rfl:      albuterol (2.5 MG/3ML) 0.083% nebulizer solution, Take 3 mLs by  nebulization every 2 hours as needed., Disp: 1 Box, Rfl:      baclofen (LIORESAL) 10 MG tablet, Take 10 mg by mouth 4 times daily as needed , Disp: , Rfl:      BACLOFEN IT, Via implanted IT pump Baclofen Concentration 2000 mc/gmL  Dose: 277.1 mcg/day Low reservoir alarm date: 10/16/2018 Low reservoir Alarm Volume: 2ml Interrogated 5/23/18, Disp: , Rfl:      Bisacodyl (DULCOLAX PO), Take 10 mg by mouth At Bedtime., Disp: , Rfl:      bisacodyl (DULCOLAX) 10 MG suppository, Place 1 suppository rectally every other day., Disp: 12 suppository, Rfl:      clindamycin (CLINDAMAX) 1 % topical gel, Apply topically 2 times daily, Disp: , Rfl:      Cranberry 400 MG TABS, Take 400 mg by mouth 2 times daily, Disp: , Rfl:      cyanocolbalamin (VITAMIN B-12) 1000 MCG tablet, Take 1 tablet by mouth daily., Disp: , Rfl:      docusate sodium (COLACE) 100 MG capsule, Take 100 mg by mouth 2 times daily., Disp: , Rfl:      ferrous sulfate 325 (65 FE) MG tablet, Take 1 tablet by mouth daily (with breakfast)., Disp: 100 tablet, Rfl:      ketoconazole (NIZORAL) 2 % cream, Apply topically 2 times daily as needed, Disp: , Rfl:      LevETIRAcetam (KEPPRA PO), Take 1,000 mg by mouth 2 times daily, Disp: , Rfl:      magnesium gluconate (MAGONATE) 500 MG tablet, Take 1 tablet by mouth daily., Disp: , Rfl:      miconazole (MICATIN; MICRO GUARD) 2 % powder, Apply  topically 2 times daily., Disp: 70 g, Rfl:      mometasone (ELOCON) 0.1 % cream, Apply topically At Bedtime, Disp: , Rfl:      Multiple Vitamins-Minerals (MULTIVITAMIN ADULT PO), Take 1 tablet by mouth daily, Disp: , Rfl:      Nutritional Supplements (OSTEO-MULTIVITAMINS/MINERALS OR), Take  by mouth daily. 12 pm , Disp: , Rfl:      ondansetron (ZOFRAN-ODT) 4 MG disintegrating tablet, Take 1 tablet by mouth every 6 hours as needed for nausea., Disp: 20 tablet, Rfl:      oxybutynin (DITROPAN) 5 MG tablet, Take 1 tablet by mouth 3 times daily., Disp: 270 tablet, Rfl:      polyethylene glycol  "(MIRALAX/GLYCOLAX) powder, Take 1 capful by mouth daily as needed for constipation (if no BM x3 days), Disp: , Rfl:      polyvinyl alcohol (LIQUIFILM TEARS) 1.4 % ophthalmic solution, Place 2 drops into both eyes every 2 hours as needed for dry eyes, Disp: , Rfl:      selenium sulfide (SELSUN) 2.5 % lotion, Apply to scalp topically in the morning every Sun, Tue, Thursday. Place for 5 min then rinse., Disp: , Rfl:      senna-docusate (SENOKOT-S;PERICOLACE) 8.6-50 MG per tablet, Take 1-2 tablets by mouth 2 times daily., Disp: 60 tablet, Rfl:      vitamin C 250 MG TABS, Take 1 tablet by mouth 2 times daily., Disp: , Rfl:      VITAMIN D, CHOLECALCIFEROL, PO, Take 5,000 Units by mouth daily, Disp: , Rfl:      zinc oxide (DESITIN) 40 % ointment, Apply  topically every hour as needed., Disp: 56.7 g, Rfl:       Allergies:   Latex and Latex.      Past Medical History:  Chronic infection.   Chronic constipation.   GERD.   Head injury.   Neurogenic bladder.   Obstructive sleep apnea.   Quadriplegia.   Seizure.   Urinary tract infection.   Kidney stones.   Sepsis.   Anemia due to blood loss, acute.   Post-surgical non-absorption.   Left renal hemorrhage.      Past Surgical History:  Open appendectomy.   Back surgery.   Baclofen pump insertion.   Laser holmium nephrolithotomy x multiple.   Orthopedic surgery.   Suprapubic catheter.     Family History:   No past pertinent family history.      Social History:   Former smoker; quit in 1994. Denies alcohol use. Denies illicit drug use.      Physical Exam:   /77   Pulse 79   Ht 1.791 m (5' 10.5\")   Wt 84.4 kg (186 lb)   BMI 26.31 kg/m      Body mass index is 26.31 kg/m .  General: Alert, no acute distress, oriented  HENT: Good dentition  Lungs: No respiratory distress, or pursed lip breathing  Heart: No obvious jugular venous distension present  Abdomen: Soft, nontender, no organomegaly or masses  Musculoskeletal: Extremities normal, no peripheral edema  Skin: No suspicious " lesions or rashes  Neuro: Alert, oriented, speech and mentation normal  Psych: affect and mood normal  Gait: The patient is quadriplegic    Laboratory:   I personally reviewed all applicable laboratory data and went over findings with patient  Significant for:    CBC RESULTS:  Recent Labs   Lab Test 06/07/18  0702 05/23/18  1158 03/13/18  0709 03/12/18  0816   WBC 10.0 7.7 9.5 8.2   HGB 10.8* 11.3* 7.9* 7.5*    263 274 253     BMP RESULTS:  Recent Labs   Lab Test 06/07/18  0702 05/23/18  1158 03/13/18  0709 03/12/18  0816    139 135 137   POTASSIUM 4.2 3.9 4.3 4.5   CHLORIDE 108 105 104 104   CO2 23 25 22 24   ANIONGAP 8 8 10 8   GLC 85 76 83 98   BUN 20 13 20 20   CR 0.96 0.84 1.18 1.06   GFRESTIMATED 86 >90 68 77   GFRESTBLACK >90 >90 83 >90   BALAJI 8.6 9.7 9.9 10.0     UA RESULTS:   Recent Labs   Lab Test 05/23/18  1125 12/29/17  1145 11/13/17  1500   SG 1.010 1.009 Canceled, Test credited   URINEPH 8.0* 7.0 Canceled, Test credited   NITRITE Positive* Positive* Canceled, Test credited*   RBCU 88* 134* Canceled, Test credited   WBCU >182* >182* Canceled, Test credited*     I spent over 15 minutes with the patient.  Over half this time was spent on counseling.     Scribe Disclosure:   I, Aleks Oropeza, am serving as a scribe to document services personally performed by Dejon Horn MD at this visit, based upon the provider's statements to me. All documentation has been reviewed by the aforementioned provider prior to being entered into the official medical record.     Portions of this medical record were completed by a scribe. UPON MY REVIEW AND AUTHENTICATION BY ELECTRONIC SIGNATURE, this confirms (a) I performed the applicable clinical services, and (b) the record is accurate.    Dejon Horn MD

## 2019-02-01 NOTE — PROGRESS NOTES
Urology Clinic    Dejon Horn MD  Date of Service: 2019     Name: Maicol Carrera  MRN: [id number]  Age: 43 year old  : 1975  Referring provider: [facility]*     Assessment and Plan:  Assessment   Nephrolithiasis status post CT showing bulky bilateral renal stones  We covered treatment options including observation, ureteroscopy, and percutaneous nephrolithotomy (PCNL), including the risks and benefits of each procedure. Mr. Carrera and I spoke about his kidney stones potentially causing him issues in the future even as he is not in pain currently. However, at the present moment we mutually decided to follow up with a renogram within the next month or 6 weeks and follow up given the many stones present.     During counseling for this visit, we covered the natural history of kidney stones, the risk of progression to symptomatic pain/infection, and the possibility of renal failure/kidney damage.  We covered treatment options including observation, ureteroscopy, extracorporeal shock wave lithotripsy (ESWL), and percutaneous nephrolithotomy (PCNL).  We covered surgical risks which include but are not limited to heart attack, stroke, blood clot in the legs or lungs, death, injury to surrounding organs (intestine, liver, spleen, pancreas, lung, muscles, nerves), loss of sensation around incisions, decreased renal function, infection, injury to ureter, injury to bladder, and urethral strictures.  Additional procedures may be necessary in the perioperative period.  We discussed the risks of blood transfusion including HIV and hepatitis.   There are other additional unexpected complications which may occur.    Plan:   Follow up in one month to 6 weeks following NM renogram. Present earlier with new or worsening symptoms.     Chief Complaint:   Follow-Up; Left-sided Nephrolithiasis.     HPI:   Maicol Carrera  is a 43 year old male with a history of quadriplegia, and neurogenic bladder, managed  with an indwelling suprapubic catheter, here for follow-up regarding left-sided nephrolithiasis. He has a history of recurrent urolithiasis with a left-sided staghorn calculus that has been managed with a percutaneous nephrolithotomy on 03/07/2018, 03/21/2018, and 06/06/2018. Maicol is approximately 8 months status-post his most recent left percutaneous nephrolithotomy completed on 06/06/2018. I last evaluated the patient in post-operative follow-up on 06/29/2019; please see this note for further details. At that time, he was doing well with improved energy. Stone prevention was discussed and he was encouraged to follow up in 6 months for repeat evaluation with CT imaging. CT scan here today showed:    1. Bulky bilateral renal stones without ureteral stone or obstruction.  Staghorn calculus on the right extends into the renal pelvis. Small  left renal pelvis stones are also present. Stone burden is similar to  previous exam with shifting of stones in the left kidney.  2. Cholelithiasis without noncontrast CT evidence of: Cystitis.    Today, the patient states that he is feeling quite well and has not had any new or worsening symptoms. I spoke with the patient about his options for either proactively removing these stones via procedure or waiting conservatively until symptoms arise.     Review of Systems:   Pertinent items are noted in HPI or as below, remainder of complete ROS is negative.      Active Medications:      ACETAMINOPHEN PO, Take 650 mg by mouth every 4 hours as needed for pain, Disp: , Rfl:      albuterol (2.5 MG/3ML) 0.083% nebulizer solution, Take 3 mLs by nebulization every 2 hours as needed., Disp: 1 Box, Rfl:      baclofen (LIORESAL) 10 MG tablet, Take 10 mg by mouth 4 times daily as needed , Disp: , Rfl:      BACLOFEN IT, Via implanted IT pump Baclofen Concentration 2000 mc/gmL  Dose: 277.1 mcg/day Low reservoir alarm date: 10/16/2018 Low reservoir Alarm Volume: 2ml Interrogated 5/23/18, Disp: ,  Rfl:      Bisacodyl (DULCOLAX PO), Take 10 mg by mouth At Bedtime., Disp: , Rfl:      bisacodyl (DULCOLAX) 10 MG suppository, Place 1 suppository rectally every other day., Disp: 12 suppository, Rfl:      clindamycin (CLINDAMAX) 1 % topical gel, Apply topically 2 times daily, Disp: , Rfl:      Cranberry 400 MG TABS, Take 400 mg by mouth 2 times daily, Disp: , Rfl:      cyanocolbalamin (VITAMIN B-12) 1000 MCG tablet, Take 1 tablet by mouth daily., Disp: , Rfl:      docusate sodium (COLACE) 100 MG capsule, Take 100 mg by mouth 2 times daily., Disp: , Rfl:      ferrous sulfate 325 (65 FE) MG tablet, Take 1 tablet by mouth daily (with breakfast)., Disp: 100 tablet, Rfl:      ketoconazole (NIZORAL) 2 % cream, Apply topically 2 times daily as needed, Disp: , Rfl:      LevETIRAcetam (KEPPRA PO), Take 1,000 mg by mouth 2 times daily, Disp: , Rfl:      magnesium gluconate (MAGONATE) 500 MG tablet, Take 1 tablet by mouth daily., Disp: , Rfl:      miconazole (MICATIN; MICRO GUARD) 2 % powder, Apply  topically 2 times daily., Disp: 70 g, Rfl:      mometasone (ELOCON) 0.1 % cream, Apply topically At Bedtime, Disp: , Rfl:      Multiple Vitamins-Minerals (MULTIVITAMIN ADULT PO), Take 1 tablet by mouth daily, Disp: , Rfl:      Nutritional Supplements (OSTEO-MULTIVITAMINS/MINERALS OR), Take  by mouth daily. 12 pm , Disp: , Rfl:      ondansetron (ZOFRAN-ODT) 4 MG disintegrating tablet, Take 1 tablet by mouth every 6 hours as needed for nausea., Disp: 20 tablet, Rfl:      oxybutynin (DITROPAN) 5 MG tablet, Take 1 tablet by mouth 3 times daily., Disp: 270 tablet, Rfl:      polyethylene glycol (MIRALAX/GLYCOLAX) powder, Take 1 capful by mouth daily as needed for constipation (if no BM x3 days), Disp: , Rfl:      polyvinyl alcohol (LIQUIFILM TEARS) 1.4 % ophthalmic solution, Place 2 drops into both eyes every 2 hours as needed for dry eyes, Disp: , Rfl:      selenium sulfide (SELSUN) 2.5 % lotion, Apply to scalp topically in the morning  "every Sun, Tue, Thursday. Place for 5 min then rinse., Disp: , Rfl:      senna-docusate (SENOKOT-S;PERICOLACE) 8.6-50 MG per tablet, Take 1-2 tablets by mouth 2 times daily., Disp: 60 tablet, Rfl:      vitamin C 250 MG TABS, Take 1 tablet by mouth 2 times daily., Disp: , Rfl:      VITAMIN D, CHOLECALCIFEROL, PO, Take 5,000 Units by mouth daily, Disp: , Rfl:      zinc oxide (DESITIN) 40 % ointment, Apply  topically every hour as needed., Disp: 56.7 g, Rfl:       Allergies:   Latex and Latex.      Past Medical History:  Chronic infection.   Chronic constipation.   GERD.   Head injury.   Neurogenic bladder.   Obstructive sleep apnea.   Quadriplegia.   Seizure.   Urinary tract infection.   Kidney stones.   Sepsis.   Anemia due to blood loss, acute.   Post-surgical non-absorption.   Left renal hemorrhage.      Past Surgical History:  Open appendectomy.   Back surgery.   Baclofen pump insertion.   Laser holmium nephrolithotomy x multiple.   Orthopedic surgery.   Suprapubic catheter.     Family History:   No past pertinent family history.      Social History:   Former smoker; quit in 1994. Denies alcohol use. Denies illicit drug use.      Physical Exam:   /77   Pulse 79   Ht 1.791 m (5' 10.5\")   Wt 84.4 kg (186 lb)   BMI 26.31 kg/m     Body mass index is 26.31 kg/m .  General: Alert, no acute distress, oriented  HENT: Good dentition  Lungs: No respiratory distress, or pursed lip breathing  Heart: No obvious jugular venous distension present  Abdomen: Soft, nontender, no organomegaly or masses  Musculoskeletal: Extremities normal, no peripheral edema  Skin: No suspicious lesions or rashes  Neuro: Alert, oriented, speech and mentation normal  Psych: affect and mood normal  Gait: The patient is quadriplegic    Laboratory:   I personally reviewed all applicable laboratory data and went over findings with patient  Significant for:    CBC RESULTS:  Recent Labs   Lab Test 06/07/18  0702 05/23/18  1158 03/13/18  0709 " 03/12/18  0816   WBC 10.0 7.7 9.5 8.2   HGB 10.8* 11.3* 7.9* 7.5*    263 274 253     BMP RESULTS:  Recent Labs   Lab Test 06/07/18  0702 05/23/18  1158 03/13/18  0709 03/12/18  0816    139 135 137   POTASSIUM 4.2 3.9 4.3 4.5   CHLORIDE 108 105 104 104   CO2 23 25 22 24   ANIONGAP 8 8 10 8   GLC 85 76 83 98   BUN 20 13 20 20   CR 0.96 0.84 1.18 1.06   GFRESTIMATED 86 >90 68 77   GFRESTBLACK >90 >90 83 >90   BALAJI 8.6 9.7 9.9 10.0     UA RESULTS:   Recent Labs   Lab Test 05/23/18  1125 12/29/17  1145 11/13/17  1500   SG 1.010 1.009 Canceled, Test credited   URINEPH 8.0* 7.0 Canceled, Test credited   NITRITE Positive* Positive* Canceled, Test credited*   RBCU 88* 134* Canceled, Test credited   WBCU >182* >182* Canceled, Test credited*     I spent over 15 minutes with the patient.  Over half this time was spent on counseling.     Scribe Disclosure:   I, Aleks Oropeza, am serving as a scribe to document services personally performed by Dejon Horn MD at this visit, based upon the provider's statements to me. All documentation has been reviewed by the aforementioned provider prior to being entered into the official medical record.     Portions of this medical record were completed by a scribe. UPON MY REVIEW AND AUTHENTICATION BY ELECTRONIC SIGNATURE, this confirms (a) I performed the applicable clinical services, and (b) the record is accurate.

## 2019-02-01 NOTE — PATIENT INSTRUCTIONS
Schedule follow up with Dr. Horn in 4-6 weeks with lasix renogram prior.      It was a pleasure meeting with you today.  Thank you for allowing me and my team the privilege of caring for you today.  YOU are the reason we are here, and I truly hope we provided you with the excellent service you deserve.  Please let us know if there is anything else we can do for you so that we can be sure you are leaving completely satisfied with your care experience.

## 2019-02-01 NOTE — NURSING NOTE
"Chief Complaint   Patient presents with     RECHECK     kidney stone       Blood pressure 121/77, pulse 79, height 1.791 m (5' 10.5\"), weight 84.4 kg (186 lb). Body mass index is 26.31 kg/m .    Patient Active Problem List   Diagnosis     Kidney stones     Sepsis with multiple organ dysfunction (MOD) (H)     Anemia due to blood loss, acute     Postsurgical nonabsorption     Quadriplegia, post-traumatic (H)     Renal hemorrhage, left     Sepsis due to urinary tract infection (H)     Left nephrolithiasis       Allergies   Allergen Reactions     Latex Rash     Latex Rash       Current Outpatient Medications   Medication Sig Dispense Refill     ACETAMINOPHEN PO Take 650 mg by mouth every 4 hours as needed for pain       albuterol (2.5 MG/3ML) 0.083% nebulizer solution Take 3 mLs by nebulization every 2 hours as needed. 1 Box      baclofen (LIORESAL) 10 MG tablet Take 10 mg by mouth 4 times daily as needed        BACLOFEN IT Via implanted IT pump  Baclofen Concentration 2000 mc/gmL   Dose: 277.1 mcg/day  Low reservoir alarm date: 10/16/2018  Low reservoir Alarm Volume: 2ml  Interrogated 5/23/18       Bisacodyl (DULCOLAX PO) Take 10 mg by mouth At Bedtime.       bisacodyl (DULCOLAX) 10 MG suppository Place 1 suppository rectally every other day. 12 suppository      clindamycin (CLINDAMAX) 1 % topical gel Apply topically 2 times daily       Cranberry 400 MG TABS Take 400 mg by mouth 2 times daily       cyanocolbalamin (VITAMIN B-12) 1000 MCG tablet Take 1 tablet by mouth daily.       docusate sodium (COLACE) 100 MG capsule Take 100 mg by mouth 2 times daily.       ferrous sulfate 325 (65 FE) MG tablet Take 1 tablet by mouth daily (with breakfast). 100 tablet      ketoconazole (NIZORAL) 2 % cream Apply topically 2 times daily as needed       LevETIRAcetam (KEPPRA PO) Take 1,000 mg by mouth 2 times daily       magnesium gluconate (MAGONATE) 500 MG tablet Take 1 tablet by mouth daily.       miconazole (MICATIN; MICRO GUARD) 2 " % powder Apply  topically 2 times daily. 70 g      mometasone (ELOCON) 0.1 % cream Apply topically At Bedtime       Multiple Vitamins-Minerals (MULTIVITAMIN ADULT PO) Take 1 tablet by mouth daily       Nutritional Supplements (OSTEO-MULTIVITAMINS/MINERALS OR) Take  by mouth daily. 12 pm         ondansetron (ZOFRAN-ODT) 4 MG disintegrating tablet Take 1 tablet by mouth every 6 hours as needed for nausea. 20 tablet      oxybutynin (DITROPAN) 5 MG tablet Take 1 tablet by mouth 3 times daily. 270 tablet      polyethylene glycol (MIRALAX/GLYCOLAX) powder Take 1 capful by mouth daily as needed for constipation (if no BM x3 days)       polyvinyl alcohol (LIQUIFILM TEARS) 1.4 % ophthalmic solution Place 2 drops into both eyes every 2 hours as needed for dry eyes       selenium sulfide (SELSUN) 2.5 % lotion Apply to scalp topically in the morning every Sun, Tue, Thursday. Place for 5 min then rinse.       senna-docusate (SENOKOT-S;PERICOLACE) 8.6-50 MG per tablet Take 1-2 tablets by mouth 2 times daily. 60 tablet      vitamin C 250 MG TABS Take 1 tablet by mouth 2 times daily.       VITAMIN D, CHOLECALCIFEROL, PO Take 5,000 Units by mouth daily       zinc oxide (DESITIN) 40 % ointment Apply  topically every hour as needed. 56.7 g        Social History     Tobacco Use     Smoking status: Former Smoker     Packs/day: 0.30     Years: 5.00     Pack years: 1.50     Types: Cigarettes     Last attempt to quit: 1994     Years since quittin.3     Smokeless tobacco: Never Used   Substance Use Topics     Alcohol use: No     Drug use: No       MAIRA Chowdhury  2019  2:16 PM

## 2019-04-16 ENCOUNTER — PRE VISIT (OUTPATIENT)
Dept: UROLOGY | Facility: CLINIC | Age: 44
End: 2019-04-16

## 2019-04-25 ENCOUNTER — HOSPITAL ENCOUNTER (OUTPATIENT)
Dept: NUCLEAR MEDICINE | Facility: CLINIC | Age: 44
Setting detail: NUCLEAR MEDICINE
Discharge: HOME | End: 2019-04-25
Attending: UROLOGY | Admitting: UROLOGY
Payer: COMMERCIAL

## 2019-04-25 DIAGNOSIS — N20.0 KIDNEY STONES: ICD-10-CM

## 2019-04-25 PROCEDURE — A9562 TC99M MERTIATIDE: HCPCS | Performed by: UROLOGY

## 2019-04-25 PROCEDURE — 78708 K FLOW/FUNCT IMAGE W/DRUG: CPT

## 2019-04-25 PROCEDURE — 40000556 ZZH STATISTIC PERIPHERAL IV START W US GUIDANCE

## 2019-04-25 PROCEDURE — 34300033 ZZH RX 343: Performed by: UROLOGY

## 2019-04-25 PROCEDURE — 25000128 H RX IP 250 OP 636: Performed by: UROLOGY

## 2019-04-25 RX ORDER — FUROSEMIDE 10 MG/ML
20 INJECTION INTRAMUSCULAR; INTRAVENOUS ONCE
Status: COMPLETED | OUTPATIENT
Start: 2019-04-25 | End: 2019-04-25

## 2019-04-25 RX ADMIN — Medication 9.8 MCI.: at 10:22

## 2019-04-25 RX ADMIN — FUROSEMIDE 20 MG: 10 INJECTION, SOLUTION INTRAVENOUS at 10:22

## 2019-04-26 ENCOUNTER — OFFICE VISIT (OUTPATIENT)
Dept: UROLOGY | Facility: CLINIC | Age: 44
End: 2019-04-26
Payer: COMMERCIAL

## 2019-04-26 VITALS
SYSTOLIC BLOOD PRESSURE: 117 MMHG | BODY MASS INDEX: 26.04 KG/M2 | HEART RATE: 87 BPM | HEIGHT: 71 IN | DIASTOLIC BLOOD PRESSURE: 68 MMHG | WEIGHT: 186 LBS

## 2019-04-26 DIAGNOSIS — N20.0 CALCULUS, KIDNEY: Primary | ICD-10-CM

## 2019-04-26 RX ORDER — CEFAZOLIN SODIUM 2 G/50ML
2 SOLUTION INTRAVENOUS
Status: CANCELLED | OUTPATIENT
Start: 2019-04-26

## 2019-04-26 RX ORDER — MAGNESIUM GLUCONATE 27 MG(500)
TABLET ORAL
Refills: 99 | COMMUNITY
Start: 2019-04-19 | End: 2019-06-19 | Stop reason: ALTCHOICE

## 2019-04-26 RX ORDER — OXYBUTYNIN CHLORIDE 5 MG/1
5 TABLET ORAL
Status: ON HOLD | COMMUNITY
End: 2019-10-06

## 2019-04-26 RX ORDER — CEFAZOLIN SODIUM 1 G/50ML
1 INJECTION, SOLUTION INTRAVENOUS SEE ADMIN INSTRUCTIONS
Status: CANCELLED | OUTPATIENT
Start: 2019-04-26

## 2019-04-26 RX ORDER — MULTIVITAMIN WITH FOLIC ACID 400 MCG
TABLET ORAL
Refills: 99 | COMMUNITY
Start: 2019-04-12 | End: 2019-04-26

## 2019-04-26 ASSESSMENT — MIFFLIN-ST. JEOR: SCORE: 1762.88

## 2019-04-26 ASSESSMENT — PAIN SCALES - GENERAL: PAINLEVEL: NO PAIN (0)

## 2019-04-26 NOTE — LETTER
2019     RE: Maicol Carrera  [facility]  [address] [place] [address]     Dear Colleague,    Thank you for referring your patient, Maicol Carrera, to the [facility]OST[facility] UROLOGIC CANCERS at [facility]. Please see a copy of my visit note below.      Urology Clinic    Dejon Horn MD  Date of Service: 2019     Name: Maicol Carrera  MRN: [id number]  Age: 43 year old  : 1975  Referring provider: Dejon Horn     Assessment and Plan:  Assessment:  Maicol Carrera  is a 43 year old male with a history of nephrolithiasis s/p percutaneous nephrolithotomy with left renal stones. Right kidney function is 20 percent, left kidney function is 80 percent.     Plan:  During counseling for this visit, we covered the natural history of kidney stones, the risk of progression to symptomatic pain/infection, and the possibility of renal failure/kidney damage.  We covered treatment options including observation, ureteroscopy, extracorporeal shock wave lithotripsy (ESWL), and percutaneous nephrolithotomy (PCNL).  We covered surgical risks which include but are not limited to heart attack, stroke, blood clot in the legs or lungs, death, injury to surrounding organs (intestine, liver, spleen, pancreas, lung, muscles, nerves), loss of sensation around incisions, decreased renal function, infection, injury to ureter, injury to bladder, and urethral strictures.  Additional procedures may be necessary in the perioperative period.  We discussed the risks of blood transfusion including HIV and hepatitis.   There are other additional unexpected complications which may occur. We will plan to proceed with left PCNL.     Urine was sent for culture today.     He will need to come in for left nephrostomy tube placement the day before surgery.     ______________________________________________________________________    HPI  Maicol Carrera is  "a 43 year old male with a history of quadriplegia, and neurogenic bladder, managed with an indwelling suprapubic catheter, here for follow-up regarding left-sided nephrolithiasis. He has a history of recurrent urolithiasis with a left-sided staghorn calculus that has been managed with a percutaneous nephrolithotomy on 03/07/2018, 03/21/2018, and 06/06/2018. He has a suprapubic catheter in place.     Review of Systems:   Pertinent items are noted in HPI or as below, remainder of complete ROS is negative.      Physical Exam:   /68   Pulse 87   Ht 1.791 m (5' 10.5\")   Wt 84.4 kg (186 lb)   BMI 26.31 kg/m     Constitutional: Alert, no acute distress  Psychiatric: Normal mood and affect  Gastrointestinal: Abdomen soft, non-tender.  : Deferred    Laboratory:   I personally reviewed all applicable laboratory data and went over findings with patient  Significant for:    CBC RESULTS:  Recent Labs   Lab Test 06/07/18  0702 05/23/18  1158 03/13/18  0709 03/12/18  0816   WBC 10.0 7.7 9.5 8.2   HGB 10.8* 11.3* 7.9* 7.5*    263 274 253        BMP RESULTS:  Recent Labs   Lab Test 06/07/18  0702 05/23/18  1158 03/13/18  0709 03/12/18  0816    139 135 137   POTASSIUM 4.2 3.9 4.3 4.5   CHLORIDE 108 105 104 104   CO2 23 25 22 24   ANIONGAP 8 8 10 8   GLC 85 76 83 98   BUN 20 13 20 20   CR 0.96 0.84 1.18 1.06   GFRESTIMATED 86 >90 68 77   GFRESTBLACK >90 >90 83 >90   BALAJI 8.6 9.7 9.9 10.0       UA RESULTS:   Recent Labs   Lab Test 05/23/18  1125 12/29/17  1145 11/13/17  1500   SG 1.010 1.009 Canceled, Test credited   URINEPH 8.0* 7.0 Canceled, Test credited   NITRITE Positive* Positive* Canceled, Test credited*   RBCU 88* 134* Canceled, Test credited   WBCU >182* >182* Canceled, Test credited*       CALCIUM RESULTS  Lab Results   Component Value Date    BALAJI 8.6 06/07/2018    BALAJI 9.7 05/23/2018    BALAJI 9.9 03/13/2018       Imaging:   I personally reviewed all applicable imaging and went over the below findings " with patient.    CT abdomen pelvis without contrast 02/01/2019:   1. Bulky bilateral renal stones without ureteral stone or obstruction.  Staghorn calculus on the right extends into the renal pelvis. Small  left renal pelvis stones are also present. Stone burden is similar to  previous exam with shifting of stones in the left kidney.  2. Cholelithiasis without noncontrast CT evidence of: Cystitis.  Per radiology.     Results for orders placed or performed during the hospital encounter of 04/25/19   NM Renogram with Lasix    Narrative    Examination: NM RENOGRAM WITH LASIX     Date: 4/25/2019 11:36 AM     Indication: Renal failure, chronic (kidney disease); Kidney stones     Comparison: 2/1/2019, 4/5/2019    Technique: The patient received 9.8 mCi of Tc-99m labeled MAG3  intravenously. The patient received 20 mg of Lasix intravenously at 20  minutes.    Findings:  There is normal perfusion of the left kidney with asymmetrically  decreased perfusion of the right kidney. The split functional  measurements are: 81.2% function on the left as compared to 18.8%  function on the right.    The excretion curves demonstrates prompt uptake in the left kidney  with normal cortical medullary clearance, however there is no  significant excretion prior to Lasix administration. The right kidney  demonstrates minimal cortical medullary clearance and no significant  excretion into the renal collecting systems prior to Lasix  administration. After Lasix administration there was washout from the  kidneys. The Lasix T1/2 was 7.4 minutes on the left and 6.4 minutes on  the right.        Impression    Impression:  1. Asymmetrically decreased perfusion of the right kidney, compatible  with greater degree of atrophy of the right kidney on previous CTs.  2. Bilateral nonobstructive hydronephrosis.  3. Split function:   Left 81.2%   Right 18.8%.    I have personally reviewed the examination and initial interpretation  and I agree with the  findings.    LUCAS VELAZQUEZ MD       Scribe Disclosure:  I, Jackie Greenberg, am serving as a scribe to document services personally performed by Dejon Horn MD at this visit, based upon the provider's statements to me. All documentation has been reviewed by the aforementioned provider prior to being entered into the official medical record.     Jackie Greenberg served as the scribe for this patient's visit and documented my history and physical exam.  I performed the history and physical exam.  I have edited and agree with the note.  JEREMIE Horn MD    Again, thank you for allowing me to participate in the care of your patient.      Sincerely,    Dejon Horn MD

## 2019-04-26 NOTE — PROGRESS NOTES
[facility]    Dejon Horn MD  Date of Service: 2019     Name: Maicol Carrera  MRN: [id number]  Age: 43 year old  : 1975  Referring provider: Dejon Horn     Assessment and Plan:  Assessment:  Maicol Carrera  is a 43 year old male with a history of nephrolithiasis s/p percutaneous nephrolithotomy with left renal stones. Right kidney function is 20 percent, left kidney function is 80 percent.     Plan:  During counseling for this visit, we covered the natural history of kidney stones, the risk of progression to symptomatic pain/infection, and the possibility of renal failure/kidney damage.  We covered treatment options including observation, ureteroscopy, extracorporeal shock wave lithotripsy (ESWL), and percutaneous nephrolithotomy (PCNL).  We covered surgical risks which include but are not limited to heart attack, stroke, blood clot in the legs or lungs, death, injury to surrounding organs (intestine, liver, spleen, pancreas, lung, muscles, nerves), loss of sensation around incisions, decreased renal function, infection, injury to ureter, injury to bladder, and urethral strictures.  Additional procedures may be necessary in the perioperative period.  We discussed the risks of blood transfusion including HIV and hepatitis.   There are other additional unexpected complications which may occur. We will plan to proceed with left PCNL.     Urine was sent for culture today.     He will need to come in for left nephrostomy tube placement the day before surgery.     ______________________________________________________________________    HPI  Maicol Carrera is a 43 year old male with a history of quadriplegia, and neurogenic bladder, managed with an indwelling suprapubic catheter, here for follow-up regarding left-sided nephrolithiasis. He has a history of recurrent urolithiasis with a left-sided staghorn calculus that has been managed with a percutaneous nephrolithotomy on 2018,  "03/21/2018, and 06/06/2018. He has a suprapubic catheter in place.     Review of Systems:   Pertinent items are noted in HPI or as below, remainder of complete ROS is negative.      Physical Exam:   /68   Pulse 87   Ht 1.791 m (5' 10.5\")   Wt 84.4 kg (186 lb)   BMI 26.31 kg/m    Constitutional: Alert, no acute distress  Psychiatric: Normal mood and affect  Gastrointestinal: Abdomen soft, non-tender.  : Deferred    Laboratory:   I personally reviewed all applicable laboratory data and went over findings with patient  Significant for:    CBC RESULTS:  Recent Labs   Lab Test 06/07/18  0702 05/23/18  1158 03/13/18  0709 03/12/18  0816   WBC 10.0 7.7 9.5 8.2   HGB 10.8* 11.3* 7.9* 7.5*    263 274 253        BMP RESULTS:  Recent Labs   Lab Test 06/07/18  0702 05/23/18  1158 03/13/18  0709 03/12/18  0816    139 135 137   POTASSIUM 4.2 3.9 4.3 4.5   CHLORIDE 108 105 104 104   CO2 23 25 22 24   ANIONGAP 8 8 10 8   GLC 85 76 83 98   BUN 20 13 20 20   CR 0.96 0.84 1.18 1.06   GFRESTIMATED 86 >90 68 77   GFRESTBLACK >90 >90 83 >90   BALAJI 8.6 9.7 9.9 10.0       UA RESULTS:   Recent Labs   Lab Test 05/23/18  1125 12/29/17  1145 11/13/17  1500   SG 1.010 1.009 Canceled, Test credited   URINEPH 8.0* 7.0 Canceled, Test credited   NITRITE Positive* Positive* Canceled, Test credited*   RBCU 88* 134* Canceled, Test credited   WBCU >182* >182* Canceled, Test credited*       CALCIUM RESULTS  Lab Results   Component Value Date    BALAJI 8.6 06/07/2018    BALAJI 9.7 05/23/2018    BALAJI 9.9 03/13/2018       Imaging:   I personally reviewed all applicable imaging and went over the below findings with patient.    CT abdomen pelvis without contrast 02/01/2019:   1. Bulky bilateral renal stones without ureteral stone or obstruction.  Staghorn calculus on the right extends into the renal pelvis. Small  left renal pelvis stones are also present. Stone burden is similar to  previous exam with shifting of stones in the left " kidney.  2. Cholelithiasis without noncontrast CT evidence of: Cystitis.  Per radiology.     Results for orders placed or performed during the hospital encounter of 04/25/19   NM Renogram with Lasix    Narrative    Examination: NM RENOGRAM WITH LASIX     Date: 4/25/2019 11:36 AM     Indication: Renal failure, chronic (kidney disease); Kidney stones     Comparison: 2/1/2019, 4/5/2019    Technique: The patient received 9.8 mCi of Tc-99m labeled MAG3  intravenously. The patient received 20 mg of Lasix intravenously at 20  minutes.    Findings:  There is normal perfusion of the left kidney with asymmetrically  decreased perfusion of the right kidney. The split functional  measurements are: 81.2% function on the left as compared to 18.8%  function on the right.    The excretion curves demonstrates prompt uptake in the left kidney  with normal cortical medullary clearance, however there is no  significant excretion prior to Lasix administration. The right kidney  demonstrates minimal cortical medullary clearance and no significant  excretion into the renal collecting systems prior to Lasix  administration. After Lasix administration there was washout from the  kidneys. The Lasix T1/2 was 7.4 minutes on the left and 6.4 minutes on  the right.        Impression    Impression:  1. Asymmetrically decreased perfusion of the right kidney, compatible  with greater degree of atrophy of the right kidney on previous CTs.  2. Bilateral nonobstructive hydronephrosis.  3. Split function:   Left 81.2%   Right 18.8%.    I have personally reviewed the examination and initial interpretation  and I agree with the findings.    LUCAS VELAZQUEZ MD       Scribe Disclosure:  I, Jackie Greenberg, am serving as a scribe to document services personally performed by Dejon Horn MD at this visit, based upon the provider's statements to me. All documentation has been reviewed by the aforementioned provider prior to being entered into the  official medical record.     Jackie Greenberg served as the scribe for this patient's visit and documented my history and physical exam.  I performed the history and physical exam.  I have edited and agree with the note.  JEREMIE Horn MD

## 2019-04-26 NOTE — PATIENT INSTRUCTIONS
Schedule surgery with Dr. Horn.    It was a pleasure meeting with you today.  Thank you for allowing me and my team the privilege of caring for you today.  YOU are the reason we are here, and I truly hope we provided you with the excellent service you deserve.  Please let us know if there is anything else we can do for you so that we can be sure you are leaving completely satisfied with your care experience.        MAIRA Leon

## 2019-04-28 LAB
BACTERIA SPEC CULT: NORMAL
SPECIMEN SOURCE: NORMAL

## 2019-05-03 DIAGNOSIS — N20.0 KIDNEY STONE: Primary | ICD-10-CM

## 2019-06-10 ENCOUNTER — PRE VISIT (OUTPATIENT)
Dept: SURGERY | Facility: CLINIC | Age: 44
End: 2019-06-10

## 2019-06-10 NOTE — TELEPHONE ENCOUNTER
FUTURE VISIT INFORMATION      SURGERY INFORMATION:    Date: 7/1/19, 7/29/19    Location: [facility] OR    Surgeon:  Dejon Horn    Anesthesia Type:  Combined General with Block    RECORDS REQUESTED FROM:       Primary Care Provider: Lorraine Horn CNP- [facility]    Pertinent Medical History: Seizure disorder    Most recent EKG+ Tracing: 3/7/18

## 2019-06-19 ENCOUNTER — OFFICE VISIT (OUTPATIENT)
Dept: SURGERY | Facility: CLINIC | Age: 44
End: 2019-06-19
Payer: COMMERCIAL

## 2019-06-19 ENCOUNTER — ANESTHESIA EVENT (OUTPATIENT)
Dept: SURGERY | Facility: CLINIC | Age: 44
End: 2019-06-19

## 2019-06-19 VITALS
HEART RATE: 81 BPM | TEMPERATURE: 97.6 F | SYSTOLIC BLOOD PRESSURE: 138 MMHG | OXYGEN SATURATION: 96 % | DIASTOLIC BLOOD PRESSURE: 88 MMHG | RESPIRATION RATE: 22 BRPM

## 2019-06-19 DIAGNOSIS — N20.0 NEPHROLITHIASIS: ICD-10-CM

## 2019-06-19 DIAGNOSIS — Z01.818 PRE-OP EXAMINATION: Primary | ICD-10-CM

## 2019-06-19 DIAGNOSIS — Z01.818 PREOP EXAMINATION: Primary | ICD-10-CM

## 2019-06-19 DIAGNOSIS — N20.0 CALCULUS, KIDNEY: Primary | ICD-10-CM

## 2019-06-19 LAB
ANION GAP SERPL CALCULATED.3IONS-SCNC: 9 MMOL/L (ref 3–14)
BASOPHILS # BLD AUTO: 0.1 10E9/L (ref 0–0.2)
BASOPHILS NFR BLD AUTO: 0.7 %
BUN SERPL-MCNC: 13 MG/DL (ref 7–30)
CALCIUM SERPL-MCNC: 9.4 MG/DL (ref 8.5–10.1)
CHLORIDE SERPL-SCNC: 103 MMOL/L (ref 94–109)
CO2 SERPL-SCNC: 25 MMOL/L (ref 20–32)
CREAT SERPL-MCNC: 0.86 MG/DL (ref 0.66–1.25)
DIFFERENTIAL METHOD BLD: ABNORMAL
EOSINOPHIL # BLD AUTO: 0.2 10E9/L (ref 0–0.7)
EOSINOPHIL NFR BLD AUTO: 2.1 %
ERYTHROCYTE [DISTWIDTH] IN BLOOD BY AUTOMATED COUNT: 16 % (ref 10–15)
GFR SERPL CREATININE-BSD FRML MDRD: >90 ML/MIN/{1.73_M2}
GLUCOSE SERPL-MCNC: 117 MG/DL (ref 70–99)
HCT VFR BLD AUTO: 46.5 % (ref 40–53)
HGB BLD-MCNC: 14.7 G/DL (ref 13.3–17.7)
IMM GRANULOCYTES # BLD: 0.1 10E9/L (ref 0–0.4)
IMM GRANULOCYTES NFR BLD: 0.8 %
LYMPHOCYTES # BLD AUTO: 1.5 10E9/L (ref 0.8–5.3)
LYMPHOCYTES NFR BLD AUTO: 16.7 %
MCH RBC QN AUTO: 27.5 PG (ref 26.5–33)
MCHC RBC AUTO-ENTMCNC: 31.6 G/DL (ref 31.5–36.5)
MCV RBC AUTO: 87 FL (ref 78–100)
MONOCYTES # BLD AUTO: 0.8 10E9/L (ref 0–1.3)
MONOCYTES NFR BLD AUTO: 8.9 %
NEUTROPHILS # BLD AUTO: 6.3 10E9/L (ref 1.6–8.3)
NEUTROPHILS NFR BLD AUTO: 70.8 %
NRBC # BLD AUTO: 0 10*3/UL
NRBC BLD AUTO-RTO: 0 /100
PLATELET # BLD AUTO: 260 10E9/L (ref 150–450)
POTASSIUM SERPL-SCNC: 3.5 MMOL/L (ref 3.4–5.3)
RBC # BLD AUTO: 5.35 10E12/L (ref 4.4–5.9)
SODIUM SERPL-SCNC: 137 MMOL/L (ref 133–144)
WBC # BLD AUTO: 8.9 10E9/L (ref 4–11)

## 2019-06-19 ASSESSMENT — PATIENT HEALTH QUESTIONNAIRE - PHQ9
SUM OF ALL RESPONSES TO PHQ QUESTIONS 1-9: 0
SUM OF ALL RESPONSES TO PHQ QUESTIONS 1-9: 0

## 2019-06-19 ASSESSMENT — LIFESTYLE VARIABLES: TOBACCO_USE: 1

## 2019-06-19 ASSESSMENT — ENCOUNTER SYMPTOMS: SEIZURES: 1

## 2019-06-19 NOTE — PROGRESS NOTES
Preoperative Assessment Center medication history for June 19, 2019 is complete.    See Epic admission navigator for prior to admission medications.   Operating room staff will still need to confirm medications and last dose information on day of surgery.     Medication history interview sources:   Patient, medication list    Changes made to PTA medication list (reason)  Added: none  Deleted: none  Changed: none    Additional medication history information (including reliability of information, actions taken by pharmacist):  Patient has an intrathecal baclofen pump    -- No recent (within 30 days) course of antibiotics  -- No recent (within 30 days) course of steroids  -- No recent (within 30 days) chronic daily medications stopped   -- Patient declines being on any other prescription or over-the-counter medications    Prior to Admission medications    Medication Sig Last Dose Taking? Auth Provider   ACETAMINOPHEN PO Take 650 mg by mouth every 4 hours as needed for pain Taking Yes Unknown, Entered By History   albuterol (2.5 MG/3ML) 0.083% nebulizer solution Take 3 mLs by nebulization every 2 hours as needed. Taking Yes Stella Cano MD   BACLOFEN IT Via implanted IT pump  Baclofen Concentration 2000 mc/gmL   Dose: 277.1 mcg/day  Low reservoir alarm date: 3/4/2020  Low reservoir Alarm Volume: 2ml  Interrogated 6/19/19  Pump is managed by [employer] Taking Yes Reported, Patient   bisacodyl (DULCOLAX) 10 MG suppository Place 1 suppository rectally every other day. Taking Yes Stella Cano MD   carbamide peroxide (DEBROX) 6.5 % otic solution 5 drops 2 times daily Taking Yes Reported, Patient   cholecalciferol (VITAMIN D3) 5000 units (125 mcg) capsule TAKE 1 CAP BY MOUTH ONCE DAILY FOR VIT-D DEFICIENCY Taking Yes Reported, Patient   clindamycin (CLINDAMAX) 1 % topical gel Apply topically 2 times daily Taking Yes Unknown, Entered By History   Cranberry 400 MG TABS Take 400 mg by mouth 2 times daily Taking Yes  Unknown, Entered By History   cyanocolbalamin (VITAMIN B-12) 1000 MCG tablet Take 1 tablet by mouth daily. Taking Yes Stella Cano MD   docusate sodium (COLACE) 100 MG capsule Take 100 mg by mouth 2 times daily. Taking Yes Reported, Patient   ferrous sulfate 325 (65 FE) MG tablet Take 1 tablet by mouth daily (with breakfast). Taking Yes Stella Cano MD   ketoconazole (NIZORAL) 2 % cream Apply topically 2 times daily as needed Taking Yes Unknown, Entered By History   LevETIRAcetam (KEPPRA PO) Take 1,000 mg by mouth 2 times daily Taking Yes Unknown, Entered By History   magnesium gluconate (MAGONATE) 500 MG tablet Take 1 tablet by mouth daily. Taking Yes Stella Cano MD   miconazole (MICATIN; MICRO GUARD) 2 % powder Apply  topically 2 times daily. Taking Yes Stella Cano MD   mometasone (ELOCON) 0.1 % cream Apply topically At Bedtime Taking Yes Unknown, Entered By History   Multiple Vitamins-Minerals (MULTIVITAMIN ADULT PO) Take 1 tablet by mouth daily Taking Yes Reported, Patient   Nutritional Supplements (OSTEO-MULTIVITAMINS/MINERALS OR) Take  by mouth daily. 12 pm   Taking Yes Reported, Patient   ondansetron (ZOFRAN-ODT) 4 MG disintegrating tablet Take 1 tablet by mouth every 6 hours as needed for nausea. Taking Yes Stella Cano MD   oxybutynin (DITROPAN) 5 MG tablet Take 5 mg by mouth Taking Yes Reported, Patient   polyethylene glycol (MIRALAX/GLYCOLAX) powder Take 1 capful by mouth daily as needed for constipation (if no BM x3 days) Taking Yes Unknown, Entered By History   polyvinyl alcohol (LIQUIFILM TEARS) 1.4 % ophthalmic solution Place 2 drops into both eyes every 2 hours as needed for dry eyes Taking Yes Unknown, Entered By History   selenium sulfide (SELSUN) 2.5 % lotion Apply to scalp topically in the morning every Sun, Tue, Thursday. Place for 5 min then rinse. Taking Yes Unknown, Entered By History   senna-docusate (SENOKOT-S;PERICOLACE) 8.6-50 MG per tablet Take 1-2 tablets by mouth  2 times daily. Taking Yes Stella Cano MD   vitamin C 250 MG TABS Take 1 tablet by mouth 2 times daily. Taking Yes Stella Cano MD   zinc oxide (DESITIN) 40 % ointment Apply  topically every hour as needed. Taking Yes Stella Cano MD   baclofen (LIORESAL) 10 MG tablet Take 10 mg by mouth every 4 hours as needed  Not Taking  Reported, Patient         Medication history completed by: America Garber RPH

## 2019-06-19 NOTE — PHARMACY - PREOPERATIVE ASSESSMENT CENTER
Medication History Note  IT Pump    Baclofen via implanted IT pump  Baclofen Concentration 2000 mc/gmL   Dose: 277.1 mcg/day  Low reservoir alarm date: 3/4/2020  Low reservoir Alarm Volume: 2ml  Interrogated 6/19/19  Pump is managed by Ulices Garber, PharmD, MS  June 19, 2019

## 2019-06-19 NOTE — PATIENT INSTRUCTIONS
Preparing for Your Surgery      Name:  Maicol Carrera   MRN:  [id number]   :  1975   Today's Date:  2019     Arriving for surgery:  Surgery date:  19  Arrival time:  05:00 am  Please come to:       [facility] [address]  [address] [place]  [address]    - ? parking is available in front of the hospital      -    Please proceed to [address] on the 3rd floor. [phone]?     - ?If you are in need of directions, wheelchair or escort please stop at the Information Desk in the lobby.  Inform the information person that you are here for surgery; a wheelchair and escort to [address] will be provided.?     What can I eat or drink?  -  You may have solid food or milk products until 8 hours prior to your surgery.  -  You may have water, apple juice or 7up/Sprite until 2 hours prior to your surgery.    Which medicines can I take?  Stop Aspirin, vitamins (multivitamin and supplements one week prior to surgery.  Hold Ibuprofen for 24 hours and/or Naproxen for 48 hours prior to surgery.   -  Do NOT take these medications in the morning, the day of surgery:  Iron - ferrous sulfate if normally taken in the morning.  -  Please take these medications the day of surgery:  Tylenol if needed; take all other scheduled medications normally taken in the morning.    How do I prepare myself?  -  Take two showers: one the night before surgery; and one the morning of surgery.         Use Scrubcare or Hibiclens to wash from neck down.  You may use your own     shampoo and conditioner. No other hair products.   -  Do NOT use lotion, powder, deodorant, or antiperspirant the day of your surgery.  -  Do NOT wear any jewelry.  - Do not bring your own medications to the hospital, except for inhalers and eye   drops.  -  Bring your ID and insurance card.    Questions or Concerns:  -If you have questions or concerns regarding the day of surgery, please call [phone].   -For questions  after surgery please call your surgeons office.
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
## 2019-06-24 ENCOUNTER — MEDICAL CORRESPONDENCE (OUTPATIENT)
Dept: HEALTH INFORMATION MANAGEMENT | Facility: CLINIC | Age: 44
End: 2019-06-24

## 2019-06-24 DIAGNOSIS — N20.0 CALCULUS, KIDNEY: Primary | ICD-10-CM

## 2019-06-25 ENCOUNTER — TRANSFERRED RECORDS (OUTPATIENT)
Dept: HEALTH INFORMATION MANAGEMENT | Facility: CLINIC | Age: 44
End: 2019-06-25

## 2019-06-25 ENCOUNTER — PATIENT OUTREACH (OUTPATIENT)
Dept: UROLOGY | Facility: CLINIC | Age: 44
End: 2019-06-25

## 2019-06-25 NOTE — PROGRESS NOTES
Pre Op Teaching Flowsheet       Pre and Post op Patient Education  Relevant Diagnosis:  Left stone  Surgical procedure:  Left percutaneous nephrolithotomy, access to kidney, ureteroscopy, cystoscopy, stent placement  Teaching Topic:  Pre and post op teaching  Person Involved in teaching: Patient's nurse at the nursing home    Motivation Level:  Asks Questions: Yes  Eager to Learn:  Yes  Cooperative: Yes  Receptive (willing/able to accept information):  Yes    Patient demonstrates understanding of the following:  Date of surgery:  7/1/19  Location of surgery:  [address] [place] [address]  History and Physical and any other testing necessary prior to surgery: Yes  Required time line for completion of History and Physical and any pre-op testing: Yes    Patient demonstrates understanding of the following:  Pre-op bowel prep:  None needed  Pre-op showering/scrub information with PCMX Soap: Yes  Blood thinner medications discussed and when to stop (if applicable):  Yes      Infection Prevention:   Patient demonstrates understanding of the following:  Surgical procedure site care taught: at time of discharge  Signs and symptoms of infection taught:  Yes      Post-op follow-up:  Discussed how to contact the hospital, nurse, and clinic scheduling staff if necessary.    Instructional materials used/given/mailed:  [facility] Booklet, post op teaching sheet, Map, Soap, and arrival/location information.    Surgical instructions packet mailed to patient.    Patient has a  and someone will be with him for the first 24 hours.

## 2019-06-26 ENCOUNTER — DOCUMENTATION ONLY (OUTPATIENT)
Dept: RADIOLOGY | Facility: CLINIC | Age: 44
End: 2019-06-26

## 2019-06-26 ENCOUNTER — TELEPHONE (OUTPATIENT)
Dept: UROLOGY | Facility: CLINIC | Age: 44
End: 2019-06-26

## 2019-06-26 NOTE — PROGRESS NOTES
Patient to be placed on Interventional Radiology schedule for bilateral upper pole nephrostomy tube placement. Left kidney first then come back for the right kidney  Pre Renal stone removal  Discussed with Dr. Lio Bates IR [facility]  [phone] Call pager  [phone] pager

## 2019-06-26 NOTE — TELEPHONE ENCOUNTER
[facility] Call Center    Phone Message    May a detailed message be left on voicemail: yes    Reason for Call: Order(s): Other:   Reason for requested: UA  Date needed: Asap  Provider name: Dr Kory Bah called in saying that the UA the pt just had was contaiminated, Mixed gerardo with 3-4 organisms. She said that the UA was faxed over and will need a new order for a new UA. Please call her back to discuss.       Action Taken: Message routed to:  [facility] ([facility]):  Urology

## 2019-07-11 ENCOUNTER — TELEPHONE (OUTPATIENT)
Dept: INTERVENTIONAL RADIOLOGY/VASCULAR | Facility: CLINIC | Age: 44
End: 2019-07-11

## 2019-07-15 ENCOUNTER — APPOINTMENT (OUTPATIENT)
Dept: INTERVENTIONAL RADIOLOGY/VASCULAR | Facility: CLINIC | Age: 44
End: 2019-07-15
Attending: UROLOGY
Payer: COMMERCIAL

## 2019-07-15 ENCOUNTER — APPOINTMENT (OUTPATIENT)
Dept: MEDSURG UNIT | Facility: CLINIC | Age: 44
End: 2019-07-15
Attending: UROLOGY
Payer: COMMERCIAL

## 2019-07-15 ENCOUNTER — HOSPITAL ENCOUNTER (INPATIENT)
Facility: CLINIC | Age: 44
LOS: 3 days | Discharge: NURSING, REHAB OR LONG-TERM CARE | End: 2019-07-19
Attending: UROLOGY | Admitting: PEDIATRICS
Payer: COMMERCIAL

## 2019-07-15 DIAGNOSIS — N39.0 COMPLICATED URINARY TRACT INFECTION: Primary | ICD-10-CM

## 2019-07-15 DIAGNOSIS — N20.0 CALCULUS, KIDNEY: ICD-10-CM

## 2019-07-15 PROBLEM — N99.528 NEPHROSTOMY COMPLICATION (H): Status: ACTIVE | Noted: 2019-07-15

## 2019-07-15 PROBLEM — T83.83XA: Status: ACTIVE | Noted: 2019-07-15

## 2019-07-15 LAB — B-HCG FREE SERPL-ACNC: 1 [IU]/L (ref 0.86–1.14)

## 2019-07-15 PROCEDURE — 27210905 ZZH KIT CR7

## 2019-07-15 PROCEDURE — 99219 ZZC INITIAL OBSERVATION CARE,LEVL II: CPT | Mod: AI | Performed by: PEDIATRICS

## 2019-07-15 PROCEDURE — 85610 PROTHROMBIN TIME: CPT

## 2019-07-15 PROCEDURE — 27210912 ZZH NEEDLE CR8

## 2019-07-15 PROCEDURE — C1769 GUIDE WIRE: HCPCS

## 2019-07-15 PROCEDURE — 99153 MOD SED SAME PHYS/QHP EA: CPT

## 2019-07-15 PROCEDURE — 25800030 ZZH RX IP 258 OP 636: Performed by: RADIOLOGY

## 2019-07-15 PROCEDURE — 99152 MOD SED SAME PHYS/QHP 5/>YRS: CPT

## 2019-07-15 PROCEDURE — 25000125 ZZHC RX 250: Performed by: STUDENT IN AN ORGANIZED HEALTH CARE EDUCATION/TRAINING PROGRAM

## 2019-07-15 PROCEDURE — 25000132 ZZH RX MED GY IP 250 OP 250 PS 637: Performed by: PHYSICIAN ASSISTANT

## 2019-07-15 PROCEDURE — 40000172 ZZH STATISTIC PROCEDURE PREP ONLY

## 2019-07-15 PROCEDURE — 27210732 ZZH ACCESSORY CR1

## 2019-07-15 PROCEDURE — C1729 CATH, DRAINAGE: HCPCS

## 2019-07-15 PROCEDURE — C1887 CATHETER, GUIDING: HCPCS

## 2019-07-15 PROCEDURE — 50432 PLMT NEPHROSTOMY CATHETER: CPT | Mod: LT

## 2019-07-15 PROCEDURE — 27210886 ZZH ACCESSORY CR5

## 2019-07-15 PROCEDURE — 99207 ZZC APP CREDIT; MD BILLING SHARED VISIT: CPT | Performed by: PHYSICIAN ASSISTANT

## 2019-07-15 PROCEDURE — 25000128 H RX IP 250 OP 636: Performed by: RADIOLOGY

## 2019-07-15 PROCEDURE — 0T9130Z DRAINAGE OF LEFT KIDNEY WITH DRAINAGE DEVICE, PERCUTANEOUS APPROACH: ICD-10-PCS | Performed by: RADIOLOGY

## 2019-07-15 PROCEDURE — G0378 HOSPITAL OBSERVATION PER HR: HCPCS

## 2019-07-15 PROCEDURE — 25000128 H RX IP 250 OP 636: Performed by: STUDENT IN AN ORGANIZED HEALTH CARE EDUCATION/TRAINING PROGRAM

## 2019-07-15 RX ORDER — BISACODYL 10 MG
10 SUPPOSITORY, RECTAL RECTAL EVERY OTHER DAY
Status: DISCONTINUED | OUTPATIENT
Start: 2019-07-16 | End: 2019-07-19 | Stop reason: HOSPADM

## 2019-07-15 RX ORDER — NALOXONE HYDROCHLORIDE 0.4 MG/ML
.1-.4 INJECTION, SOLUTION INTRAMUSCULAR; INTRAVENOUS; SUBCUTANEOUS
Status: DISCONTINUED | OUTPATIENT
Start: 2019-07-15 | End: 2019-07-15

## 2019-07-15 RX ORDER — FENTANYL CITRATE 50 UG/ML
25-50 INJECTION, SOLUTION INTRAMUSCULAR; INTRAVENOUS EVERY 5 MIN PRN
Status: DISCONTINUED | OUTPATIENT
Start: 2019-07-15 | End: 2019-07-15 | Stop reason: HOSPADM

## 2019-07-15 RX ORDER — POLYETHYLENE GLYCOL 3350 17 G/17G
17 POWDER, FOR SOLUTION ORAL DAILY PRN
Status: DISCONTINUED | OUTPATIENT
Start: 2019-07-15 | End: 2019-07-19 | Stop reason: HOSPADM

## 2019-07-15 RX ORDER — OXYBUTYNIN CHLORIDE 5 MG/1
5 TABLET ORAL DAILY
Status: DISCONTINUED | OUTPATIENT
Start: 2019-07-16 | End: 2019-07-19 | Stop reason: HOSPADM

## 2019-07-15 RX ORDER — MOMETASONE FUROATE 1 MG/G
CREAM TOPICAL AT BEDTIME
Status: DISCONTINUED | OUTPATIENT
Start: 2019-07-15 | End: 2019-07-16

## 2019-07-15 RX ORDER — POLYETHYLENE GLYCOL 3350 17 G/17G
17 POWDER, FOR SOLUTION ORAL DAILY PRN
Status: DISCONTINUED | OUTPATIENT
Start: 2019-07-15 | End: 2019-07-15

## 2019-07-15 RX ORDER — SODIUM CHLORIDE 9 MG/ML
INJECTION, SOLUTION INTRAVENOUS CONTINUOUS
Status: DISCONTINUED | OUTPATIENT
Start: 2019-07-15 | End: 2019-07-15 | Stop reason: HOSPADM

## 2019-07-15 RX ORDER — ACETAMINOPHEN 325 MG/1
650 TABLET ORAL EVERY 4 HOURS PRN
Status: DISCONTINUED | OUTPATIENT
Start: 2019-07-15 | End: 2019-07-19 | Stop reason: HOSPADM

## 2019-07-15 RX ORDER — BISACODYL 5 MG
5 TABLET, DELAYED RELEASE (ENTERIC COATED) ORAL DAILY PRN
Status: DISCONTINUED | OUTPATIENT
Start: 2019-07-15 | End: 2019-07-19 | Stop reason: HOSPADM

## 2019-07-15 RX ORDER — AMOXICILLIN 250 MG
1-2 CAPSULE ORAL 2 TIMES DAILY
Status: DISCONTINUED | OUTPATIENT
Start: 2019-07-15 | End: 2019-07-19 | Stop reason: HOSPADM

## 2019-07-15 RX ORDER — LIDOCAINE 40 MG/G
CREAM TOPICAL
Status: DISCONTINUED | OUTPATIENT
Start: 2019-07-15 | End: 2019-07-15 | Stop reason: HOSPADM

## 2019-07-15 RX ORDER — ZINC OXIDE
OINTMENT (GRAM) TOPICAL
Status: DISCONTINUED | OUTPATIENT
Start: 2019-07-15 | End: 2019-07-16

## 2019-07-15 RX ORDER — IODIXANOL 320 MG/ML
50 INJECTION, SOLUTION INTRAVASCULAR ONCE
Status: DISCONTINUED | OUTPATIENT
Start: 2019-07-15 | End: 2019-07-15

## 2019-07-15 RX ORDER — BACLOFEN 10 MG/1
10 TABLET ORAL EVERY 4 HOURS PRN
Status: DISCONTINUED | OUTPATIENT
Start: 2019-07-15 | End: 2019-07-19 | Stop reason: HOSPADM

## 2019-07-15 RX ORDER — CLINDAMYCIN PHOSPHATE 10 MG/G
GEL TOPICAL 2 TIMES DAILY
Status: DISCONTINUED | OUTPATIENT
Start: 2019-07-15 | End: 2019-07-16

## 2019-07-15 RX ORDER — BISACODYL 5 MG
10 TABLET, DELAYED RELEASE (ENTERIC COATED) ORAL DAILY PRN
Status: DISCONTINUED | OUTPATIENT
Start: 2019-07-15 | End: 2019-07-19 | Stop reason: HOSPADM

## 2019-07-15 RX ORDER — ONDANSETRON 4 MG/1
4 TABLET, ORALLY DISINTEGRATING ORAL EVERY 6 HOURS PRN
Status: DISCONTINUED | OUTPATIENT
Start: 2019-07-15 | End: 2019-07-19 | Stop reason: HOSPADM

## 2019-07-15 RX ORDER — LEVETIRACETAM 500 MG/1
1000 TABLET ORAL 2 TIMES DAILY
Status: DISCONTINUED | OUTPATIENT
Start: 2019-07-15 | End: 2019-07-19 | Stop reason: HOSPADM

## 2019-07-15 RX ORDER — NALOXONE HYDROCHLORIDE 0.4 MG/ML
.1-.4 INJECTION, SOLUTION INTRAMUSCULAR; INTRAVENOUS; SUBCUTANEOUS
Status: DISCONTINUED | OUTPATIENT
Start: 2019-07-15 | End: 2019-07-19 | Stop reason: HOSPADM

## 2019-07-15 RX ORDER — ACETAMINOPHEN 650 MG/1
650 SUPPOSITORY RECTAL EVERY 4 HOURS PRN
Status: DISCONTINUED | OUTPATIENT
Start: 2019-07-15 | End: 2019-07-19 | Stop reason: HOSPADM

## 2019-07-15 RX ORDER — UREA 10 %
500 LOTION (ML) TOPICAL DAILY
Status: DISCONTINUED | OUTPATIENT
Start: 2019-07-16 | End: 2019-07-19 | Stop reason: HOSPADM

## 2019-07-15 RX ORDER — NALOXONE HYDROCHLORIDE 0.4 MG/ML
.1-.4 INJECTION, SOLUTION INTRAMUSCULAR; INTRAVENOUS; SUBCUTANEOUS
Status: DISCONTINUED | OUTPATIENT
Start: 2019-07-15 | End: 2019-07-15 | Stop reason: HOSPADM

## 2019-07-15 RX ORDER — ALBUTEROL SULFATE 0.83 MG/ML
2.5 SOLUTION RESPIRATORY (INHALATION)
Status: DISCONTINUED | OUTPATIENT
Start: 2019-07-15 | End: 2019-07-19 | Stop reason: HOSPADM

## 2019-07-15 RX ORDER — FLUMAZENIL 0.1 MG/ML
0.2 INJECTION, SOLUTION INTRAVENOUS
Status: DISCONTINUED | OUTPATIENT
Start: 2019-07-15 | End: 2019-07-15 | Stop reason: HOSPADM

## 2019-07-15 RX ORDER — DOCUSATE SODIUM 100 MG/1
100 CAPSULE, LIQUID FILLED ORAL 2 TIMES DAILY
Status: DISCONTINUED | OUTPATIENT
Start: 2019-07-15 | End: 2019-07-19 | Stop reason: HOSPADM

## 2019-07-15 RX ORDER — POLYVINYL ALCOHOL 14 MG/ML
2 SOLUTION/ DROPS OPHTHALMIC
Status: DISCONTINUED | OUTPATIENT
Start: 2019-07-15 | End: 2019-07-16

## 2019-07-15 RX ORDER — BISACODYL 5 MG
15 TABLET, DELAYED RELEASE (ENTERIC COATED) ORAL DAILY PRN
Status: DISCONTINUED | OUTPATIENT
Start: 2019-07-15 | End: 2019-07-19 | Stop reason: HOSPADM

## 2019-07-15 RX ORDER — ONDANSETRON 2 MG/ML
4 INJECTION INTRAMUSCULAR; INTRAVENOUS EVERY 6 HOURS PRN
Status: DISCONTINUED | OUTPATIENT
Start: 2019-07-15 | End: 2019-07-19 | Stop reason: HOSPADM

## 2019-07-15 RX ORDER — OXYCODONE HYDROCHLORIDE 5 MG/1
5-10 TABLET ORAL
Status: DISCONTINUED | OUTPATIENT
Start: 2019-07-15 | End: 2019-07-15

## 2019-07-15 RX ADMIN — SENNOSIDES AND DOCUSATE SODIUM 1 TABLET: 8.6; 5 TABLET ORAL at 23:54

## 2019-07-15 RX ADMIN — DOCUSATE SODIUM 100 MG: 100 CAPSULE, LIQUID FILLED ORAL at 23:54

## 2019-07-15 RX ADMIN — LEVETIRACETAM 1000 MG: 500 TABLET ORAL at 23:54

## 2019-07-15 RX ADMIN — FENTANYL CITRATE 50 MCG: 50 INJECTION INTRAMUSCULAR; INTRAVENOUS at 16:22

## 2019-07-15 RX ADMIN — VANCOMYCIN HYDROCHLORIDE 1500 MG: 10 INJECTION, POWDER, LYOPHILIZED, FOR SOLUTION INTRAVENOUS at 13:45

## 2019-07-15 RX ADMIN — FENTANYL CITRATE 50 MCG: 50 INJECTION INTRAMUSCULAR; INTRAVENOUS at 17:33

## 2019-07-15 RX ADMIN — MIDAZOLAM 1 MG: 1 INJECTION INTRAMUSCULAR; INTRAVENOUS at 15:44

## 2019-07-15 RX ADMIN — FENTANYL CITRATE 50 MCG: 50 INJECTION INTRAMUSCULAR; INTRAVENOUS at 15:45

## 2019-07-15 RX ADMIN — SODIUM CHLORIDE: 9 INJECTION, SOLUTION INTRAVENOUS at 13:30

## 2019-07-15 RX ADMIN — LIDOCAINE HYDROCHLORIDE 20 ML: 10 INJECTION, SOLUTION EPIDURAL; INFILTRATION; INTRACAUDAL; PERINEURAL at 17:47

## 2019-07-15 RX ADMIN — MIDAZOLAM 1 MG: 1 INJECTION INTRAMUSCULAR; INTRAVENOUS at 16:23

## 2019-07-15 NOTE — PRE-PROCEDURE
Interventional Radiology Pre-Procedure Sedation Assessment   Time of Assessment: 2:23 PM    Expected Level: Minimal Sedation    Indication: Sedation is required for the following type of Procedure: Drain    Sedation and procedural consent: Risks, benefits and alternatives were discussed with Patient    PO Intake: Appropriately NPO for procedure    ASA Class: Class 3 - SEVERE SYSTEMIC DISEASE, DEFINITE FUNCTIONAL LIMITATIONS.    Mallampati: Grade 3:  Soft palate visible, posterior pharyngeal wall not visible    Lungs: Lungs Clear with good breath sounds bilaterally    Heart: Normal heart sounds and rate    History and physical reviewed and no updates needed. I have reviewed the lab findings, diagnostic data, medications, and the plan for sedation. I have determined this patient to be an appropriate candidate for the planned sedation and procedure and have reassessed the patient IMMEDIATELY PRIOR to sedation and procedure.    Payam Koenig MD

## 2019-07-15 NOTE — IR NOTE
Interventional Radiology Brief Post Procedure Note    Procedure: IR NEPHROSTOMY TUBE PLACEMENT LEFT    Proceduralist: Javi Luna MD; Payam Alaniz MD    Assistant: IR Fellow Physician, Oliverio Martell MD and None    Time Out: Prior to the start of the procedure and with procedural staff participation, I verbally confirmed the patient s identity using two indicators, relevant allergies, that the procedure was appropriate and matched the consent or emergent situation, and that the correct equipment/implants were available. Immediately prior to starting the procedure I conducted the Time Out with the procedural staff and re-confirmed the patient s name, procedure, and site/side. (The Joint Commission universal protocol was followed.)  Yes    Medications   Medication Event Details Admin User Admin Time   midazolam (VERSED) injection 0.5-2 mg Medication Given Dose: 1 mg; Route: Intravenous Bridget Mart RN 7/15/2019  3:44 PM   fentaNYL (PF) (SUBLIMAZE) injection 25-50 mcg Medication Given Dose: 50 mcg; Route: Intravenous Bridget Mart RN 7/15/2019  3:45 PM   fentaNYL (PF) (SUBLIMAZE) injection 25-50 mcg Medication Given Dose: 50 mcg; Route: Intravenous Bridget Mart RN 7/15/2019  4:22 PM   midazolam (VERSED) injection 0.5-2 mg Medication Given Dose: 1 mg; Route: Intravenous Bridget Mart RN 7/15/2019  4:23 PM   fentaNYL (PF) (SUBLIMAZE) injection 25-50 mcg Medication Given Dose: 50 mcg; Route: Intravenous Bridget Mart RN 7/15/2019  5:33 PM   lidocaine 1 % 1-30 mL Medication Given by Other Dose: 20 mL; Route: Intradermal Bridget Mart RN 7/15/2019  5:47 PM       Sedation: IR Nurse Monitored Care   Post Procedure Summary:  Prior to the start of the procedure and with procedural staff participation, I verbally confirmed the patient s identity using two indicators, relevant allergies, that the procedure was appropriate and matched the consent or  emergent situation, and that the correct equipment/implants were available. Immediately prior to starting the procedure I conducted the Time Out with the procedural staff and re-confirmed the patient s name, procedure, and site/side. (The Joint Commission universal protocol was followed.)  Yes       Sedatives: Fentanyl and Midazolam (Versed)    Vital signs, airway and pulse oximetry were monitored and remained stable throughout the procedure and sedation was maintained until the procedure was complete.  The patient was monitored by staff until sedation discharge criteria were met.    Patient tolerance: Patient tolerated procedure without immediate complication..    Time of sedation in minutes: 120 Minutes minutes from beginning to end of physician one to one monitoring.          Findings: Per report    Estimated Blood Loss: Minimal    Fluoroscopy Time: 28.8 minute(s)    SPECIMENS: None    Complications: 1. None     Condition: Stable    Plan: Admit for observation due to complexity of procedure resulting in increased risk of complication. Otherwise, per orders.    Comments: See dictated procedure note for full details.    Oliverio Martell MD

## 2019-07-15 NOTE — PROGRESS NOTES
Patient Name: Maicol Carrera  Medical Record Number: [id number]  Today's Date: 7/15/2019    Procedure: Left nephrostomy tube placement Proceduralist: Dr Luna, TRAVIS Castro    Sedation start time: 1543  Sedation end time: 1745  Sedation medications administered: versed 2 mg, fentanyl 150 mcg   Total sedation time: 120 minutes      Procedure start time: 1543  Puncture time: 1545  Procedure end time: 1745    Report given to: SERA Barbosa 6D      Other Notes: Pt arrived to IR room 5 from 5A. Consent reviewed and verified. Pt denies any questions or concerns regarding procedure.   Pt positioned right side lying and monitored per protocol.   Tube placed back left side to gravity drainage.  Pt tolerated procedure without any noted complications. Pt transferred  to 6D.

## 2019-07-15 NOTE — H&P
"[facility] [place]    History and Physical - Hospitalist Service, Gold Med Night       Date of Admission:  7/15/2019    Assessment & Plan   Maicol Carrera is a 43 year old male with a history of JUAN, seizures, spastic quadriplegia, neurogenic bladder w/suprapubic catheter in place, and recurrent nephrolithiasis, who was admitted following L nephrostomy tube placement on 7/15/19 for postoperative monitoring.     Recurrent nephrolithiasis s/p left nephrostomy tube placement (7/15/19): He has recurrent nephrolithiasis with a left-sided staghorn calculus, previously managed with multiple percutaneous nephrolithotomies. Follows with Dr. Horn of Urology, last saw 4/26/19 and planned for nephrostomy tube placement. Now s/p L nephrostomy tube placement with IR today, tube placement was difficult and he is admitted for post-op monitoring. Currently with hematuria, is HDS.    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 Follows with Urology as above. PTA on oxybutynin, baclofen pump (last filled 6/4/19 at [facility]), and PO baclofen PRN.    - Continue PTA oxybutynin, baclofen    Hx of seizures: No seizure activity in \"years.\" No concern for seizure activity here.    - Continue PTA Keppra 1000 mg BID   - Seizure precautions    JUAN: Stable, continue PTA BiPAP.     Chronic constipation: Stable, continue PTA regimen of Dulcolax, Colace, Miralax, and Senokot.       Diet: Regular Diet Adult    DVT Prophylaxis: Pneumatic Compression Devices  Belle Catheter: not present  Code Status: Full code    Disposition Plan   Expected discharge: Tomorrow, recommended to prior living arrangement once hemoglobin stable.  Entered: Robyn Huynh PA-C " "07/15/2019, 6:56 PM     The patient's care was discussed with the Attending Physician, Dr. Stapleton, Bedside Nurse and Patient.    Robyn Huynh PA-C  [facility], [place]  Pager: [phone]  Please see sticky note for cross cover information  ______________________________________________________________________    Chief Complaint   S/p nephrostomy tube placement    History is obtained from the patient    History of Present Illness   Maicol Carrera is a 43 year old male with a history of JUAN, quadriplegia, neurogenic bladder w/suprapubic catheter in place, and recurrent nephrolithiasis, who was admitted following L nephrostomy tube on 7/15/19 for postoperative monitoring. He reports a long-standing history of nephrolithiasis, he had multiple percutaneous nephrolithotomies last year (last in June 2018). He as doing well overall after his last nephrolithotomy but had a renogram in April which demonstrated unchanged stone burden so he subsequently underwent left nephrostomy tube placement today. The tube placement was difficult so he was admitted for monitoring and serial hemoglobins overnight.     Currently, he reports doing well and has no complaints of pain. No nausea, vomiting, dizziness, lightheadedness, headaches. Ate a turkey sandwich and is taking in fluids well. He reports his other chronic medical conditions are stable. He cannot recall the last time he had a seizures, tells me this was \"years ago.\" Baclofen pump gets filled twice per year, was last filled at [facility] on 6/4/19, is unsure of when is next fill date is exactly. Denies any current or recent chest pain/pressure, palpitations, dyspnea, cough, cold symptoms, fever/chills, leg swelling.     Review of Systems    The 10 point Review of Systems is negative other than noted in the HPI or here.     Past Medical History    I have reviewed this patient's medical history and updated it with pertinent information if " needed.   Past Medical History:   Diagnosis Date     Chronic infection     + MRSA     Constipation, chronic      GERD (gastroesophageal reflux disease)      Head injury      Neurogenic bladder     SP catheter     JUAN (obstructive sleep apnea)     bipap     Quadriplegia (H)      Seizure (H)      Spastic quadriplegia (H)      Urinary tract infection        Past Surgical History   I have reviewed this patient's surgical history and updated it with pertinent information if needed.  Past Surgical History:   Procedure Laterality Date     APPENDECTOMY OPEN       BACK SURGERY       INSERT PUMP BACLOFEN       LASER HOLMIUM NEPHROLITHOTOMY VIA PERCUTANEOUS NEPHROSTOMY  10/19/2011    Procedure:LASER HOLMIUM NEPHROLITHOTOMY VIA PERCUTANEOUS NEPHROSTOMY; Left Ureteral Stent Placement, Left Percutaneous Access, Left Percutaneous Nephrostomy  *Latex Allergy*  ; Surgeon:YAN ADDISON; Location:[facility] OR     LASER HOLMIUM NEPHROLITHOTOMY VIA PERCUTANEOUS NEPHROSTOMY Left 3/7/2018    Procedure: LASER HOLMIUM NEPHROLITHOTOMY VIA PERCUTANEOUS NEPHROSTOMY;  Left Percutaneous Nephrolithotomy, Access To Kidney, Ureteroscopy, Cystoscopy, Stent Placement With Holmium Laser standby;  Surgeon: Dejon Horn MD;  Location: [facility] OR     LASER HOLMIUM NEPHROLITHOTOMY VIA PERCUTANEOUS NEPHROSTOMY Left 3/21/2018    Procedure: LASER HOLMIUM NEPHROLITHOTOMY VIA PERCUTANEOUS NEPHROSTOMY;  Left Holmium Laser Percutaneous Nephrolithotomy, Access To Kidney, Left Ureteroscopy, Stent Placement  general with block **Latex Allergy**;  Surgeon: Dejon Horn MD;  Location: [facility] OR     LASER HOLMIUM NEPHROLITHOTOMY VIA PERCUTANEOUS NEPHROSTOMY Left 6/6/2018    Procedure: LASER HOLMIUM NEPHROLITHOTOMY VIA PERCUTANEOUS NEPHROSTOMY;  Left Percutaneous Nephrolithotomy,  Ureteroscopy, retrogrades, extraction of stones with basket, laser standby ;  Surgeon: Dejon Horn MD;  Location: [facility] OR     ORTHOPEDIC SURGERY       supra pubic catheter          Social History   I have reviewed this patient's social history and updated it with pertinent information if needed.  Social History     Tobacco Use     Smoking status: Former Smoker     Packs/day: 0.30     Years: 5.00     Pack years: 1.50     Types: Cigarettes     Last attempt to quit: 1994     Years since quittin.8     Smokeless tobacco: Never Used   Substance Use Topics     Alcohol use: No     Drug use: No       Family History   I have reviewed this patient's family history and updated it with pertinent information if needed.   History reviewed. No pertinent family history.    Prior to Admission Medications   Prior to Admission Medications   Prescriptions Last Dose Informant Patient Reported? Taking?   ACETAMINOPHEN PO More than a month at Unknown time  Yes No   Sig: Take 650 mg by mouth every 4 hours as needed for pain   BACLOFEN IT   Yes No   Sig: Via implanted IT pump  Baclofen Concentration 2000 mc/gmL   Dose: 277.1 mcg/day  Low reservoir alarm date: 3/4/2020  Low reservoir Alarm Volume: 2ml  Interrogated 19  Pump is managed by Ulices Roth   Cranberry 400 MG TABS 2019 at Unknown time  Yes Yes   Sig: Take 400 mg by mouth 2 times daily   LevETIRAcetam (KEPPRA PO) 7/15/2019 at 0700  Yes Yes   Sig: Take 1,000 mg by mouth 2 times daily   Multiple Vitamins-Minerals (MULTIVITAMIN ADULT PO) 2019 at Unknown time  Yes Yes   Sig: Take 1 tablet by mouth daily   Nutritional Supplements (OSTEO-MULTIVITAMINS/MINERALS OR) 2019 at Unknown time  Yes Yes   Sig: Take  by mouth daily. 12 pm     albuterol (2.5 MG/3ML) 0.083% nebulizer solution   No No   Sig: Take 3 mLs by nebulization every 2 hours as needed.   baclofen (LIORESAL) 10 MG tablet Past Month at Unknown time  Yes Yes   Sig: Take 10 mg by mouth every 4 hours as needed    bisacodyl (DULCOLAX) 10 MG suppository 2019 at Unknown time  No Yes   Sig: Place 1 suppository rectally every other day.   carbamide peroxide (DEBROX) 6.5 %  otic solution Past Month at Unknown time  Yes Yes   Si drops 2 times daily   cholecalciferol (VITAMIN D3) 5000 units (125 mcg) capsule 2019 at Unknown time  Yes Yes   Sig: TAKE 1 CAP BY MOUTH ONCE DAILY FOR VIT-D DEFICIENCY   clindamycin (CLINDAMAX) 1 % topical gel 2019 at Unknown time  Yes Yes   Sig: Apply topically 2 times daily   cyanocolbalamin (VITAMIN B-12) 1000 MCG tablet 2019 at Unknown time  No Yes   Sig: Take 1 tablet by mouth daily.   docusate sodium (COLACE) 100 MG capsule 2019 at Unknown time  Yes Yes   Sig: Take 100 mg by mouth 2 times daily.   ferrous sulfate 325 (65 FE) MG tablet 2019 at Unknown time  No Yes   Sig: Take 1 tablet by mouth daily (with breakfast).   ketoconazole (NIZORAL) 2 % cream 2019 at Unknown time  Yes Yes   Sig: Apply topically 2 times daily as needed   magnesium gluconate (MAGONATE) 500 MG tablet 2019 at Unknown time  No Yes   Sig: Take 1 tablet by mouth daily.   miconazole (MICATIN; MICRO GUARD) 2 % powder 2019 at Unknown time  No Yes   Sig: Apply  topically 2 times daily.   mometasone (ELOCON) 0.1 % cream 2019 at Unknown time  Yes Yes   Sig: Apply topically At Bedtime   ondansetron (ZOFRAN-ODT) 4 MG disintegrating tablet Past Month at Unknown time  No Yes   Sig: Take 1 tablet by mouth every 6 hours as needed for nausea.   oxybutynin (DITROPAN) 5 MG tablet 2019 at Unknown time  Yes Yes   Sig: Take 5 mg by mouth   polyethylene glycol (MIRALAX/GLYCOLAX) powder Past Month at Unknown time  Yes Yes   Sig: Take 1 capful by mouth daily as needed for constipation (if no BM x3 days)   polyvinyl alcohol (LIQUIFILM TEARS) 1.4 % ophthalmic solution Past Month at Unknown time  Yes Yes   Sig: Place 2 drops into both eyes every 2 hours as needed for dry eyes   selenium sulfide (SELSUN) 2.5 % lotion   Yes No   Sig: Apply to scalp topically in the morning every Sun, Tue, Thursday. Place for 5 min then rinse.   senna-docusate  (SENOKOT-S;PERICOLACE) 8.6-50 MG per tablet Past Month at Unknown time  No Yes   Sig: Take 1-2 tablets by mouth 2 times daily.   vitamin C 250 MG TABS 7/14/2019 at Unknown time  No Yes   Sig: Take 1 tablet by mouth 2 times daily.   zinc oxide (DESITIN) 40 % ointment 7/14/2019 at Unknown time  No Yes   Sig: Apply  topically every hour as needed.      Facility-Administered Medications: None     Allergies   Allergies   Allergen Reactions     Latex Rash     Latex Rash       Physical Exam   Vital Signs: Temp: 97.8  F (36.6  C) Temp src: Oral BP: 127/78 Pulse: 78 Heart Rate: 79 Resp: 20 SpO2: 99 % O2 Device: None (Room air) Oxygen Delivery: 2 LPM  Weight: 0 lbs 0 oz    Constitutional: Awake and alert, lying comfortably in bed, in no apparent distress.   Eyes: Sclera clear, anicteric  ENT: Moist mucous membranes. Oropharynx clear.   Respiratory: Breathing comfortably on room air. CTA bilaterally with no crackles or wheezing.   Cardiovascular: RRR, normal S1/S2. No rubs or murmurs. Bilateral pedal pulses intact. No peripheral edema.   GI: Soft, non-tender, non-distended. Bowel sounds present.  Genitounirinary: Suprapubic catheter in place, + hematuria.   Skin: Warm and dry. Good color. No jaundice.   Neurologic: Spastic quadriplegia. Alert and oriented to person, place, and time.     Data   Data reviewed today: I reviewed all medications, new labs and imaging results over the last 24 hours.      ROUTINE IP LABS (Last four results)  No lab results found in last 7 days.  No lab results found in last 7 days.  No lab results found in last 7 days.     Glucose Values Latest Ref Rng & Units 6/19/2019   Bedside Glucose (mg/dl )  - --   GLUCOSE 70 - 99 mg/dL 117(H)   Some recent data might be hidden

## 2019-07-16 PROBLEM — N39.0 COMPLICATED URINARY TRACT INFECTION: Status: ACTIVE | Noted: 2019-07-16

## 2019-07-16 LAB
ABO + RH BLD: NORMAL
ABO + RH BLD: NORMAL
ALBUMIN SERPL-MCNC: 3.1 G/DL (ref 3.4–5)
ALBUMIN UR-MCNC: 100 MG/DL
ALP SERPL-CCNC: 69 U/L (ref 40–150)
ALT SERPL W P-5'-P-CCNC: 19 U/L (ref 0–70)
ANION GAP SERPL CALCULATED.3IONS-SCNC: 7 MMOL/L (ref 3–14)
APPEARANCE UR: ABNORMAL
AST SERPL W P-5'-P-CCNC: 5 U/L (ref 0–45)
BACTERIA #/AREA URNS HPF: ABNORMAL /HPF
BILIRUB SERPL-MCNC: 0.8 MG/DL (ref 0.2–1.3)
BILIRUB UR QL STRIP: NEGATIVE
BLD GP AB SCN SERPL QL: NORMAL
BLOOD BANK CMNT PATIENT-IMP: NORMAL
BUN SERPL-MCNC: 15 MG/DL (ref 7–30)
CALCIUM SERPL-MCNC: 8.6 MG/DL (ref 8.5–10.1)
CHLORIDE SERPL-SCNC: 103 MMOL/L (ref 94–109)
CO2 SERPL-SCNC: 24 MMOL/L (ref 20–32)
COLOR UR AUTO: ABNORMAL
CREAT SERPL-MCNC: 1.02 MG/DL (ref 0.66–1.25)
ERYTHROCYTE [DISTWIDTH] IN BLOOD BY AUTOMATED COUNT: 16.3 % (ref 10–15)
GFR SERPL CREATININE-BSD FRML MDRD: >90 ML/MIN/{1.73_M2}
GLUCOSE SERPL-MCNC: 121 MG/DL (ref 70–99)
GLUCOSE UR STRIP-MCNC: NEGATIVE MG/DL
HCT VFR BLD AUTO: 44.7 % (ref 40–53)
HGB BLD-MCNC: 13.1 G/DL (ref 13.3–17.7)
HGB BLD-MCNC: 13.3 G/DL (ref 13.3–17.7)
HGB BLD-MCNC: 14.1 G/DL (ref 13.3–17.7)
HGB BLD-MCNC: 15.2 G/DL (ref 13.3–17.7)
HGB UR QL STRIP: ABNORMAL
KETONES UR STRIP-MCNC: NEGATIVE MG/DL
LACTATE BLD-SCNC: 1.1 MMOL/L (ref 0.7–2)
LEUKOCYTE ESTERASE UR QL STRIP: ABNORMAL
MCH RBC QN AUTO: 26.8 PG (ref 26.5–33)
MCHC RBC AUTO-ENTMCNC: 29.8 G/DL (ref 31.5–36.5)
MCV RBC AUTO: 90 FL (ref 78–100)
MUCOUS THREADS #/AREA URNS LPF: PRESENT /LPF
NITRATE UR QL: NEGATIVE
PH UR STRIP: 7.5 PH (ref 5–7)
PLATELET # BLD AUTO: 255 10E9/L (ref 150–450)
POTASSIUM SERPL-SCNC: 4.3 MMOL/L (ref 3.4–5.3)
PROT SERPL-MCNC: 7.8 G/DL (ref 6.8–8.8)
RBC # BLD AUTO: 4.96 10E12/L (ref 4.4–5.9)
RBC #/AREA URNS AUTO: 24 /HPF (ref 0–2)
SODIUM SERPL-SCNC: 133 MMOL/L (ref 133–144)
SOURCE: ABNORMAL
SP GR UR STRIP: 1 (ref 1–1.03)
SPECIMEN EXP DATE BLD: NORMAL
UROBILINOGEN UR STRIP-MCNC: NORMAL MG/DL (ref 0–2)
WBC # BLD AUTO: 24.2 10E9/L (ref 4–11)
WBC #/AREA URNS AUTO: >182 /HPF (ref 0–5)
WBC CLUMPS #/AREA URNS HPF: PRESENT /HPF

## 2019-07-16 PROCEDURE — 81001 URINALYSIS AUTO W/SCOPE: CPT | Performed by: INTERNAL MEDICINE

## 2019-07-16 PROCEDURE — 85027 COMPLETE CBC AUTOMATED: CPT | Performed by: PHYSICIAN ASSISTANT

## 2019-07-16 PROCEDURE — 85018 HEMOGLOBIN: CPT | Performed by: PHYSICIAN ASSISTANT

## 2019-07-16 PROCEDURE — 25000128 H RX IP 250 OP 636: Performed by: INTERNAL MEDICINE

## 2019-07-16 PROCEDURE — 36415 COLL VENOUS BLD VENIPUNCTURE: CPT | Performed by: PHYSICIAN ASSISTANT

## 2019-07-16 PROCEDURE — 25000132 ZZH RX MED GY IP 250 OP 250 PS 637: Performed by: PHYSICIAN ASSISTANT

## 2019-07-16 PROCEDURE — 83605 ASSAY OF LACTIC ACID: CPT | Performed by: INTERNAL MEDICINE

## 2019-07-16 PROCEDURE — 80053 COMPREHEN METABOLIC PANEL: CPT | Performed by: PHYSICIAN ASSISTANT

## 2019-07-16 PROCEDURE — 86900 BLOOD TYPING SEROLOGIC ABO: CPT | Performed by: INTERNAL MEDICINE

## 2019-07-16 PROCEDURE — 40000275 ZZH STATISTIC RCP TIME EA 10 MIN

## 2019-07-16 PROCEDURE — 86901 BLOOD TYPING SEROLOGIC RH(D): CPT | Performed by: INTERNAL MEDICINE

## 2019-07-16 PROCEDURE — 12000001 ZZH R&B MED SURG/OB UMMC

## 2019-07-16 PROCEDURE — 99233 SBSQ HOSP IP/OBS HIGH 50: CPT | Performed by: INTERNAL MEDICINE

## 2019-07-16 PROCEDURE — 36415 COLL VENOUS BLD VENIPUNCTURE: CPT | Performed by: INTERNAL MEDICINE

## 2019-07-16 PROCEDURE — 85018 HEMOGLOBIN: CPT | Performed by: INTERNAL MEDICINE

## 2019-07-16 PROCEDURE — 87086 URINE CULTURE/COLONY COUNT: CPT | Performed by: INTERNAL MEDICINE

## 2019-07-16 PROCEDURE — G0378 HOSPITAL OBSERVATION PER HR: HCPCS

## 2019-07-16 PROCEDURE — 86850 RBC ANTIBODY SCREEN: CPT | Performed by: INTERNAL MEDICINE

## 2019-07-16 PROCEDURE — 94660 CPAP INITIATION&MGMT: CPT

## 2019-07-16 RX ORDER — CEFEPIME HYDROCHLORIDE 1 G/1
1 INJECTION, POWDER, FOR SOLUTION INTRAMUSCULAR; INTRAVENOUS EVERY 12 HOURS
Status: DISCONTINUED | OUTPATIENT
Start: 2019-07-16 | End: 2019-07-19 | Stop reason: HOSPADM

## 2019-07-16 RX ADMIN — ACETAMINOPHEN 650 MG: 325 TABLET, FILM COATED ORAL at 13:00

## 2019-07-16 RX ADMIN — CEFEPIME HYDROCHLORIDE 1 G: 1 INJECTION, POWDER, FOR SOLUTION INTRAMUSCULAR; INTRAVENOUS at 13:00

## 2019-07-16 RX ADMIN — OXYBUTYNIN CHLORIDE 5 MG: 5 TABLET ORAL at 08:47

## 2019-07-16 RX ADMIN — Medication 500 MG: at 08:47

## 2019-07-16 RX ADMIN — LEVETIRACETAM 1000 MG: 500 TABLET ORAL at 19:45

## 2019-07-16 RX ADMIN — ACETAMINOPHEN 650 MG: 325 TABLET, FILM COATED ORAL at 19:45

## 2019-07-16 RX ADMIN — LEVETIRACETAM 1000 MG: 500 TABLET ORAL at 08:47

## 2019-07-16 RX ADMIN — ACETAMINOPHEN 650 MG: 325 TABLET, FILM COATED ORAL at 04:11

## 2019-07-16 ASSESSMENT — ACTIVITIES OF DAILY LIVING (ADL)
ADLS_ACUITY_SCORE: 40
ADLS_ACUITY_SCORE: 39

## 2019-07-16 NOTE — PROGRESS NOTES
[facility], [facility] Progress Note - Hospitalist Service, Gold 8       Date of Admission:  7/15/2019  Assessment & Plan   Maicol Carrera is a 43 year old male with a history of JUAN, seizures, spastic quadriplegia, neurogenic bladder w/suprapubic catheter in place, and recurrent nephrolithiasis, who was admitted following L nephrostomy tube placement on 7/15/19 for postoperative monitoring.      # Complicated Urinary tract infection with history of resistant proteus and h/o pseudomonas (UCx pending)  # Recurrent nephrolithiasis s/p left nephrostomy tube placement (7/15/19): He has recurrent nephrolithiasis with a left-sided staghorn calculus, previously managed with multiple percutaneous nephrolithotomies. Follows with Dr. Horn of Urology, last saw 4/26/19 and planned for nephrostomy tube placement. Now s/p L nephrostomy tube placement with IR on 7/15, tube placement was difficult and he is admitted for post-op monitoring. Hgb prior to procedure was ~15, post-procedure was 13.3. Developed post-op leukocytosis to 25 with elevated temp to 100.3.     - Hgb stable todau (at noon was 14.1), next check at 2200.  Urine clearing without tatiana hematuria. If Hgb stable at 2200, can stop frequent monitoring.  - Cefepime initiated given prior proteus and pseudomonal infections.  Await UCx for ID and sensitivities.  GIven prior sensitivities suspect will need PICC and IV Abx.  Will need 14 day course for complicated UTI.   - BCx if spikes.   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 Follows with Urology as above. PTA on oxybutynin, baclofen pump (last filled 6/4/19 at [facility]), and PO baclofen PRN.    - Continue PTA oxybutynin,  "baclofen     Hx of seizures: No seizure activity in \"years.\" No concern for seizure activity here.    - Continue PTA Keppra 1000 mg BID   - Seizure precautions     JUAN: Stable, continue PTA BiPAP.      Chronic constipation: Stable, continue PTA regimen of Dulcolax, Colace, Miralax, and Senokot.         Diet: Regular Diet Adult    DVT Prophylaxis: ambulation (UE movement) per basline.  Belle Catheter: not present  Code Status: Full Code      Disposition Plan   Expected discharge: 2 - 3 days, recommended to prior living arrangement once antibiotic plan established and hemoglobin stable.  Entered: Erasto Pulido MD 07/16/2019, 3:46 PM       The patient's care was discussed with the Bedside Nurse, Patient and Patient's Family.    Erasto Pulido MD  Hospitalist Service, [address]  Please see sticky note for cross cover information  ______________________________________________________________________    Interval History   Patient feeling much better and wants to go home today.  No flank pain. Feels warm.  No dizziness. No new rashes.  Urine appears to be clearing.    Remainder of 4 point ROS negative including GI, neuro (at baseline), Pulm, and CV.    Data reviewed today: I reviewed all medications, new labs and imaging results over the last 24 hours. I personally reviewed no images or EKG's today.    Physical Exam   Vital Signs: Temp: 99  F (37.2  C) Temp src: Oral BP: 120/68 Pulse: 94 Heart Rate: 105 Resp: 20 SpO2: 93 % O2 Device: None (Room air) Oxygen Delivery: 2 LPM  Weight: 0 lbs 0 oz  Constitutional: Awake and alert, sitting comfortably in wheelchair, in no apparent distress.   Eyes: Sclera clear, anicteric  ENT: Moist mucous membranes. Oropharynx clear.   Respiratory: Breathing comfortably on room air. CTA bilaterally with no crackles or wheezing.   Cardiovascular: RRR, normal S1/S2. No rubs or murmurs.  No peripheral edema.   GI: Soft, non-tender, " non-distended. Bowel sounds present.  Genitounirinary: Suprapubic catheter in place and Nephrostomy in place, + light pink hematuria.   Skin: Warm and dry. Good color. No jaundice.   Neurologic: Spastic quadriplegia. Alert and oriented to person, place, and time.     Data

## 2019-07-16 NOTE — PLAN OF CARE
Observation Goals:  Prior to Discharge  -diagnostic tests and consults completed and resulted - Not met- Trending hgb  -vital signs normal or at patient baseline - Monitoring, AVSS except diastolic BP elevated  -tolerating oral intake to maintain hydration - Met  -adequate pain control on oral analgesics - Met, denies pain at this time  -tolerating oral antibiotics or has plans for home infusion setup - NA- no Abx ordered  -infection is improving - Pending  -dyspnea improved and O2 sats greater than 88% on room air or prior home oxygen levels - Met, patient has Bipap on overnight  -returns to baseline functional status - pending  -safe disposition plan has been identified - Not met (patient from [facility]; no family)    Pt refused seizure pads, denies pain or nausea at this time. See I and O flowsheet for PNT and SP catheter OP. Pt refused PTA topical medications while on observation status.

## 2019-07-16 NOTE — PLAN OF CARE
Observation Goals:  Prior to Discharge  -diagnostic tests and consults completed and resulted - Not met- Trending hgb, last reading stable (15.2).   -vital signs normal or at patient baseline - Monitoring, AVSS   -tolerating oral intake to maintain hydration - Met  -adequate pain control on oral analgesics - Met, denies pain at this time  -tolerating oral antibiotics or has plans for home infusion setup - NA- no Abx ordered  -infection is improving - Pending  -dyspnea improved and O2 sats greater than 88% on room air or prior home oxygen levels - Met, patient has Bipap on overnight  -returns to baseline functional status - MET- baseline quadriplegic  -safe disposition plan has been identified - Not met (patient from [facility])    Pt eager to go back to [facility] today. Tylenol given for minor discomfort, temp 99.1.

## 2019-07-16 NOTE — PLAN OF CARE
Outpatient/Observation goals to be met before discharge home:     diagnostic tests and consults completed and resulted : no  -vital signs normal or at patient baseline : yes  -tolerating oral intake to maintain hydration : yes  -adequate pain control on oral analgesics : yes  -tolerating oral antibiotics or has plans for home infusion setup: na   -infection is improving : no  -dyspnea improved and O2 sats greater than 88% on room air or prior home oxygen levels : yes  -returns to baseline functional status : yes  -safe disposition plan has been identified : yes back to [facility]

## 2019-07-16 NOTE — PLAN OF CARE
Observation Goals:  Prior to Discharge  -diagnostic tests and consults completed and resulted - Not met  -vital signs normal or at patient baseline - Monitoring, AVSS except diastolic BP elevated  -tolerating oral intake to maintain hydration - Met  -adequate pain control on oral analgesics - Met, denies pain at this time  -tolerating oral antibiotics or has plans for home infusion setup - Not met  -infection is improving - Not met  -dyspnea improved and O2 sats greater than 88% on room air or prior home oxygen levels - Met, patient has Bipap, unable to tolerate mask, request to RT for smaller mask, pt tolerating well.  -returns to baseline functional status - Not met  -safe disposition plan has been identified - Not met (patient from [facility]; no family)    Nurse to notify provider when observation goals have been met and patient is ready for discharge

## 2019-07-16 NOTE — PROGRESS NOTES
"Social Work Services Progress Note    Hospital Day: 1   Date of Initial Social Work Evaluation:  Not completed   Collaborated with:  Chart review, medical team, admissions at [facility] in [place].     Data:  Maicol Carrera is a 43 year old male with a history of JUAN, seizures, spastic quadriplegia, neurogenic bladder w/suprapubic catheter in place, and recurrent nephrolithiasis, who was admitted following L nephrostomy tube placement on 7/15/19 for postoperative monitoring.     Intervention:  SWer assisting with return to LTC.     Assessment:  Per chart review, pt admitted from [facility] in [facility]. Pt has been living there for about 20 years. SWer faxed updated clinicals and contacted admissions to follow up. No problem with pt returning. Facility requesting discharge time and orders when medically ready.     Per RN later this afternoon, pt not ready for discharge at this time. Pt flipped inpt and possibly getting PICC IV abx. SWer called and updated admissions.     SWer will continue to follow and remain available. Per chart, pt prefers to use \"[facility]\" for transportation to LTC when ready. SWer will confirm with pt and assist as needed.     Plan:    Anticipated Disposition:  [facility]  PH: [phone], F: [phone]    Barriers to d/c plan:  Clearance for discharge, medically      Follow Up:  SWer will follow and remain available.     Yolande Velarde, MEME, [facility]  Acute Care Inpatient Clinical   6D-Observation Unit  Phone: [phone]  I   Pager: [phone]        "

## 2019-07-16 NOTE — IR NOTE
"IR PROGRESS NOTE    S: Mr. Carrera states he is feeling much better today. He reports a temperature up to 100.3 overnight, which then went down. He has no other complaints. Specifically, he denies back or flank pain, fever, and chills.     O:  Vital signs:  Temp: 99  F (37.2  C) Temp src: Axillary BP: 108/71 Pulse: 94 Heart Rate: 81 Resp: 18 SpO2: 98 % O2 Device: BiPAP/CPAP Oxygen Delivery: 2 LPM      Estimated body mass index is 26.31 kg/m  as calculated from the following:    Height as of 4/26/19: 1.791 m (5' 10.5\").    Weight as of 4/26/19: 84.4 kg (186 lb).     Lab Results   Component Value Date    WBC 24.2 07/16/2019     Lab Results   Component Value Date    RBC 4.96 07/16/2019     Lab Results   Component Value Date    HGB 13.3 07/16/2019     Lab Results   Component Value Date    HCT 44.7 07/16/2019     No components found for: MCT  Lab Results   Component Value Date    MCV 90 07/16/2019     Lab Results   Component Value Date    MCH 26.8 07/16/2019     Lab Results   Component Value Date    MCHC 29.8 07/16/2019     Lab Results   Component Value Date    RDW 16.3 07/16/2019     Lab Results   Component Value Date     07/16/2019     Last Comprehensive Metabolic Panel:  Sodium   Date Value Ref Range Status   07/16/2019 133 133 - 144 mmol/L Final     Potassium   Date Value Ref Range Status   07/16/2019 4.3 3.4 - 5.3 mmol/L Final     Chloride   Date Value Ref Range Status   07/16/2019 103 94 - 109 mmol/L Final     Carbon Dioxide   Date Value Ref Range Status   07/16/2019 24 20 - 32 mmol/L Final     Anion Gap   Date Value Ref Range Status   07/16/2019 7 3 - 14 mmol/L Final     Glucose   Date Value Ref Range Status   07/16/2019 121 (H) 70 - 99 mg/dL Final     Urea Nitrogen   Date Value Ref Range Status   07/16/2019 15 7 - 30 mg/dL Final     Creatinine   Date Value Ref Range Status   07/16/2019 1.02 0.66 - 1.25 mg/dL Final     GFR Estimate   Date Value Ref Range Status   07/16/2019 >90 >60 mL/min/[1.73_m2] Final    "  Comment:     Non  GFR Calc  Starting 12/18/2018, serum creatinine based estimated GFR (eGFR) will be   calculated using the Chronic Kidney Disease Epidemiology Collaboration   (CKD-EPI) equation.       Calcium   Date Value Ref Range Status   07/16/2019 8.6 8.5 - 10.1 mg/dL Final     Bilirubin Total   Date Value Ref Range Status   07/16/2019 0.8 0.2 - 1.3 mg/dL Final     Alkaline Phosphatase   Date Value Ref Range Status   07/16/2019 69 40 - 150 U/L Final     ALT   Date Value Ref Range Status   07/16/2019 19 0 - 70 U/L Final     AST   Date Value Ref Range Status   07/16/2019 5 0 - 45 U/L Final       Left PNT catheter draining cloudy yellow urine with minimal pink tinge.     A/P:   Mild hematuria: to be expected following PNT, and quite minimal considering complexity of procedure.  Leukocytosis:    Patient is vitally stable and feeling very well without other subjective or objective findings of systemic immune response. This is most likely due to transient periprocedural bacteremia and/or stress demargination. We will discuss with medicine team.

## 2019-07-16 NOTE — PROGRESS NOTES
Gold Provider #[phone] paged with update on elevated diastolic BP. Will continue to monitor and update provider with changes.

## 2019-07-17 LAB
ANION GAP SERPL CALCULATED.3IONS-SCNC: 7 MMOL/L (ref 3–14)
BACTERIA SPEC CULT: NORMAL
BUN SERPL-MCNC: 17 MG/DL (ref 7–30)
CALCIUM SERPL-MCNC: 9.1 MG/DL (ref 8.5–10.1)
CHLORIDE SERPL-SCNC: 105 MMOL/L (ref 94–109)
CO2 SERPL-SCNC: 24 MMOL/L (ref 20–32)
CREAT SERPL-MCNC: 0.95 MG/DL (ref 0.66–1.25)
ERYTHROCYTE [DISTWIDTH] IN BLOOD BY AUTOMATED COUNT: 16.4 % (ref 10–15)
GFR SERPL CREATININE-BSD FRML MDRD: >90 ML/MIN/{1.73_M2}
GLUCOSE SERPL-MCNC: 117 MG/DL (ref 70–99)
HCT VFR BLD AUTO: 45.4 % (ref 40–53)
HGB BLD-MCNC: 13.3 G/DL (ref 13.3–17.7)
Lab: NORMAL
MCH RBC QN AUTO: 26.2 PG (ref 26.5–33)
MCHC RBC AUTO-ENTMCNC: 29.3 G/DL (ref 31.5–36.5)
MCV RBC AUTO: 89 FL (ref 78–100)
PLATELET # BLD AUTO: 211 10E9/L (ref 150–450)
POTASSIUM SERPL-SCNC: 4 MMOL/L (ref 3.4–5.3)
RBC # BLD AUTO: 5.08 10E12/L (ref 4.4–5.9)
SODIUM SERPL-SCNC: 137 MMOL/L (ref 133–144)
SPECIMEN SOURCE: NORMAL
WBC # BLD AUTO: 15.6 10E9/L (ref 4–11)

## 2019-07-17 PROCEDURE — 36415 COLL VENOUS BLD VENIPUNCTURE: CPT | Performed by: INTERNAL MEDICINE

## 2019-07-17 PROCEDURE — 25000132 ZZH RX MED GY IP 250 OP 250 PS 637: Performed by: PHYSICIAN ASSISTANT

## 2019-07-17 PROCEDURE — 99232 SBSQ HOSP IP/OBS MODERATE 35: CPT | Performed by: INTERNAL MEDICINE

## 2019-07-17 PROCEDURE — 25000128 H RX IP 250 OP 636: Performed by: INTERNAL MEDICINE

## 2019-07-17 PROCEDURE — 94660 CPAP INITIATION&MGMT: CPT

## 2019-07-17 PROCEDURE — 80048 BASIC METABOLIC PNL TOTAL CA: CPT | Performed by: INTERNAL MEDICINE

## 2019-07-17 PROCEDURE — 40000275 ZZH STATISTIC RCP TIME EA 10 MIN

## 2019-07-17 PROCEDURE — 85027 COMPLETE CBC AUTOMATED: CPT | Performed by: INTERNAL MEDICINE

## 2019-07-17 PROCEDURE — 12000001 ZZH R&B MED SURG/OB UMMC

## 2019-07-17 RX ORDER — HEPARIN SODIUM,PORCINE 10 UNIT/ML
2-5 VIAL (ML) INTRAVENOUS
Status: DISCONTINUED | OUTPATIENT
Start: 2019-07-17 | End: 2019-07-19 | Stop reason: HOSPADM

## 2019-07-17 RX ORDER — CEFEPIME HYDROCHLORIDE 1 G/1
1 INJECTION, POWDER, FOR SOLUTION INTRAMUSCULAR; INTRAVENOUS EVERY 12 HOURS
DISCHARGE
Start: 2019-07-18 | End: 2019-08-16

## 2019-07-17 RX ORDER — LIDOCAINE 40 MG/G
CREAM TOPICAL
Status: DISCONTINUED | OUTPATIENT
Start: 2019-07-17 | End: 2019-07-19 | Stop reason: HOSPADM

## 2019-07-17 RX ADMIN — DOCUSATE SODIUM 100 MG: 100 CAPSULE, LIQUID FILLED ORAL at 08:36

## 2019-07-17 RX ADMIN — LEVETIRACETAM 1000 MG: 500 TABLET ORAL at 19:41

## 2019-07-17 RX ADMIN — Medication 500 MG: at 08:35

## 2019-07-17 RX ADMIN — CEFEPIME HYDROCHLORIDE 1 G: 1 INJECTION, POWDER, FOR SOLUTION INTRAMUSCULAR; INTRAVENOUS at 00:42

## 2019-07-17 RX ADMIN — OXYBUTYNIN CHLORIDE 5 MG: 5 TABLET ORAL at 08:36

## 2019-07-17 RX ADMIN — CEFEPIME HYDROCHLORIDE 1 G: 1 INJECTION, POWDER, FOR SOLUTION INTRAMUSCULAR; INTRAVENOUS at 13:33

## 2019-07-17 RX ADMIN — SENNOSIDES AND DOCUSATE SODIUM 2 TABLET: 8.6; 5 TABLET ORAL at 08:35

## 2019-07-17 RX ADMIN — LEVETIRACETAM 1000 MG: 500 TABLET ORAL at 08:35

## 2019-07-17 RX ADMIN — ACETAMINOPHEN 650 MG: 325 TABLET, FILM COATED ORAL at 10:31

## 2019-07-17 RX ADMIN — SENNOSIDES AND DOCUSATE SODIUM 2 TABLET: 8.6; 5 TABLET ORAL at 19:40

## 2019-07-17 RX ADMIN — DOCUSATE SODIUM 100 MG: 100 CAPSULE, LIQUID FILLED ORAL at 19:41

## 2019-07-17 ASSESSMENT — ACTIVITIES OF DAILY LIVING (ADL)
ADLS_ACUITY_SCORE: 39
ADLS_ACUITY_SCORE: 41
ADLS_ACUITY_SCORE: 41
ADLS_ACUITY_SCORE: 39
ADLS_ACUITY_SCORE: 41
ADLS_ACUITY_SCORE: 39

## 2019-07-17 NOTE — PLAN OF CARE
-vital signs normal or at patient baseline; Yes  -tolerating oral intake to maintain hydration; Yes  -adequate pain control on oral analgesics; Yes   -tolerating oral antibiotics or has plans for home infusion setup; Not PO  -infection is improving; NO just started on abx   -dyspnea improved and O2 sats greater than 88% on room air or prior home oxygen levels; Yes  -returns to baseline functional status; yes   -safe disposition plan has been identified; No  Nurse to notify provider when observation goals have been met and patient is ready for discharge.

## 2019-07-17 NOTE — PROGRESS NOTES
[facility], [facility] Progress Note - Hospitalist Service, Gold 8       Date of Admission:  7/15/2019  Assessment & Plan   Maicol Carrera is a 43 year old male with a history of JUAN, seizures, spastic quadriplegia, neurogenic bladder w/suprapubic catheter in place, and recurrent nephrolithiasis, who was admitted following L nephrostomy tube placement on 7/15/19 for postoperative monitoring.      # Complicated Urinary tract infection with history of resistant proteus and h/o pseudomonas (UCx pending)  # Recurrent nephrolithiasis s/p left nephrostomy tube placement (7/15/19): He has recurrent nephrolithiasis with a left-sided staghorn calculus, previously managed with multiple percutaneous nephrolithotomies. Follows with Dr. Horn of Urology, last saw 4/26/19 and planned for nephrostomy tube placement. Now s/p L nephrostomy tube placement with IR on 7/15, tube placement was difficult and he is admitted for post-op monitoring. Hgb prior to procedure was ~15, post-procedure was 13.3. Developed post-op leukocytosis to 25 with elevated temp to 100.3.     - Hgb stable for >24h/  Urine cleared.  - Cefepime initiated given prior proteus and pseudomonal infections.  Await UCx for ID and sensitivities.  May be possible to de-escalate to Ceftriaxone if no pseudomonas.  Prior proteu resistant to all oral medications (macrobid, fluroquinolones and Bactrim) GIven prior sensitivities suspect will need PICC and IV Abx.  Will need 14 day course for complicated UTI.   - BCx if spikes.   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 Follows with Urology as above. PTA on oxybutynin, baclofen pump (last filled 6/4/19 at [facility]), and PO baclofen PRN.     "- Continue PTA oxybutynin, baclofen     Hx of seizures: No seizure activity in \"years.\" No concern for seizure activity here.    - Continue PTA Keppra 1000 mg BID   - Seizure precautions     JUAN: Stable, continue PTA BiPAP.      Chronic constipation: Stable, continue PTA regimen of Dulcolax, Colace, Miralax, and Senokot.         Diet: Regular Diet Adult    DVT Prophylaxis: ambulation (UE movement) per basline.  Belle Catheter: not present  Code Status: Full Code      Disposition Plan   Expected discharge: Tomorrow, recommended to prior living arrangement once antibiotic plan established and hemoglobin stable.  Entered: Erasto Pulido MD 07/17/2019, 7:49 AM       The patient's care was discussed with the Bedside Nurse, Patient and Patient's Family.    Erasto Pulido MD  Hospitalist Service, [address]  Please see sticky note for cross cover information  ______________________________________________________________________    Interval History   Patient feeling much better and wants to go home ASAP.  No flank pain. No dizziness. No new rashes.  Urine appears to be cleared.    Remainder of 4 point ROS negative including GI, neuro (at baseline), Pulm, and CV.    Data reviewed today: I reviewed all medications, new labs and imaging results over the last 24 hours. I personally reviewed no images or EKG's today.    Physical Exam   Vital Signs: Temp: 99.4  F (37.4  C) Temp src: Axillary BP: 129/82   Heart Rate: 92 Resp: 18 SpO2: 96 % O2 Device: BiPAP/CPAP    Weight: 0 lbs 0 oz  Constitutional: Awake and alert, sitting comfortably in wheelchair, in no apparent distress.   Eyes: Sclera clear, anicteric  ENT: Moist mucous membranes. Oropharynx clear.   Respiratory: Breathing comfortably on room air. CTA bilaterally with no crackles or wheezing.   Cardiovascular: RRR, normal S1/S2. No rubs or murmurs.  No peripheral edema.   GI: Soft, non-tender, non-distended. Bowel sounds " present.  Genitounirinary: Suprapubic catheter in place and Nephrostomy in place,    Skin: Warm and dry. Good color. No jaundice.   Neurologic: Spastic quadriplegia. Alert and oriented to person, place, and time.     Data

## 2019-07-17 NOTE — PLAN OF CARE
Shift:   1012-2226  VS: Pain:    /89   Pulse 94   Temp 98.3  F (36.8  C) (Oral)   Resp 18   SpO2 92%   Pain control adequate with repositioning and tylenol.  C/o neck pain and R shoulder pain.    Neuro:  neuros status baseline, unchanged.  Pt is quad.  Able to use soft touch call light.  Pt is total care.  Cardiac:   VSS.  Respiratory:   LS clear.  O2 sats 95% RA.  Uses BiPAP at night.  Machine at bedside.  GI/Diet/Appetite:  LBM today.  Pt incontinent.  Wearing brief.  Abd soft.  BS audible.  Baclofen pump lower right quadrant.  Excellent appetite eats 100% of meals, total feed.  :   pt has suprapubic catheter and L PNT.  See flowsheets for UO.  UO from both sites clearer, less cloudy toward end of shift, UO from both sites augusto/pinkish this AM now clear/yellow in color.  IV maxipime for UTI.  LDA's:   PIV left lower forearm at tko, pt is difficult stick.  Skin:Activity:  skin intact.  Pt on pulsate mattress.  Repositioned q 2 h.  Heels elevated off bed.  Pillows rearranged q 2 h.  Pt wearing incontinence brief, clean.  Tests/Procedures:   preliminary urine culture results in.  Pertinent Labs/Lab Collection:      Plan:    plan for PICC placement tomorrow.

## 2019-07-17 NOTE — PLAN OF CARE
vital signs normal or at patient baseline; Yes  -tolerating oral intake to maintain hydration; Yes  -adequate pain control on oral analgesics; Yes   -tolerating oral antibiotics or has plans for home infusion setup; Not PO  -infection is improving; NO just started on abx   -dyspnea improved and O2 sats greater than 88% on room air or prior home oxygen levels; Yes  -returns to baseline functional status; yes   -safe disposition plan has been identified; No  Nurse to notify provider when observation goals have been met and patient is ready for discharge.

## 2019-07-18 PROCEDURE — 25000132 ZZH RX MED GY IP 250 OP 250 PS 637: Performed by: PHYSICIAN ASSISTANT

## 2019-07-18 PROCEDURE — 25000128 H RX IP 250 OP 636: Performed by: INTERNAL MEDICINE

## 2019-07-18 PROCEDURE — 12000001 ZZH R&B MED SURG/OB UMMC

## 2019-07-18 PROCEDURE — 99233 SBSQ HOSP IP/OBS HIGH 50: CPT | Performed by: INTERNAL MEDICINE

## 2019-07-18 RX ADMIN — Medication 500 MG: at 08:23

## 2019-07-18 RX ADMIN — OXYBUTYNIN CHLORIDE 5 MG: 5 TABLET ORAL at 08:24

## 2019-07-18 RX ADMIN — CEFEPIME HYDROCHLORIDE 1 G: 1 INJECTION, POWDER, FOR SOLUTION INTRAMUSCULAR; INTRAVENOUS at 00:05

## 2019-07-18 RX ADMIN — DOCUSATE SODIUM 100 MG: 100 CAPSULE, LIQUID FILLED ORAL at 20:27

## 2019-07-18 RX ADMIN — CEFEPIME HYDROCHLORIDE 1 G: 1 INJECTION, POWDER, FOR SOLUTION INTRAMUSCULAR; INTRAVENOUS at 12:41

## 2019-07-18 RX ADMIN — SENNOSIDES AND DOCUSATE SODIUM 1 TABLET: 8.6; 5 TABLET ORAL at 20:27

## 2019-07-18 RX ADMIN — LEVETIRACETAM 1000 MG: 500 TABLET ORAL at 08:23

## 2019-07-18 RX ADMIN — LEVETIRACETAM 1000 MG: 500 TABLET ORAL at 20:27

## 2019-07-18 ASSESSMENT — ACTIVITIES OF DAILY LIVING (ADL)
ADLS_ACUITY_SCORE: 41

## 2019-07-18 NOTE — DISCHARGE SUMMARY
[facility], [place]  Hospitalist Discharge Summary       Date of Admission:  7/15/2019  Date of Discharge:  7/18/2019  Discharging Provider: Erasto Pulido MD  Discharge Team: Hospitalist Service, Gold 8    Discharge Diagnoses   Percutaneous Nephrostomy Tube Placement complicated by  Complicated Urinary Tract Infection secondary to nephrolithiasis and percutaneous nephrostomy tube placement, unrelated to suprapubic catheter, polymicrobial  Hematuria    Follow-ups Needed After Discharge   Follow-up Appointments     Follow Up and recommended labs and tests      Follow up with Dr. Horn from Urology as previously scheduled, on   7/29, for stone removal.           Unresulted Labs Ordered in the Past 30 Days of this Admission     No orders found from 6/15/2019 to 7/16/2019.          Discharge Disposition   Discharged to long-term care facility  Condition at discharge: Stable    Hospital Course      Maicol Carrera is a 43 year old male with a history of JUAN, seizures, spastic quadriplegia, neurogenic bladder w/suprapubic catheter in place, and recurrent nephrolithiasis, who was admitted following L nephrostomy tube placement on 7/15/19 for postoperative monitoring.      # Complicated Urinary tract infection with history of resistant proteus and h/o pseudomonas  # Recurrent nephrolithiasis s/p left nephrostomy tube placement (7/15/19) complicated by Hematuria: He has recurrent nephrolithiasis with a left-sided staghorn calculus, previously managed with multiple percutaneous nephrolithotomies. Follows with Dr. Horn of Urology, last saw 4/26/19 and planned for nephrostomy tube placement. Now s/p L nephrostomy tube placement with IR on 7/15, tube placement was difficult and he was admitted for post-op monitoring. Hgb prior to procedure was ~15, post-procedure was 13 and remained stable. Developed post-op leukocytosis to 25 with elevated temp to 100.3 and UA showed >182 WBC. Cefepime  initiated given prior proteus and pseudomonal infections.  Prior proteus resistant to all oral medications (macrobid, fluroquinolones and Bactrim).  Will need 14 day total course for complicated UTI (antibiotic end date 7/30/2019), after which PICC may be removed.     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 Follows with Urology as above. PTA on oxybutynin, baclofen pump (last filled 6/4/19 at [facility]), and PO baclofen PRN.       Consultations This Hospital Stay   VASCULAR ACCESS CARE ADULT IP CONSULT  VASCULAR ACCESS ADULT IP CONSULT  INTERVENTIONAL RADIOLOGY ADULT/PEDS IP CONSULT    Code Status   Full Code    Time Spent on this Encounter   I, Erasto Pulido, personally saw the patient today and spent greater than 30 minutes discharging this patient.     Erasto Pulido MD  [facility], [place]  ______________________________________________________________________    Physical Exam   Vital Signs: Temp: 97.5  F (36.4  C) Temp src: Oral BP: 112/78 Pulse: 85 Heart Rate: 81 Resp: 18 SpO2: 97 % O2 Device: None (Room air)    Weight: 0 lbs 0 oz  Constitutional: Awake and alert, sitting comfortably in wheelchair, in no apparent distress.   Eyes: Sclera clear, anicteric  ENT: Moist mucous membranes. Oropharynx clear.   Respiratory: Breathing comfortably on room air. CTA bilaterally with no crackles or wheezing.   Cardiovascular: RRR, normal S1/S2. No rubs or murmurs.  No peripheral edema.   GI: Soft, non-tender, non-distended. Bowel sounds present.  Genitounirinary: Suprapubic catheter in place and Nephrostomy in place, clear yellowurine draining    Skin: Warm and dry. Good color. No jaundice.   Neurologic: Spastic quadriplegia. Alert and oriented to person, place, and time.         Primary Care Physician   Nancy Ag MD    Discharge Orders      General info for SNF    Length of Stay Estimate: Long Term  Care  Condition at Discharge: Stable  Level of care:skilled   Rehabilitation Potential: Poor  Admission H&P remains valid and up-to-date: Yes  Recent Chemotherapy: N/A  Use Nursing Home Standing Orders: Yes     Mantoux instructions    Give two-step Mantoux (PPD) Per Facility Policy Yes     Reason for your hospital stay    You were admitted to have nephrostomy tubes placed.  Unfortunately, you developed signs and symptoms of an infection, and a urinalysis pointed to a urinary source.  You were started on antibiotics and improved quickly.     IV access    PICC.     Follow Up and recommended labs and tests    Follow up with Dr. Horn from Urology as previously scheduled, on 7/29, for stone removal.     Activity - Up with nursing assistance     Full Code     Advance Diet as Tolerated    Follow this diet upon discharge: Orders Placed This Encounter      Regular Diet Adult       Significant Results and Procedures   Most Recent 3 CBC's:  Recent Labs   Lab Test 07/17/19  0640 07/16/19  2140 07/16/19  1204 07/16/19  0611  06/19/19  1129   WBC 15.6*  --   --  24.2*  --  8.9   HGB 13.3 13.1* 14.1 13.3   < > 14.7   MCV 89  --   --  90  --  87     --   --  255  --  260    < > = values in this interval not displayed.       Discharge Medications   Current Discharge Medication List      START taking these medications    Details   ceFEPIme (MAXIPIME) 1 g SOLR vial Inject 1 g into the vein every 12 hours for 12 days    Associated Diagnoses: Complicated urinary tract infection      !! sodium chloride, PF, 0.9% PF flush 10 mLs by Intracatheter route every 8 hours    Associated Diagnoses: Complicated urinary tract infection      !! sodium chloride, PF, 0.9% PF flush 10-20 mLs by Intracatheter route every hour as needed for line flush or post meds or blood draw After any IV meds or lab draws.    Associated Diagnoses: Complicated urinary tract infection       !! - Potential duplicate medications found. Please discuss with  provider.      CONTINUE these medications which have NOT CHANGED    Details   baclofen (LIORESAL) 10 MG tablet Take 10 mg by mouth every 4 hours as needed       bisacodyl (DULCOLAX) 10 MG suppository Place 1 suppository rectally every other day.  Qty: 12 suppository    Associated Diagnoses: Chronic constipation      carbamide peroxide (DEBROX) 6.5 % otic solution 5 drops 2 times daily      cholecalciferol (VITAMIN D3) 5000 units (125 mcg) capsule TAKE 1 CAP BY MOUTH ONCE DAILY FOR VIT-D DEFICIENCY  Refills: 99      clindamycin (CLINDAMAX) 1 % topical gel Apply topically 2 times daily      Cranberry 400 MG TABS Take 400 mg by mouth 2 times daily      cyanocolbalamin (VITAMIN B-12) 1000 MCG tablet Take 1 tablet by mouth daily.    Associated Diagnoses: Vitamin B 12 deficiency      docusate sodium (COLACE) 100 MG capsule Take 100 mg by mouth 2 times daily.      ferrous sulfate 325 (65 FE) MG tablet Take 1 tablet by mouth daily (with breakfast).  Qty: 100 tablet    Associated Diagnoses: Anemia due to blood loss, acute      ketoconazole (NIZORAL) 2 % cream Apply topically 2 times daily as needed      LevETIRAcetam (KEPPRA PO) Take 1,000 mg by mouth 2 times daily      magnesium gluconate (MAGONATE) 500 MG tablet Take 1 tablet by mouth daily.    Associated Diagnoses: Magnesium deficiency      miconazole (MICATIN; MICRO GUARD) 2 % powder Apply  topically 2 times daily.  Qty: 70 g    Associated Diagnoses: Candida infection of flexural skin      mometasone (ELOCON) 0.1 % cream Apply topically At Bedtime      Multiple Vitamins-Minerals (MULTIVITAMIN ADULT PO) Take 1 tablet by mouth daily      Nutritional Supplements (OSTEO-MULTIVITAMINS/MINERALS OR) Take  by mouth daily. 12 pm        ondansetron (ZOFRAN-ODT) 4 MG disintegrating tablet Take 1 tablet by mouth every 6 hours as needed for nausea.  Qty: 20 tablet    Associated Diagnoses: Nausea and vomiting      oxybutynin (DITROPAN) 5 MG tablet Take 5 mg by mouth      polyethylene  glycol (MIRALAX/GLYCOLAX) powder Take 1 capful by mouth daily as needed for constipation (if no BM x3 days)      polyvinyl alcohol (LIQUIFILM TEARS) 1.4 % ophthalmic solution Place 2 drops into both eyes every 2 hours as needed for dry eyes      senna-docusate (SENOKOT-S;PERICOLACE) 8.6-50 MG per tablet Take 1-2 tablets by mouth 2 times daily.  Qty: 60 tablet    Associated Diagnoses: Chronic constipation      vitamin C 250 MG TABS Take 1 tablet by mouth 2 times daily.    Associated Diagnoses: Recurrent UTI      zinc oxide (DESITIN) 40 % ointment Apply  topically every hour as needed.  Qty: 56.7 g    Associated Diagnoses: Breakdown of skin tissue      ACETAMINOPHEN PO Take 650 mg by mouth every 4 hours as needed for pain      albuterol (2.5 MG/3ML) 0.083% nebulizer solution Take 3 mLs by nebulization every 2 hours as needed.  Qty: 1 Box    Associated Diagnoses: SOB (shortness of breath)      BACLOFEN IT Via implanted IT pump  Baclofen Concentration 2000 mc/gmL   Dose: 277.1 mcg/day  Low reservoir alarm date: 3/4/2020  Low reservoir Alarm Volume: 2ml  Interrogated 6/19/19  Pump is managed by [facility]      selenium sulfide (SELSUN) 2.5 % lotion Apply to scalp topically in the morning every Sun, Tue, Thursday. Place for 5 min then rinse.           Allergies   Allergies   Allergen Reactions     Latex Rash     Latex Rash

## 2019-07-18 NOTE — PROGRESS NOTES
Attempted to access left basilic vein and lateral brachial vein,  both veins attempted around AC area and too deep and unsuccesfsul. Right arm not attempted due to occlusion in right axillary vein. See IR note 3/13/18 Venogram performed. Patient's nurse and Dr. CORA Pulido  notified of failed PICC.

## 2019-07-18 NOTE — PLAN OF CARE
Shift-  5768-1550    Activity: pt is quadriplegic and total assist. Repositioned  Q2h.   Pain: denies   Neuro: intact, A&Ox4   Respiratory: On RA, lung sounds clear, BiPAP at night    GI: pt incontinent, no bowel movement today, baclofen pump R lower quadrant   Diet: Regular diet, is total feed. Good appetite.   Lines: L side suprapubic catheter, R side nephrostomy tube, both have adequate output. PIV left lower forearm     /82 (BP Location: Right arm)   Pulse 85   Temp 98.2  F (36.8  C) (Oral)   Resp 18   SpO2 92%     PICC line unable to be placed today. Pt staying another night, NPO at midnight, IR consult tomorrow.

## 2019-07-18 NOTE — PROGRESS NOTES
ADDENDUM: Spoke with MD, pt was unable to get his PICC line and IR consulted. Pt not medically ready for discharge today. SWer notified LTC at [facility], updated pt father and cancelled transportation. SWer will follow tomorrow 7/19.           Social Work Services Discharge Note      Patient Name:  Maicol Carrera     Anticipated Discharge Date:  Today 7/18/2019 at 7:30pm via [facility] PH: [phone] (pt has his own w/c) back to LT at [facility] [place] PH: [phone], F: [phone]    Discharge Disposition:   LT:  [facility] [place] PH: [phone], F: [phone]    Following MD:  Pt PCP, provider at [facility]       Pre-Admission Screening (PAS) online form has been completed.  The Level of Care (LOC) is:  Determined  Confirmation Code is:  Not needed as pt was a direct admit.   Patient/caregiver informed of referral to [facility] for Pre-Admission Screening for skilled nursing facility (SNF) placement and to expect a phone call post discharge from SNF.     Additional Services/Equipment Arranged:  [facility] PH: [phone] van scheduled for 7:30pm tonight. Pt has his own electric w/c.      Patient / Family response to discharge plan:  Pt notified by SWer at bedside. Pt reported feeling anxious to return and agreeable to plan. Pt denied additional needs or concerns.      Persons notified of above discharge plan:  Pt, pt father Javon (with pt permission), admissions at [facility], MD and bedside RN.     Staff Discharge Instructions:  Please fax discharge orders and signed hard scripts for any controlled substances.  Please print a packet and send with patient.     CTS Handoff completed:  NO    Medicare Notice of Rights provided to the patient/family:  NO    MEME De Luna, [facility] [place]  Acute Care Inpatient Clinical   6D-Observation Unit  Phone: [phone]  I   Pager: [phone]

## 2019-07-18 NOTE — PROGRESS NOTES
[facility], [facility] Progress Note - Hospitalist Service, Gold 8       Date of Admission:  7/15/2019  Assessment & Plan   Maicol Carrera is a 43 year old male with a history of JUAN, seizures, spastic quadriplegia, neurogenic bladder w/suprapubic catheter in place, and recurrent nephrolithiasis, who was admitted following L nephrostomy tube placement on 7/15/19 for postoperative monitoring.      # Complicated Urinary tract infection with history of resistant proteus and h/o pseudomonas (UCx pending)  # Recurrent nephrolithiasis s/p left nephrostomy tube placement (7/15/19): He has recurrent nephrolithiasis with a left-sided staghorn calculus, previously managed with multiple percutaneous nephrolithotomies. Follows with Dr. Horn of Urology, last saw 4/26/19 and planned for nephrostomy tube placement. Now s/p L nephrostomy tube placement with IR on 7/15, tube placement was difficult and he is admitted for post-op monitoring. Hgb prior to procedure was ~15, post-procedure was 13.3. Developed post-op leukocytosis to 25 with elevated temp to 100.3.     - Hgb stable for >24h/  Urine cleared.  - Cefepime initiated given prior proteus and pseudomonal infections.  Await UCx for ID and sensitivities.  May be possible to de-escalate to Ceftriaxone if no pseudomonas.  Prior proteu resistant to all oral medications (macrobid, fluroquinolones and Bactrim) GIven prior sensitivities suspect will need PICC and IV Abx.  Will need 14 day course for complicated UTI.   - BCx if spikes.   - BMP, CBC in the morning   - Nephrostomy tube to gravity drainage   - Post-procedure cares per IR   - Advance diet as tolerated   - Tylenol PRN for pain   - Zofran PRN for nausea   - PICC nurse was unable to place PICC, so IR consult placed for PICC placement prior to discharge.     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 Follows with Urology as  "above. PTA on oxybutynin, baclofen pump (last filled 6/4/19 at [facility]), and PO baclofen PRN.    - Continue PTA oxybutynin, baclofen     Hx of seizures: No seizure activity in \"years.\" No concern for seizure activity here.    - Continue PTA Keppra 1000 mg BID   - Seizure precautions     JUAN: Stable, continue PTA BiPAP.      Chronic constipation: Stable, continue PTA regimen of Dulcolax, Colace, Miralax, and Senokot.         Diet: Regular Diet Adult  Advance Diet as Tolerated  NPO per Anesthesia Guidelines for Procedure/Surgery Except for: Meds    DVT Prophylaxis: ambulation (UE movement) per basline.  Belle Catheter: not present  Code Status: Full Code      Disposition Plan   Expected discharge: Tomorrow, recommended to prior living arrangement once antibiotic plan established and hemoglobin stable.  Entered: Erasto Pulido MD 07/18/2019, 3:57 PM       The patient's care was discussed with the Bedside Nurse, Patient and Patient's Family.    Erasto Pulido MD  Hospitalist Service, [address]  Please see sticky note for cross cover information  ______________________________________________________________________    Interval History   Patient feeling much better and wants to go home ASAP.  No flank pain. No dizziness. No new rashes.  Urine appears to be cleared.    Remainder of 4 point ROS negative including GI, neuro (at baseline), Pulm, and CV.    Data reviewed today: I reviewed all medications, new labs and imaging results over the last 24 hours. I personally reviewed no images or EKG's today.    Physical Exam   Vital Signs: Temp: 97.5  F (36.4  C) Temp src: Oral BP: 112/78 Pulse: 85 Heart Rate: 81 Resp: 18 SpO2: 97 % O2 Device: None (Room air)    Weight: 0 lbs 0 oz  Constitutional: Awake and alert, sitting comfortably in wheelchair, in no apparent distress.   Eyes: Sclera clear, anicteric  ENT: Moist mucous membranes. Oropharynx clear.   Respiratory: " Breathing comfortably on room air. CTA bilaterally with no crackles or wheezing.   Cardiovascular: RRR, normal S1/S2. No rubs or murmurs.  No peripheral edema.   GI: Soft, non-tender, non-distended. Bowel sounds present.  Genitounirinary: Suprapubic catheter in place and Nephrostomy in place,    Skin: Warm and dry. Good color. No jaundice.   Neurologic: Spastic quadriplegia. Alert and oriented to person, place, and time.     Data

## 2019-07-18 NOTE — PLAN OF CARE
Alert and oriented x4, VSS on BiPAP overnight. Chronic UTI being treated with IV abx, getting PICC line today. Total assist and a total feed, regular diet. Up with lift. L PNT with clear yellow urine, Suprapubic catheter with clear yellow urine output. Able to communicate needs. Continue plan of care.

## 2019-07-18 NOTE — DISCHARGE SUMMARY
[facility], [place]  Hospitalist Discharge Summary       Date of Admission:  7/15/2019  Date of Discharge:  7/18/2019  Discharging Provider: Erasto Pulido MD  Discharge Team: Hospitalist Service, Gold 8    Discharge Diagnoses   Percutaneous Nephrostomy Tube Placement complicated by  Complicated Urinary Tract Infection, polymicrobial  Hematuria    Follow-ups Needed After Discharge   Follow-up Appointments     Follow Up and recommended labs and tests      Follow up with Dr. Horn from Urology as previously scheduled, on   7/29, for stone removal.           Unresulted Labs Ordered in the Past 30 Days of this Admission     No orders found from 6/15/2019 to 7/16/2019.          Discharge Disposition   Discharged to long-term care facility  Condition at discharge: Stable    Hospital Course      Maicol Carrera is a 43 year old male with a history of JUAN, seizures, spastic quadriplegia, neurogenic bladder w/suprapubic catheter in place, and recurrent nephrolithiasis, who was admitted following L nephrostomy tube placement on 7/15/19 for postoperative monitoring.      # Complicated Urinary tract infection with history of resistant proteus and h/o pseudomonas  # Recurrent nephrolithiasis s/p left nephrostomy tube placement (7/15/19) complicated by Hematuria: He has recurrent nephrolithiasis with a left-sided staghorn calculus, previously managed with multiple percutaneous nephrolithotomies. Follows with Dr. Horn of Urology, last saw 4/26/19 and planned for nephrostomy tube placement. Now s/p L nephrostomy tube placement with IR on 7/15, tube placement was difficult and he was admitted for post-op monitoring. Hgb prior to procedure was ~15, post-procedure was 13 and remained stable. Developed post-op leukocytosis to 25 with elevated temp to 100.3 and UA showed >182 WBC. Cefepime initiated given prior proteus and pseudomonal infections.  Prior proteus resistant to all oral medications  (macrobid, fluroquinolones and Bactrim).  Will need 14 day total course for complicated UTI (antibiotic end date 7/30/2019), after which PICC may be removed.     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 Follows with Urology as above. PTA on oxybutynin, baclofen pump (last filled 6/4/19 at [facility]), and PO baclofen PRN.       Consultations This Hospital Stay   VASCULAR ACCESS CARE ADULT IP CONSULT  VASCULAR ACCESS ADULT IP CONSULT    Code Status   Full Code    Time Spent on this Encounter   I, Erasto Pulido, personally saw the patient today and spent greater than 30 minutes discharging this patient.     Erasto Pulido MD  [facility], [place]  ______________________________________________________________________    Physical Exam   Vital Signs: Temp: 98.5  F (36.9  C) Temp src: Oral BP: 139/89   Heart Rate: 86 Resp: 18 SpO2: 93 % O2 Device: None (Room air)    Weight: 0 lbs 0 oz  Constitutional: Awake and alert, sitting comfortably in wheelchair, in no apparent distress.   Eyes: Sclera clear, anicteric  ENT: Moist mucous membranes. Oropharynx clear.   Respiratory: Breathing comfortably on room air. CTA bilaterally with no crackles or wheezing.   Cardiovascular: RRR, normal S1/S2. No rubs or murmurs.  No peripheral edema.   GI: Soft, non-tender, non-distended. Bowel sounds present.  Genitounirinary: Suprapubic catheter in place and Nephrostomy in place, clear yellowurine draining    Skin: Warm and dry. Good color. No jaundice.   Neurologic: Spastic quadriplegia. Alert and oriented to person, place, and time.         Primary Care Physician   Nancy Ag MD    Discharge Orders      General info for SNF    Length of Stay Estimate: Long Term Care  Condition at Discharge: Stable  Level of care:skilled   Rehabilitation Potential: Poor  Admission H&P remains valid and up-to-date: Yes  Recent Chemotherapy: N/A  Use  Nursing Home Standing Orders: Yes     Mantoux instructions    Give two-step Mantoux (PPD) Per Facility Policy Yes     Reason for your hospital stay    You were admitted to have nephrostomy tubes placed.  Unfortunately, you developed signs and symptoms of an infection, and a urinalysis pointed to a urinary source.  You were started on antibiotics and improved quickly.     IV access    PICC.     Follow Up and recommended labs and tests    Follow up with Dr. Horn from Urology as previously scheduled, on 7/29, for stone removal.     Activity - Up with nursing assistance     Full Code     Advance Diet as Tolerated    Follow this diet upon discharge: Orders Placed This Encounter      Regular Diet Adult       Significant Results and Procedures   Most Recent 3 CBC's:  Recent Labs   Lab Test 07/17/19  0640 07/16/19  2140 07/16/19  1204 07/16/19  0611  06/19/19  1129   WBC 15.6*  --   --  24.2*  --  8.9   HGB 13.3 13.1* 14.1 13.3   < > 14.7   MCV 89  --   --  90  --  87     --   --  255  --  260    < > = values in this interval not displayed.       Discharge Medications   Current Discharge Medication List      START taking these medications    Details   ceFEPIme (MAXIPIME) 1 g SOLR vial Inject 1 g into the vein every 12 hours for 12 days    Associated Diagnoses: Complicated urinary tract infection         CONTINUE these medications which have NOT CHANGED    Details   baclofen (LIORESAL) 10 MG tablet Take 10 mg by mouth every 4 hours as needed       bisacodyl (DULCOLAX) 10 MG suppository Place 1 suppository rectally every other day.  Qty: 12 suppository    Associated Diagnoses: Chronic constipation      carbamide peroxide (DEBROX) 6.5 % otic solution 5 drops 2 times daily      cholecalciferol (VITAMIN D3) 5000 units (125 mcg) capsule TAKE 1 CAP BY MOUTH ONCE DAILY FOR VIT-D DEFICIENCY  Refills: 99      clindamycin (CLINDAMAX) 1 % topical gel Apply topically 2 times daily      Cranberry 400 MG TABS Take 400 mg by  mouth 2 times daily      cyanocolbalamin (VITAMIN B-12) 1000 MCG tablet Take 1 tablet by mouth daily.    Associated Diagnoses: Vitamin B 12 deficiency      docusate sodium (COLACE) 100 MG capsule Take 100 mg by mouth 2 times daily.      ferrous sulfate 325 (65 FE) MG tablet Take 1 tablet by mouth daily (with breakfast).  Qty: 100 tablet    Associated Diagnoses: Anemia due to blood loss, acute      ketoconazole (NIZORAL) 2 % cream Apply topically 2 times daily as needed      LevETIRAcetam (KEPPRA PO) Take 1,000 mg by mouth 2 times daily      magnesium gluconate (MAGONATE) 500 MG tablet Take 1 tablet by mouth daily.    Associated Diagnoses: Magnesium deficiency      miconazole (MICATIN; MICRO GUARD) 2 % powder Apply  topically 2 times daily.  Qty: 70 g    Associated Diagnoses: Candida infection of flexural skin      mometasone (ELOCON) 0.1 % cream Apply topically At Bedtime      Multiple Vitamins-Minerals (MULTIVITAMIN ADULT PO) Take 1 tablet by mouth daily      Nutritional Supplements (OSTEO-MULTIVITAMINS/MINERALS OR) Take  by mouth daily. 12 pm        ondansetron (ZOFRAN-ODT) 4 MG disintegrating tablet Take 1 tablet by mouth every 6 hours as needed for nausea.  Qty: 20 tablet    Associated Diagnoses: Nausea and vomiting      oxybutynin (DITROPAN) 5 MG tablet Take 5 mg by mouth      polyethylene glycol (MIRALAX/GLYCOLAX) powder Take 1 capful by mouth daily as needed for constipation (if no BM x3 days)      polyvinyl alcohol (LIQUIFILM TEARS) 1.4 % ophthalmic solution Place 2 drops into both eyes every 2 hours as needed for dry eyes      senna-docusate (SENOKOT-S;PERICOLACE) 8.6-50 MG per tablet Take 1-2 tablets by mouth 2 times daily.  Qty: 60 tablet    Associated Diagnoses: Chronic constipation      vitamin C 250 MG TABS Take 1 tablet by mouth 2 times daily.    Associated Diagnoses: Recurrent UTI      zinc oxide (DESITIN) 40 % ointment Apply  topically every hour as needed.  Qty: 56.7 g    Associated Diagnoses:  Breakdown of skin tissue      ACETAMINOPHEN PO Take 650 mg by mouth every 4 hours as needed for pain      albuterol (2.5 MG/3ML) 0.083% nebulizer solution Take 3 mLs by nebulization every 2 hours as needed.  Qty: 1 Box    Associated Diagnoses: SOB (shortness of breath)      BACLOFEN IT Via implanted IT pump  Baclofen Concentration 2000 mc/gmL   Dose: 277.1 mcg/day  Low reservoir alarm date: 3/4/2020  Low reservoir Alarm Volume: 2ml  Interrogated 6/19/19  Pump is managed by Ulices Roth      selenium sulfide (SELSUN) 2.5 % lotion Apply to scalp topically in the morning every Sun, Tue, Thursday. Place for 5 min then rinse.           Allergies   Allergies   Allergen Reactions     Latex Rash     Latex Rash

## 2019-07-19 ENCOUNTER — APPOINTMENT (OUTPATIENT)
Dept: INTERVENTIONAL RADIOLOGY/VASCULAR | Facility: CLINIC | Age: 44
End: 2019-07-19
Attending: RADIOLOGY
Payer: COMMERCIAL

## 2019-07-19 VITALS
SYSTOLIC BLOOD PRESSURE: 109 MMHG | TEMPERATURE: 98.2 F | OXYGEN SATURATION: 95 % | DIASTOLIC BLOOD PRESSURE: 85 MMHG | HEART RATE: 81 BPM | RESPIRATION RATE: 18 BRPM

## 2019-07-19 PROCEDURE — C1769 GUIDE WIRE: HCPCS

## 2019-07-19 PROCEDURE — 25000128 H RX IP 250 OP 636: Performed by: RADIOLOGY

## 2019-07-19 PROCEDURE — 40000275 ZZH STATISTIC RCP TIME EA 10 MIN

## 2019-07-19 PROCEDURE — 99239 HOSP IP/OBS DSCHRG MGMT >30: CPT | Performed by: INTERNAL MEDICINE

## 2019-07-19 PROCEDURE — 27210886 ZZH ACCESSORY CR5

## 2019-07-19 PROCEDURE — 94660 CPAP INITIATION&MGMT: CPT

## 2019-07-19 PROCEDURE — 36573 INSJ PICC RS&I 5 YR+: CPT

## 2019-07-19 PROCEDURE — 27210732 ZZH ACCESSORY CR1

## 2019-07-19 PROCEDURE — 25000125 ZZHC RX 250: Performed by: RADIOLOGY

## 2019-07-19 PROCEDURE — 27210908 ZZH NEEDLE CR4

## 2019-07-19 PROCEDURE — 25000132 ZZH RX MED GY IP 250 OP 250 PS 637: Performed by: PHYSICIAN ASSISTANT

## 2019-07-19 PROCEDURE — 05HY33Z INSERTION OF INFUSION DEVICE INTO UPPER VEIN, PERCUTANEOUS APPROACH: ICD-10-PCS | Performed by: RADIOLOGY

## 2019-07-19 PROCEDURE — 25000128 H RX IP 250 OP 636: Performed by: INTERNAL MEDICINE

## 2019-07-19 PROCEDURE — C1887 CATHETER, GUIDING: HCPCS

## 2019-07-19 PROCEDURE — C1751 CATH, INF, PER/CENT/MIDLINE: HCPCS

## 2019-07-19 PROCEDURE — 25500064 ZZH RX 255 OP 636: Performed by: RADIOLOGY

## 2019-07-19 PROCEDURE — 27210808 ZZH SHEATH CR7

## 2019-07-19 RX ORDER — HEPARIN SODIUM,PORCINE 10 UNIT/ML
5 VIAL (ML) INTRAVENOUS
Status: COMPLETED | OUTPATIENT
Start: 2019-07-19 | End: 2019-07-19

## 2019-07-19 RX ORDER — NICOTINE POLACRILEX 4 MG
15-30 LOZENGE BUCCAL
Status: CANCELLED | OUTPATIENT
Start: 2019-07-19

## 2019-07-19 RX ORDER — IODIXANOL 320 MG/ML
50 INJECTION, SOLUTION INTRAVASCULAR ONCE
Status: COMPLETED | OUTPATIENT
Start: 2019-07-19 | End: 2019-07-19

## 2019-07-19 RX ORDER — HEPARIN SODIUM,PORCINE 10 UNIT/ML
5 VIAL (ML) INTRAVENOUS
Status: CANCELLED | OUTPATIENT
Start: 2019-07-19

## 2019-07-19 RX ORDER — LIDOCAINE HYDROCHLORIDE 10 MG/ML
6 INJECTION, SOLUTION EPIDURAL; INFILTRATION; INTRACAUDAL; PERINEURAL
Status: COMPLETED | OUTPATIENT
Start: 2019-07-19 | End: 2019-07-19

## 2019-07-19 RX ORDER — DEXTROSE MONOHYDRATE 25 G/50ML
25-50 INJECTION, SOLUTION INTRAVENOUS
Status: CANCELLED | OUTPATIENT
Start: 2019-07-19

## 2019-07-19 RX ADMIN — DOCUSATE SODIUM 100 MG: 100 CAPSULE, LIQUID FILLED ORAL at 09:44

## 2019-07-19 RX ADMIN — SODIUM CHLORIDE, PRESERVATIVE FREE 5 ML: 5 INJECTION INTRAVENOUS at 14:40

## 2019-07-19 RX ADMIN — IODIXANOL 10 ML: 320 INJECTION, SOLUTION INTRAVASCULAR at 14:35

## 2019-07-19 RX ADMIN — LIDOCAINE HYDROCHLORIDE 6 ML: 10 INJECTION, SOLUTION EPIDURAL; INFILTRATION; INTRACAUDAL; PERINEURAL at 14:55

## 2019-07-19 RX ADMIN — SENNOSIDES AND DOCUSATE SODIUM 1 TABLET: 8.6; 5 TABLET ORAL at 09:44

## 2019-07-19 RX ADMIN — CEFEPIME HYDROCHLORIDE 1 G: 1 INJECTION, POWDER, FOR SOLUTION INTRAMUSCULAR; INTRAVENOUS at 00:01

## 2019-07-19 RX ADMIN — OXYBUTYNIN CHLORIDE 5 MG: 5 TABLET ORAL at 09:44

## 2019-07-19 RX ADMIN — CEFEPIME HYDROCHLORIDE 1 G: 1 INJECTION, POWDER, FOR SOLUTION INTRAMUSCULAR; INTRAVENOUS at 15:34

## 2019-07-19 RX ADMIN — LEVETIRACETAM 1000 MG: 500 TABLET ORAL at 09:43

## 2019-07-19 RX ADMIN — Medication 500 MG: at 09:44

## 2019-07-19 ASSESSMENT — ACTIVITIES OF DAILY LIVING (ADL)
ADLS_ACUITY_SCORE: 39
ADLS_ACUITY_SCORE: 41

## 2019-07-19 NOTE — PROGRESS NOTES
Pt is A&O x4, denies pain. NPO at midnight. On CPAP, has slept throughout the night. Fluids running TKO. Uses call light appropriately and makes needs known. Catheter and nephrostomy tube with good urine output. Will continue to monitor.

## 2019-07-19 NOTE — PROGRESS NOTES
Interventional Radiology Intra-procedural Nursing Note    Patient Name: Maicol Carrera  Medical Record Number: [id number]  Today's Date: July 19, 2019    Procedure: Peripherally inserted central catheter  Proceduralist: Logan Rangel PA-C, Dr Whalen, Dr Marina    Sedation Notes: No sedation given.     Procedure start time: 1243  Puncture time: 1245  Procedure end time: 1435    Report given to: SERA Carney 6D    Other Notes: Pt arrived to IR room 2 from 6D. Consent reviewed. Pt denies any questions or concerns regarding procedure. Pt positioned supine and monitored per protocol. Single lumen 4 [place] x 27 cm  Midline inserted by Dr Marina via left basilic.  Catheter heparin locked and is ready to use.  Site cleansed and a dressing applied.Pt tolerated procedure without any noted complications. Pt transferred back to 6D.    Maisha Kumar

## 2019-07-19 NOTE — PLAN OF CARE
Shift 1141-4321:     Pt alert and oriented x 4. Pt tolerated regular diet without any issues. Pt aware of NPO at midnight for procedure. Pt denies any pain. Pt repositioned as needed. Pt helped with CPAP. Able to make needs known. Call light within reach. Will continue to monitor.

## 2019-07-19 NOTE — IR NOTE
Interventional Radiology Brief Post Procedure Note    Procedure: IR PICC PLACEMENT > 5 YRS OF AGE    Proceduralist: Herson Marina MD, TREVA Ponce    Assistant: IR Fellow Physician, Oliverio Martell MD and None    Time Out: Prior to the start of the procedure and with procedural staff participation, I verbally confirmed the patient s identity using two indicators, relevant allergies, that the procedure was appropriate and matched the consent or emergent situation, and that the correct equipment/implants were available. Immediately prior to starting the procedure I conducted the Time Out with the procedural staff and re-confirmed the patient s name, procedure, and site/side. (The Joint Commission universal protocol was followed.)  Yes    Medications   Medication Event Details Admin User Admin Time   iodixanol (VISIPAQUE 320) injection 50 mL Medication Given Dose: 10 mL; Route: Intravenous; Scheduled Time:  2:45 PM Moises Shelby 7/19/2019  2:35 PM   heparin lock flush 10 UNIT/ML injection 5 mL Medication Given by Other Dose: 5 mL; Route: Intravenous; Comment: By Dr Dr Marina  No scanner on computer Bridget Vidales RN 7/19/2019  2:40 PM       Sedation: None. Local Anesthestic used    Findings: per report    Estimated Blood Loss: Minimal    Fluoroscopy Time: 6.5 minute(s)    SPECIMENS: None    Complications: 1. Unable to advance to central veins - midline placed    Condition: Stable    Plan: per orders    Comments: See dictated procedure note for full details.    Oliverio Martell MD

## 2019-07-19 NOTE — PROGRESS NOTES
Social Work Services Progress Note    Hospital Day: 4   Date of Initial Social Work Evaluation:  -  Collaborated with:  [facility]res Reid MD    Data:  Pt is a 43-year old male who presented to the hospital following a nephrostomy tube placement. Pt has a history of seizures, JUAN, recurrent nephrolithiasis and spastic quadriplegia.     Intervention:  RENALDO consulted with MD and was informed that pt could go back to his facility later today. RENALDO scheduled a ride with HE at 7:30pm. RENALDO called [facility] and notified staff of his ride time. RENALDO faxed updated medical notes to [facility].     Assessment:  N/A    Plan:    Anticipated Disposition:  Facility:  [facility]    Barriers to d/c plan:  Unforseen medical changes.    Follow Up:  None needed at this time.    MANDY Robles  [facility]   [address]  Pager: [phone]

## 2019-07-19 NOTE — CONSULTS
Patient is on IR schedule 7/19/2019 for a Single lumen PICC line placement   Labs WNL for procedure.  No NPO required.   Consent will be done prior to procedure.   nephrolithiasis percutaneous nephrostomy tube placed developed post-op leukocytosis to 25 with elevated temp to 100.3  Please [place] the IR [place] RN at [phone] for estimated time of procedure.     Discussed with Dr. Salas Bates IR RPA  [phone] Call pager  [phone] pager

## 2019-07-20 NOTE — PLAN OF CARE
A&O x4, denies pain. Midline placed in IR this shift for outpatient IV abx. Catheter and nephrostomy tube with good urine output. Discharge orders placed, waiting for transport to care facility. Report given to facility.

## 2019-07-25 ENCOUNTER — ANESTHESIA EVENT (OUTPATIENT)
Dept: SURGERY | Facility: CLINIC | Age: 44
End: 2019-07-25
Payer: COMMERCIAL

## 2019-07-28 ASSESSMENT — ENCOUNTER SYMPTOMS: SEIZURES: 1

## 2019-07-28 ASSESSMENT — LIFESTYLE VARIABLES: TOBACCO_USE: 1
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
## 2019-07-29 ENCOUNTER — ANESTHESIA (OUTPATIENT)
Dept: SURGERY | Facility: CLINIC | Age: 44
End: 2019-07-29
Payer: COMMERCIAL

## 2019-07-29 ENCOUNTER — APPOINTMENT (OUTPATIENT)
Dept: GENERAL RADIOLOGY | Facility: CLINIC | Age: 44
End: 2019-07-29
Attending: UROLOGY
Payer: COMMERCIAL

## 2019-07-29 ENCOUNTER — HOSPITAL ENCOUNTER (OUTPATIENT)
Facility: CLINIC | Age: 44
Setting detail: OBSERVATION
Discharge: HOME | End: 2019-07-30
Attending: UROLOGY | Admitting: UROLOGY
Payer: COMMERCIAL

## 2019-07-29 PROBLEM — N20.0 NEPHROLITHIASIS: Status: ACTIVE | Noted: 2019-07-29

## 2019-07-29 LAB
ABO + RH BLD: NORMAL
ABO + RH BLD: NORMAL
ANION GAP SERPL CALCULATED.3IONS-SCNC: 12 MMOL/L (ref 3–14)
BLD GP AB SCN SERPL QL: NORMAL
BLOOD BANK CMNT PATIENT-IMP: NORMAL
BUN SERPL-MCNC: 16 MG/DL (ref 7–30)
CALCIUM SERPL-MCNC: 8.6 MG/DL (ref 8.5–10.1)
CHLORIDE SERPL-SCNC: 108 MMOL/L (ref 94–109)
CO2 SERPL-SCNC: 23 MMOL/L (ref 20–32)
CREAT SERPL-MCNC: 0.87 MG/DL (ref 0.66–1.25)
ERYTHROCYTE [DISTWIDTH] IN BLOOD BY AUTOMATED COUNT: 15.9 % (ref 10–15)
ERYTHROCYTE [DISTWIDTH] IN BLOOD BY AUTOMATED COUNT: 16.1 % (ref 10–15)
GFR SERPL CREATININE-BSD FRML MDRD: >90 ML/MIN/{1.73_M2}
GLUCOSE BLDC GLUCOMTR-MCNC: 84 MG/DL (ref 70–99)
GLUCOSE SERPL-MCNC: 114 MG/DL (ref 70–99)
HCT VFR BLD AUTO: 40.3 % (ref 40–53)
HCT VFR BLD AUTO: 43.5 % (ref 40–53)
HGB BLD-MCNC: 11.7 G/DL (ref 13.3–17.7)
HGB BLD-MCNC: 12.9 G/DL (ref 13.3–17.7)
MCH RBC QN AUTO: 26.3 PG (ref 26.5–33)
MCH RBC QN AUTO: 26.6 PG (ref 26.5–33)
MCHC RBC AUTO-ENTMCNC: 29 G/DL (ref 31.5–36.5)
MCHC RBC AUTO-ENTMCNC: 29.7 G/DL (ref 31.5–36.5)
MCV RBC AUTO: 89 FL (ref 78–100)
MCV RBC AUTO: 92 FL (ref 78–100)
PLATELET # BLD AUTO: 223 10E9/L (ref 150–450)
PLATELET # BLD AUTO: 326 10E9/L (ref 150–450)
POTASSIUM SERPL-SCNC: 4.8 MMOL/L (ref 3.4–5.3)
RBC # BLD AUTO: 4.4 10E12/L (ref 4.4–5.9)
RBC # BLD AUTO: 4.91 10E12/L (ref 4.4–5.9)
SODIUM SERPL-SCNC: 143 MMOL/L (ref 133–144)
SPECIMEN EXP DATE BLD: NORMAL
WBC # BLD AUTO: 16.9 10E9/L (ref 4–11)
WBC # BLD AUTO: 17 10E9/L (ref 4–11)

## 2019-07-29 PROCEDURE — C1887 CATHETER, GUIDING: HCPCS | Performed by: UROLOGY

## 2019-07-29 PROCEDURE — 82365 CALCULUS SPECTROSCOPY: CPT | Performed by: UROLOGY

## 2019-07-29 PROCEDURE — 36000070 ZZH SURGERY LEVEL 5 EA 15 ADDTL MIN - UMMC: Performed by: UROLOGY

## 2019-07-29 PROCEDURE — 25000128 H RX IP 250 OP 636: Performed by: NURSE ANESTHETIST, CERTIFIED REGISTERED

## 2019-07-29 PROCEDURE — 36000072 ZZH SURGERY LEVEL 5 W FLUORO 1ST 30 MIN - UMMC: Performed by: UROLOGY

## 2019-07-29 PROCEDURE — 40000277 XR SURGERY CARM FLUORO LESS THAN 5 MIN W STILLS: Mod: TC

## 2019-07-29 PROCEDURE — 25000128 H RX IP 250 OP 636: Performed by: ANESTHESIOLOGY

## 2019-07-29 PROCEDURE — 86850 RBC ANTIBODY SCREEN: CPT | Performed by: UROLOGY

## 2019-07-29 PROCEDURE — 86901 BLOOD TYPING SEROLOGIC RH(D): CPT | Performed by: UROLOGY

## 2019-07-29 PROCEDURE — C1758 CATHETER, URETERAL: HCPCS | Performed by: UROLOGY

## 2019-07-29 PROCEDURE — 71000017 ZZH RECOVERY PHASE 1 LEVEL 3 EA ADDTL HR: Performed by: UROLOGY

## 2019-07-29 PROCEDURE — 36415 COLL VENOUS BLD VENIPUNCTURE: CPT | Performed by: UROLOGY

## 2019-07-29 PROCEDURE — 25000128 H RX IP 250 OP 636: Performed by: STUDENT IN AN ORGANIZED HEALTH CARE EDUCATION/TRAINING PROGRAM

## 2019-07-29 PROCEDURE — 25000132 ZZH RX MED GY IP 250 OP 250 PS 637: Performed by: STUDENT IN AN ORGANIZED HEALTH CARE EDUCATION/TRAINING PROGRAM

## 2019-07-29 PROCEDURE — 37000008 ZZH ANESTHESIA TECHNICAL FEE, 1ST 30 MIN: Performed by: UROLOGY

## 2019-07-29 PROCEDURE — 25000566 ZZH SEVOFLURANE, EA 15 MIN: Performed by: UROLOGY

## 2019-07-29 PROCEDURE — 96366 THER/PROPH/DIAG IV INF ADDON: CPT | Mod: 59

## 2019-07-29 PROCEDURE — 25800030 ZZH RX IP 258 OP 636: Performed by: ANESTHESIOLOGY

## 2019-07-29 PROCEDURE — 25000125 ZZHC RX 250: Performed by: NURSE ANESTHETIST, CERTIFIED REGISTERED

## 2019-07-29 PROCEDURE — 37000009 ZZH ANESTHESIA TECHNICAL FEE, EACH ADDTL 15 MIN: Performed by: UROLOGY

## 2019-07-29 PROCEDURE — C1729 CATH, DRAINAGE: HCPCS | Performed by: UROLOGY

## 2019-07-29 PROCEDURE — 25500064 ZZH RX 255 OP 636: Performed by: UROLOGY

## 2019-07-29 PROCEDURE — 96365 THER/PROPH/DIAG IV INF INIT: CPT

## 2019-07-29 PROCEDURE — 25000125 ZZHC RX 250: Performed by: STUDENT IN AN ORGANIZED HEALTH CARE EDUCATION/TRAINING PROGRAM

## 2019-07-29 PROCEDURE — 36415 COLL VENOUS BLD VENIPUNCTURE: CPT | Performed by: STUDENT IN AN ORGANIZED HEALTH CARE EDUCATION/TRAINING PROGRAM

## 2019-07-29 PROCEDURE — 40000171 ZZH STATISTIC PRE-PROCEDURE ASSESSMENT III: Performed by: UROLOGY

## 2019-07-29 PROCEDURE — 25800030 ZZH RX IP 258 OP 636: Performed by: NURSE ANESTHETIST, CERTIFIED REGISTERED

## 2019-07-29 PROCEDURE — C1725 CATH, TRANSLUMIN NON-LASER: HCPCS | Performed by: UROLOGY

## 2019-07-29 PROCEDURE — 86900 BLOOD TYPING SEROLOGIC ABO: CPT | Performed by: UROLOGY

## 2019-07-29 PROCEDURE — C1769 GUIDE WIRE: HCPCS | Performed by: UROLOGY

## 2019-07-29 PROCEDURE — 00000146 ZZHCL STATISTIC GLUCOSE BY METER IP

## 2019-07-29 PROCEDURE — 96361 HYDRATE IV INFUSION ADD-ON: CPT | Mod: 59

## 2019-07-29 PROCEDURE — G0378 HOSPITAL OBSERVATION PER HR: HCPCS

## 2019-07-29 PROCEDURE — 27210794 ZZH OR GENERAL SUPPLY STERILE: Performed by: UROLOGY

## 2019-07-29 PROCEDURE — C2617 STENT, NON-COR, TEM W/O DEL: HCPCS | Performed by: UROLOGY

## 2019-07-29 PROCEDURE — 80048 BASIC METABOLIC PNL TOTAL CA: CPT | Performed by: UROLOGY

## 2019-07-29 PROCEDURE — 85027 COMPLETE CBC AUTOMATED: CPT | Performed by: STUDENT IN AN ORGANIZED HEALTH CARE EDUCATION/TRAINING PROGRAM

## 2019-07-29 PROCEDURE — 94660 CPAP INITIATION&MGMT: CPT

## 2019-07-29 PROCEDURE — 40000275 ZZH STATISTIC RCP TIME EA 10 MIN

## 2019-07-29 PROCEDURE — P9041 ALBUMIN (HUMAN),5%, 50ML: HCPCS | Performed by: ANESTHESIOLOGY

## 2019-07-29 PROCEDURE — 71000016 ZZH RECOVERY PHASE 1 LEVEL 3 FIRST HR: Performed by: UROLOGY

## 2019-07-29 DEVICE — IMPLANTABLE DEVICE
Type: IMPLANTABLE DEVICE | Status: NON-FUNCTIONAL
Removed: 2019-09-19

## 2019-07-29 RX ORDER — NALOXONE HYDROCHLORIDE 0.4 MG/ML
.1-.4 INJECTION, SOLUTION INTRAMUSCULAR; INTRAVENOUS; SUBCUTANEOUS
Status: DISCONTINUED | OUTPATIENT
Start: 2019-07-29 | End: 2019-07-29 | Stop reason: HOSPADM

## 2019-07-29 RX ORDER — DEXTROSE MONOHYDRATE 25 G/50ML
25-50 INJECTION, SOLUTION INTRAVENOUS
Status: DISCONTINUED | OUTPATIENT
Start: 2019-07-29 | End: 2019-07-30 | Stop reason: HOSPADM

## 2019-07-29 RX ORDER — DIMENHYDRINATE 50 MG/ML
25 INJECTION, SOLUTION INTRAMUSCULAR; INTRAVENOUS
Status: DISCONTINUED | OUTPATIENT
Start: 2019-07-29 | End: 2019-07-29 | Stop reason: HOSPADM

## 2019-07-29 RX ORDER — METOPROLOL TARTRATE 1 MG/ML
1-2 INJECTION, SOLUTION INTRAVENOUS EVERY 5 MIN PRN
Status: DISCONTINUED | OUTPATIENT
Start: 2019-07-29 | End: 2019-07-29 | Stop reason: HOSPADM

## 2019-07-29 RX ORDER — SODIUM CHLORIDE, SODIUM LACTATE, POTASSIUM CHLORIDE, CALCIUM CHLORIDE 600; 310; 30; 20 MG/100ML; MG/100ML; MG/100ML; MG/100ML
INJECTION, SOLUTION INTRAVENOUS CONTINUOUS PRN
Status: DISCONTINUED | OUTPATIENT
Start: 2019-07-29 | End: 2019-07-29

## 2019-07-29 RX ORDER — ALBUMIN (HUMAN) 12.5 G/50ML
25 SOLUTION INTRAVENOUS ONCE
Status: DISCONTINUED | OUTPATIENT
Start: 2019-07-29 | End: 2019-07-29 | Stop reason: ALTCHOICE

## 2019-07-29 RX ORDER — LIDOCAINE 40 MG/G
CREAM TOPICAL
Status: DISCONTINUED | OUTPATIENT
Start: 2019-07-29 | End: 2019-07-30 | Stop reason: HOSPADM

## 2019-07-29 RX ORDER — CEFEPIME HYDROCHLORIDE 1 G/1
1 INJECTION, POWDER, FOR SOLUTION INTRAMUSCULAR; INTRAVENOUS EVERY 12 HOURS
Status: DISCONTINUED | OUTPATIENT
Start: 2019-07-29 | End: 2019-07-30 | Stop reason: HOSPADM

## 2019-07-29 RX ORDER — OXYCODONE HYDROCHLORIDE 5 MG/1
5-10 TABLET ORAL
Status: DISCONTINUED | OUTPATIENT
Start: 2019-07-29 | End: 2019-07-30 | Stop reason: HOSPADM

## 2019-07-29 RX ORDER — FENTANYL CITRATE 50 UG/ML
25-50 INJECTION, SOLUTION INTRAMUSCULAR; INTRAVENOUS
Status: CANCELLED | OUTPATIENT
Start: 2019-07-29

## 2019-07-29 RX ORDER — LEVETIRACETAM 250 MG/1
1000 TABLET ORAL 2 TIMES DAILY
Status: DISCONTINUED | OUTPATIENT
Start: 2019-07-29 | End: 2019-07-30 | Stop reason: HOSPADM

## 2019-07-29 RX ORDER — ONDANSETRON 2 MG/ML
4 INJECTION INTRAMUSCULAR; INTRAVENOUS EVERY 6 HOURS PRN
Status: DISCONTINUED | OUTPATIENT
Start: 2019-07-29 | End: 2019-07-30 | Stop reason: HOSPADM

## 2019-07-29 RX ORDER — OXYBUTYNIN CHLORIDE 5 MG/1
5 TABLET ORAL 2 TIMES DAILY PRN
Status: DISCONTINUED | OUTPATIENT
Start: 2019-07-29 | End: 2019-07-30 | Stop reason: HOSPADM

## 2019-07-29 RX ORDER — FENTANYL CITRATE 50 UG/ML
25-50 INJECTION, SOLUTION INTRAMUSCULAR; INTRAVENOUS
Status: DISCONTINUED | OUTPATIENT
Start: 2019-07-29 | End: 2019-07-29 | Stop reason: HOSPADM

## 2019-07-29 RX ORDER — ONDANSETRON 2 MG/ML
INJECTION INTRAMUSCULAR; INTRAVENOUS PRN
Status: DISCONTINUED | OUTPATIENT
Start: 2019-07-29 | End: 2019-07-29

## 2019-07-29 RX ORDER — ALBUMIN, HUMAN INJ 5% 5 %
12.5 SOLUTION INTRAVENOUS ONCE
Status: COMPLETED | OUTPATIENT
Start: 2019-07-29 | End: 2019-07-29

## 2019-07-29 RX ORDER — FERROUS SULFATE 325(65) MG
325 TABLET ORAL
Status: DISCONTINUED | OUTPATIENT
Start: 2019-07-30 | End: 2019-07-30 | Stop reason: HOSPADM

## 2019-07-29 RX ORDER — SODIUM CHLORIDE, SODIUM LACTATE, POTASSIUM CHLORIDE, CALCIUM CHLORIDE 600; 310; 30; 20 MG/100ML; MG/100ML; MG/100ML; MG/100ML
INJECTION, SOLUTION INTRAVENOUS CONTINUOUS
Status: DISCONTINUED | OUTPATIENT
Start: 2019-07-29 | End: 2019-07-29 | Stop reason: HOSPADM

## 2019-07-29 RX ORDER — DOCUSATE SODIUM 100 MG/1
100 CAPSULE, LIQUID FILLED ORAL 2 TIMES DAILY
Status: DISCONTINUED | OUTPATIENT
Start: 2019-07-29 | End: 2019-07-30 | Stop reason: HOSPADM

## 2019-07-29 RX ORDER — HYDROMORPHONE HYDROCHLORIDE 1 MG/ML
.3-.5 INJECTION, SOLUTION INTRAMUSCULAR; INTRAVENOUS; SUBCUTANEOUS EVERY 10 MIN PRN
Status: DISCONTINUED | OUTPATIENT
Start: 2019-07-29 | End: 2019-07-29 | Stop reason: HOSPADM

## 2019-07-29 RX ORDER — ONDANSETRON 4 MG/1
4 TABLET, ORALLY DISINTEGRATING ORAL EVERY 6 HOURS PRN
Status: DISCONTINUED | OUTPATIENT
Start: 2019-07-29 | End: 2019-07-30 | Stop reason: HOSPADM

## 2019-07-29 RX ORDER — SODIUM CHLORIDE 9 MG/ML
INJECTION, SOLUTION INTRAVENOUS CONTINUOUS
Status: DISCONTINUED | OUTPATIENT
Start: 2019-07-29 | End: 2019-07-30 | Stop reason: HOSPADM

## 2019-07-29 RX ORDER — MEPERIDINE HYDROCHLORIDE 25 MG/ML
12.5 INJECTION INTRAMUSCULAR; INTRAVENOUS; SUBCUTANEOUS
Status: DISCONTINUED | OUTPATIENT
Start: 2019-07-29 | End: 2019-07-29 | Stop reason: HOSPADM

## 2019-07-29 RX ORDER — MAGNESIUM OXIDE 400 MG/1
400 TABLET ORAL DAILY
Status: DISCONTINUED | OUTPATIENT
Start: 2019-07-29 | End: 2019-07-30 | Stop reason: HOSPADM

## 2019-07-29 RX ORDER — ALBUTEROL SULFATE 0.83 MG/ML
2.5 SOLUTION RESPIRATORY (INHALATION) EVERY 4 HOURS PRN
Status: DISCONTINUED | OUTPATIENT
Start: 2019-07-29 | End: 2019-07-29 | Stop reason: HOSPADM

## 2019-07-29 RX ORDER — ACETAMINOPHEN 325 MG/1
650 TABLET ORAL EVERY 6 HOURS PRN
Status: DISCONTINUED | OUTPATIENT
Start: 2019-07-29 | End: 2019-07-30 | Stop reason: HOSPADM

## 2019-07-29 RX ORDER — NALOXONE HYDROCHLORIDE 0.4 MG/ML
.1-.4 INJECTION, SOLUTION INTRAMUSCULAR; INTRAVENOUS; SUBCUTANEOUS
Status: DISCONTINUED | OUTPATIENT
Start: 2019-07-29 | End: 2019-07-30 | Stop reason: HOSPADM

## 2019-07-29 RX ORDER — LIDOCAINE HYDROCHLORIDE 20 MG/ML
INJECTION, SOLUTION INFILTRATION; PERINEURAL PRN
Status: DISCONTINUED | OUTPATIENT
Start: 2019-07-29 | End: 2019-07-29

## 2019-07-29 RX ORDER — NICOTINE POLACRILEX 4 MG
15-30 LOZENGE BUCCAL
Status: DISCONTINUED | OUTPATIENT
Start: 2019-07-29 | End: 2019-07-30 | Stop reason: HOSPADM

## 2019-07-29 RX ORDER — ONDANSETRON 4 MG/1
4 TABLET, ORALLY DISINTEGRATING ORAL EVERY 30 MIN PRN
Status: DISCONTINUED | OUTPATIENT
Start: 2019-07-29 | End: 2019-07-29 | Stop reason: HOSPADM

## 2019-07-29 RX ORDER — FLUCONAZOLE 2 MG/ML
INJECTION, SOLUTION INTRAVENOUS PRN
Status: DISCONTINUED | OUTPATIENT
Start: 2019-07-29 | End: 2019-07-29

## 2019-07-29 RX ORDER — BISACODYL 10 MG
10 SUPPOSITORY, RECTAL RECTAL EVERY OTHER DAY
Status: DISCONTINUED | OUTPATIENT
Start: 2019-07-29 | End: 2019-07-30 | Stop reason: HOSPADM

## 2019-07-29 RX ORDER — ONDANSETRON 2 MG/ML
4 INJECTION INTRAMUSCULAR; INTRAVENOUS EVERY 30 MIN PRN
Status: DISCONTINUED | OUTPATIENT
Start: 2019-07-29 | End: 2019-07-29 | Stop reason: HOSPADM

## 2019-07-29 RX ORDER — BACLOFEN 10 MG/1
10 TABLET ORAL EVERY 4 HOURS PRN
Status: DISCONTINUED | OUTPATIENT
Start: 2019-07-29 | End: 2019-07-30 | Stop reason: HOSPADM

## 2019-07-29 RX ORDER — CIPROFLOXACIN 2 MG/ML
INJECTION, SOLUTION INTRAVENOUS PRN
Status: DISCONTINUED | OUTPATIENT
Start: 2019-07-29 | End: 2019-07-29

## 2019-07-29 RX ORDER — FLUMAZENIL 0.1 MG/ML
0.2 INJECTION, SOLUTION INTRAVENOUS
Status: DISCONTINUED | OUTPATIENT
Start: 2019-07-29 | End: 2019-07-29 | Stop reason: HOSPADM

## 2019-07-29 RX ORDER — BUPIVACAINE HYDROCHLORIDE AND EPINEPHRINE 5; 5 MG/ML; UG/ML
INJECTION, SOLUTION PERINEURAL PRN
Status: DISCONTINUED | OUTPATIENT
Start: 2019-07-29 | End: 2019-07-29

## 2019-07-29 RX ORDER — HYDROMORPHONE HCL/0.9% NACL/PF 0.2MG/0.2
0.2 SYRINGE (ML) INTRAVENOUS
Status: DISCONTINUED | OUTPATIENT
Start: 2019-07-29 | End: 2019-07-30 | Stop reason: HOSPADM

## 2019-07-29 RX ORDER — PROPOFOL 10 MG/ML
INJECTION, EMULSION INTRAVENOUS PRN
Status: DISCONTINUED | OUTPATIENT
Start: 2019-07-29 | End: 2019-07-29

## 2019-07-29 RX ORDER — FENTANYL CITRATE 50 UG/ML
INJECTION, SOLUTION INTRAMUSCULAR; INTRAVENOUS PRN
Status: DISCONTINUED | OUTPATIENT
Start: 2019-07-29 | End: 2019-07-29

## 2019-07-29 RX ORDER — HYDRALAZINE HYDROCHLORIDE 20 MG/ML
2.5-5 INJECTION INTRAMUSCULAR; INTRAVENOUS EVERY 10 MIN PRN
Status: DISCONTINUED | OUTPATIENT
Start: 2019-07-29 | End: 2019-07-29 | Stop reason: HOSPADM

## 2019-07-29 RX ADMIN — ROCURONIUM BROMIDE 80 MG: 10 INJECTION INTRAVENOUS at 08:07

## 2019-07-29 RX ADMIN — FENTANYL CITRATE 50 MCG: 50 INJECTION, SOLUTION INTRAMUSCULAR; INTRAVENOUS at 11:11

## 2019-07-29 RX ADMIN — CEFEPIME HYDROCHLORIDE 1 G: 1 INJECTION, POWDER, FOR SOLUTION INTRAMUSCULAR; INTRAVENOUS at 20:25

## 2019-07-29 RX ADMIN — PHENYLEPHRINE HYDROCHLORIDE 150 MCG: 10 INJECTION INTRAVENOUS at 08:21

## 2019-07-29 RX ADMIN — SODIUM CHLORIDE, POTASSIUM CHLORIDE, SODIUM LACTATE AND CALCIUM CHLORIDE: 600; 310; 30; 20 INJECTION, SOLUTION INTRAVENOUS at 07:20

## 2019-07-29 RX ADMIN — PHENYLEPHRINE HYDROCHLORIDE 200 MCG: 10 INJECTION INTRAVENOUS at 10:21

## 2019-07-29 RX ADMIN — PHENYLEPHRINE HYDROCHLORIDE 150 MCG: 10 INJECTION INTRAVENOUS at 08:37

## 2019-07-29 RX ADMIN — SUGAMMADEX 200 MG: 100 INJECTION, SOLUTION INTRAVENOUS at 10:48

## 2019-07-29 RX ADMIN — DOCUSATE SODIUM 100 MG: 100 CAPSULE, LIQUID FILLED ORAL at 20:24

## 2019-07-29 RX ADMIN — FENTANYL CITRATE 100 MCG: 50 INJECTION, SOLUTION INTRAMUSCULAR; INTRAVENOUS at 08:07

## 2019-07-29 RX ADMIN — ALBUMIN HUMAN 12.5 G: 0.05 INJECTION, SOLUTION INTRAVENOUS at 13:34

## 2019-07-29 RX ADMIN — PROPOFOL 170 MG: 10 INJECTION, EMULSION INTRAVENOUS at 08:07

## 2019-07-29 RX ADMIN — BUPIVACAINE HYDROCHLORIDE AND EPINEPHRINE BITARTRATE 30 ML: 5; .005 INJECTION, SOLUTION EPIDURAL; INTRACAUDAL; PERINEURAL at 07:43

## 2019-07-29 RX ADMIN — FENTANYL CITRATE 50 MCG: 50 INJECTION INTRAMUSCULAR; INTRAVENOUS at 07:34

## 2019-07-29 RX ADMIN — FENTANYL CITRATE 25 MCG: 50 INJECTION, SOLUTION INTRAMUSCULAR; INTRAVENOUS at 11:37

## 2019-07-29 RX ADMIN — PHENYLEPHRINE HYDROCHLORIDE 100 MCG: 10 INJECTION INTRAVENOUS at 09:43

## 2019-07-29 RX ADMIN — CIPROFLOXACIN 400 MG: 2 INJECTION INTRAVENOUS at 09:11

## 2019-07-29 RX ADMIN — PHENYLEPHRINE HYDROCHLORIDE 200 MCG: 10 INJECTION INTRAVENOUS at 10:42

## 2019-07-29 RX ADMIN — PHENYLEPHRINE HYDROCHLORIDE 200 MCG: 10 INJECTION INTRAVENOUS at 10:05

## 2019-07-29 RX ADMIN — PHENYLEPHRINE HYDROCHLORIDE 200 MCG: 10 INJECTION INTRAVENOUS at 09:54

## 2019-07-29 RX ADMIN — MAGNESIUM OXIDE TAB 400 MG (241.3 MG ELEMENTAL MG) 400 MG: 400 (241.3 MG) TAB at 20:24

## 2019-07-29 RX ADMIN — ONDANSETRON 4 MG: 2 INJECTION INTRAMUSCULAR; INTRAVENOUS at 10:37

## 2019-07-29 RX ADMIN — SODIUM CHLORIDE, POTASSIUM CHLORIDE, SODIUM LACTATE AND CALCIUM CHLORIDE 400 ML: 600; 310; 30; 20 INJECTION, SOLUTION INTRAVENOUS at 12:02

## 2019-07-29 RX ADMIN — SODIUM CHLORIDE, POTASSIUM CHLORIDE, SODIUM LACTATE AND CALCIUM CHLORIDE: 600; 310; 30; 20 INJECTION, SOLUTION INTRAVENOUS at 11:45

## 2019-07-29 RX ADMIN — PHENYLEPHRINE HYDROCHLORIDE 100 MCG: 10 INJECTION INTRAVENOUS at 10:27

## 2019-07-29 RX ADMIN — LEVETIRACETAM 1000 MG: 250 TABLET, FILM COATED ORAL at 20:24

## 2019-07-29 RX ADMIN — PHENYLEPHRINE HYDROCHLORIDE 200 MCG: 10 INJECTION INTRAVENOUS at 10:13

## 2019-07-29 RX ADMIN — FLUCONAZOLE 400 MG: 2 INJECTION INTRAVENOUS at 09:08

## 2019-07-29 RX ADMIN — LIDOCAINE HYDROCHLORIDE 100 MG: 20 INJECTION, SOLUTION INFILTRATION; PERINEURAL at 08:07

## 2019-07-29 NOTE — ANESTHESIA CARE TRANSFER NOTE
Patient: Maicol Kingel    Procedure(s):  Left Percutaneous Nephrolithotomy, Access To Kidney, Ureteroscopy, Cystoscopy, Stent Placement with Holmium Laser on standby    Diagnosis: Calculus Kidney  Diagnosis Additional Information: No value filed.    Anesthesia Type:   General, Peripheral Nerve Block, For Post-op pain in coordination with surgeon     Note:  Airway :Face Mask  Patient transferred to:PACU  Comments: To PACU, VSS, pt awake and alert, exchanging well, report to RN, care accepted. Pending Hgb.Handoff Report: Identifed the Patient, Identified the Reponsible Provider, Reviewed the pertinent medical history, Discussed the surgical course, Reviewed Intra-OP anesthesia mangement and issues during anesthesia, Set expectations for post-procedure period and Allowed opportunity for questions and acknowledgement of understanding      Vitals: (Last set prior to Anesthesia Care Transfer)    CRNA VITALS  7/29/2019 1029 - 7/29/2019 1108      7/29/2019             Resp Rate (observed):  1  (Abnormal)     Resp Rate (set):  10                Electronically Signed By: ANNIE Anand CRNA  July 29, 2019  11:08 AM

## 2019-07-29 NOTE — LETTER
Health Information Management Services               Recipient:  Tres Brannon  [phone] (p.)  [phone] (f.)      Sender:  Kimberly Jacob [employer] [phone]        Date: July 30, 2019  Patient Name:  Maicol Carrera  Routing Message:      Discharge orders      The documents accompanying this notice contain confidential information belonging to the sender.  This information is intended only for the use of the individual or entity named above.  The authorized recipient of this information is prohibited from disclosing this information to any other party and is required to destroy the information after its stated need has been fulfilled, unless otherwise required by state law.      If you are not the intended recipient, you are hereby notified that any disclosure, copy, distribution or action taken in reliance on the contents of these documents is strictly prohibited.  If you have received this document in error, please return it by fax to [phone] with a note on the cover sheet explaining why you are returning it (e.g. not your patient, not your provider, etc.).  If you need further assistance, please call [facility] Centralized Transcription at [phone].  Documents may also be returned by mail to [employer], , [facility] [address], [place], [place] [address].

## 2019-07-29 NOTE — ANESTHESIA PROCEDURE NOTES
Peripheral Nerve Block Procedure Note    Staff:     Anesthesiologist:  Gerald Giron MD  Location: Pre-op  Procedure Start/Stop TImes:      7/29/2019 7:29 AM     7/29/2019 7:55 AM    patient identified, IV checked, site marked, risks and benefits discussed, informed consent, monitors and equipment checked, pre-op evaluation, at physician/surgeon's request and post-op pain management      Correct Patient: Yes      Correct Position: Yes      Correct Site: Yes      Correct Procedure: Yes      Correct Laterality:  Yes    Site Marked:  Yes  Procedure details:     Procedure:  Paravertebral    ASA:  3    Laterality:  Left    Position:  Right Lateral Decubitus    Sterile Prep: chloraprep and sterile gloves      Insertion Site:  T7-8    Needle:  Touhy needle    Needle gauge:  17    Ultrasound: Yes      Ultrasound used to identify targeted nerve, plexus, or vascular structure and placed a needle adjacent to it      Permanent Image entered into patiient's record      Abnormal pain on injection: No      Blood Aspirated: No      Paresthesias:  No    Bleeding at site: No      Test dose negative for signs of intravascular injection: No      Bolus via:  Needle    Infusion Method:  Single Shot    Blood aspirated via catheter: No      Complications:  None

## 2019-07-29 NOTE — OP NOTE
PREOPERATIVE DIAGNOSIS: left sided kidney stone.     POSTOPERATIVE DIAGNOSIS: left sided kidney stone    PROCEDURES PERFORMED:   1. Antegrade nephrostogram.   2. Removal of indwelling nephrostomy tube.   3. Percutaneous nephrolithotomy, stone burden greater than 2cm.   4. Use of CyberWand.   5. Use of C-arm and interpretation of the fluoroscopic image.   6. Placement of [id number] nephroureteral stent    STAFF SURGEON: Dejon Horn MD.     : Zahra Babb MD     ESTIMATED BLOOD LOSS:  750cc  SPECIMENS: Stone fragment sent for culture as well as stone fragments sent for analysis.     SIGNIFICANT FINDINGS: The patient is NOT visually stone free at the end of the procedure. Significant bleeding.     BRIEF OPERATIVE INDICATIONS: 42yo M with large bilateral stone burden. Renogram demonstrates 80% left function, 20% right. He had a left PNT placed prior to today's procedure to facilitate access. After full discussion of the risks, benefits, and alternatives, the patient has opted to proceed with the above stated procedure.      PROCEDURE PERFORMED: After identifying and marking the patient in the preoperative holding area, the patient was brought to the operating room and placed in supine position. Preoperative timeout was then completed to verify the patient and procedure to be performed. Once adequate anesthesia was obtained, the patient was placed in prone position and all the pressure points were well supported. Subsequently, the patient was then prepped and draped in a standard sterile fashion.     We began our procedure by doing the antegrade nephrostogram to delineate the anatomy of the renal pelvis. Images were saved. The PNT was noted to the in the upper pole with a narrow infundibulum to the renal pelvis. We then placed a sensor guidewire into the left renal pelvis under fluoroscopic guidance and cut the stitch and removed the percutaneous nephrostomy tube. After removing the  nephrostomy tube, we placed a 5Fr open ended catheter over the sensor guidewire and then we successfully advanced the catheter down the ureter under fluoroscopic guidance. Subsequently the Sensor guidewire was then advanced and we were able to pass into the bladder. At this point we used a 10Fr dual lumen catheter to obtain access to the bladder with a super stiff wire. The sensor wire was secured to the drape to act as our safety. Subsequently, we placed a 30-Polish Bard X-Force balloon dilator and dilated the tract under fluoroscopic guidance. After placing the nephroscope, we entered the renal pelvis and immediately encountered bleeding. We attempted to use the CyberWand to fragment the upper pole stone however due to bleeding our visualization was severely limited and it is unclear how much stone we were able to treat. Further exploration with the flexible cystoscopy proved difficult due to visualization, and we were not able to completely assess residual stone burden in the upper pole or access the lower pole. Due to blood loss and difficult visualization, we then opted to conclude the procedure and proceed with nephroureteral stent placement. A [id number] nephroureteral stent was advanced over the sensor wire into the bladder, where distal curl was confirmed on fluoroscopy. The proximal curl was seen in the very small renal pelvis. The site was then closed with a nylon stitch and the nephroureteral stent was then secured to the skin. Gauze and foam tape was applied over the site.  This concluded our procedure. The patient was awakened from anesthesia, brought to the recovery room without any immediate postoperative complication.     POST-OP PLAN:  Admit to obs for Hgb monitoring  Continue pre-op abx.   Scheduled for 2nd look PCNL in Sept

## 2019-07-29 NOTE — ANESTHESIA POSTPROCEDURE EVALUATION
Anesthesia POST Procedure Evaluation    Patient: Maicol Carrera   MRN:     [id number] Gender:   male   Age:    43 year old :      1975        Preoperative Diagnosis: Calculus Kidney   Procedure(s):  Left Percutaneous Nephrolithotomy, Access To Kidney, Ureteroscopy, Cystoscopy, Stent Placement with Holmium Laser on standby   Postop Comments: No value filed.       Anesthesia Type:  Not documented  General, Peripheral Nerve Block, For Post-op pain in coordination with surgeon    Reportable Event: NO     PAIN: Uncomplicated   Sign Out status: Comfortable, Well controlled pain     PONV: No PONV   Sign Out status:  No Nausea or Vomiting     Neuro/Psych: Uneventful perioperative course   Sign Out Status: Preoperative baseline; Age appropriate mentation     Airway/Resp.: Uneventful perioperative course   Sign Out Status: Non labored breathing, age appropriate RR; Resp. Status within EXPECTED Parameters     CV: Uneventful perioperative course   Sign Out status: Appropriate BP and perfusion indices; Appropriate HR/Rhythm     Disposition:   Sign Out in:  PACU  Disposition:  Phase II; Home  Recovery Course: Uneventful  Follow-Up: Not required           Last Anesthesia Record Vitals:  CRNA VITALS  2019 1029 - 2019 1127      2019             NIBP:  110/83    Ht Rate:  112          Last PACU Vitals:  Vitals Value Taken Time   BP 96/68 2019 11:20 AM   Temp     Pulse 110 2019 11:20 AM   Resp     SpO2 98 % 2019 11:26 AM   Temp src     NIBP 110/83 2019 11:08 AM   Pulse     SpO2 99 % 2019 11:04 AM   Resp     Temp     Ht Rate 112 2019 11:08 AM   Temp 2     Vitals shown include unvalidated device data.      Electronically Signed By: Stella Mckeon MD, 2019, 11:27 AM

## 2019-07-30 VITALS
HEART RATE: 74 BPM | WEIGHT: 169.1 LBS | SYSTOLIC BLOOD PRESSURE: 116 MMHG | BODY MASS INDEX: 23.92 KG/M2 | TEMPERATURE: 98.4 F | DIASTOLIC BLOOD PRESSURE: 83 MMHG | RESPIRATION RATE: 20 BRPM | OXYGEN SATURATION: 100 %

## 2019-07-30 LAB
ANION GAP SERPL CALCULATED.3IONS-SCNC: 10 MMOL/L (ref 3–14)
BASOPHILS # BLD AUTO: 0.1 10E9/L (ref 0–0.2)
BASOPHILS NFR BLD AUTO: 0.7 %
BUN SERPL-MCNC: 16 MG/DL (ref 7–30)
CALCIUM SERPL-MCNC: 8.2 MG/DL (ref 8.5–10.1)
CHLORIDE SERPL-SCNC: 107 MMOL/L (ref 94–109)
CO2 SERPL-SCNC: 24 MMOL/L (ref 20–32)
CREAT SERPL-MCNC: 0.79 MG/DL (ref 0.66–1.25)
DIFFERENTIAL METHOD BLD: ABNORMAL
EOSINOPHIL # BLD AUTO: 0.3 10E9/L (ref 0–0.7)
EOSINOPHIL NFR BLD AUTO: 2.4 %
ERYTHROCYTE [DISTWIDTH] IN BLOOD BY AUTOMATED COUNT: 16.4 % (ref 10–15)
ERYTHROCYTE [DISTWIDTH] IN BLOOD BY AUTOMATED COUNT: 16.5 % (ref 10–15)
GFR SERPL CREATININE-BSD FRML MDRD: >90 ML/MIN/{1.73_M2}
GLUCOSE BLDC GLUCOMTR-MCNC: 104 MG/DL (ref 70–99)
GLUCOSE SERPL-MCNC: 107 MG/DL (ref 70–99)
HCT VFR BLD AUTO: 32.3 % (ref 40–53)
HCT VFR BLD AUTO: 33.5 % (ref 40–53)
HGB BLD-MCNC: 10.1 G/DL (ref 13.3–17.7)
HGB BLD-MCNC: 9.3 G/DL (ref 13.3–17.7)
IMM GRANULOCYTES # BLD: 0.3 10E9/L (ref 0–0.4)
IMM GRANULOCYTES NFR BLD: 2.4 %
LYMPHOCYTES # BLD AUTO: 0.8 10E9/L (ref 0.8–5.3)
LYMPHOCYTES NFR BLD AUTO: 6.7 %
MCH RBC QN AUTO: 26.4 PG (ref 26.5–33)
MCH RBC QN AUTO: 26.6 PG (ref 26.5–33)
MCHC RBC AUTO-ENTMCNC: 28.8 G/DL (ref 31.5–36.5)
MCHC RBC AUTO-ENTMCNC: 30.1 G/DL (ref 31.5–36.5)
MCV RBC AUTO: 88 FL (ref 78–100)
MCV RBC AUTO: 93 FL (ref 78–100)
MONOCYTES # BLD AUTO: 0.9 10E9/L (ref 0–1.3)
MONOCYTES NFR BLD AUTO: 7.3 %
NEUTROPHILS # BLD AUTO: 9.5 10E9/L (ref 1.6–8.3)
NEUTROPHILS NFR BLD AUTO: 80.5 %
NRBC # BLD AUTO: 0 10*3/UL
NRBC BLD AUTO-RTO: 0 /100
PLATELET # BLD AUTO: 126 10E9/L (ref 150–450)
PLATELET # BLD AUTO: 138 10E9/L (ref 150–450)
POTASSIUM SERPL-SCNC: 4.4 MMOL/L (ref 3.4–5.3)
RBC # BLD AUTO: 3.49 10E12/L (ref 4.4–5.9)
RBC # BLD AUTO: 3.82 10E12/L (ref 4.4–5.9)
SODIUM SERPL-SCNC: 140 MMOL/L (ref 133–144)
WBC # BLD AUTO: 10.2 10E9/L (ref 4–11)
WBC # BLD AUTO: 11.8 10E9/L (ref 4–11)

## 2019-07-30 PROCEDURE — 25800030 ZZH RX IP 258 OP 636: Performed by: STUDENT IN AN ORGANIZED HEALTH CARE EDUCATION/TRAINING PROGRAM

## 2019-07-30 PROCEDURE — 85027 COMPLETE CBC AUTOMATED: CPT | Mod: XU | Performed by: STUDENT IN AN ORGANIZED HEALTH CARE EDUCATION/TRAINING PROGRAM

## 2019-07-30 PROCEDURE — G0378 HOSPITAL OBSERVATION PER HR: HCPCS

## 2019-07-30 PROCEDURE — 94660 CPAP INITIATION&MGMT: CPT

## 2019-07-30 PROCEDURE — 96366 THER/PROPH/DIAG IV INF ADDON: CPT

## 2019-07-30 PROCEDURE — 80048 BASIC METABOLIC PNL TOTAL CA: CPT | Performed by: STUDENT IN AN ORGANIZED HEALTH CARE EDUCATION/TRAINING PROGRAM

## 2019-07-30 PROCEDURE — 00000146 ZZHCL STATISTIC GLUCOSE BY METER IP

## 2019-07-30 PROCEDURE — 40000802 ZZH SITE CHECK

## 2019-07-30 PROCEDURE — 36415 COLL VENOUS BLD VENIPUNCTURE: CPT | Performed by: STUDENT IN AN ORGANIZED HEALTH CARE EDUCATION/TRAINING PROGRAM

## 2019-07-30 PROCEDURE — 87086 URINE CULTURE/COLONY COUNT: CPT | Performed by: UROLOGY

## 2019-07-30 PROCEDURE — 40000275 ZZH STATISTIC RCP TIME EA 10 MIN

## 2019-07-30 PROCEDURE — 87086 URINE CULTURE/COLONY COUNT: CPT | Performed by: STUDENT IN AN ORGANIZED HEALTH CARE EDUCATION/TRAINING PROGRAM

## 2019-07-30 PROCEDURE — 25000132 ZZH RX MED GY IP 250 OP 250 PS 637: Performed by: STUDENT IN AN ORGANIZED HEALTH CARE EDUCATION/TRAINING PROGRAM

## 2019-07-30 PROCEDURE — 85025 COMPLETE CBC W/AUTO DIFF WBC: CPT | Performed by: STUDENT IN AN ORGANIZED HEALTH CARE EDUCATION/TRAINING PROGRAM

## 2019-07-30 PROCEDURE — 96361 HYDRATE IV INFUSION ADD-ON: CPT

## 2019-07-30 PROCEDURE — 25000128 H RX IP 250 OP 636: Performed by: STUDENT IN AN ORGANIZED HEALTH CARE EDUCATION/TRAINING PROGRAM

## 2019-07-30 RX ADMIN — SODIUM CHLORIDE: 9 INJECTION, SOLUTION INTRAVENOUS at 03:26

## 2019-07-30 RX ADMIN — DOCUSATE SODIUM 100 MG: 100 CAPSULE, LIQUID FILLED ORAL at 07:24

## 2019-07-30 RX ADMIN — FERROUS SULFATE TAB 325 MG (65 MG ELEMENTAL FE) 325 MG: 325 (65 FE) TAB at 07:24

## 2019-07-30 RX ADMIN — SODIUM CHLORIDE: 9 INJECTION, SOLUTION INTRAVENOUS at 12:55

## 2019-07-30 RX ADMIN — MAGNESIUM OXIDE TAB 400 MG (241.3 MG ELEMENTAL MG) 400 MG: 400 (241.3 MG) TAB at 07:24

## 2019-07-30 RX ADMIN — LEVETIRACETAM 1000 MG: 250 TABLET, FILM COATED ORAL at 07:24

## 2019-07-30 RX ADMIN — CEFEPIME HYDROCHLORIDE 1 G: 1 INJECTION, POWDER, FOR SOLUTION INTRAMUSCULAR; INTRAVENOUS at 05:45

## 2019-07-30 NOTE — PROGRESS NOTES
Outpatient/Observation goals to be met before discharge home:  Hgb stable - yes, last 11.7     Hemodynamically stable - yes    No signs of infection - No     Tolerating diet - yes     Pain well controlled - yes  Sleeping well between cares, 1: 1 sitter, BiPAP in place, /76 (BP Location: Right arm)   Pulse 74   Temp 98.8  F (37.1  C) (Axillary)   Resp 18   Wt 76.7 kg (169 lb 1.6 oz)   SpO2 100%   BMI 23.92 kg/m

## 2019-07-30 NOTE — PROGRESS NOTES
Social Work Services Discharge Note      Patient Name:  Maicol Carrera     Anticipated Discharge Date:  7/30/2019    Discharge Disposition:   LTC:  [facility] [phone] (p.) [phone] (f.)    Following MD:  Assigned per MD     Pre-Admission Screening (PAS) online form has been completed.  The Level of Care (LOC) is:  Determined  Confirmation Code is:  N/A, pt returning to facility  Patient/caregiver informed of referral to [facility] for Pre-Admission Screening for skilled nursing facility (SNF) placement and to expect a phone call post discharge from SNF.     Additional Services/Equipment Arranged:  [employer] ride for discharge scheduled at 5:15 ([phone])     Patient / Family response to discharge plan:  Pt looking forward to returning to facility. He expressed disappointment that he was not able to return earlier than scheduled, but SW and RN explained that it would take time to get pt ready and ride scheduled and pt voiced understanding.     Persons notified of above discharge plan:  Pt, pt's facility, charge & bedside RN. SW asked if pt wanted anyone else such as family to be called and pt declined. Facility aware of time of pt's return and confirmed fax number for discharge orders.    Staff Discharge Instructions:  Please fax discharge orders and signed hard scripts for any controlled substances.  Please print a packet and send with patient.     CTS Handoff completed:  YES    Medicare Notice of Rights provided to the patient/family:  NO. <48 hours in observation.    DONIS Scott, MANDY  Float   Covering 6D Observation Unit  P: [phone]  Pager: [phone] or [phone]

## 2019-07-30 NOTE — PROGRESS NOTES
I saw Maicol Carrera on rounds and discussed his care with my resident team.  He is s/p left percutaneous nephrolithotomy..  I performed a history and physical exam. I discussed my findings with my resident team.  I have reviewed their note and agree with the listed findings, assesment, and plan.    He did have a hgb drop but has been stable and is ready for discharge.

## 2019-07-30 NOTE — PLAN OF CARE
Hgb stable: Yes, 10.1  Hemodynamically stable: Yes  No signs of infection: Yes  Tolerating diet: Yes  Pain well controlled: Yes, denies pain    /83 (BP Location: Right arm)   Pulse 74   Temp 98.4  F (36.9  C) (Axillary)   Resp 20   Wt 76.7 kg (169 lb 1.6 oz)   SpO2 100%   BMI 23.92 kg/m

## 2019-07-30 NOTE — PROGRESS NOTES
Outpatient/Observation goals to be met before discharge home:  Hgb stable - yes, last 11.3    Hemodynamically stable - yes, /73 (BP Location: Right arm)   Pulse 84   Temp 97.8  F (36.6  C) (Oral)   Resp 18   Wt 76.7 kg (169 lb 1.6 oz)   SpO2 100%   BMI 23.92 kg/m      No signs of infection - No    Tolerating diet - yes    Pain well controlled - yes

## 2019-07-30 NOTE — PLAN OF CARE
Outpatient/Observation goals to be met before discharge home:  Hgb stable - yes  Hemodynamically stable - yes  No signs of infection - not at this time  Tolerating diet - yes  Pain well controlled - yes    /79   Pulse 91   Temp 97.7  F (36.5  C) (Oral)   Resp 18   Wt 76.7 kg (169 lb 1.6 oz)   SpO2 98%   BMI 23.92 kg/m

## 2019-07-30 NOTE — PROGRESS NOTES
Writer gave report to [facility] ([facility]). Pt left in his wheelchair to the [facility] with the [facility] transport. He took all of his belongings and discharge papers.

## 2019-07-30 NOTE — DISCHARGE SUMMARY
Discharge Summary     Maicol Carrera MRN# [id number]   YOB: 1975 Age: 43 year old     Date of Admission:  7/29/2019  Date of Discharge::  7/30/2019  Admitting Physician:  Dejon Horn MD  Discharge Physician:  Zahra Babb MD  Primary Care Physician:         Nancy gA          Admission Diagnoses:   Calculus Kidney  Nephrolithiasis          Discharge Diagnosis:   Same as above         Procedures:   Procedure(s):  Left Percutaneous Nephrolithotomy, Access To Kidney, Ureteroscopy, Cystoscopy, Stent Placement with Holmium Laser on standby        Non-operative procedures:   None performed          Consultations:   None         Imaging Studies:     Results for orders placed or performed during the hospital encounter of 07/29/19   XR Surgery CASA Fluoro L/T 5 Min w Stills    Narrative    This exam was marked as non-reportable because it will not be read by a   radiologist or a [facility] non-radiologist provider.              Medications Prior to Admission:     Medications Prior to Admission   Medication Sig Dispense Refill Last Dose     ACETAMINOPHEN PO Take 650 mg by mouth every 4 hours as needed for pain   7/28/2019 at Unknown time     albuterol (2.5 MG/3ML) 0.083% nebulizer solution Take 3 mLs by nebulization every 2 hours as needed. 1 Box  7/28/2019 at Unknown time     bisacodyl (DULCOLAX) 10 MG suppository Place 1 suppository rectally every other day. 12 suppository  7/28/2019 at Unknown time     ceFEPIme (MAXIPIME) 1 g SOLR vial Inject 1 g into the vein every 12 hours for 12 days   7/29/2019 at 0300     cholecalciferol (VITAMIN D3) 5000 units (125 mcg) capsule TAKE 1 CAP BY MOUTH ONCE DAILY FOR VIT-D DEFICIENCY  99 7/28/2019 at Unknown time     clindamycin (CLINDAMAX) 1 % topical gel Apply topically 2 times daily   7/28/2019 at Unknown time     Cranberry 400 MG TABS Take 400 mg by mouth 2 times daily   7/28/2019 at Unknown time     cyanocolbalamin  (VITAMIN B-12) 1000 MCG tablet Take 1 tablet by mouth daily.   7/28/2019 at Unknown time     docusate sodium (COLACE) 100 MG capsule Take 100 mg by mouth 2 times daily.   7/28/2019 at Unknown time     ferrous sulfate 325 (65 FE) MG tablet Take 1 tablet by mouth daily (with breakfast). 100 tablet  7/28/2019 at Unknown time     LevETIRAcetam (KEPPRA PO) Take 1,000 mg by mouth 2 times daily   7/29/2019 at 0430     magnesium gluconate (MAGONATE) 500 MG tablet Take 1 tablet by mouth daily.   7/28/2019 at Unknown time     miconazole (MICATIN; MICRO GUARD) 2 % powder Apply  topically 2 times daily. 70 g  7/28/2019 at Unknown time     mometasone (ELOCON) 0.1 % cream Apply topically At Bedtime   7/28/2019 at Unknown time     Multiple Vitamins-Minerals (MULTIVITAMIN ADULT PO) Take 1 tablet by mouth daily   7/28/2019 at Unknown time     Nutritional Supplements (OSTEO-MULTIVITAMINS/MINERALS OR) Take  by mouth daily. 12 pm     7/28/2019 at Unknown time     oxybutynin (DITROPAN) 5 MG tablet Take 5 mg by mouth   7/28/2019 at Unknown time     polyethylene glycol (MIRALAX/GLYCOLAX) powder Take 1 capful by mouth daily as needed for constipation (if no BM x3 days)   7/28/2019 at Unknown time     polyvinyl alcohol (LIQUIFILM TEARS) 1.4 % ophthalmic solution Place 2 drops into both eyes every 2 hours as needed for dry eyes   7/28/2019 at Unknown time     selenium sulfide (SELSUN) 2.5 % lotion Apply to scalp topically in the morning every Sun, Tue, Thursday. Place for 5 min then rinse.   7/28/2019 at Unknown time     senna-docusate (SENOKOT-S;PERICOLACE) 8.6-50 MG per tablet Take 1-2 tablets by mouth 2 times daily. 60 tablet  7/28/2019 at Unknown time     sodium chloride, PF, 0.9% PF flush 10 mLs by Intracatheter route every 8 hours   7/29/2019 at 0300     vitamin C 250 MG TABS Take 1 tablet by mouth 2 times daily.   7/28/2019 at Unknown time     baclofen (LIORESAL) 10 MG tablet Take 10 mg by mouth every 4 hours as needed    Past Month  at Unknown time     BACLOFEN IT Via implanted IT pump  Baclofen Concentration 2000 mc/gmL   Dose: 277.1 mcg/day  Low reservoir alarm date: 3/4/2020  Low reservoir Alarm Volume: 2ml  Interrogated 6/19/19  Pump is managed by Ulices Roth   Taking     carbamide peroxide (DEBROX) 6.5 % otic solution 5 drops 2 times daily   Past Month at Unknown time     ketoconazole (NIZORAL) 2 % cream Apply topically 2 times daily as needed   Unknown at Unknown time     ondansetron (ZOFRAN-ODT) 4 MG disintegrating tablet Take 1 tablet by mouth every 6 hours as needed for nausea. 20 tablet  Unknown at Unknown time     zinc oxide (DESITIN) 40 % ointment Apply  topically every hour as needed. 56.7 g  Unknown at Unknown time            Discharge Medications:     Current Discharge Medication List      CONTINUE these medications which have NOT CHANGED    Details   ACETAMINOPHEN PO Take 650 mg by mouth every 4 hours as needed for pain      albuterol (2.5 MG/3ML) 0.083% nebulizer solution Take 3 mLs by nebulization every 2 hours as needed.  Qty: 1 Box    Associated Diagnoses: SOB (shortness of breath)      bisacodyl (DULCOLAX) 10 MG suppository Place 1 suppository rectally every other day.  Qty: 12 suppository    Associated Diagnoses: Chronic constipation      ceFEPIme (MAXIPIME) 1 g SOLR vial Inject 1 g into the vein every 12 hours for 12 days    Associated Diagnoses: Complicated urinary tract infection      cholecalciferol (VITAMIN D3) 5000 units (125 mcg) capsule TAKE 1 CAP BY MOUTH ONCE DAILY FOR VIT-D DEFICIENCY  Refills: 99      clindamycin (CLINDAMAX) 1 % topical gel Apply topically 2 times daily      Cranberry 400 MG TABS Take 400 mg by mouth 2 times daily      cyanocolbalamin (VITAMIN B-12) 1000 MCG tablet Take 1 tablet by mouth daily.    Associated Diagnoses: Vitamin B 12 deficiency      docusate sodium (COLACE) 100 MG capsule Take 100 mg by mouth 2 times daily.      ferrous sulfate 325 (65 FE) MG tablet Take 1 tablet by mouth daily  (with breakfast).  Qty: 100 tablet    Associated Diagnoses: Anemia due to blood loss, acute      LevETIRAcetam (KEPPRA PO) Take 1,000 mg by mouth 2 times daily      magnesium gluconate (MAGONATE) 500 MG tablet Take 1 tablet by mouth daily.    Associated Diagnoses: Magnesium deficiency      miconazole (MICATIN; MICRO GUARD) 2 % powder Apply  topically 2 times daily.  Qty: 70 g    Associated Diagnoses: Candida infection of flexural skin      mometasone (ELOCON) 0.1 % cream Apply topically At Bedtime      Multiple Vitamins-Minerals (MULTIVITAMIN ADULT PO) Take 1 tablet by mouth daily      Nutritional Supplements (OSTEO-MULTIVITAMINS/MINERALS OR) Take  by mouth daily. 12 pm        oxybutynin (DITROPAN) 5 MG tablet Take 5 mg by mouth      polyethylene glycol (MIRALAX/GLYCOLAX) powder Take 1 capful by mouth daily as needed for constipation (if no BM x3 days)      polyvinyl alcohol (LIQUIFILM TEARS) 1.4 % ophthalmic solution Place 2 drops into both eyes every 2 hours as needed for dry eyes      selenium sulfide (SELSUN) 2.5 % lotion Apply to scalp topically in the morning every Sun, Tue, Thursday. Place for 5 min then rinse.      senna-docusate (SENOKOT-S;PERICOLACE) 8.6-50 MG per tablet Take 1-2 tablets by mouth 2 times daily.  Qty: 60 tablet    Associated Diagnoses: Chronic constipation      sodium chloride, PF, 0.9% PF flush 10 mLs by Intracatheter route every 8 hours    Associated Diagnoses: Complicated urinary tract infection      vitamin C 250 MG TABS Take 1 tablet by mouth 2 times daily.    Associated Diagnoses: Recurrent UTI      baclofen (LIORESAL) 10 MG tablet Take 10 mg by mouth every 4 hours as needed       BACLOFEN IT Via implanted IT pump  Baclofen Concentration 2000 mc/gmL   Dose: 277.1 mcg/day  Low reservoir alarm date: 3/4/2020  Low reservoir Alarm Volume: 2ml  Interrogated 6/19/19  Pump is managed by Ulices Roth      carbamide peroxide (DEBROX) 6.5 % otic solution 5 drops 2 times daily      ketoconazole  (NIZORAL) 2 % cream Apply topically 2 times daily as needed      ondansetron (ZOFRAN-ODT) 4 MG disintegrating tablet Take 1 tablet by mouth every 6 hours as needed for nausea.  Qty: 20 tablet    Associated Diagnoses: Nausea and vomiting      zinc oxide (DESITIN) 40 % ointment Apply  topically every hour as needed.  Qty: 56.7 g    Associated Diagnoses: Breakdown of skin tissue                    Brief History of Illness:   Reason for admission requiring a surgical or invasive procedure:   Calculus Kidney   The patient underwent the following procedure(s):   See above   There were no immediate complications during this procedure.    Please refer to the full operative summary for details.           Hospital Course:   The patient was admitted after his left percutaneous nephrolithotomy for monitoring due to higher than anticipated blood loss during surgery. His hemoglobin was 10.1 post-operatively (from 12.9.) This was felt to be an appropriate drop given acute blood loss during surgery as well as dilution with IV fluids. Maicol Carrera recovered as anticipated and experienced no post-operative complications.     On POD#1 patient was ambulating without assitance, tolerating the discharge diet, had pain controlled with PO medications to go home with, and requiring no IV medications or fluids. Patient was discharged home with appropriate contact information, follow-up and instructions as seen below in the discharge paperwork.         Final Pathology Result:   Pending at time of discharge         Discharge Instructions and Follow-Up:   No discharge procedures on file.         Discharge Disposition:     Discharged to Home      Condition at discharge: Good    --    Chika Florez MD  Urology Resident    8:32 AM, 7/30/2019

## 2019-07-30 NOTE — PLAN OF CARE
"PRIMARY DIAGNOSIS: \"GENERIC\" NURSING  OUTPATIENT/OBSERVATION GOALS TO BE MET BEFORE DISCHARGE:  ADLs back to baseline: Yes.    Pain status: Ana    Return to near baseline physical activity: Yes.     Hgb stable: Yes.    Hemodynamically stable: Yes    No signs of infection: Yes    Tolerating diet: Yes    Discharge Planner Nurse   Safe discharge environment identified: Yes.  Barriers to discharge: None, he will discharge at [address] [place] [address].       Entered by: Rose Pryor 07/30/2019 5:09 PM     Please review provider order for any additional goals.   Nurse to notify provider when observation goals have been met and patient is ready for discharge.  "

## 2019-07-30 NOTE — PLAN OF CARE
Hgb stable: 10.1  Hemodynamically stable: Yes  No signs of infection: Yes  Tolerating diet: Yes  Pain well controlled: Yes, denies pain    /83 (BP Location: Right arm)   Pulse 74   Temp 98.4  F (36.9  C) (Axillary)   Resp 20   Wt 76.7 kg (169 lb 1.6 oz)   SpO2 100%   BMI 23.92 kg/m

## 2019-07-30 NOTE — PROGRESS NOTES
Urology  Progress Note  07/30/2019    - NAEON  - tolerating regular diet  - pain controlled  - on home bipap/CPAP overnight    Exam  /76 (BP Location: Right arm)   Pulse 74   Temp 98.8  F (37.1  C) (Axillary)   Resp 18   Wt 76.7 kg (169 lb 1.6 oz)   SpO2 100%   BMI 23.92 kg/m    No acute distress  Unlabored breathing  Abdomen soft, nontender, nondistended.  Belle with light pink urine in tubing  Left PNT in place draining yellow urine with minimal sediment    I/O's (last 24/since midnight):  /not charted   ccs    Labs  AM labs pending    Assessment/Plan  43 year old y/o male POD#1 s/p left PCNL and stent placement for left kidney stone.     Neuro: continue dilaudid, oxycodone, home baclofen, tylenol prn for pain control (pt has not taken these). Continue home keppra  CV: VIRA  Pulm: incentive spirometry while awake  FEN/GI: regular diet, MIVF @ 100/hr. Continue colace, iron, mg  Endo: VIRA  : continue PNT, SPT  Heme/ID: continue to monitor hgb. Cefepime.  Activity: up ad orly, encourage OOB  PPx: SCDs  Dispo: 7B, home likely today pending AM Hgb    Seen and examined with the chief resident. Will discuss with Dr. Horn.    Zahra Babb MD  Urology PGY-3       Contacting the Urology Team     Please use the following job codes to reach the Urology Team. Note that you must use an in house phone and that job codes cannot receive text pages.     On weekdays, dial [phone] (or star-star-star [phone] on the new [facility] telephones) then [phone] to reach the Adult Urology resident or PA on call    On weekdays, dial [phone] (or star-star-star [phone] on the new [employer] telephones) then [phone] to reach the Pediatric Urology resident    On weeknights and weekends, dial [phone] (or star-star-star [phone] on the new [place] telephones) then [phone] to reach the Urology resident on call (for both Adult and Pediatrics)

## 2019-07-30 NOTE — CONSULTS
GENERAL ID SERVICE CONSULTATION     Patient:  Maicol aCrrera   Date of birth 1975, Medical record number [id number]  Date of Visit:  07/30/2019  Date of Admission: 7/29/2019  Consult Requester:Dejon Horn MD          Assessment and Recommendations:   ASSESSMENT:    43 year old gentleman with pmh of quadriplegia and neurogenic bladder s/p supra-pubic urinary catheterization with recurrent history of renal calculi and UTI. Patient was admitted on 7/29/2019 for PCNL for recurrent kidney stones. Patient was on Cefepime for 14 days course ( including OR and POD#1 ) for concerns of MDR UTI as the patient had a similar procedure on 7/15/2019 and had an elevated WBC and that raised the concern about an infectious process as the patient know to have a recurrent MDR UTIs in the past.  Patient had PCNL 1 year ago, s/p sepsis 2/2 Pseudomonas, Proteus and MRSA UTI  treated with 7 days of bactrim and 8 days of Ceftazidime.  Patient is POD#1 without any fever, chills or night sweats. WBC is elevated but trending down after the procedure.    RECOMMENDATION:  1. Remove PICC line  2. Get urine culture before discharge patient home  3. No need for more antibiotics coverage at this point of care  3. Follow up with Dr. Park in clinic on Sep/12/19 11:00 AM for pre-op evaluation for UTI    Thank you for this consult. ID will continue to follow.     Helen SANTIAGO PGY1  ID service  p [id number]    Patient was discussed with Dr. Park.  ___________________________________________________________    Consult Question:. Remove PICC line? Needs for antibiotics before next procedure?  Admission Diagnosis: Calculus Kidney  Nephrolithiasis         History of Present Illness:     43 year old gentleman with pmh of seizures, JUAN on CPAP, quadriplegia and neurogenic bladder s/p supra-pubic urinary catheterization with recurrent history of renal calculi and UTI. Patient was admitted on 7/29/2019 for PCNL for recurrent kidney stones.  Patient was admitted on 7/28/2019 for an elective PCNL with renal stents placement with POD #1. Patient has been on Cefepime for 14 days by today for possible UTI 2/2 possible MDR bacteria as the patient has a recurrent hx of MDR UTIs in the past.          Review of Systems:   CONSTITUTIONAL:  No fevers or chills  EYES: negative for icterus  ENT:  negative for hearing loss, tinnitus and sore throat  RESPIRATORY:  negative for cough with sputum and dyspnea  CARDIOVASCULAR:  negative for chest pain, dyspnea  GASTROINTESTINAL:  negative for nausea, vomiting, diarrhea and constipation  GENITOURINARY:  Unable to assess           Past Medical History:     Past Medical History:   Diagnosis Date     Chronic infection     + MRSA     Constipation, chronic      GERD (gastroesophageal reflux disease)      Head injury      Neurogenic bladder     SP catheter     JUAN (obstructive sleep apnea)     bipap     Quadriplegia (H)      Seizure (H)      Spastic quadriplegia (H)      Urinary tract infection             Past Surgical History:     Past Surgical History:   Procedure Laterality Date     APPENDECTOMY OPEN       BACK SURGERY       INSERT PUMP BACLOFEN       IR NEPHROSTOMY TUBE PLACEMENT LEFT  7/15/2019     IR PICC PLACEMENT > 5 YRS OF AGE  7/19/2019     LASER HOLMIUM NEPHROLITHOTOMY VIA PERCUTANEOUS NEPHROSTOMY  10/19/2011    Procedure:LASER HOLMIUM NEPHROLITHOTOMY VIA PERCUTANEOUS NEPHROSTOMY; Left Ureteral Stent Placement, Left Percutaneous Access, Left Percutaneous Nephrostomy  *Latex Allergy*  ; Surgeon:YAN ADDISON; Location:[facility]     LASER HOLMIUM NEPHROLITHOTOMY VIA PERCUTANEOUS NEPHROSTOMY Left 3/7/2018    Procedure: LASER HOLMIUM NEPHROLITHOTOMY VIA PERCUTANEOUS NEPHROSTOMY;  Left Percutaneous Nephrolithotomy, Access To Kidney, Ureteroscopy, Cystoscopy, Stent Placement With Holmium Laser standby;  Surgeon: Dejon Horn MD;  Location: [facility]     LASER HOLMIUM NEPHROLITHOTOMY VIA PERCUTANEOUS NEPHROSTOMY  Left 3/21/2018    Procedure: LASER HOLMIUM NEPHROLITHOTOMY VIA PERCUTANEOUS NEPHROSTOMY;  Left Holmium Laser Percutaneous Nephrolithotomy, Access To Kidney, Left Ureteroscopy, Stent Placement  general with block **Latex Allergy**;  Surgeon: Dejon Horn MD;  Location: [facility]     LASER HOLMIUM NEPHROLITHOTOMY VIA PERCUTANEOUS NEPHROSTOMY Left 2018    Procedure: LASER HOLMIUM NEPHROLITHOTOMY VIA PERCUTANEOUS NEPHROSTOMY;  Left Percutaneous Nephrolithotomy,  Ureteroscopy, retrogrades, extraction of stones with basket, laser standby ;  Surgeon: Dejon Horn MD;  Location: [facility]     LASER HOLMIUM NEPHROLITHOTOMY VIA PERCUTANEOUS NEPHROSTOMY Left 2019    Procedure: Left Percutaneous Nephrolithotomy, Access To Kidney, Ureteroscopy, Cystoscopy, Stent Placement with Holmium Laser on standby;  Surgeon: Dejon Horn MD;  Location: [facility] OR     ORTHOPEDIC SURGERY       supra pubic catheter              Family History:   Reviewed and non-contributory.   History reviewed. No pertinent family history.         Social History:     Social History     Tobacco Use     Smoking status: Former Smoker     Packs/day: 0.30     Years: 5.00     Pack years: 1.50     Types: Cigarettes     Last attempt to quit: 1994     Years since quittin.8     Smokeless tobacco: Never Used   Substance Use Topics     Alcohol use: No     History   Sexual Activity     Sexual activity: Not on file            Current Medications:       bisacodyl  10 mg Rectal Every Other Day     ceFEPIme  1 g Intravenous Q12H     docusate sodium  100 mg Oral BID     ferrous sulfate  325 mg Oral Daily with breakfast     levETIRAcetam  1,000 mg Oral BID     magnesium oxide  400 mg Oral Daily     sodium chloride (PF)  10 mL Intravenous Q7 Days     sodium chloride (PF)  3 mL Intracatheter Q8H            Allergies:     Allergies   Allergen Reactions     Latex Rash            Physical Exam:   Vitals were reviewed  Patient Vitals for the past 24  hrs:   BP Temp Temp src Pulse Heart Rate Resp SpO2   07/30/19 0646 116/83 98.4  F (36.9  C) Axillary -- 86 20 --   07/30/19 0500 -- -- -- -- -- -- 100 %   07/30/19 0400 -- -- -- -- -- -- 100 %   07/30/19 0300 -- -- -- -- -- -- 99 %   07/30/19 0245 117/76 98.8  F (37.1  C) Axillary 74 74 18 --   07/30/19 0000 -- -- -- -- -- -- 100 %   07/29/19 2300 -- -- -- -- -- -- 98 %   07/29/19 2229 116/73 97.8  F (36.6  C) Oral -- 89 18 97 %   07/29/19 2200 117/77 -- -- 84 -- -- --   07/29/19 2130 114/79 -- -- 88 -- -- --   07/29/19 2100 118/79 -- -- 91 -- -- --   07/29/19 2030 127/79 -- -- 76 -- -- --   07/29/19 2015 115/79 -- -- 80 -- -- --   07/29/19 2000 106/82 -- -- 77 -- -- --   07/29/19 1945 112/79 -- -- 80 -- -- --   07/29/19 1930 111/84 -- -- 82 -- -- --   07/29/19 1915 98/78 -- -- 91 -- -- --   07/29/19 1900 105/80 -- -- 78 -- -- --   07/29/19 1845 96/72 -- -- 76 -- -- --   07/29/19 1830 106/74 -- -- 79 -- -- --   07/29/19 1815 99/73 -- -- 68 -- -- --   07/29/19 1807 105/80 97.7  F (36.5  C) Oral 70 68 18 98 %   07/29/19 1600 92/66 -- -- 70 73 -- 97 %   07/29/19 1400 91/66 -- -- -- -- 18 99 %   07/29/19 1345 (!) 85/62 -- -- -- 84 -- 99 %   07/29/19 1330 (!) 81/60 -- -- 88 86 -- 99 %   07/29/19 1315 90/53 -- -- -- 87 20 99 %   07/29/19 1300 (!) 82/56 -- -- 90 89 20 99 %   07/29/19 1245 (!) 82/60 -- -- -- 92 21 99 %   07/29/19 1230 (!) 77/64 -- -- 93 98 20 99 %   07/29/19 1215 (!) 78/63 -- -- -- 102 22 98 %   07/29/19 1200 (!) 83/56 -- -- 104 104 20 98 %   07/29/19 1145 (!) 87/63 -- -- -- 107 22 98 %   07/29/19 1130 95/66 98.4  F (36.9  C) Axillary 111 111 23 98 %   07/29/19 1115 110/71 -- -- -- 110 24 98 %       Physical Examination:  GENERAL:  Quadriplegic, well nourished, in bed in no acute distress.   HEENT:  Head is normocephalic, atraumatic   EYES:  Eyes have anicteric sclerae without conjunctival injection or stigmata of endocarditis.    ENT:  Oropharynx is moist without exudates or ulcers. Tongue is  midline  NECK:  Supple. No cervical lymphadenopathy  LUNGS:  Clear to auscultation bilateral.   CARDIOVASCULAR:  Regular rate and rhythm with no murmurs, gallops or rubs.  ABDOMEN:  Normal bowel sounds, soft, nontender. No appreciable hepatosplenomegaly  SKIN:  No acute rashes. PICC are in place without any surrounding erythema or exudate.   NEUROLOGIC:  quadrapelgic         Laboratory Data:     Inflammatory Markers    Recent Labs   Lab Test 12/29/11  1500 11/29/11  1230 11/06/11  1530 10/22/11  1300   SED 16* 32*  --   --    CRP 31.3* 36.5* 71.1* 198.9*       Hematology Studies    Recent Labs   Lab Test 07/30/19  0621 07/29/19  2013 07/29/19  1101 07/17/19  0640 07/16/19  2140 07/16/19  1204 07/16/19  0611  06/19/19  1129  12/29/11  1500 11/29/11  1230  10/30/11  0400 10/29/11  0904   WBC 11.8* 17.0* 16.9* 15.6*  --   --  24.2*  --  8.9   < > 8.1 6.0   < > 10.5 11.9*   ANEU 9.5*  --   --   --   --   --   --   --  6.3  --  5.5 4.0  --  7.1 9.6*   AEOS 0.3  --   --   --   --   --   --   --  0.2  --  0.4 0.3  --  0.2 0.1   HGB 10.1* 11.7* 12.9* 13.3 13.1* 14.1 13.3   < > 14.7   < > 13.8 11.6*   < > 8.2* 8.8*   MCV 88 92 89 89  --   --  90  --  87   < > 87 88   < > 92 94   * 223 326 211  --   --  255  --  260   < > 237 279   < > 515* 482*    < > = values in this interval not displayed.       Metabolic Studies     Recent Labs   Lab Test 07/30/19  0621 07/29/19  1144 07/17/19  0640 07/16/19  0611 06/19/19  1129    143 137 133 137   POTASSIUM 4.4 4.8 4.0 4.3 3.5   CHLORIDE 107 108 105 103 103   CO2 24 23 24 24 25   BUN 16 16 17 15 13   CR 0.79 0.87 0.95 1.02 0.86   GFRESTIMATED >90 >90 >90 >90 >90       Hepatic Studies    Recent Labs   Lab Test 07/16/19  0611 12/29/11  1500 11/07/11  0633 10/31/11  0400 10/30/11  0400 10/29/11  0904   BILITOTAL 0.8 0.4 0.5 0.4 0.5 0.4   ALKPHOS 69 81 61 56 62 60   ALBUMIN 3.1* 4.1 3.6* 2.4* 2.5* 2.6*   AST 5 36 19 21 28 17   ALT 19 58 18 18 18 13       Microbiology:  Culture  Micro   Date Value Ref Range Status   07/16/2019 >100,000 colonies/mL  mixed urogenital gerardo    Final   04/26/2019 Canceled, Test credited  Specimen not received    Final   05/23/2018 >100,000 colonies/mL  Proteus mirabilis   (A)  Final   05/23/2018 (A)  Final    10,000 to 50,000 colonies/mL  Strain 2  Pseudomonas aeruginosa     03/08/2018 No growth  Final   03/08/2018 No growth  Final   03/07/2018 (A)  Final    <10,000 colonies/mL  Methicillin resistant Staphylococcus aureus (MRSA)     03/07/2018 <10,000 colonies/mL  Pseudomonas aeruginosa   (A)  Final   03/07/2018 <10,000 colonies/mL  Proteus mirabilis   (A)  Final   12/29/2017 (A)  Final    50,000 to 100,000 colonies/mL  Pseudomonas aeruginosa     12/29/2017 10,000 to 50,000 colonies/mL  Proteus mirabilis   (A)  Final   12/29/2017 (A)  Final    10,000 to 50,000 colonies/mL  Methicillin resistant Staphylococcus aureus (MRSA)     10/20/2017 >100,000 colonies/mL  Gram positive cocci   (A)  Final   10/20/2017 <10,000 colonies/mL  str2  Gram positive cocci   (A)  Final   10/20/2017 10,000 to 50,000 colonies/mL  Gram negative rods   (A)  Final   10/20/2017 (A)  Final    10,000 to 50,000 colonies/mL  Strain 2  Gram negative rods     10/20/2017 Susceptibility upon request  Final   11/06/2011   Final    10 to 50,000 colonies/mL Candida albicans / dubliniensis Candida albicans and   Candida dubliniensis are not routinely speciated   10/31/2011   Final    Incorrectly ordered by PCU/Clinic  Canceled, Test credited   10/31/2011 No anaerobes isolated  Final   10/31/2011 No growth  Final   10/31/2011 No growth  Final   10/30/2011   Final    Heavy growth Halle albicans / dubliniensis Candida albicans and Candida   dubliniensis are not routinely speciated   10/24/2011 No growth  Final   10/24/2011 No growth  Final   10/22/2011 No anaerobes isolated  Final   10/22/2011 No growth  Final   10/22/2011 No growth  Final   10/22/2011 No growth  Final   10/22/2011   Final    Canceled,  Test credited  Incorrectly ordered by PCU/Clinic (Only 1 specimen type may share an accession   number)  For test result see Sputum culture [id number]   10/22/2011   Final    Canceled, Test credited  Incorrectly ordered by PCU/Clinic (Only 1 specimen type may share an accession   number)  For culture result see [id number]   10/22/2011   Final    Canceled, Test credited  Incorrectly ordered by PCU/Clinic (Only 1 specimen type may share an accession   number)  For culture result see [id number]   10/22/2011   Final    Moderate growth Candida albicans / dubliniensis Candida albicans and Candida   dubliniensis are not routinely speciated   10/22/2011 No growth  Final   10/20/2011 No growth  Final   10/19/2011   Final    No MRSA isolated: susceptibilities not available. PCR assay is more sensitive   than culture.   10/12/2011 Duplicate request Canceled, Test credited  Final   10/12/2011   Final    >100,000 colonies/mL Escherichia coli  >100,000 colonies/mL Pseudomonas aeruginosa   04/21/2008 >100,000 colonies/mL Citrobacter species  Final       Urine Studies    Recent Labs   Lab Test 07/16/19  0940 05/23/18  1125 12/29/17  1145 11/13/17  1500 10/20/17  1200   LEUKEST Large* Large* Large* Canceled, Test credited* Large*   WBCU >182* >182* >182* Canceled, Test credited* >182*

## 2019-08-01 ENCOUNTER — PATIENT OUTREACH (OUTPATIENT)
Dept: UROLOGY | Facility: CLINIC | Age: 44
End: 2019-08-01

## 2019-08-01 LAB
APPEARANCE STONE: NORMAL
COMPN STONE: NORMAL
NUMBER STONE: 1
SIZE STONE: NORMAL MM
WT STONE: 63 MG

## 2019-08-01 NOTE — PROGRESS NOTES
Spoke with the nurse that works with patient at his home where he resides. He states patient seems to be doing fine. He is acting like his normal self.  Does not seem to be in any pain.  Patient scheduled for next procedure on 9/30/19 with Dr. Horn.

## 2019-08-13 ENCOUNTER — PRE VISIT (OUTPATIENT)
Dept: UROLOGY | Facility: CLINIC | Age: 44
End: 2019-08-13

## 2019-08-13 NOTE — TELEPHONE ENCOUNTER
Chief Complaint : Return-Cysto Stent Pull     Hx/Sx: Kidney Stones     Records/Orders: Available     Pt Contacted: n/a    At Rooming: Pt is Michael Lift

## 2019-08-16 ENCOUNTER — OFFICE VISIT (OUTPATIENT)
Dept: UROLOGY | Facility: CLINIC | Age: 44
End: 2019-08-16
Payer: COMMERCIAL

## 2019-08-16 VITALS
HEART RATE: 83 BPM | WEIGHT: 169 LBS | SYSTOLIC BLOOD PRESSURE: 152 MMHG | DIASTOLIC BLOOD PRESSURE: 88 MMHG | BODY MASS INDEX: 23.91 KG/M2

## 2019-08-16 DIAGNOSIS — N20.0 CALCULUS, KIDNEY: Primary | ICD-10-CM

## 2019-08-16 PROBLEM — R25.9 ABNORMAL INVOLUNTARY MOVEMENT: Status: ACTIVE | Noted: 2019-08-16

## 2019-08-16 PROBLEM — G89.29 CHRONIC PAIN: Status: ACTIVE | Noted: 2019-08-16

## 2019-08-16 RX ORDER — CIPROFLOXACIN 500 MG/1
500 TABLET, FILM COATED ORAL ONCE
Status: COMPLETED | OUTPATIENT
Start: 2019-08-16 | End: 2019-08-16

## 2019-08-16 RX ORDER — ACETAMINOPHEN 325 MG/1
TABLET ORAL
Refills: 99 | COMMUNITY
Start: 2019-05-10 | End: 2019-08-16

## 2019-08-16 RX ORDER — MICONAZOLE
POWDER (GRAM) MISCELLANEOUS
COMMUNITY

## 2019-08-16 RX ORDER — METRONIDAZOLE 7.5 MG/G
LOTION TOPICAL
COMMUNITY

## 2019-08-16 RX ORDER — LEVETIRACETAM 1000 MG/1
TABLET ORAL
Refills: 99 | COMMUNITY
Start: 2019-08-02

## 2019-08-16 RX ORDER — MAGNESIUM GLUCONATE 27 MG(500)
TABLET ORAL
Refills: 99 | COMMUNITY
Start: 2019-06-11

## 2019-08-16 RX ADMIN — CIPROFLOXACIN 500 MG: 500 TABLET, FILM COATED ORAL at 09:48

## 2019-08-16 ASSESSMENT — PAIN SCALES - GENERAL: PAINLEVEL: NO PAIN (0)

## 2019-08-16 NOTE — PROGRESS NOTES
Invasive Procedure Safety Checklist:    Procedure: Cysto/stent removal    Action: Complete sections and checkboxes as appropriate.    Pre-procedure:  1. Patient ID Verified with 2 identifiers (Maura and  or MRN) : YES    2. Procedure and site verified with patient/designee (when able) : YES    3. Accurate consent documentation in medical record : YES    4. H&P (or appropriate assessment) documented in medical record : NO  H&P must be up to 30 days prior to procedure an updated within 24 hours of                 Procedure as applicable.     5. Relevant diagnostic and radiology test results appropriately labeled and displayed as applicable : NO    6. Blood products, implants, devices, and/or special equipment available for the procedure as applicable : NO    7. Procedure site(s) marked with provider initials [Exclusions: ] : NO    8. Marking not required. Reason : Yes  Procedure does not require site marking    Time Out:     Time-Out performed immediately prior to starting procedure, including verbal and active participation of all team members addressing: YES    1. Correct patient identity.  2. Confirmed that the correct side and site are marked.  3. An accurate procedure to be done.  4. Agreement on the procedure to be done.  5. Correct patient position.  6. Relevant images and results are properly labeled and appropriately displayed.  7. The need to administer antibiotics or fluids for irrigation purposes during the procedure as applicable.  8. Safety precautions based on patient history or medication use.    During Procedure: Verification of correct person, site, and procedure occurs any time the responsibility for care of the patient is transferred to another member of the care team.    MAIRA Leon

## 2019-08-16 NOTE — LETTER
2019       RE: Maicol Carrera  [facility]  [address] [place] [address]     Dear Colleague,    Thank you for referring your patient, Maicol Carrera, to the [facility] UROLOGY AND INST FOR PROSTATE AND UROLOGIC CANCERS at [facility]. Please see a copy of my visit note below.    Invasive Procedure Safety Checklist:    Procedure: Cysto/stent removal    Action: Complete sections and checkboxes as appropriate.    Pre-procedure:  1. Patient ID Verified with 2 identifiers (Maura and  or MRN) : YES    2. Procedure and site verified with patient/designee (when able) : YES    3. Accurate consent documentation in medical record : YES    4. H&P (or appropriate assessment) documented in medical record : NO  H&P must be up to 30 days prior to procedure an updated within 24 hours of                 Procedure as applicable.     5. Relevant diagnostic and radiology test results appropriately labeled and displayed as applicable : NO    6. Blood products, implants, devices, and/or special equipment available for the procedure as applicable : NO    7. Procedure site(s) marked with provider initials [Exclusions: ] : NO    8. Marking not required. Reason : Yes  Procedure does not require site marking    Time Out:     Time-Out performed immediately prior to starting procedure, including verbal and active participation of all team members addressing: YES    1. Correct patient identity.  2. Confirmed that the correct side and site are marked.  3. An accurate procedure to be done.  4. Agreement on the procedure to be done.  5. Correct patient position.  6. Relevant images and results are properly labeled and appropriately displayed.  7. The need to administer antibiotics or fluids for irrigation purposes during the procedure as applicable.  8. Safety precautions based on patient history or medication use.    During Procedure: Verification of correct person, site, and  procedure occurs any time the responsibility for care of the patient is transferred to another member of the care team.    MAIRA Leon    The following medication was given:     MEDICATION:  Ciprofloxacin  ROUTE: PO  SITE: Medication was given orally   DOSE: 500 mg  LOT #: [id number]  : [address]  EXPIRATION DATE: 10/2020  NDC#: [id number]   Was there drug waste? No      Lyndsay Sherwood CMA  August 16, 2019        Removal:  18 Fr straight tipped silicone ly catheter removed from suprapubic meatus without difficulty.    Insertion:  18 Fr straight tipped silicone ly catheter inserted into suprapubic meatus in the usual sterile fashion without difficulty.  Balloon filled with 10 mL sterile H2O.  Received 50 ml pink urine output.   Catheter secured in place with leg strap: Yes.     One Cipro 500 mg given per protocol: Yes.   The following medication was given:         Patient did tolerate procedure well.    Patient instructed as to where to call or go for pain, fever, leakage, or decreased urine flow.       MAIRA Gonzales  8/16/2019  9:51 AM        Patient s/p PCNL, planning for second look PCNL with Dr. Horn.   Currently with nephroureterostomy tube in place.   Discussed upcoming procedure. Discussed maintenance and care of tube.     Follow up with Dr. Horn to schedule second look PCNL.       Again, thank you for allowing me to participate in the care of your patient.      Sincerely,    Nicolette Tay MD

## 2019-08-16 NOTE — PROGRESS NOTES
The following medication was given:     MEDICATION:  Ciprofloxacin  ROUTE: PO  SITE: Medication was given orally   DOSE: 500 mg  LOT #: [id number]  : [employer]  EXPIRATION DATE: 10/2020  NDC#: [id number]   Was there drug waste? No      Lyndsay Sherwood CMA  August 16, 2019

## 2019-08-16 NOTE — PROGRESS NOTES
Removal:  18 Fr straight tipped silicone ly catheter removed from suprapubic meatus without difficulty.    Insertion:  18 Fr straight tipped silicone ly catheter inserted into suprapubic meatus in the usual sterile fashion without difficulty.  Balloon filled with 10 mL sterile H2O.  Received 50 ml pink urine output.   Catheter secured in place with leg strap: Yes.     One Cipro 500 mg given per protocol: Yes.   The following medication was given:         Patient did tolerate procedure well.    Patient instructed as to where to call or go for pain, fever, leakage, or decreased urine flow.       MAIRA Gonzales  8/16/2019  9:51 AM

## 2019-08-16 NOTE — NURSING NOTE
Chief Complaint   Patient presents with     Surgical Followup     Post op/stent removal     Lyndsay Sherwood, A

## 2019-08-16 NOTE — PATIENT INSTRUCTIONS
Surgery as scheduled for 9-30-19 with Dr. Horn.    It was a pleasure meeting with you today.  Thank you for allowing me and my team the privilege of caring for you today.  YOU are the reason we are here, and I truly hope we provided you with the excellent service you deserve.  Please let us know if there is anything else we can do for you so that we can be sure you are leaving completely satisfied with your care experience.        MAIRA Leon

## 2019-08-26 NOTE — PROGRESS NOTES
Patient s/p PCNL, planning for second look PCNL with Dr. Horn.   Currently with nephroureterostomy tube in place.   Discussed upcoming procedure. Discussed maintenance and care of tube.     Follow up with Dr. Horn to schedule second look PCNL.

## 2019-09-11 ENCOUNTER — TELEPHONE (OUTPATIENT)
Dept: INFECTIOUS DISEASES | Facility: CLINIC | Age: 44
End: 2019-09-11

## 2019-09-12 ENCOUNTER — OFFICE VISIT (OUTPATIENT)
Dept: INFECTIOUS DISEASES | Facility: CLINIC | Age: 44
End: 2019-09-12
Attending: INTERNAL MEDICINE
Payer: COMMERCIAL

## 2019-09-12 ENCOUNTER — ALLIED HEALTH/NURSE VISIT (OUTPATIENT)
Dept: UROLOGY | Facility: CLINIC | Age: 44
End: 2019-09-12
Payer: COMMERCIAL

## 2019-09-12 VITALS
SYSTOLIC BLOOD PRESSURE: 144 MMHG | HEART RATE: 86 BPM | DIASTOLIC BLOOD PRESSURE: 87 MMHG | TEMPERATURE: 97.8 F | OXYGEN SATURATION: 99 %

## 2019-09-12 DIAGNOSIS — N20.0 LEFT NEPHROLITHIASIS: Primary | ICD-10-CM

## 2019-09-12 DIAGNOSIS — R82.71 BACTERIURIA: Primary | ICD-10-CM

## 2019-09-12 PROCEDURE — G0463 HOSPITAL OUTPT CLINIC VISIT: HCPCS | Mod: ZF

## 2019-09-12 ASSESSMENT — PAIN SCALES - GENERAL: PAINLEVEL: NO PAIN (0)

## 2019-09-12 NOTE — PROGRESS NOTES
Patient in for UC from neph tube. Sample taken in sterile fashion from neph tube. Sent to lab.  Dr. Park from ID will treat once result come in.

## 2019-09-12 NOTE — LETTER
9/12/2019      RE: Maicol Carrera  [facility]  [address] [place] [address]       [facility] Follow-Up Visit  9/12/2019          Assessment and Recommendations:   ASSESSMENT:     44 year old gentleman with pmh of quadriplegia and neurogenic bladder s/p supra-pubic urinary catheterization with recurrent history of renal calculi and UTI. Patient was admitted on 7/29/2019 for PCNL for recurrent kidney stones. Patient was on cefepime for 14 days course ( including OR and POD#1 ) for concerns of MDR UTI as the patient had a similar procedure on 7/15/2019 and had an elevated WBC post-op.     He is scheduled for an additional procedure in about 2 weeks so we will check a urine culture from his nephrostomy tube to give some guidance to the most appropriate jae-operative antibiotics.        Plan:   1.  Check urine culture from nephrostomy tube today to help guide perioperative antibiotics to prevent post-operative sepsis     Deena Park MD  Infectious Diseases  [phone] (TextPage)     ___________________________________________________________           History of Present Illness:      44 year old gentleman with pmh of seizures, JUAN on CPAP, quadriplegia and neurogenic bladder s/p supra-pubic urinary catheterization with recurrent history of renal calculi and UTI. Patient was admitted on 7/29/2019 for PCNL for recurrent kidney stones. Patient was admitted on 7/28/2019 for an elective PCNL with renal stents placement with POD #1. Patient has been on Cefepime for 14 days by today for possible UTI 2/2 possible MDR bacteria as the patient has a recurrent hx of MDR UTIs in the past.     He has been doing well since hospital discharge without any fevers or other new symptoms.           Review of Systems:   4 point ROS including Respiratory, CV, GI and , other than that noted in the HPI,  is negative               Current Medications:      Current Outpatient  Medications   Medication     ACETAMINOPHEN PO     albuterol (2.5 MG/3ML) 0.083% nebulizer solution     baclofen (LIORESAL) 10 MG tablet     BACLOFEN IT     bisacodyl (DULCOLAX) 10 MG suppository     carbamide peroxide (DEBROX) 6.5 % otic solution     cholecalciferol (VITAMIN D3) 5000 units (125 mcg) capsule     clindamycin (CLINDAMAX) 1 % topical gel     Cranberry 400 MG TABS     docusate sodium (COLACE) 100 MG capsule     ferrous sulfate 325 (65 FE) MG tablet     ketoconazole (NIZORAL) 2 % cream     levETIRAcetam (KEPPRA) 1000 MG tablet     MAG-G 500 (27 Mg) MG tablet     magnesium gluconate (MAGONATE) 500 MG tablet     metroNIDAZOLE (METROLOTION) 0.75 % external lotion     miconazole (MICATIN; MICRO GUARD) 2 % powder     Miconazole POWD     mometasone (ELOCON) 0.1 % cream     Multiple Vitamins-Minerals (MULTIVITAMIN ADULT PO)     Nutritional Supplements (OSTEO-MULTIVITAMINS/MINERALS OR)     ondansetron (ZOFRAN-ODT) 4 MG disintegrating tablet     oxybutynin (DITROPAN) 5 MG tablet     polyethylene glycol (MIRALAX/GLYCOLAX) powder     selenium sulfide (SELSUN) 2.5 % lotion     senna-docusate (SENOKOT-S;PERICOLACE) 8.6-50 MG per tablet     vitamin C 250 MG TABS     zinc oxide (DESITIN) 40 % ointment     cyanocolbalamin (VITAMIN B-12) 1000 MCG tablet     polyvinyl alcohol (LIQUIFILM TEARS) 1.4 % ophthalmic solution     No current facility-administered medications for this visit.             Allergies:           Allergies   Allergen Reactions     Latex Rash              Physical Exam:   BP (!) 144/87 (BP Location: Left arm, Patient Position: Sitting, Cuff Size: Adult Regular)   Pulse 86   Temp 97.8  F (36.6  C) (Oral)   SpO2 99%    Physical Examination:  Exam:  GENERAL:  Well-developed, well-nourished, sitting in chair in no acute distress.   ENT:  Head is normocephalic, atraumatic. Anterior oropharynx without ulcers.  EYES:  Eyes have anicteric sclerae.    NECK:  Supple.  LUNGS:  Normal respiratory effort.    ABDOMEN:  Non-distended.   EXT: Extremities without visible edema.   SKIN:  No acute rashes.    NEUROLOGIC:  quadrapelgic          Laboratory Data:   No new labs today  Urine culture pending    Deena Park MD

## 2019-09-12 NOTE — NURSING NOTE
Chief Complaint   Patient presents with     RECHECK     Hospital follow up     BP (!) 144/87 (BP Location: Left arm, Patient Position: Sitting, Cuff Size: Adult Regular)   Pulse 86   Temp 97.8  F (36.6  C) (Oral)   SpO2 99%   Karuna Chavez on 9/12/2019 at 11:03 AM

## 2019-09-13 NOTE — PROGRESS NOTES
[facility] Follow-Up Visit  9/12/2019          Assessment and Recommendations:   ASSESSMENT:     44 year old gentleman with pmh of quadriplegia and neurogenic bladder s/p supra-pubic urinary catheterization with recurrent history of renal calculi and UTI. Patient was admitted on 7/29/2019 for PCNL for recurrent kidney stones. Patient was on cefepime for 14 days course ( including OR and POD#1 ) for concerns of MDR UTI as the patient had a similar procedure on 7/15/2019 and had an elevated WBC post-op. He is scheduled for an additional procedure in about 2 weeks so we will check a urine culture from his nephrostomy tube to give some guidance to the most appropriate jae-operative antibiotics.     9/25/19 Addendum:   Urine grew Pseudomonas and Morganella sensitive to ceftazidime and MRSA. He does NOT have current evidence of an infection. We are starting antibiotics to prevent post-procedure sepsis. I discussed a plan with his urologist, Dr. Horn. We will set him up to start ceftazidime and vancomycin as soon as a PICC can be placed (before the end of the week) prior to his procedure on Monday, 9/30. We will then plan to continue antibiotics for 24-48 hours post-procedure. No post-discharge antibiotics are anticipated if he does well after the procedure.     Plan:   1. Place PICC  2. Start ceftazidime 2g IV Q8H  3. Start vancomycin with goal trough 15-20  4. Tentative planned duration is through 10/1 but should be re-evaluated by urology prior to discharge depending on whether he does well post-op.  5. Please check CBC and CMP when PICC is placed. Further creatinine/vancomycin level checks per pharmacy protocol. No weekly labs needed as anticipated duration is <1 week.   6. Antibiotic indication is bacteriuria prior to urologic surgery.      Deena Park MD  Infectious Diseases  [phone] (TextPage)     ___________________________________________________________           History of  Present Illness:      44 year old gentleman with pmh of seizures, JUAN on CPAP, quadriplegia and neurogenic bladder s/p supra-pubic urinary catheterization with recurrent history of renal calculi and UTI. Patient was admitted on 7/29/2019 for PCNL for recurrent kidney stones. Patient was admitted on 7/28/2019 for an elective PCNL with renal stents placement with POD #1. Patient has been on Cefepime for 14 days by today for possible UTI 2/2 possible MDR bacteria as the patient has a recurrent hx of MDR UTIs in the past.     He has been doing well since hospital discharge without any fevers or other new symptoms.           Review of Systems:   4 point ROS including Respiratory, CV, GI and , other than that noted in the HPI,  is negative               Current Medications:      Current Outpatient Medications   Medication     ACETAMINOPHEN PO     albuterol (2.5 MG/3ML) 0.083% nebulizer solution     baclofen (LIORESAL) 10 MG tablet     BACLOFEN IT     bisacodyl (DULCOLAX) 10 MG suppository     carbamide peroxide (DEBROX) 6.5 % otic solution     cholecalciferol (VITAMIN D3) 5000 units (125 mcg) capsule     clindamycin (CLINDAMAX) 1 % topical gel     Cranberry 400 MG TABS     docusate sodium (COLACE) 100 MG capsule     ferrous sulfate 325 (65 FE) MG tablet     ketoconazole (NIZORAL) 2 % cream     levETIRAcetam (KEPPRA) 1000 MG tablet     MAG-G 500 (27 Mg) MG tablet     magnesium gluconate (MAGONATE) 500 MG tablet     metroNIDAZOLE (METROLOTION) 0.75 % external lotion     miconazole (MICATIN; MICRO GUARD) 2 % powder     Miconazole POWD     mometasone (ELOCON) 0.1 % cream     Multiple Vitamins-Minerals (MULTIVITAMIN ADULT PO)     Nutritional Supplements (OSTEO-MULTIVITAMINS/MINERALS OR)     ondansetron (ZOFRAN-ODT) 4 MG disintegrating tablet     oxybutynin (DITROPAN) 5 MG tablet     polyethylene glycol (MIRALAX/GLYCOLAX) powder     selenium sulfide (SELSUN) 2.5 % lotion     senna-docusate (SENOKOT-S;PERICOLACE) 8.6-50 MG per  tablet     vitamin C 250 MG TABS     zinc oxide (DESITIN) 40 % ointment     cyanocolbalamin (VITAMIN B-12) 1000 MCG tablet     polyvinyl alcohol (LIQUIFILM TEARS) 1.4 % ophthalmic solution     No current facility-administered medications for this visit.             Allergies:           Allergies   Allergen Reactions     Latex Rash              Physical Exam:   BP (!) 144/87 (BP Location: Left arm, Patient Position: Sitting, Cuff Size: Adult Regular)   Pulse 86   Temp 97.8  F (36.6  C) (Oral)   SpO2 99%    Physical Examination:  Exam:  GENERAL:  Well-developed, well-nourished, sitting in chair in no acute distress.   ENT:  Head is normocephalic, atraumatic. Anterior oropharynx without ulcers.  EYES:  Eyes have anicteric sclerae.    NECK:  Supple.  LUNGS:  Normal respiratory effort.   ABDOMEN:  Non-distended.   EXT: Extremities without visible edema.   SKIN:  No acute rashes.    NEUROLOGIC:  quadrapelgic          Laboratory Data:   No new labs today  Urine culture pending

## 2019-09-18 DIAGNOSIS — N20.0 KIDNEY STONE: Primary | ICD-10-CM

## 2019-09-19 ENCOUNTER — HOSPITAL ENCOUNTER (OUTPATIENT)
Facility: CLINIC | Age: 44
Discharge: HOME | End: 2019-09-19
Attending: UROLOGY | Admitting: RADIOLOGY
Payer: COMMERCIAL

## 2019-09-19 ENCOUNTER — APPOINTMENT (OUTPATIENT)
Dept: MEDSURG UNIT | Facility: CLINIC | Age: 44
End: 2019-09-19
Attending: UROLOGY
Payer: COMMERCIAL

## 2019-09-19 ENCOUNTER — APPOINTMENT (OUTPATIENT)
Dept: INTERVENTIONAL RADIOLOGY/VASCULAR | Facility: CLINIC | Age: 44
End: 2019-09-19
Attending: UROLOGY
Payer: COMMERCIAL

## 2019-09-19 VITALS
RESPIRATION RATE: 18 BRPM | TEMPERATURE: 97.6 F | SYSTOLIC BLOOD PRESSURE: 134 MMHG | OXYGEN SATURATION: 95 % | DIASTOLIC BLOOD PRESSURE: 79 MMHG

## 2019-09-19 DIAGNOSIS — N20.0 KIDNEY STONE: ICD-10-CM

## 2019-09-19 PROCEDURE — C2617 STENT, NON-COR, TEM W/O DEL: HCPCS

## 2019-09-19 PROCEDURE — C1769 GUIDE WIRE: HCPCS

## 2019-09-19 PROCEDURE — 50387 CHANGE NEPHROURETERAL CATH: CPT

## 2019-09-19 PROCEDURE — 25000128 H RX IP 250 OP 636: Performed by: RADIOLOGY

## 2019-09-19 PROCEDURE — 25800030 ZZH RX IP 258 OP 636: Performed by: RADIOLOGY

## 2019-09-19 PROCEDURE — 40000167 ZZH STATISTIC PP CARE STAGE 2

## 2019-09-19 PROCEDURE — 40000556 ZZH STATISTIC PERIPHERAL IV START W US GUIDANCE

## 2019-09-19 PROCEDURE — 25000125 ZZHC RX 250: Performed by: RADIOLOGY

## 2019-09-19 RX ORDER — LIDOCAINE 40 MG/G
CREAM TOPICAL
Status: DISCONTINUED | OUTPATIENT
Start: 2019-09-19 | End: 2019-09-19 | Stop reason: HOSPADM

## 2019-09-19 RX ORDER — DEXTROSE MONOHYDRATE 25 G/50ML
25-50 INJECTION, SOLUTION INTRAVENOUS
Status: DISCONTINUED | OUTPATIENT
Start: 2019-09-19 | End: 2019-09-19 | Stop reason: HOSPADM

## 2019-09-19 RX ORDER — NICOTINE POLACRILEX 4 MG
15-30 LOZENGE BUCCAL
Status: DISCONTINUED | OUTPATIENT
Start: 2019-09-19 | End: 2019-09-19 | Stop reason: HOSPADM

## 2019-09-19 RX ORDER — SODIUM CHLORIDE 9 MG/ML
INJECTION, SOLUTION INTRAVENOUS CONTINUOUS
Status: DISCONTINUED | OUTPATIENT
Start: 2019-09-19 | End: 2019-09-19 | Stop reason: HOSPADM

## 2019-09-19 RX ORDER — LIDOCAINE HYDROCHLORIDE 10 MG/ML
1-30 INJECTION, SOLUTION EPIDURAL; INFILTRATION; INTRACAUDAL; PERINEURAL
Status: COMPLETED | OUTPATIENT
Start: 2019-09-19 | End: 2019-09-19

## 2019-09-19 RX ORDER — AMPICILLIN 1 G/1
1 INJECTION, POWDER, FOR SOLUTION INTRAMUSCULAR; INTRAVENOUS
Status: COMPLETED | OUTPATIENT
Start: 2019-09-19 | End: 2019-09-19

## 2019-09-19 RX ADMIN — SODIUM CHLORIDE: 9 INJECTION, SOLUTION INTRAVENOUS at 10:28

## 2019-09-19 RX ADMIN — AMPICILLIN SODIUM 1 G: 1 INJECTION, POWDER, FOR SOLUTION INTRAMUSCULAR; INTRAVENOUS at 10:27

## 2019-09-19 RX ADMIN — LIDOCAINE HYDROCHLORIDE 15 ML: 10 INJECTION, SOLUTION EPIDURAL; INFILTRATION; INTRACAUDAL; PERINEURAL at 12:31

## 2019-09-19 RX ADMIN — GENTAMICIN SULFATE 140 MG: 40 INJECTION, SOLUTION INTRAMUSCULAR; INTRAVENOUS at 12:12

## 2019-09-19 NOTE — PROGRESS NOTES
Tolerated food and fluids.  Patient anxious to get out of bed and into his chair for comfort.  PIV removed.  Reviewed discharge instructions with patient.  Paperwork filled out for nursing home.  Did not receive sedation.  Discharged to [facility] to await transport.

## 2019-09-19 NOTE — PROGRESS NOTES
Arrived from home for a PNT exchange.  Denies pain.  PIV placed.  Consent obtained.  Ready for procedure.

## 2019-09-19 NOTE — IP AVS SNAPSHOT
[address] [address]                                    After Visit Summary   9/19/2019    Maicol Carrera    MRN: [id number]           After Visit Summary Signature Page    I have received my discharge instructions, and my questions have been answered. I have discussed any challenges I see with this plan with the nurse or doctor.    ..........................................................................................................................................  Patient/Patient Representative Signature      ..........................................................................................................................................  Patient Representative Print Name and Relationship to Patient    ..................................................               ................................................  Date                                   Time    ..........................................................................................................................................  Reviewed by Signature/Title    ...................................................              ..............................................  Date                                               Time          22EPIC Rev 08/18

## 2019-09-19 NOTE — PROGRESS NOTES
Patient Name: Maicol Carrera  Medical Record Number: [id number]  Today's Date: 9/19/2019    Procedure: Left perc nephroureteral tube change  Proceduralist: Dr Suarez  Sedation medications administered: None    Procedure start time: 1214  Puncture time: 1215  Procedure end time: 1230    Report given to: 2A SERA Carrion  : No    Other Notes: Pt arrived to IR room 2 from . Consent reviewed. Pt denies any questions or concerns regarding procedure. Pt positioned lateral left side up, prepped and monitored per protocol. 10 x 24 Nephroureteral stent positioned by Dr Suarez.  Pt tolerated procedure without any noted complications. Pt transferred back to .

## 2019-09-19 NOTE — IP AVS SNAPSHOT
MRN:[id number]                      After Visit Summary   9/19/2019    Maicol Carrera    MRN: [id number]           Visit Information        Department      9/19/2019  8:23 AM [address] [facility]          Review of your medicines      UNREVIEWED medicines. Ask your doctor about these medicines       Dose / Directions   ACETAMINOPHEN PO      Dose:  650 mg  Take 650 mg by mouth every 4 hours as needed for pain  Refills:  0     albuterol (2.5 MG/3ML) 0.083% neb solution  Commonly known as:  PROVENTIL  Used for:  SOB (shortness of breath)      Dose:  2.5 mg  Take 3 mLs by nebulization every 2 hours as needed.  Quantity:  1 Box  Refills:  0     * baclofen 10 MG tablet  Commonly known as:  LIORESAL      Dose:  10 mg  Take 10 mg by mouth every 4 hours as needed  Refills:  0     * BACLOFEN IT      Via implanted IT pump  Baclofen Concentration 2000 mc/gmL   Dose: 277.1 mcg/day  Low reservoir alarm date: 3/4/2020  Low reservoir Alarm Volume: 2ml  Interrogated 6/19/19  Pump is managed by Ulices Roth  Refills:  0     bisacodyl 10 MG suppository  Commonly known as:  DULCOLAX  Used for:  Chronic constipation      Dose:  10 mg  Place 1 suppository rectally every other day.  Quantity:  12 suppository  Refills:  0     carbamide peroxide 6.5 % otic solution  Commonly known as:  DEBROX      Dose:  5 drop  5 drops 2 times daily  Refills:  0     cholecalciferol 5000 units (125 mcg) capsule  Commonly known as:  VITAMIN D3      TAKE 1 CAP BY MOUTH ONCE DAILY FOR VIT-D DEFICIENCY  Refills:  99     clindamycin 1 % external gel  Commonly known as:  CLINDAMAX      Apply topically 2 times daily  Refills:  0     COLACE 100 MG capsule  Generic drug:  docusate sodium      Dose:  100 mg  Take 100 mg by mouth 2 times daily.  Refills:  0     Cranberry 400 MG Tabs      Dose:  400 mg  Take 400 mg by mouth 2 times daily  Refills:  0     cyanocobalamin 1000 MCG tablet  Commonly known as:  VITAMIN B-12  Used for:  Vitamin B 12  deficiency      Dose:  1000 mcg  Take 1 tablet by mouth daily.  Refills:  0     ferrous sulfate 325 (65 Fe) MG tablet  Commonly known as:  FEROSUL  Used for:  Anemia due to blood loss, acute      Dose:  325 mg  Take 1 tablet by mouth daily (with breakfast).  Quantity:  100 tablet  Refills:  0     ketoconazole 2 % external cream  Commonly known as:  NIZORAL      Apply topically 2 times daily as needed  Refills:  0     levETIRAcetam 1000 MG tablet  Commonly known as:  KEPPRA      TAKE 1 TAB BY MOUTH TWICE A DAY FOR CONVULSIONS  Refills:  99     * magnesium gluconate 500 MG tablet  Commonly known as:  MAGONATE  Used for:  Magnesium deficiency      Dose:  500 mg  Take 1 tablet by mouth daily.  Refills:  0     * MAG-G 500 (27 Mg) MG tablet  Generic drug:  magnesium gluconate      TAKE 1 TABLET BY MOUTH EVERY OTHER DAY FOR HYPERMAGNESIUM  Refills:  99     metroNIDAZOLE 0.75 % external lotion  Commonly known as:  METROLOTION      Refills:  0     miconazole 2 % external powder  Commonly known as:  MICATIN/MICRO GUARD  Used for:  Candida infection of flexural skin      Apply  topically 2 times daily.  Quantity:  70 g  Refills:  0     Miconazole Powd      Refills:  0     mometasone 0.1 % external cream  Commonly known as:  ELOCON      Apply topically At Bedtime  Refills:  0     MULTIVITAMIN ADULT PO      Dose:  1 tablet  Take 1 tablet by mouth daily  Refills:  0     ondansetron 4 MG ODT tab  Commonly known as:  ZOFRAN-ODT  Used for:  Nausea and vomiting      Dose:  4 mg  Take 1 tablet by mouth every 6 hours as needed for nausea.  Quantity:  20 tablet  Refills:  0     OSTEO-MULTIVITAMINS/MINERALS OR      Take  by mouth daily. 12 pm  Refills:  0     oxybutynin 5 MG tablet  Commonly known as:  DITROPAN      Dose:  5 mg  Take 5 mg by mouth  Refills:  0     polyethylene glycol powder  Commonly known as:  MIRALAX/GLYCOLAX      Dose:  1 capful  Take 1 capful by mouth daily as needed for constipation (if no BM x3 days)  Refills:  0      polyvinyl alcohol 1.4 % ophthalmic solution  Commonly known as:  LIQUIFILM TEARS      Dose:  2 drop  Place 2 drops into both eyes every 2 hours as needed for dry eyes  Refills:  0     selenium sulfide 2.5 % external lotion  Commonly known as:  SELSUN      Apply to scalp topically in the morning every Sun, Tue, Thursday. Place for 5 min then rinse.  Refills:  0     senna-docusate 8.6-50 MG tablet  Commonly known as:  SENOKOT-S/PERICOLACE  Used for:  Chronic constipation      Dose:  1-2 tablet  Take 1-2 tablets by mouth 2 times daily.  Quantity:  60 tablet  Refills:  0     vitamin C 250 MG Tabs tablet  Commonly known as:  ASCORBIC ACID  Used for:  Recurrent UTI      Dose:  250 mg  Take 1 tablet by mouth 2 times daily.  Refills:  0     zinc oxide 40 % external ointment  Commonly known as:  DESITIN  Used for:  Breakdown of skin tissue      Apply  topically every hour as needed.  Quantity:  56.7 g  Refills:  0         * This list has 4 medication(s) that are the same as other medications prescribed for you. Read the directions carefully, and ask your doctor or other care provider to review them with you.                  Protect others around you: Learn how to safely use, store and throw away your medicines at www.disposemymeds.org.       Follow-ups after your visit       Your next 10 appointments already scheduled    Sep 30, 2019  Procedure with Dejon Horn MD  [facility], [place], Same Day Surgery (--) [address] [facility] [address]  [phone]      Care Instructions       Further instructions from your care team       [facility]  Discharge Instructions for   Percutaneous Nephrostomy   Tube Replacement    After you go home:      Resume a regular diet unless otherwise ordered by your physician.          CALL THE PHYSICIAN IF:    You start bleeding from the procedure site. If you do start to bleed from the site lie down and hold some pressure on the site. Your physician will tell you if  "you need to return to the hospital.    You develop nausea or vomiting.    You develop hives or a rash or any unexplained itching.    ADDITIONAL INSTRUCTIONS:  Please call for the following problems:  1. No urine draining from the nephrostomy tube. Check that tube is not kinked.  2. Urine leaking around tube.  3. Urine becomes very foul smelling or new or fresh blood in urine  4. The skin around the tube is red, painful, or has drainage.  5. You have pain in your back, over your kidney.  6. You have a fever of 100.5 F and chills  7. You feel nauseated and \"just not right.\"    Change the dressing initially the next day to check the insertion site.  After that change every other day.  Clean around tube site with washcloth and antibacterial soap.      [facility] INTERVENTIONAL RADIOLOGY DEPARTMENT  Procedure Physician: Dr. Suarez   Date:2019  Telephone Numbers:  [phone]     Monday-Friday 8:00AM-4:30PM                       [phone]     After 4:30 PM Monday-Friday, Weekends and Holidays. Ask for Interventional Radiologist on Call. Someone is available 24 hours a day.  [facility] toll free number: [phone] Monday- Friday 8:00AM -4:30PM.    I    Additional Information About Your Visit       [employer] Information    [employer] lets you send messages to your doctor, view your test results, renew your prescriptions, schedule appointments and more. To sign up, go to [email or web address] . Click on \"Log in\" on the left side of the screen, which will take you to the Welcome page. Then click on \"Sign up Now\" on the right side of the page.     You will be asked to enter the access code listed below, as well as some personal information. Please follow the directions to create your username and password.     Your access code is: [email or web address]  Expires: 10/28/2019  6:31 AM     Your access code will  in 60 days. If you need help or a new code, please call your [facility] clinic or [phone].       Care " EveryWhere ID    This is your Care EveryWhere ID. This could be used by other organizations to access your [facility] medical records  [id number]       Your Vitals Were     Blood Pressure   134/79 (BP Location: Left arm, Cuff Size: Adult Regular)    Temperature   97.6  F (36.4  C) (Oral)    Respirations   18    Pulse Oximetry   95%           Primary Care Provider Office Phone # Fax #    Nancy Ramandeep Ag [phone]      Equal Access to Services    JUS VIEIRA : Hadii aad ku hadasho Soomaali, waaxda luqadaha, qaybta kaalmada adeegyada, waxay arielin hayaan adeeg brian phillips . So [facility] [phone].    ATENCIÓN: Si habla español, tiene a da silva disposición servicios gratuitos de asistencia lingüística. [facility] [phone].    We comply with applicable federal civil rights laws and [place] laws. We do not discriminate on the basis of race, color, national origin, age, disability, sex, sexual orientation, or gender identity.           Thank you!    Thank you for choosing [facility] for your care. Our goal is always to provide you with excellent care. Hearing back from our patients is one way we can continue to improve our services. Please take a few minutes to complete the written survey that you may receive in the mail after you visit with us. Thank you!            Medication List      ASK your doctor about these medications          Morning Afternoon Evening Bedtime As Needed    ACETAMINOPHEN PO  INSTRUCTIONS:  Take 650 mg by mouth every 4 hours as needed for pain                     albuterol (2.5 MG/3ML) 0.083% neb solution  Also known as:  PROVENTIL  INSTRUCTIONS:  Take 3 mLs by nebulization every 2 hours as needed.                     * baclofen 10 MG tablet  Also known as:  LIORESAL  INSTRUCTIONS:  Take 10 mg by mouth every 4 hours as needed                     * BACLOFEN IT  INSTRUCTIONS:  Via implanted IT pump  Baclofen Concentration 2000 mc/gmL   Dose: 277.1 mcg/day  Low reservoir alarm  date: 3/4/2020  Low reservoir Alarm Volume: 2ml  Interrogated 6/19/19  Pump is managed by Ulices Roth                     bisacodyl 10 MG suppository  Also known as:  DULCOLAX  INSTRUCTIONS:  Place 1 suppository rectally every other day.                     carbamide peroxide 6.5 % otic solution  Also known as:  DEBROX  INSTRUCTIONS:  5 drops 2 times daily                     cholecalciferol 5000 units (125 mcg) capsule  Also known as:  VITAMIN D3  INSTRUCTIONS:  TAKE 1 CAP BY MOUTH ONCE DAILY FOR VIT-D DEFICIENCY                     clindamycin 1 % external gel  Also known as:  CLINDAMAX  INSTRUCTIONS:  Apply topically 2 times daily                     COLACE 100 MG capsule  INSTRUCTIONS:  Take 100 mg by mouth 2 times daily.  Generic drug:  docusate sodium                     Cranberry 400 MG Tabs  INSTRUCTIONS:  Take 400 mg by mouth 2 times daily                     cyanocobalamin 1000 MCG tablet  Also known as:  VITAMIN B-12  INSTRUCTIONS:  Take 1 tablet by mouth daily.                     ferrous sulfate 325 (65 Fe) MG tablet  Also known as:  FEROSUL  INSTRUCTIONS:  Take 1 tablet by mouth daily (with breakfast).                     ketoconazole 2 % external cream  Also known as:  NIZORAL  INSTRUCTIONS:  Apply topically 2 times daily as needed                     levETIRAcetam 1000 MG tablet  Also known as:  KEPPRA  INSTRUCTIONS:  TAKE 1 TAB BY MOUTH TWICE A DAY FOR CONVULSIONS                     * magnesium gluconate 500 MG tablet  Also known as:  MAGONATE  INSTRUCTIONS:  Take 1 tablet by mouth daily.                     * MAG-G 500 (27 Mg) MG tablet  INSTRUCTIONS:  TAKE 1 TABLET BY MOUTH EVERY OTHER DAY FOR HYPERMAGNESIUM  Generic drug:  magnesium gluconate                     metroNIDAZOLE 0.75 % external lotion  Also known as:  METROLOTION                     miconazole 2 % external powder  Also known as:  MICATIN/MICRO GUARD  INSTRUCTIONS:  Apply  topically 2 times daily.                     Miconazole  Powd                     mometasone 0.1 % external cream  Also known as:  ELOCON  INSTRUCTIONS:  Apply topically At Bedtime                     MULTIVITAMIN ADULT PO  INSTRUCTIONS:  Take 1 tablet by mouth daily                     ondansetron 4 MG ODT tab  Also known as:  ZOFRAN-ODT  INSTRUCTIONS:  Take 1 tablet by mouth every 6 hours as needed for nausea.                     OSTEO-MULTIVITAMINS/MINERALS OR  INSTRUCTIONS:  Take  by mouth daily. 12 pm                     oxybutynin 5 MG tablet  Also known as:  DITROPAN  INSTRUCTIONS:  Take 5 mg by mouth                     polyethylene glycol powder  Also known as:  MIRALAX/GLYCOLAX  INSTRUCTIONS:  Take 1 capful by mouth daily as needed for constipation (if no BM x3 days)                     polyvinyl alcohol 1.4 % ophthalmic solution  Also known as:  LIQUIFILM TEARS  INSTRUCTIONS:  Place 2 drops into both eyes every 2 hours as needed for dry eyes                     selenium sulfide 2.5 % external lotion  Also known as:  SELSUN  INSTRUCTIONS:  Apply to scalp topically in the morning every Sun, Tue, Thursday. Place for 5 min then rinse.                     senna-docusate 8.6-50 MG tablet  Also known as:  SENOKOT-S/PERICOLACE  INSTRUCTIONS:  Take 1-2 tablets by mouth 2 times daily.                     vitamin C 250 MG Tabs tablet  Also known as:  ASCORBIC ACID  INSTRUCTIONS:  Take 1 tablet by mouth 2 times daily.                     zinc oxide 40 % external ointment  Also known as:  DESITIN  INSTRUCTIONS:  Apply  topically every hour as needed.                        * This list has 4 medication(s) that are the same as other medications prescribed for you. Read the directions carefully, and ask your doctor or other care provider to review them with you.

## 2019-09-23 LAB
BACTERIA SPEC CULT: ABNORMAL
SPECIMEN SOURCE: ABNORMAL

## 2019-09-25 ENCOUNTER — TELEPHONE (OUTPATIENT)
Dept: INFECTIOUS DISEASES | Facility: CLINIC | Age: 44
End: 2019-09-25

## 2019-09-25 NOTE — TELEPHONE ENCOUNTER
Please see addendum to 9/12/19 note for information regarding plans for jae-operative antibiotics.     Deena Park MD  Infectious Diseases  [phone] (TextPage)

## 2019-09-26 ENCOUNTER — TELEPHONE (OUTPATIENT)
Dept: INFECTIOUS DISEASES | Facility: CLINIC | Age: 44
End: 2019-09-26

## 2019-09-26 DIAGNOSIS — N39.0 URINARY TRACT INFECTION ASSOCIATED WITH CYSTOSTOMY CATHETER, INITIAL ENCOUNTER (H): Primary | ICD-10-CM

## 2019-09-26 DIAGNOSIS — T83.510A URINARY TRACT INFECTION ASSOCIATED WITH CYSTOSTOMY CATHETER, INITIAL ENCOUNTER (H): Primary | ICD-10-CM

## 2019-09-26 NOTE — TELEPHONE ENCOUNTER
"Infectious Disease Clinic IV Therapy Note    -Ordering provider: Dr. Park    -Diagnosis/indication for therapy: bacturia prior to urologic surgery    -Infusion Service: \"[facility]    -PICC will be placed on: 09/26/2018 at [facility].    -1st dose will be on: 09/26/2019 at [facility].    - Medication orders: ceftazidime 2g IV Q8H and vancomycin with goal trough 15-20    -Anticipated LOT: tentative planned duration is through 10/1 but should be re-evaluated by urology prior to discharge depending on whether he does well post-op     -Lab orders: Please check CBC and CMP when PICC is placed. Further creatinine/vancomycin level checks per pharmacy protocol. No weekly labs needed as anticipated duration is <1 week.  Lab results to be faxed to Dr. Park at [phone].    -Patient has been notified of appointments and medication plan: Yes    Babs Alonso RN    "

## 2019-09-27 ENCOUNTER — ANESTHESIA EVENT (OUTPATIENT)
Dept: SURGERY | Facility: CLINIC | Age: 44
End: 2019-09-27
Payer: COMMERCIAL

## 2019-09-27 ENCOUNTER — HOSPITAL ENCOUNTER (OUTPATIENT)
Dept: INTERVENTIONAL RADIOLOGY/VASCULAR | Facility: CLINIC | Age: 44
Discharge: HOME | End: 2019-09-27
Attending: INTERNAL MEDICINE | Admitting: INTERNAL MEDICINE
Payer: COMMERCIAL

## 2019-09-27 VITALS
OXYGEN SATURATION: 96 % | RESPIRATION RATE: 15 BRPM | SYSTOLIC BLOOD PRESSURE: 119 MMHG | DIASTOLIC BLOOD PRESSURE: 87 MMHG

## 2019-09-27 DIAGNOSIS — T83.510A URINARY TRACT INFECTION ASSOCIATED WITH CYSTOSTOMY CATHETER, INITIAL ENCOUNTER (H): ICD-10-CM

## 2019-09-27 DIAGNOSIS — N39.0 URINARY TRACT INFECTION ASSOCIATED WITH CYSTOSTOMY CATHETER, INITIAL ENCOUNTER (H): ICD-10-CM

## 2019-09-27 PROCEDURE — 36573 INSJ PICC RS&I 5 YR+: CPT

## 2019-09-27 PROCEDURE — 27210908 ZZH NEEDLE CR4

## 2019-09-27 PROCEDURE — 25500064 ZZH RX 255 OP 636: Performed by: RADIOLOGY

## 2019-09-27 PROCEDURE — C1769 GUIDE WIRE: HCPCS

## 2019-09-27 PROCEDURE — 25000128 H RX IP 250 OP 636: Performed by: STUDENT IN AN ORGANIZED HEALTH CARE EDUCATION/TRAINING PROGRAM

## 2019-09-27 PROCEDURE — 25000125 ZZHC RX 250: Performed by: STUDENT IN AN ORGANIZED HEALTH CARE EDUCATION/TRAINING PROGRAM

## 2019-09-27 PROCEDURE — 27210732 ZZH ACCESSORY CR1

## 2019-09-27 PROCEDURE — C1751 CATH, INF, PER/CENT/MIDLINE: HCPCS

## 2019-09-27 PROCEDURE — C1887 CATHETER, GUIDING: HCPCS

## 2019-09-27 PROCEDURE — 27210804 ZZH SHEATH CR3

## 2019-09-27 PROCEDURE — 27210886 ZZH ACCESSORY CR5

## 2019-09-27 RX ORDER — DEXTROSE MONOHYDRATE 25 G/50ML
25-50 INJECTION, SOLUTION INTRAVENOUS
Status: CANCELLED | OUTPATIENT
Start: 2019-09-27

## 2019-09-27 RX ORDER — IODIXANOL 320 MG/ML
50 INJECTION, SOLUTION INTRAVASCULAR ONCE
Status: COMPLETED | OUTPATIENT
Start: 2019-09-27 | End: 2019-09-27

## 2019-09-27 RX ORDER — LIDOCAINE HYDROCHLORIDE 10 MG/ML
1-30 INJECTION, SOLUTION EPIDURAL; INFILTRATION; INTRACAUDAL; PERINEURAL
Status: COMPLETED | OUTPATIENT
Start: 2019-09-27 | End: 2019-09-27

## 2019-09-27 RX ORDER — HEPARIN SODIUM,PORCINE 10 UNIT/ML
5-10 VIAL (ML) INTRAVENOUS
Status: DISCONTINUED | OUTPATIENT
Start: 2019-09-27 | End: 2019-09-28 | Stop reason: HOSPADM

## 2019-09-27 RX ORDER — NICOTINE POLACRILEX 4 MG
15-30 LOZENGE BUCCAL
Status: CANCELLED | OUTPATIENT
Start: 2019-09-27

## 2019-09-27 RX ORDER — HEPARIN SODIUM,PORCINE 10 UNIT/ML
5 VIAL (ML) INTRAVENOUS
Status: COMPLETED | OUTPATIENT
Start: 2019-09-27 | End: 2019-09-27

## 2019-09-27 RX ORDER — HEPARIN SODIUM,PORCINE 10 UNIT/ML
5 VIAL (ML) INTRAVENOUS EVERY 24 HOURS
Status: CANCELLED | OUTPATIENT
Start: 2019-09-27

## 2019-09-27 RX ADMIN — IODIXANOL 10 ML: 320 INJECTION, SOLUTION INTRAVASCULAR at 12:19

## 2019-09-27 RX ADMIN — Medication 5 ML: at 12:03

## 2019-09-27 RX ADMIN — LIDOCAINE HYDROCHLORIDE 5 ML: 10 INJECTION, SOLUTION EPIDURAL; INFILTRATION; INTRACAUDAL; PERINEURAL at 12:04

## 2019-09-27 NOTE — PROGRESS NOTES
Patient Name: Maicol Carrera  Medical Record Number: [id number]  Today's Date: 9/27/2019    Procedure: PICC line placement  Proceduralist: Dr. Luna and Dr. Schuler    Procedure start time: 1115  Puncture time: 1116  Procedure end time: 1205    Other Notes: Pt arrived to IR room 4 from [facility]. Consent reviewed. Pt denies any questions or concerns regarding procedure. Pt positioned supine and monitored per protocol. Pt tolerated procedure without any noted complications. Pt transferred back to [facility].

## 2019-09-27 NOTE — PROCEDURES
[facility], [place]    Procedure: IR Procedure Note  Date/Time: 9/27/2019 12:40 PM  Performed by: David Schuler MD  Authorized by: David Schuler MD   IR Fellow Physician: David Schuler     UNIVERSAL PROTOCOL   Site Marked: Yes  Prior Images Obtained and Reviewed:  Yes  Required items: Required blood products, implants, devices and special equipment available    Patient identity confirmed:  Verbally with patient  Patient was reevaluated immediately before administering moderate or deep sedation or anesthesia  Confirmation Checklist:  Patient's identity using two indicators, relevant allergies, procedure was appropriate and matched the consent or emergent situation and correct equipment/implants were available  Time out: Immediately prior to the procedure a time out was called    Preparation: Patient was prepped and draped in usual sterile fashion      SEDATION    Patient Sedated: No    See dictated procedure note for full details.  Findings: 5 Fr. Left arm PICC line placed    Specimens: none    Complications: None    Condition: Stable    Plan: PICC cares as ordered     PROCEDURE   Patient Tolerance:  Patient tolerated the procedure well with no immediate complications    Time of Sedation in Minutes by Physician:  0

## 2019-09-30 ENCOUNTER — APPOINTMENT (OUTPATIENT)
Dept: GENERAL RADIOLOGY | Facility: CLINIC | Age: 44
End: 2019-09-30
Attending: UROLOGY
Payer: COMMERCIAL

## 2019-09-30 ENCOUNTER — HOSPITAL ENCOUNTER (INPATIENT)
Facility: CLINIC | Age: 44
LOS: 3 days | Discharge: NURSING, REHAB OR LONG-TERM CARE | End: 2019-10-06
Attending: UROLOGY | Admitting: SURGERY
Payer: COMMERCIAL

## 2019-09-30 ENCOUNTER — ANESTHESIA (OUTPATIENT)
Dept: SURGERY | Facility: CLINIC | Age: 44
End: 2019-09-30
Payer: COMMERCIAL

## 2019-09-30 DIAGNOSIS — N20.0 NEPHROLITHIASIS: ICD-10-CM

## 2019-09-30 DIAGNOSIS — N32.89 BLADDER SPASMS: ICD-10-CM

## 2019-09-30 DIAGNOSIS — N20.0 KIDNEY STONE: Primary | ICD-10-CM

## 2019-09-30 DIAGNOSIS — N10 ACUTE PYELONEPHRITIS: Primary | ICD-10-CM

## 2019-09-30 LAB
ANION GAP SERPL CALCULATED.3IONS-SCNC: 9 MMOL/L (ref 3–14)
BUN SERPL-MCNC: 13 MG/DL (ref 7–30)
CALCIUM SERPL-MCNC: 8.6 MG/DL (ref 8.5–10.1)
CHLORIDE SERPL-SCNC: 109 MMOL/L (ref 94–109)
CO2 SERPL-SCNC: 23 MMOL/L (ref 20–32)
CREAT SERPL-MCNC: 1.09 MG/DL (ref 0.66–1.25)
ERYTHROCYTE [DISTWIDTH] IN BLOOD BY AUTOMATED COUNT: 16 % (ref 10–15)
GFR SERPL CREATININE-BSD FRML MDRD: 83 ML/MIN/{1.73_M2}
GLUCOSE BLDC GLUCOMTR-MCNC: 108 MG/DL (ref 70–99)
GLUCOSE SERPL-MCNC: 150 MG/DL (ref 70–99)
HCT VFR BLD AUTO: 40 % (ref 40–53)
HGB BLD-MCNC: 10.2 G/DL (ref 13.3–17.7)
HGB BLD-MCNC: 11.5 G/DL (ref 13.3–17.7)
LACTATE BLD-SCNC: 2.2 MMOL/L (ref 0.7–2)
MCH RBC QN AUTO: 24.1 PG (ref 26.5–33)
MCHC RBC AUTO-ENTMCNC: 28.8 G/DL (ref 31.5–36.5)
MCV RBC AUTO: 84 FL (ref 78–100)
PLATELET # BLD AUTO: 335 10E9/L (ref 150–450)
POTASSIUM SERPL-SCNC: 4.4 MMOL/L (ref 3.4–5.3)
RBC # BLD AUTO: 4.77 10E12/L (ref 4.4–5.9)
SODIUM SERPL-SCNC: 140 MMOL/L (ref 133–144)
WBC # BLD AUTO: 13.7 10E9/L (ref 4–11)

## 2019-09-30 PROCEDURE — 36000070 ZZH SURGERY LEVEL 5 EA 15 ADDTL MIN - UMMC: Performed by: UROLOGY

## 2019-09-30 PROCEDURE — 85027 COMPLETE CBC AUTOMATED: CPT | Performed by: STUDENT IN AN ORGANIZED HEALTH CARE EDUCATION/TRAINING PROGRAM

## 2019-09-30 PROCEDURE — 85018 HEMOGLOBIN: CPT | Performed by: STUDENT IN AN ORGANIZED HEALTH CARE EDUCATION/TRAINING PROGRAM

## 2019-09-30 PROCEDURE — C1887 CATHETER, GUIDING: HCPCS | Performed by: UROLOGY

## 2019-09-30 PROCEDURE — 82365 CALCULUS SPECTROSCOPY: CPT | Performed by: UROLOGY

## 2019-09-30 PROCEDURE — C2617 STENT, NON-COR, TEM W/O DEL: HCPCS | Performed by: UROLOGY

## 2019-09-30 PROCEDURE — C1725 CATH, TRANSLUMIN NON-LASER: HCPCS | Performed by: UROLOGY

## 2019-09-30 PROCEDURE — 25500064 ZZH RX 255 OP 636: Performed by: UROLOGY

## 2019-09-30 PROCEDURE — 37000009 ZZH ANESTHESIA TECHNICAL FEE, EACH ADDTL 15 MIN: Performed by: UROLOGY

## 2019-09-30 PROCEDURE — 25000128 H RX IP 250 OP 636: Performed by: STUDENT IN AN ORGANIZED HEALTH CARE EDUCATION/TRAINING PROGRAM

## 2019-09-30 PROCEDURE — 25800030 ZZH RX IP 258 OP 636: Performed by: NURSE ANESTHETIST, CERTIFIED REGISTERED

## 2019-09-30 PROCEDURE — 00000146 ZZHCL STATISTIC GLUCOSE BY METER IP

## 2019-09-30 PROCEDURE — 25800030 ZZH RX IP 258 OP 636: Performed by: STUDENT IN AN ORGANIZED HEALTH CARE EDUCATION/TRAINING PROGRAM

## 2019-09-30 PROCEDURE — G0378 HOSPITAL OBSERVATION PER HR: HCPCS

## 2019-09-30 PROCEDURE — 36415 COLL VENOUS BLD VENIPUNCTURE: CPT | Performed by: STUDENT IN AN ORGANIZED HEALTH CARE EDUCATION/TRAINING PROGRAM

## 2019-09-30 PROCEDURE — C1769 GUIDE WIRE: HCPCS | Performed by: UROLOGY

## 2019-09-30 PROCEDURE — 86923 COMPATIBILITY TEST ELECTRIC: CPT | Performed by: STUDENT IN AN ORGANIZED HEALTH CARE EDUCATION/TRAINING PROGRAM

## 2019-09-30 PROCEDURE — 71000017 ZZH RECOVERY PHASE 1 LEVEL 3 EA ADDTL HR: Performed by: UROLOGY

## 2019-09-30 PROCEDURE — 25000128 H RX IP 250 OP 636: Performed by: UROLOGY

## 2019-09-30 PROCEDURE — 71000016 ZZH RECOVERY PHASE 1 LEVEL 3 FIRST HR: Performed by: UROLOGY

## 2019-09-30 PROCEDURE — 25800030 ZZH RX IP 258 OP 636: Performed by: UROLOGY

## 2019-09-30 PROCEDURE — 80048 BASIC METABOLIC PNL TOTAL CA: CPT | Performed by: STUDENT IN AN ORGANIZED HEALTH CARE EDUCATION/TRAINING PROGRAM

## 2019-09-30 PROCEDURE — 25000125 ZZHC RX 250: Performed by: UROLOGY

## 2019-09-30 PROCEDURE — 86900 BLOOD TYPING SEROLOGIC ABO: CPT | Performed by: STUDENT IN AN ORGANIZED HEALTH CARE EDUCATION/TRAINING PROGRAM

## 2019-09-30 PROCEDURE — 0TC18ZZ EXTIRPATION OF MATTER FROM LEFT KIDNEY, VIA NATURAL OR ARTIFICIAL OPENING ENDOSCOPIC: ICD-10-PCS | Performed by: UROLOGY

## 2019-09-30 PROCEDURE — 25000566 ZZH SEVOFLURANE, EA 15 MIN: Performed by: UROLOGY

## 2019-09-30 PROCEDURE — 40000277 XR SURGERY CARM FLUORO LESS THAN 5 MIN W STILLS: Mod: TC

## 2019-09-30 PROCEDURE — 86850 RBC ANTIBODY SCREEN: CPT | Performed by: STUDENT IN AN ORGANIZED HEALTH CARE EDUCATION/TRAINING PROGRAM

## 2019-09-30 PROCEDURE — 27210794 ZZH OR GENERAL SUPPLY STERILE: Performed by: UROLOGY

## 2019-09-30 PROCEDURE — 25000128 H RX IP 250 OP 636: Performed by: NURSE ANESTHETIST, CERTIFIED REGISTERED

## 2019-09-30 PROCEDURE — 0T778DZ DILATION OF LEFT URETER WITH INTRALUMINAL DEVICE, VIA NATURAL OR ARTIFICIAL OPENING ENDOSCOPIC: ICD-10-PCS | Performed by: UROLOGY

## 2019-09-30 PROCEDURE — 40000171 ZZH STATISTIC PRE-PROCEDURE ASSESSMENT III: Performed by: UROLOGY

## 2019-09-30 PROCEDURE — 37000008 ZZH ANESTHESIA TECHNICAL FEE, 1ST 30 MIN: Performed by: UROLOGY

## 2019-09-30 PROCEDURE — C1758 CATHETER, URETERAL: HCPCS | Performed by: UROLOGY

## 2019-09-30 PROCEDURE — 27211024 ZZHC OR SUPPLY OTHER OPNP: Performed by: UROLOGY

## 2019-09-30 PROCEDURE — 36000072 ZZH SURGERY LEVEL 5 W FLUORO 1ST 30 MIN - UMMC: Performed by: UROLOGY

## 2019-09-30 PROCEDURE — 0TC13ZZ EXTIRPATION OF MATTER FROM LEFT KIDNEY, PERCUTANEOUS APPROACH: ICD-10-PCS | Performed by: UROLOGY

## 2019-09-30 PROCEDURE — 0TP5X0Z REMOVAL OF DRAINAGE DEVICE FROM KIDNEY, EXTERNAL APPROACH: ICD-10-PCS | Performed by: UROLOGY

## 2019-09-30 PROCEDURE — 83605 ASSAY OF LACTIC ACID: CPT

## 2019-09-30 PROCEDURE — 86901 BLOOD TYPING SEROLOGIC RH(D): CPT | Performed by: STUDENT IN AN ORGANIZED HEALTH CARE EDUCATION/TRAINING PROGRAM

## 2019-09-30 PROCEDURE — 25000132 ZZH RX MED GY IP 250 OP 250 PS 637: Performed by: STUDENT IN AN ORGANIZED HEALTH CARE EDUCATION/TRAINING PROGRAM

## 2019-09-30 PROCEDURE — 25000125 ZZHC RX 250: Performed by: NURSE ANESTHETIST, CERTIFIED REGISTERED

## 2019-09-30 DEVICE — IMPLANTABLE DEVICE: Type: IMPLANTABLE DEVICE | Site: URETER | Status: FUNCTIONAL

## 2019-09-30 RX ORDER — OXYCODONE HYDROCHLORIDE 5 MG/1
5-10 TABLET ORAL EVERY 4 HOURS PRN
Status: DISCONTINUED | OUTPATIENT
Start: 2019-09-30 | End: 2019-10-06 | Stop reason: HOSPADM

## 2019-09-30 RX ORDER — ONDANSETRON 4 MG/1
4 TABLET, ORALLY DISINTEGRATING ORAL EVERY 6 HOURS PRN
Status: DISCONTINUED | OUTPATIENT
Start: 2019-09-30 | End: 2019-10-06 | Stop reason: HOSPADM

## 2019-09-30 RX ORDER — SODIUM CHLORIDE, SODIUM LACTATE, POTASSIUM CHLORIDE, CALCIUM CHLORIDE 600; 310; 30; 20 MG/100ML; MG/100ML; MG/100ML; MG/100ML
INJECTION, SOLUTION INTRAVENOUS CONTINUOUS PRN
Status: DISCONTINUED | OUTPATIENT
Start: 2019-09-30 | End: 2019-09-30

## 2019-09-30 RX ORDER — FENTANYL CITRATE 50 UG/ML
INJECTION, SOLUTION INTRAMUSCULAR; INTRAVENOUS PRN
Status: DISCONTINUED | OUTPATIENT
Start: 2019-09-30 | End: 2019-09-30

## 2019-09-30 RX ORDER — DOCUSATE SODIUM 100 MG/1
100 CAPSULE, LIQUID FILLED ORAL 2 TIMES DAILY
Status: DISCONTINUED | OUTPATIENT
Start: 2019-09-30 | End: 2019-10-06 | Stop reason: HOSPADM

## 2019-09-30 RX ORDER — EPHEDRINE SULFATE 50 MG/ML
INJECTION, SOLUTION INTRAMUSCULAR; INTRAVENOUS; SUBCUTANEOUS PRN
Status: DISCONTINUED | OUTPATIENT
Start: 2019-09-30 | End: 2019-09-30

## 2019-09-30 RX ORDER — FLUMAZENIL 0.1 MG/ML
0.2 INJECTION, SOLUTION INTRAVENOUS
Status: DISCONTINUED | OUTPATIENT
Start: 2019-09-30 | End: 2019-09-30 | Stop reason: HOSPADM

## 2019-09-30 RX ORDER — ONDANSETRON 2 MG/ML
4 INJECTION INTRAMUSCULAR; INTRAVENOUS EVERY 30 MIN PRN
Status: DISCONTINUED | OUTPATIENT
Start: 2019-09-30 | End: 2019-09-30 | Stop reason: HOSPADM

## 2019-09-30 RX ORDER — FENTANYL CITRATE 50 UG/ML
25-50 INJECTION, SOLUTION INTRAMUSCULAR; INTRAVENOUS
Status: DISCONTINUED | OUTPATIENT
Start: 2019-09-30 | End: 2019-09-30 | Stop reason: HOSPADM

## 2019-09-30 RX ORDER — CEFAZOLIN SODIUM 2 G/100ML
2 INJECTION, SOLUTION INTRAVENOUS
Status: DISCONTINUED | OUTPATIENT
Start: 2019-09-30 | End: 2019-09-30 | Stop reason: HOSPADM

## 2019-09-30 RX ORDER — CEFAZOLIN SODIUM 1 G/3ML
1 INJECTION, POWDER, FOR SOLUTION INTRAMUSCULAR; INTRAVENOUS SEE ADMIN INSTRUCTIONS
Status: DISCONTINUED | OUTPATIENT
Start: 2019-09-30 | End: 2019-09-30 | Stop reason: HOSPADM

## 2019-09-30 RX ORDER — ALBUTEROL SULFATE 0.83 MG/ML
2.5 SOLUTION RESPIRATORY (INHALATION)
Status: DISCONTINUED | OUTPATIENT
Start: 2019-09-30 | End: 2019-10-06 | Stop reason: HOSPADM

## 2019-09-30 RX ORDER — LIDOCAINE 40 MG/G
CREAM TOPICAL
Status: DISCONTINUED | OUTPATIENT
Start: 2019-09-30 | End: 2019-10-06 | Stop reason: HOSPADM

## 2019-09-30 RX ORDER — NALOXONE HYDROCHLORIDE 0.4 MG/ML
.1-.4 INJECTION, SOLUTION INTRAMUSCULAR; INTRAVENOUS; SUBCUTANEOUS
Status: DISCONTINUED | OUTPATIENT
Start: 2019-09-30 | End: 2019-09-30 | Stop reason: HOSPADM

## 2019-09-30 RX ORDER — DEXAMETHASONE SODIUM PHOSPHATE 4 MG/ML
INJECTION, SOLUTION INTRA-ARTICULAR; INTRALESIONAL; INTRAMUSCULAR; INTRAVENOUS; SOFT TISSUE PRN
Status: DISCONTINUED | OUTPATIENT
Start: 2019-09-30 | End: 2019-09-30

## 2019-09-30 RX ORDER — SODIUM CHLORIDE, SODIUM LACTATE, POTASSIUM CHLORIDE, CALCIUM CHLORIDE 600; 310; 30; 20 MG/100ML; MG/100ML; MG/100ML; MG/100ML
INJECTION, SOLUTION INTRAVENOUS CONTINUOUS
Status: DISCONTINUED | OUTPATIENT
Start: 2019-09-30 | End: 2019-09-30 | Stop reason: HOSPADM

## 2019-09-30 RX ORDER — ACETAMINOPHEN 325 MG/1
650 TABLET ORAL EVERY 6 HOURS PRN
Status: DISCONTINUED | OUTPATIENT
Start: 2019-09-30 | End: 2019-10-06 | Stop reason: HOSPADM

## 2019-09-30 RX ORDER — OXYBUTYNIN CHLORIDE 5 MG/1
5 TABLET ORAL DAILY PRN
Status: DISCONTINUED | OUTPATIENT
Start: 2019-09-30 | End: 2019-10-03

## 2019-09-30 RX ORDER — FERROUS SULFATE 325(65) MG
325 TABLET ORAL
Status: DISCONTINUED | OUTPATIENT
Start: 2019-10-01 | End: 2019-10-06 | Stop reason: HOSPADM

## 2019-09-30 RX ORDER — BACLOFEN 10 MG/1
10 TABLET ORAL EVERY 4 HOURS PRN
Status: DISCONTINUED | OUTPATIENT
Start: 2019-09-30 | End: 2019-10-06 | Stop reason: HOSPADM

## 2019-09-30 RX ORDER — ONDANSETRON 4 MG/1
4 TABLET, ORALLY DISINTEGRATING ORAL EVERY 30 MIN PRN
Status: DISCONTINUED | OUTPATIENT
Start: 2019-09-30 | End: 2019-09-30 | Stop reason: HOSPADM

## 2019-09-30 RX ORDER — PROPOFOL 10 MG/ML
INJECTION, EMULSION INTRAVENOUS PRN
Status: DISCONTINUED | OUTPATIENT
Start: 2019-09-30 | End: 2019-09-30

## 2019-09-30 RX ORDER — SODIUM CHLORIDE 9 MG/ML
INJECTION, SOLUTION INTRAVENOUS CONTINUOUS
Status: DISCONTINUED | OUTPATIENT
Start: 2019-09-30 | End: 2019-10-02 | Stop reason: CLARIF

## 2019-09-30 RX ORDER — HYDROMORPHONE HCL/0.9% NACL/PF 0.2MG/0.2
0.2 SYRINGE (ML) INTRAVENOUS
Status: DISCONTINUED | OUTPATIENT
Start: 2019-09-30 | End: 2019-10-02

## 2019-09-30 RX ORDER — CEFAZOLIN SODIUM 2 G/100ML
2 INJECTION, SOLUTION INTRAVENOUS
Status: COMPLETED | OUTPATIENT
Start: 2019-09-30 | End: 2019-09-30

## 2019-09-30 RX ORDER — POLYETHYLENE GLYCOL 3350 17 G/17G
17 POWDER, FOR SOLUTION ORAL DAILY PRN
Status: DISCONTINUED | OUTPATIENT
Start: 2019-09-30 | End: 2019-10-06 | Stop reason: HOSPADM

## 2019-09-30 RX ORDER — LEVETIRACETAM 500 MG/1
1000 TABLET ORAL 2 TIMES DAILY
Status: DISCONTINUED | OUTPATIENT
Start: 2019-09-30 | End: 2019-10-06 | Stop reason: HOSPADM

## 2019-09-30 RX ORDER — NALOXONE HYDROCHLORIDE 0.4 MG/ML
.1-.4 INJECTION, SOLUTION INTRAMUSCULAR; INTRAVENOUS; SUBCUTANEOUS
Status: DISCONTINUED | OUTPATIENT
Start: 2019-09-30 | End: 2019-10-04

## 2019-09-30 RX ORDER — ONDANSETRON 2 MG/ML
INJECTION INTRAMUSCULAR; INTRAVENOUS PRN
Status: DISCONTINUED | OUTPATIENT
Start: 2019-09-30 | End: 2019-09-30

## 2019-09-30 RX ORDER — ONDANSETRON 2 MG/ML
4 INJECTION INTRAMUSCULAR; INTRAVENOUS EVERY 6 HOURS PRN
Status: DISCONTINUED | OUTPATIENT
Start: 2019-09-30 | End: 2019-10-06 | Stop reason: HOSPADM

## 2019-09-30 RX ORDER — NALOXONE HYDROCHLORIDE 0.4 MG/ML
.1-.4 INJECTION, SOLUTION INTRAMUSCULAR; INTRAVENOUS; SUBCUTANEOUS
Status: DISCONTINUED | OUTPATIENT
Start: 2019-09-30 | End: 2019-10-01

## 2019-09-30 RX ORDER — HYDROMORPHONE HYDROCHLORIDE 1 MG/ML
.3-.5 INJECTION, SOLUTION INTRAMUSCULAR; INTRAVENOUS; SUBCUTANEOUS EVERY 5 MIN PRN
Status: DISCONTINUED | OUTPATIENT
Start: 2019-09-30 | End: 2019-09-30 | Stop reason: HOSPADM

## 2019-09-30 RX ADMIN — SUGAMMADEX 150 MG: 100 INJECTION, SOLUTION INTRAVENOUS at 10:46

## 2019-09-30 RX ADMIN — MIDAZOLAM 2 MG: 1 INJECTION INTRAMUSCULAR; INTRAVENOUS at 07:43

## 2019-09-30 RX ADMIN — Medication 5 MG: at 09:36

## 2019-09-30 RX ADMIN — ACETAMINOPHEN 650 MG: 325 TABLET, FILM COATED ORAL at 22:22

## 2019-09-30 RX ADMIN — Medication 5 MG: at 10:40

## 2019-09-30 RX ADMIN — Medication 5 MG: at 10:44

## 2019-09-30 RX ADMIN — SODIUM CHLORIDE 1000 ML: 9 INJECTION, SOLUTION INTRAVENOUS at 12:29

## 2019-09-30 RX ADMIN — CEFTAZIDIME 2 G: 2 INJECTION, POWDER, FOR SOLUTION INTRAVENOUS at 17:25

## 2019-09-30 RX ADMIN — OXYCODONE HYDROCHLORIDE 5 MG: 5 TABLET ORAL at 22:22

## 2019-09-30 RX ADMIN — PROPOFOL 150 MG: 10 INJECTION, EMULSION INTRAVENOUS at 07:52

## 2019-09-30 RX ADMIN — DOCUSATE SODIUM 100 MG: 100 CAPSULE, LIQUID FILLED ORAL at 21:00

## 2019-09-30 RX ADMIN — FENTANYL CITRATE 50 MCG: 50 INJECTION, SOLUTION INTRAMUSCULAR; INTRAVENOUS at 07:52

## 2019-09-30 RX ADMIN — LEVETIRACETAM 1000 MG: 500 TABLET, FILM COATED ORAL at 21:00

## 2019-09-30 RX ADMIN — ROCURONIUM BROMIDE 50 MG: 10 INJECTION INTRAVENOUS at 07:52

## 2019-09-30 RX ADMIN — VANCOMYCIN HYDROCHLORIDE 1500 MG: 10 INJECTION, POWDER, LYOPHILIZED, FOR SOLUTION INTRAVENOUS at 18:16

## 2019-09-30 RX ADMIN — Medication 5 MG: at 09:59

## 2019-09-30 RX ADMIN — CEFTAZIDIME 2 G: 2 INJECTION, POWDER, FOR SOLUTION INTRAVENOUS at 08:50

## 2019-09-30 RX ADMIN — DEXAMETHASONE SODIUM PHOSPHATE 6 MG: 4 INJECTION, SOLUTION INTRA-ARTICULAR; INTRALESIONAL; INTRAMUSCULAR; INTRAVENOUS; SOFT TISSUE at 08:12

## 2019-09-30 RX ADMIN — SODIUM CHLORIDE, POTASSIUM CHLORIDE, SODIUM LACTATE AND CALCIUM CHLORIDE: 600; 310; 30; 20 INJECTION, SOLUTION INTRAVENOUS at 07:59

## 2019-09-30 RX ADMIN — CEFAZOLIN SODIUM 2 G: 2 INJECTION, SOLUTION INTRAVENOUS at 08:00

## 2019-09-30 RX ADMIN — FENTANYL CITRATE 50 MCG: 50 INJECTION, SOLUTION INTRAMUSCULAR; INTRAVENOUS at 10:53

## 2019-09-30 RX ADMIN — ONDANSETRON 4 MG: 2 INJECTION INTRAMUSCULAR; INTRAVENOUS at 10:46

## 2019-09-30 RX ADMIN — SODIUM CHLORIDE, POTASSIUM CHLORIDE, SODIUM LACTATE AND CALCIUM CHLORIDE: 600; 310; 30; 20 INJECTION, SOLUTION INTRAVENOUS at 07:36

## 2019-09-30 RX ADMIN — FENTANYL CITRATE 50 MCG: 50 INJECTION, SOLUTION INTRAMUSCULAR; INTRAVENOUS at 11:05

## 2019-09-30 ASSESSMENT — ENCOUNTER SYMPTOMS: SEIZURES: 1

## 2019-09-30 ASSESSMENT — LIFESTYLE VARIABLES: TOBACCO_USE: 1

## 2019-09-30 NOTE — OR NURSING
Patient alert and oriented, able to answer questions and participate in care. Patient states he gives his verbal consent for procedure, unable to sign due to quadriplegia. Writer and Barb Rivas RN at bedside to witness consent. Signed paper consent for patient with Dr. Horn (urology) at bedside.
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
## 2019-09-30 NOTE — PHARMACY-VANCOMYCIN DOSING SERVICE
Pharmacy Vancomycin Initial Note  Date of Service 2019  Patient's  1975  44 year old, male    Indication: Surgical Prophylaxis    Current estimated CrCl = Estimated Creatinine Clearance: 96 mL/min (based on SCr of 1.09 mg/dL).    Creatinine for last 3 days  2019:  1:05 PM Creatinine 1.09 mg/dL    Recent Vancomycin Level(s) for last 3 days  No results found for requested labs within last 72 hours.      Vancomycin IV Administrations (past 72 hours)      No vancomycin orders with administrations in past 72 hours.                Nephrotoxins and other renal medications (From now, onward)    Start     Dose/Rate Route Frequency Ordered Stop    19 1515  vancomycin 1500 mg in 0.9% NaCl 250 ml intermittent infusion 1,500 mg      1,500 mg  over 90 Minutes Intravenous EVERY 24 HOURS 19 1506            Contrast Orders - past 72 hours (72h ago, onward)    Start     Dose/Rate Route Frequency Ordered Stop    19 1049  iothalamate meglumine (CONRAY) 60 % injection  Status:  Discontinued        PRN 19 1049 19 1436                Plan:  1.  Continue vancomycin 1500 mg IV q24h from PTA dosing. Patient received a dose on  and   2.  Goal Trough Level: 15-20 mg/L per ID note   3.  Pharmacy will check trough levels as appropriate in 1-3 Days.    4. Serum creatinine levels will be ordered daily for the first week of therapy and at least twice weekly for subsequent weeks.    5. [place] method utilized to dose vancomycin therapy: continuing PTA dose    Alia Stover [facility]

## 2019-09-30 NOTE — OR NURSING
At 1230 Dr Novoa at bedside . He was notified that respiratory rate down to 28-32 and heart rate in 120s.  Patient was improving . Denies SOB at this time. Continuing to monitor.  Stat labs were sent at approx 1300.  Rn attempted to draw from PICC.  He stated that even though the PICC was just placed on this Friday it has not drawn at all.  Lab called and 2 different lab technicians attempted  multipele draws before being able to obtain the labs

## 2019-09-30 NOTE — OR NURSING
At 1325 Dr Novoa updated with improving vitals , no longer SOB and comfort level of patient(less hot)  He stated he would be by to see patient before signing him out.  Labs are pending

## 2019-09-30 NOTE — PROGRESS NOTES
Infectious Diseases Chart Note:   9/30/2019     Please see 9/12 ID clinic note for history and preoperative antibiotic plan. Mr. Carrera was on preoperative antibiotics to prevent procedure-related bacteremia. He did not have any evidence of active infection prior to his procedure. Per discussion with Dr. Horn, a significant portion of stone burden was removed today but he may still has some fragments left (which will be better defined after upcoming CT).  It is likely that he will have an additional procedure in 2-3 weeks to remove these fragments if they are present. We made the following plan:     Plan:   1. If any new signs/symptoms concerning for infection prior to discharge, please reconsult ID and we will see him formally.   2. If another procedure is planned within 2 weeks, we will continue ceftaz and vanc through that procedure. If it is >2 weeks, stop antibiotics in 72 hours and restart 72 hours prior to the next procedure.   3. I will monitor the pending stone culture and we can adjust antibiotics as needed pending results.     Deena Park MD  Infectious Diseases  [phone] (TextPage)

## 2019-09-30 NOTE — PROGRESS NOTES
Per Dr. Babb writer received verbal order to flush PNT at port site with 10 ml of normal saline PRN.

## 2019-09-30 NOTE — PROGRESS NOTES
Sepsis Evaluation Progress Note    I was called to see Maicol Carrera due to abnormal vital signs triggering the Sepsis SIRS screening alert. He is not known to have an infection.     Physical Exam   Vital Signs:  Temp: 97.8  F (36.6  C) Temp src: Axillary(taking ice chips) BP: 118/78 Pulse: 89 Heart Rate: 87 Resp: 26 SpO2: 100 % O2 Device: Nasal cannula Oxygen Delivery: 2 LPM    Lab:  Lactic Acid   Date Value Ref Range Status   09/30/2019 2.2 (H) 0.7 - 2.0 mmol/L Final     Lactate for Sepsis Protocol   Date Value Ref Range Status   07/16/2019 1.1 0.7 - 2.0 mmol/L Final       The patient is at baseline mental status.     Physical exam was not performed.     Assessment & Plan   NO EVIDENCE OF SEPSIS at this time.  Vital sign, physical exam, and lab findings are likely due to dehydration.    Disposition: The patient will remain on the current unit. We will continue to monitor this patient closely.  Chinedu Goncalves MD    Sepsis Criteria   Sepsis: 2+ SIRS criteria due to infection  Severe Sepsis: Sepsis AND 1+ new sign of acute organ dysfunction (Note: lactate >2 is organ dysfunction)  Septic Shock: Sepsis AND hypotension despite volume resuscitation with 30 ml/kg crystalloid

## 2019-09-30 NOTE — PROGRESS NOTES
Brief Urology Progress Note    Called to bedside to assess bleeding. Patient not bleeding at time of exam. Vitals wnl and stable - SBP high 110's-low 120's, mild tachycardia to low 100's. Patient denies pain or other symptoms. Pressure dressing re-applied. Repeat Hgb and type and screen ordered. Will continue to monitor.     Zahra Babb MD  Urology PGY-3

## 2019-09-30 NOTE — PROGRESS NOTES
Writer found patient with copuis amount of bright red blood from PNT tube. Mike pressure applied. Hemostasis achieved. Pressure dressing re applied. VSS.     Acquaye paged re; PNT tube bleeding. Called with read back no new orders.

## 2019-09-30 NOTE — OP NOTE
Operative Note  9/30/19    PREOPERATIVE DIAGNOSIS: left sided stone.     POSTOPERATIVE DIAGNOSIS: left stone    PROCEDURES PERFORMED:   1. Antegrade nephrostogram.   2. Removal of indwelling nephrostomy tube.   3. Percutaneous nephrolithotomy, stone burden greater than 2cm  4. Use of Shockpulse  5. Holium laser lithotripsy  5. Use of C-arm and interpretation of the fluoroscopic image.   6. Placement of a [id number] nephroureteral stent    STAFF SURGEON: Dejon Horn MD.     : Zahra Babb MD     ESTIMATED BLOOD LOSS:  300cc  SPECIMENS: Stone fragment sent for culture as well as stone fragments sent for analysis.     SIGNIFICANT FINDINGS: The patient is NOT visually stone free at the end of the procedure.     BRIEF OPERATIVE INDICATIONS: 43yo M with left staghorn stone. Here for 2nd look PCNL.      PROCEDURE PERFORMED: After identifying and marking the patient in the preoperative holding area, the patient was brought to the operating room and placed in supine position. Preoperative timeout was then completed to verify the patient and procedure to be performed. Once adequate anesthesia was obtained, the patient was placed in prone position and all the pressure points were well supported. Subsequently, the patient was then prepped and draped in a standard sterile fashion.   We began our procedure by doing the antegrade nephrostogram to delineate the anatomy of the renal pelvis. Images were saved. We then placed a sensor guidewire into the left renal pelvis under fluoroscopic guidance and cut the stitch and removed the percutaneous nephrostomy tube. After removing the nephrostomy tube, we placed a 10Fr dual lumen catheter adn advanced a super stiff down into the bladder.  Subsequently, we placed a 30-Syriac Bard X-Force balloon dilator and dilated the tract under fluoroscopic guidance. After placing the nephroscope, we removed a few fragments with the help of the 3-prong grasper and these  were sent for stone analysis as well as for culture. Subsequently, with the help of the CyberWand we removed other remaining stone fragment. Flexible cystoscopy was necessary to access tight upper and lower pole calyces, and laser lithotripsy was performed on stones visualized there. Some stone fragments were removed however due to worsening visualization due to bleeding, we could not remove all stones or stone fragments. A 10Fr x 26cm left nephroureteral stent was advanced over the safety wire into the bladder and good curl was established in the renal pelvis. This concluded our procedure. We closed the PCN site with 2-0 nylon suture and then secured with an ABD 4 x 4 and Medipore tape dressing. Of note, Surgiflo was used in the tract prior to finishing the procedure due to persistnet bleeding from the tract.     This concluded our procedure. The patient was awakened from anesthesia, brought to the recovery room without any immediate postoperative complication.     - will schedule repeat PCNL in 4-6 weeks  - continue home ceftazidime and vanco  - ID consult  - will stay tonight.    Attestation:  I was present for the entire procedure on 9/30/19.  JEREMIE Horn MD

## 2019-09-30 NOTE — OR NURSING
Patient arrived PACU breathin spontaneous tachypnic respirations (36/min).  He was having trouble verbalizing initialy . Speech a bit garbled.  He would repeat hot hot and fan fan.  His face and ears were cherry red and face noted to be swollen.  Dr Novoa at bedside and HOB increased to 60 degrees.  His heartrate in low 130s.  He denied pain.  Cold ice clothes placed on neck and head.  Cold packs paced on underarms..

## 2019-09-30 NOTE — OR NURSING
At 1400 Dr ANTONIO Babb notified of postop Stat CBC and electrolytes.  She was also notified that patient's urine is coming out of the suprapubic catheter and was red.  . She was notifed that urine only in the tubing of the PNT.  After giving handoff to 6 D RN, I sent a request for an order to flush the PNT - ( RN on 6D staed she could do it if there was an order)

## 2019-09-30 NOTE — LETTER
Transition Communication Hand-off for Care Transitions to Next Level of Care Provider    Name: Maicol Carrera  : 1975  MRN #: [id number]  Primary Care Provider: Nancy Ag MD     Primary Clinic: [address] [address]     Reason for Hospitalization:  Calculus Kidney  Nephrolithiasis  Nephrolithiasis  Admit Date/Time: 2019  6:05 AM  Discharge Date: 10/2/2019 6:30 PM  Payor Source: Payor: [employer] / Plan: [employer] SNBC / Product Type: Indemnity /         Reason for Communication Hand-off Referral: Continuum of care    Discharge Plan: [facility]       Concern for non-adherence with plan of care:   NO  Discharge Needs Assessment:      Any outstanding tests or procedures:    Procedures     Future Labs/Procedures    BIPAP treatment     Comments:    PULMONOLOGY CARES:  - Resume [id number] BiPAP, CPAP and Oxygen, as previously ordered by Mr. Cordova's home medical team                Key Recommendations:  None at this time    MANDY Kruger    AVS/Discharge Summary is the source of truth; this is a helpful guide for improved communication of patient story

## 2019-09-30 NOTE — ANESTHESIA POSTPROCEDURE EVALUATION
Anesthesia POST Procedure Evaluation    Patient: Maicol Carrera   MRN:     [id number] Gender:   male   Age:    44 year old :      1975        Preoperative Diagnosis: Calculus Kidney   Procedure(s):  Left Percutaneous Nephrolithotomy, Access To Kidney, Ureteroscopy, Cystoscopy, Stent Placement With Holmium Laser **Latex Allergy**   Postop Comments: No value filed.       Anesthesia Type:  Not documented  No value filed.    Reportable Event: NO     PAIN: Uncomplicated   Sign Out status: Comfortable, Well controlled pain     PONV: No PONV   Sign Out status:  No Nausea or Vomiting     Neuro/Psych: Uneventful perioperative course   Sign Out Status: Preoperative baseline; Age appropriate mentation     Airway/Resp.: Uneventful perioperative course   Sign Out Status: Non labored breathing, age appropriate RR; Resp. Status within EXPECTED Parameters     CV: Uneventful perioperative course   Sign Out status: Appropriate BP and perfusion indices; Appropriate HR/Rhythm     Disposition:   Sign Out in:  PACU  Disposition:  Floor  Recovery Course: Uneventful  Follow-Up: Not required           Last Anesthesia Record Vitals:  CRNA VITALS  2019 1100 - 2019 1200      2019             Pulse:  133    SpO2:  99 %    Resp Rate (observed):  (!) 3          Last PACU Vitals:  Vitals Value Taken Time   /82 2019  2:20 PM   Temp 36.6  C (97.8  F) 2019  2:15 PM   Pulse 93 2019  2:20 PM   Resp 26 2019  2:15 PM   SpO2 99 % 2019  2:22 PM   Temp src     NIBP     Pulse     SpO2     Resp     Temp     Ht Rate     Temp 2     Vitals shown include unvalidated device data.      Electronically Signed By: Jacquelyn Novoa MD, 2019, 2:38 PM

## 2019-09-30 NOTE — PROGRESS NOTES
Upon arrival to 6D patient placed on bed. This writer inspected PNT and it was bleeding profusely. Manual pressure applied. Urology notified. Blood pressure dropped RRT called. Patient stabilized. Will continue to monitor.

## 2019-09-30 NOTE — ANESTHESIA CARE TRANSFER NOTE
Patient: Maicol Kingel    Procedure(s):  Left Percutaneous Nephrolithotomy, Access To Kidney, Ureteroscopy, Cystoscopy, Stent Placement With Holmium Laser **Latex Allergy**    Diagnosis: Calculus Kidney  Diagnosis Additional Information: No value filed.    Anesthesia Type:   No value filed.     Note:  Airway :Face Mask  Patient transferred to:PACU  Comments: Turned supine with HOB up. Spont breathing. -246. RR 38. Slowed after fentanyl. Suctioned and extubated awake. To PACU with O2. Dr KOVACS at bedsideHandoff Report: Identifed the Patient, Identified the Reponsible Provider, Reviewed the pertinent medical history, Discussed the surgical course, Reviewed Intra-OP anesthesia mangement and issues during anesthesia, Set expectations for post-procedure period and Allowed opportunity for questions and acknowledgement of understanding      Vitals: (Last set prior to Anesthesia Care Transfer)    CRNA VITALS  9/30/2019 1100 - 9/30/2019 1147      9/30/2019             Pulse:  133    SpO2:  99 %    Resp Rate (observed):  (!) 3                Electronically Signed By: ANNIE Segovia CRNA  September 30, 2019  11:47 AM

## 2019-09-30 NOTE — PROGRESS NOTES
RRT called for bleeding from PNT, drop in BP.  Urology notided, pressure held to L PNT site.  Noted with saturated dressing and squirting blood when dressing removed.

## 2019-10-01 LAB
ANION GAP SERPL CALCULATED.3IONS-SCNC: 8 MMOL/L (ref 3–14)
ANION GAP SERPL CALCULATED.3IONS-SCNC: 9 MMOL/L (ref 3–14)
BASOPHILS # BLD AUTO: 0 10E9/L (ref 0–0.2)
BASOPHILS NFR BLD AUTO: 0.3 %
BUN SERPL-MCNC: 17 MG/DL (ref 7–30)
BUN SERPL-MCNC: 18 MG/DL (ref 7–30)
CALCIUM SERPL-MCNC: 7.5 MG/DL (ref 8.5–10.1)
CALCIUM SERPL-MCNC: 7.8 MG/DL (ref 8.5–10.1)
CHLORIDE SERPL-SCNC: 109 MMOL/L (ref 94–109)
CHLORIDE SERPL-SCNC: 111 MMOL/L (ref 94–109)
CO2 SERPL-SCNC: 22 MMOL/L (ref 20–32)
CO2 SERPL-SCNC: 23 MMOL/L (ref 20–32)
CREAT SERPL-MCNC: 1.35 MG/DL (ref 0.66–1.25)
CREAT SERPL-MCNC: 1.4 MG/DL (ref 0.66–1.25)
DIFFERENTIAL METHOD BLD: ABNORMAL
EOSINOPHIL # BLD AUTO: 0.5 10E9/L (ref 0–0.7)
EOSINOPHIL NFR BLD AUTO: 4 %
ERYTHROCYTE [DISTWIDTH] IN BLOOD BY AUTOMATED COUNT: 16.3 % (ref 10–15)
ERYTHROCYTE [DISTWIDTH] IN BLOOD BY AUTOMATED COUNT: 16.7 % (ref 10–15)
GFR SERPL CREATININE-BSD FRML MDRD: 62 ML/MIN/{1.73_M2}
GFR SERPL CREATININE-BSD FRML MDRD: 64 ML/MIN/{1.73_M2}
GLUCOSE SERPL-MCNC: 109 MG/DL (ref 70–99)
GLUCOSE SERPL-MCNC: 125 MG/DL (ref 70–99)
HCT VFR BLD AUTO: 24.8 % (ref 40–53)
HCT VFR BLD AUTO: 26.8 % (ref 40–53)
HGB BLD-MCNC: 7.2 G/DL (ref 13.3–17.7)
HGB BLD-MCNC: 7.4 G/DL (ref 13.3–17.7)
HGB BLD-MCNC: 7.7 G/DL (ref 13.3–17.7)
HGB BLD-MCNC: NORMAL G/DL (ref 13.3–17.7)
IMM GRANULOCYTES # BLD: 0.1 10E9/L (ref 0–0.4)
IMM GRANULOCYTES NFR BLD: 0.7 %
LYMPHOCYTES # BLD AUTO: 1.1 10E9/L (ref 0.8–5.3)
LYMPHOCYTES NFR BLD AUTO: 8.4 %
MCH RBC QN AUTO: 23.9 PG (ref 26.5–33)
MCH RBC QN AUTO: 24.1 PG (ref 26.5–33)
MCHC RBC AUTO-ENTMCNC: 27.6 G/DL (ref 31.5–36.5)
MCHC RBC AUTO-ENTMCNC: 29 G/DL (ref 31.5–36.5)
MCV RBC AUTO: 83 FL (ref 78–100)
MCV RBC AUTO: 87 FL (ref 78–100)
MONOCYTES # BLD AUTO: 1.1 10E9/L (ref 0–1.3)
MONOCYTES NFR BLD AUTO: 8.6 %
NEUTROPHILS # BLD AUTO: 9.8 10E9/L (ref 1.6–8.3)
NEUTROPHILS NFR BLD AUTO: 78 %
NRBC # BLD AUTO: 0 10*3/UL
NRBC BLD AUTO-RTO: 0 /100
PLATELET # BLD AUTO: 228 10E9/L (ref 150–450)
PLATELET # BLD AUTO: 280 10E9/L (ref 150–450)
POTASSIUM SERPL-SCNC: 3.8 MMOL/L (ref 3.4–5.3)
POTASSIUM SERPL-SCNC: 4.3 MMOL/L (ref 3.4–5.3)
RBC # BLD AUTO: 2.99 10E12/L (ref 4.4–5.9)
RBC # BLD AUTO: 3.09 10E12/L (ref 4.4–5.9)
SODIUM SERPL-SCNC: 140 MMOL/L (ref 133–144)
SODIUM SERPL-SCNC: 143 MMOL/L (ref 133–144)
VANCOMYCIN SERPL-MCNC: 26.7 MG/L
WBC # BLD AUTO: 10.8 10E9/L (ref 4–11)
WBC # BLD AUTO: 12.6 10E9/L (ref 4–11)

## 2019-10-01 PROCEDURE — 80048 BASIC METABOLIC PNL TOTAL CA: CPT | Performed by: STUDENT IN AN ORGANIZED HEALTH CARE EDUCATION/TRAINING PROGRAM

## 2019-10-01 PROCEDURE — 25000128 H RX IP 250 OP 636: Performed by: UROLOGY

## 2019-10-01 PROCEDURE — 25800030 ZZH RX IP 258 OP 636: Performed by: STUDENT IN AN ORGANIZED HEALTH CARE EDUCATION/TRAINING PROGRAM

## 2019-10-01 PROCEDURE — 80202 ASSAY OF VANCOMYCIN: CPT | Performed by: UROLOGY

## 2019-10-01 PROCEDURE — 94660 CPAP INITIATION&MGMT: CPT

## 2019-10-01 PROCEDURE — 25800030 ZZH RX IP 258 OP 636: Performed by: UROLOGY

## 2019-10-01 PROCEDURE — 40000275 ZZH STATISTIC RCP TIME EA 10 MIN

## 2019-10-01 PROCEDURE — 36415 COLL VENOUS BLD VENIPUNCTURE: CPT | Performed by: UROLOGY

## 2019-10-01 PROCEDURE — 85027 COMPLETE CBC AUTOMATED: CPT | Performed by: STUDENT IN AN ORGANIZED HEALTH CARE EDUCATION/TRAINING PROGRAM

## 2019-10-01 PROCEDURE — G0378 HOSPITAL OBSERVATION PER HR: HCPCS

## 2019-10-01 PROCEDURE — 25000128 H RX IP 250 OP 636: Performed by: STUDENT IN AN ORGANIZED HEALTH CARE EDUCATION/TRAINING PROGRAM

## 2019-10-01 PROCEDURE — 85018 HEMOGLOBIN: CPT | Performed by: UROLOGY

## 2019-10-01 PROCEDURE — 25000132 ZZH RX MED GY IP 250 OP 250 PS 637: Performed by: STUDENT IN AN ORGANIZED HEALTH CARE EDUCATION/TRAINING PROGRAM

## 2019-10-01 PROCEDURE — 85025 COMPLETE CBC W/AUTO DIFF WBC: CPT | Performed by: STUDENT IN AN ORGANIZED HEALTH CARE EDUCATION/TRAINING PROGRAM

## 2019-10-01 PROCEDURE — 85018 HEMOGLOBIN: CPT | Performed by: STUDENT IN AN ORGANIZED HEALTH CARE EDUCATION/TRAINING PROGRAM

## 2019-10-01 PROCEDURE — 36415 COLL VENOUS BLD VENIPUNCTURE: CPT | Performed by: STUDENT IN AN ORGANIZED HEALTH CARE EDUCATION/TRAINING PROGRAM

## 2019-10-01 RX ADMIN — FERROUS SULFATE TAB 325 MG (65 MG ELEMENTAL FE) 325 MG: 325 (65 FE) TAB at 09:53

## 2019-10-01 RX ADMIN — SODIUM CHLORIDE: 9 INJECTION, SOLUTION INTRAVENOUS at 00:38

## 2019-10-01 RX ADMIN — VANCOMYCIN HYDROCHLORIDE 1500 MG: 10 INJECTION, POWDER, LYOPHILIZED, FOR SOLUTION INTRAVENOUS at 15:39

## 2019-10-01 RX ADMIN — CEFTAZIDIME 2 G: 2 INJECTION, POWDER, FOR SOLUTION INTRAVENOUS at 01:05

## 2019-10-01 RX ADMIN — DOCUSATE SODIUM 100 MG: 100 CAPSULE, LIQUID FILLED ORAL at 09:54

## 2019-10-01 RX ADMIN — SODIUM CHLORIDE: 9 INJECTION, SOLUTION INTRAVENOUS at 18:00

## 2019-10-01 RX ADMIN — OXYCODONE HYDROCHLORIDE 10 MG: 5 TABLET ORAL at 20:03

## 2019-10-01 RX ADMIN — CEFTAZIDIME 2 G: 2 INJECTION, POWDER, FOR SOLUTION INTRAVENOUS at 18:00

## 2019-10-01 RX ADMIN — CEFTAZIDIME 2 G: 2 INJECTION, POWDER, FOR SOLUTION INTRAVENOUS at 09:30

## 2019-10-01 RX ADMIN — SODIUM CHLORIDE 500 ML: 9 INJECTION, SOLUTION INTRAVENOUS at 05:27

## 2019-10-01 RX ADMIN — ACETAMINOPHEN 650 MG: 325 TABLET, FILM COATED ORAL at 18:58

## 2019-10-01 RX ADMIN — SODIUM CHLORIDE: 9 INJECTION, SOLUTION INTRAVENOUS at 06:37

## 2019-10-01 RX ADMIN — DOCUSATE SODIUM 100 MG: 100 CAPSULE, LIQUID FILLED ORAL at 20:03

## 2019-10-01 RX ADMIN — OXYCODONE HYDROCHLORIDE 5 MG: 5 TABLET ORAL at 15:58

## 2019-10-01 RX ADMIN — LEVETIRACETAM 1000 MG: 500 TABLET, FILM COATED ORAL at 09:53

## 2019-10-01 RX ADMIN — LEVETIRACETAM 1000 MG: 500 TABLET, FILM COATED ORAL at 20:09

## 2019-10-01 NOTE — PLAN OF CARE
Patient seen by MD. Pressure dressing on PNT site is intact. No bleeding noted on the dressing. S/p ly is draining reddish urine. Receiving total cares.

## 2019-10-01 NOTE — PLAN OF CARE
Afebrile, VSS on RA.  Denies pain or nausea.  Supra-pubic catheter insertion site leaking, dressing changed.  PNT dressing CDI.   NS infusing at 100 ml/hr.  Awaiting lab draws to check Hgb which will determine if Pt. discharges today.

## 2019-10-01 NOTE — PROGRESS NOTES
UROLOGY INTERVAL PROGRESS NOTE     Paged re: bleeding from around PNT site. Bleeding subsided with pressure held by nursing and subsequently applied pressure dressing. On exam, PNT site appears CDI with minimally saturated pressure dressing. His PNT tubing/collection bag was filled with ~ 20 ml bloody urine and there is about ~ 600 ml of similarly bloody urine in bag. The patient presently denies pain, SOB, CP or flank pain. He is HDS and sitting comfortably in bed.     Plan:   - Continue to monitor, no indication for acute intervention at this time. Patient may resume post-procedure diet.   - Nursing instructed to notify Urology should patient become hemodynamically unstable or have recurrence of bleeding.     Chinedu Goncalves MD   Urology Resident, PGY-4

## 2019-10-01 NOTE — PROVIDER NOTIFICATION
"Provider text paged re critical lab level. \"FYI-vancomycin lab of 26.7. Vanco already ran this morning. Have discussed with pharmacist already. States they will have to recheck lab tomorrow.   Thanks,\"  "

## 2019-10-01 NOTE — PROGRESS NOTES
I saw Maicol Carrera on rounds and discussed his care with my resident team.  He is s/p percutaneous nephrolithotomy.  I performed a history and physical exam. I discussed my findings with my resident team.  I have reviewed their note and agree with the listed findings, assesment, and plan.    The main issue today is post op anemia.  His hgb is down to 7.4.  We will follow closely and it is possible he will need prbc transfusion.

## 2019-10-01 NOTE — PROGRESS NOTES
Urology  Progress Note  10/01/2019    - Scant bleeding around PNT site yesterday. Patient visited x3 at request of nursing staff, oozing stable  - Hypotensive early this AM, bolused with 500 mL NS  - Tolerating diet with no nausea or vomiting.    Exam  BP 91/52   Pulse 81   Temp 97.5  F (36.4  C) (Axillary)   Resp 26   Wt 76.9 kg (169 lb 8.5 oz)   SpO2 95%   BMI 23.98 kg/m    No acute distress  Unlabored breathing  Abdomen soft, nontender, nondistended. No bleeding noted around PNT site.  PNT light red urine in tubing  SPT with light red urine in tubing    I/O's (last 24/since midnight):  SPT: 230/500  Nephroureteral stent: 400/100    Labs  AM labs pending    Assessment/Plan  44 year old y/o male POD#1 s/p PCNL, shock wave lithotripsy, and placement of left nephroureteral stent for left sided stone.     Neuro: Tylenol, dilaudid, oxycodone for pain control. Home keppra.   CV: Hypotensive - fluids as below  Pulm: incentive spirometry while awake  FEN/GI: Regular diet, MIVF @ 100/hr  Endo: VIRA  : Continue nephroureteral stent  Heme/ID: Ceftazidime, vancomycin.   Activity: Movement with assist  PPx: SCDs  Dispo: Home likely today if hemodynamically stable.    Seen and examined with the chief resident. Will discuss with staff.    Gallo Patel MD  Urology Resident     Contacting the Urology Team     Please use the following job codes to reach the Urology Team. Note that you must use an in house phone and that job codes cannot receive text pages.     On weekdays, dial [phone] (or star-star-star [phone] on the new [employer] telephones) then [phone] to reach the Adult Urology resident or PA on call    On weekdays, dial [phone] (or star-star-star 777 on the new [employer] telephones) then [phone] to reach the Pediatric Urology resident    On weeknights and weekends, dial [phone] (or star-star-star 777 on the new [employer] telephones) then [phone] to reach the Urology resident on call (for both Adult and Pediatrics)

## 2019-10-01 NOTE — PLAN OF CARE
Patient seen by MD. Pressure dressing on PNT site is intact. No bleeding noted on the dressing. S/p ly is draining reddish urine. Receiving total cares. Has bipap on. On tele, nsr.

## 2019-10-01 NOTE — PROVIDER NOTIFICATION
09/30/19 1500   Call Information   Date of Call 09/30/19   Time of Call 1448   Name of person requesting the team Brielle   Title of person requesting team RN   RRT Arrival time 1450   Time RRT ended 1505   Reason for call   Type of RRT Adult   Primary reason for call Uncontrolled excessive bleeding   Was patient transferred from the ED, ICU, or PACU within last 24 hours prior to RRT call? Yes   SBAR   Situation Pt had nephrostomy placed, upon arrival to floor dressing and bedding under the patient was saturated with blood. SBP went from 140's to 90's.   Background Pt admitted to outpatient after antegrade nephrostogram, stent and PNT placement.    Notable History/Conditions   (Quadraplegic)   Assessment Pt is alert and oriented, does not appear to be in any distress. Staff holding pressure on PNT site. Pt had previously saturated 3 dressings including a quick clot dressing as well as had blood squirting out of the site. Upon RRT arrival, staff had bleeding controlled.   Interventions IV fluids;Labs   Patient Outcome   Patient Outcome Stabilized on unit   RRT Team   Attending/Primary/Covering Physician Urology   Date Attending Physician notified 09/30/19   Time Attending Physician notified 1449   Physician(s) Zahra Babb MD   Lead RN Ni Hannah   RT Zachary Casey   Post RRT Intervention Assessment   Post RRT Assessment Stable/Improved   Date Follow Up Done 09/30/19   Time Follow Up Done 1730   Comments PNT tube not flushing or draining

## 2019-10-02 ENCOUNTER — APPOINTMENT (OUTPATIENT)
Dept: GENERAL RADIOLOGY | Facility: CLINIC | Age: 44
End: 2019-10-02
Attending: UROLOGY
Payer: COMMERCIAL

## 2019-10-02 LAB
ANION GAP SERPL CALCULATED.3IONS-SCNC: 6 MMOL/L (ref 3–14)
BUN SERPL-MCNC: 19 MG/DL (ref 7–30)
CALCIUM SERPL-MCNC: 7.8 MG/DL (ref 8.5–10.1)
CHLORIDE SERPL-SCNC: 114 MMOL/L (ref 94–109)
CO2 SERPL-SCNC: 22 MMOL/L (ref 20–32)
CREAT SERPL-MCNC: 1.39 MG/DL (ref 0.66–1.25)
ERYTHROCYTE [DISTWIDTH] IN BLOOD BY AUTOMATED COUNT: 16.4 % (ref 10–15)
GFR SERPL CREATININE-BSD FRML MDRD: 62 ML/MIN/{1.73_M2}
GLUCOSE SERPL-MCNC: 88 MG/DL (ref 70–99)
HCT VFR BLD AUTO: 24.9 % (ref 40–53)
HGB BLD-MCNC: 6.8 G/DL (ref 13.3–17.7)
HGB BLD-MCNC: 6.9 G/DL (ref 13.3–17.7)
HGB BLD-MCNC: 8.2 G/DL (ref 13.3–17.7)
MCH RBC QN AUTO: 23.6 PG (ref 26.5–33)
MCHC RBC AUTO-ENTMCNC: 27.3 G/DL (ref 31.5–36.5)
MCV RBC AUTO: 87 FL (ref 78–100)
PLATELET # BLD AUTO: 206 10E9/L (ref 150–450)
POTASSIUM SERPL-SCNC: 4.3 MMOL/L (ref 3.4–5.3)
RBC # BLD AUTO: 2.88 10E12/L (ref 4.4–5.9)
SODIUM SERPL-SCNC: 142 MMOL/L (ref 133–144)
WBC # BLD AUTO: 8.5 10E9/L (ref 4–11)

## 2019-10-02 PROCEDURE — 40001072 ZZH STATISTICAL VASC ACCESS NURSE TIME, 16-31 MINUTES

## 2019-10-02 PROCEDURE — G0378 HOSPITAL OBSERVATION PER HR: HCPCS

## 2019-10-02 PROCEDURE — 85027 COMPLETE CBC AUTOMATED: CPT | Performed by: STUDENT IN AN ORGANIZED HEALTH CARE EDUCATION/TRAINING PROGRAM

## 2019-10-02 PROCEDURE — 36415 COLL VENOUS BLD VENIPUNCTURE: CPT | Performed by: STUDENT IN AN ORGANIZED HEALTH CARE EDUCATION/TRAINING PROGRAM

## 2019-10-02 PROCEDURE — 36593 DECLOT VASCULAR DEVICE: CPT

## 2019-10-02 PROCEDURE — 25000128 H RX IP 250 OP 636: Performed by: STUDENT IN AN ORGANIZED HEALTH CARE EDUCATION/TRAINING PROGRAM

## 2019-10-02 PROCEDURE — 85018 HEMOGLOBIN: CPT | Performed by: STUDENT IN AN ORGANIZED HEALTH CARE EDUCATION/TRAINING PROGRAM

## 2019-10-02 PROCEDURE — 36415 COLL VENOUS BLD VENIPUNCTURE: CPT | Performed by: PHYSICIAN ASSISTANT

## 2019-10-02 PROCEDURE — 25800030 ZZH RX IP 258 OP 636: Performed by: STUDENT IN AN ORGANIZED HEALTH CARE EDUCATION/TRAINING PROGRAM

## 2019-10-02 PROCEDURE — 85018 HEMOGLOBIN: CPT | Performed by: PHYSICIAN ASSISTANT

## 2019-10-02 PROCEDURE — 71045 X-RAY EXAM CHEST 1 VIEW: CPT

## 2019-10-02 PROCEDURE — 25000132 ZZH RX MED GY IP 250 OP 250 PS 637: Performed by: STUDENT IN AN ORGANIZED HEALTH CARE EDUCATION/TRAINING PROGRAM

## 2019-10-02 PROCEDURE — 40000802 ZZH SITE CHECK

## 2019-10-02 PROCEDURE — 80048 BASIC METABOLIC PNL TOTAL CA: CPT | Performed by: STUDENT IN AN ORGANIZED HEALTH CARE EDUCATION/TRAINING PROGRAM

## 2019-10-02 RX ORDER — VANCOMYCIN HYDROCHLORIDE 1 G/200ML
1000 INJECTION, SOLUTION INTRAVENOUS EVERY 24 HOURS
Status: COMPLETED | OUTPATIENT
Start: 2019-10-03 | End: 2019-10-03

## 2019-10-02 RX ADMIN — ALTEPLASE 2 MG: 2.2 INJECTION, POWDER, LYOPHILIZED, FOR SOLUTION INTRAVENOUS at 19:58

## 2019-10-02 RX ADMIN — SODIUM CHLORIDE: 9 INJECTION, SOLUTION INTRAVENOUS at 04:04

## 2019-10-02 RX ADMIN — DOCUSATE SODIUM 100 MG: 100 CAPSULE, LIQUID FILLED ORAL at 19:15

## 2019-10-02 RX ADMIN — OXYCODONE HYDROCHLORIDE 10 MG: 5 TABLET ORAL at 15:58

## 2019-10-02 RX ADMIN — ACETAMINOPHEN 650 MG: 325 TABLET, FILM COATED ORAL at 01:43

## 2019-10-02 RX ADMIN — OXYCODONE HYDROCHLORIDE 10 MG: 5 TABLET ORAL at 19:59

## 2019-10-02 RX ADMIN — OXYCODONE HYDROCHLORIDE 10 MG: 5 TABLET ORAL at 01:43

## 2019-10-02 RX ADMIN — CEFTAZIDIME 2 G: 2 INJECTION, POWDER, FOR SOLUTION INTRAVENOUS at 20:40

## 2019-10-02 RX ADMIN — FERROUS SULFATE TAB 325 MG (65 MG ELEMENTAL FE) 325 MG: 325 (65 FE) TAB at 07:44

## 2019-10-02 RX ADMIN — CEFTAZIDIME 2 G: 2 INJECTION, POWDER, FOR SOLUTION INTRAVENOUS at 07:49

## 2019-10-02 RX ADMIN — LEVETIRACETAM 1000 MG: 500 TABLET, FILM COATED ORAL at 07:43

## 2019-10-02 RX ADMIN — DOCUSATE SODIUM 100 MG: 100 CAPSULE, LIQUID FILLED ORAL at 07:44

## 2019-10-02 RX ADMIN — LEVETIRACETAM 1000 MG: 500 TABLET, FILM COATED ORAL at 19:15

## 2019-10-02 RX ADMIN — CEFTAZIDIME 2 G: 2 INJECTION, POWDER, FOR SOLUTION INTRAVENOUS at 00:22

## 2019-10-02 NOTE — PLAN OF CARE
Observation Goals before Discharge  Patient able to ambulate as they were prior to admission or with assist devices provided by therapies during their stay. - Pt quadriplegic. Turn in bed with assist of 2.     Patient Hgb recheck at 6.9, urology informed. Pt will stay tonight again. Alert and oriented, but pt reports of feeling tired, and wanting to sleep today. Drainage from suprapubic catheter and PNT continues to be red/bloody. Will continue to monitor.

## 2019-10-02 NOTE — PLAN OF CARE
OBSERVATION GOALS:    - Patient able to ambulate as they were prior to admission or with assist devices provided by therapies during their stay. - N/A. Pt quadriplegic. Up w/ Ax2 and lift to WC.

## 2019-10-02 NOTE — PLAN OF CARE
Outpatient in a Bed discharge goals PRIOR TO DISCHARGE    Comments: List all Outpatient in a Bed discharge goals to be met before discharge home:     ~ Patient able to ambulate as they were prior to admission or with assist devices provided by therapies during their stay- pt is quadriplegic, chair in room for discharge.     ~ Nurse to Notify Provider when Outpatient in a Bed discharge goals have been met and patient is ready for discharge.      Patient alert and oriented. Minimal jaw pain, prn pain medication x 2.  Tolerating PO diet for dinner, no n/v. Suprapubic catheter in place, minimal leakage, dressing changed. Hgb at 7.2, provider informed patient will be staying tonight and recheck labs in the AM. emergency contact informed. Suprapubic catheter drainage red/bloody. PNT drainage also red/bloody. VSS.     /67   Pulse 81   Temp 97.4  F (36.3  C) (Oral)   Resp 18   Wt 76.9 kg (169 lb 8.5 oz)   SpO2 96%   BMI 23.98 kg/m

## 2019-10-02 NOTE — DISCHARGE SUMMARY
[facility] UroDischarge Summary    Patient: Maicol Carrera    MRN: [id number]   : 1975         Date of Admission:  2019   Date of Discharge::  10/6/2019  Admitting Physician:  Dejon Horn MD  Discharge Physician:  Chinedu Goncalves MD              Admission Diagnoses:   Calculus Kidney  Nephrolithiasis    Past Medical History:   Diagnosis Date     Chronic infection     + MRSA     Constipation, chronic      GERD (gastroesophageal reflux disease)      Head injury      Neurogenic bladder     SP catheter     JUAN (obstructive sleep apnea)     bipap     Quadriplegia (H)      Seizure (H)      Spastic quadriplegia (H)      Urinary tract infection              Discharge Diagnosis:   Calculus Kidney  Acute blood loss anemia requiring transfusion    Past Medical History:   Diagnosis Date     Chronic infection     + MRSA     Constipation, chronic      GERD (gastroesophageal reflux disease)      Head injury      Neurogenic bladder     SP catheter     JUAN (obstructive sleep apnea)     bipap     Quadriplegia (H)      Seizure (H)      Spastic quadriplegia (H)      Urinary tract infection             Procedures:   Procedure(s): NEPHROLITHOTOMY, PERCUTANEOUS, USING HOLMIUM LASER   IR embolization of bleeding vessel             Medications Prior to Admission:     Medications Prior to Admission   Medication Sig Dispense Refill Last Dose     ACETAMINOPHEN PO Take 650 mg by mouth every 4 hours as needed for pain   2019 at Unknown time     baclofen (LIORESAL) 10 MG tablet Take 10 mg by mouth every 4 hours as needed    2019 at Unknown time     BACLOFEN IT Via implanted IT pump  Baclofen Concentration 2000 mc/gmL   Dose: 277.1 mcg/day  Low reservoir alarm date: 3/4/2020  Low reservoir Alarm Volume: 2ml  Interrogated 19  Pump is managed by Ulices Roth   2019 at Unknown time     bisacodyl (DULCOLAX) 10 MG suppository Place 1 suppository rectally every other day. 12 suppository  2019 at  Unknown time     carbamide peroxide (DEBROX) 6.5 % otic solution 5 drops 2 times daily   9/29/2019 at Unknown time     cholecalciferol (VITAMIN D3) 5000 units (125 mcg) capsule TAKE 1 CAP BY MOUTH ONCE DAILY FOR VIT-D DEFICIENCY  99 9/27/2019 at Unknown time     clindamycin (CLINDAMAX) 1 % topical gel Apply topically 2 times daily   9/29/2019 at Unknown time     Cranberry 400 MG TABS Take 400 mg by mouth 2 times daily   9/27/2019 at Unknown time     cyanocolbalamin (VITAMIN B-12) 1000 MCG tablet Take 1 tablet by mouth daily.   9/27/2019 at Unknown time     docusate sodium (COLACE) 100 MG capsule Take 100 mg by mouth 2 times daily.   9/27/2019 at Unknown time     ferrous sulfate 325 (65 FE) MG tablet Take 1 tablet by mouth daily (with breakfast). 100 tablet  9/27/2019 at Unknown time     ketoconazole (NIZORAL) 2 % cream Apply topically 2 times daily as needed   9/29/2019 at Unknown time     levETIRAcetam (KEPPRA) 1000 MG tablet TAKE 1 TAB BY MOUTH TWICE A DAY FOR CONVULSIONS  99 9/30/2019 at Unknown time     MAG-G 500 (27 Mg) MG tablet TAKE 1 TABLET BY MOUTH EVERY OTHER DAY FOR HYPERMAGNESIUM  99 9/27/2019 at Unknown time     magnesium gluconate (MAGONATE) 500 MG tablet Take 1 tablet by mouth daily.   9/27/2019 at Unknown time     metroNIDAZOLE (METROLOTION) 0.75 % external lotion    9/29/2019 at Unknown time     miconazole (MICATIN; MICRO GUARD) 2 % powder Apply  topically 2 times daily. 70 g  9/29/2019 at Unknown time     mometasone (ELOCON) 0.1 % cream Apply topically At Bedtime   9/29/2019 at Unknown time     Multiple Vitamins-Minerals (MULTIVITAMIN ADULT PO) Take 1 tablet by mouth daily   Past Week at Unknown time     Nutritional Supplements (OSTEO-MULTIVITAMINS/MINERALS OR) Take  by mouth daily. 12 pm     Past Week at Unknown time     ondansetron (ZOFRAN-ODT) 4 MG disintegrating tablet Take 1 tablet by mouth every 6 hours as needed for nausea. 20 tablet  Past Month at Unknown time     polyethylene glycol  (MIRALAX/GLYCOLAX) powder Take 1 capful by mouth daily as needed for constipation (if no BM x3 days)   9/29/2019 at Unknown time     polyvinyl alcohol (LIQUIFILM TEARS) 1.4 % ophthalmic solution Place 2 drops into both eyes every 2 hours as needed for dry eyes   Past Week at Unknown time     selenium sulfide (SELSUN) 2.5 % lotion Apply to scalp topically in the morning every Sun, Tue, Thursday. Place for 5 min then rinse.   9/29/2019 at Unknown time     senna-docusate (SENOKOT-S;PERICOLACE) 8.6-50 MG per tablet Take 1-2 tablets by mouth 2 times daily. 60 tablet  9/29/2019 at Unknown time     vitamin C 250 MG TABS Take 1 tablet by mouth 2 times daily.   Past Week at Unknown time     zinc oxide (DESITIN) 40 % ointment Apply  topically every hour as needed. 56.7 g  9/29/2019 at Unknown time     albuterol (2.5 MG/3ML) 0.083% nebulizer solution Take 3 mLs by nebulization every 2 hours as needed. 1 Box  More than a month at Unknown time     Miconazole POWD    9/18/2019 at Unknown time     [DISCONTINUED] oxybutynin (DITROPAN) 5 MG tablet Take 5 mg by mouth   9/27/2019 at Unknown time             Discharge Medications:     Current Discharge Medication List      CONTINUE these medications which have CHANGED    Details   oxybutynin (DITROPAN) 5 MG tablet Take 1 tablet (5 mg) by mouth 3 times daily as needed for bladder spasms  Qty: 90 tablet, Refills: 11    Associated Diagnoses: Bladder spasms         CONTINUE these medications which have NOT CHANGED    Details   ACETAMINOPHEN PO Take 650 mg by mouth every 4 hours as needed for pain      baclofen (LIORESAL) 10 MG tablet Take 10 mg by mouth every 4 hours as needed       BACLOFEN IT Via implanted IT pump  Baclofen Concentration 2000 mc/gmL   Dose: 277.1 mcg/day  Low reservoir alarm date: 3/4/2020  Low reservoir Alarm Volume: 2ml  Interrogated 6/19/19  Pump is managed by Ulices Roth      bisacodyl (DULCOLAX) 10 MG suppository Place 1 suppository rectally every other day.  Qty:  12 suppository    Associated Diagnoses: Chronic constipation      carbamide peroxide (DEBROX) 6.5 % otic solution 5 drops 2 times daily      cholecalciferol (VITAMIN D3) 5000 units (125 mcg) capsule TAKE 1 CAP BY MOUTH ONCE DAILY FOR VIT-D DEFICIENCY  Refills: 99      clindamycin (CLINDAMAX) 1 % topical gel Apply topically 2 times daily      Cranberry 400 MG TABS Take 400 mg by mouth 2 times daily      cyanocolbalamin (VITAMIN B-12) 1000 MCG tablet Take 1 tablet by mouth daily.    Associated Diagnoses: Vitamin B 12 deficiency      docusate sodium (COLACE) 100 MG capsule Take 100 mg by mouth 2 times daily.      ferrous sulfate 325 (65 FE) MG tablet Take 1 tablet by mouth daily (with breakfast).  Qty: 100 tablet    Associated Diagnoses: Anemia due to blood loss, acute      ketoconazole (NIZORAL) 2 % cream Apply topically 2 times daily as needed      levETIRAcetam (KEPPRA) 1000 MG tablet TAKE 1 TAB BY MOUTH TWICE A DAY FOR CONVULSIONS  Refills: 99      !! MAG-G 500 (27 Mg) MG tablet TAKE 1 TABLET BY MOUTH EVERY OTHER DAY FOR HYPERMAGNESIUM  Refills: 99      !! magnesium gluconate (MAGONATE) 500 MG tablet Take 1 tablet by mouth daily.    Associated Diagnoses: Magnesium deficiency      metroNIDAZOLE (METROLOTION) 0.75 % external lotion       miconazole (MICATIN; MICRO GUARD) 2 % powder Apply  topically 2 times daily.  Qty: 70 g    Associated Diagnoses: Candida infection of flexural skin      mometasone (ELOCON) 0.1 % cream Apply topically At Bedtime      Multiple Vitamins-Minerals (MULTIVITAMIN ADULT PO) Take 1 tablet by mouth daily      Nutritional Supplements (OSTEO-MULTIVITAMINS/MINERALS OR) Take  by mouth daily. 12 pm        ondansetron (ZOFRAN-ODT) 4 MG disintegrating tablet Take 1 tablet by mouth every 6 hours as needed for nausea.  Qty: 20 tablet    Associated Diagnoses: Nausea and vomiting      polyethylene glycol (MIRALAX/GLYCOLAX) powder Take 1 capful by mouth daily as needed for constipation (if no BM x3 days)       polyvinyl alcohol (LIQUIFILM TEARS) 1.4 % ophthalmic solution Place 2 drops into both eyes every 2 hours as needed for dry eyes      selenium sulfide (SELSUN) 2.5 % lotion Apply to scalp topically in the morning every Sun, Tue, Thursday. Place for 5 min then rinse.      senna-docusate (SENOKOT-S;PERICOLACE) 8.6-50 MG per tablet Take 1-2 tablets by mouth 2 times daily.  Qty: 60 tablet    Associated Diagnoses: Chronic constipation      vitamin C 250 MG TABS Take 1 tablet by mouth 2 times daily.    Associated Diagnoses: Recurrent UTI      zinc oxide (DESITIN) 40 % ointment Apply  topically every hour as needed.  Qty: 56.7 g    Associated Diagnoses: Breakdown of skin tissue      albuterol (2.5 MG/3ML) 0.083% nebulizer solution Take 3 mLs by nebulization every 2 hours as needed.  Qty: 1 Box    Associated Diagnoses: SOB (shortness of breath)      Miconazole POWD        !! - Potential duplicate medications found. Please discuss with provider.                Consultations:   Consultation during this admission received:     VASCULAR ACCESS CARE ADULT IP CONSULT  INTERVENTIONAL RADIOLOGY ADULT/PEDS IP CONSULT  PHYSICAL THERAPY ADULT IP CONSULT  OCCUPATIONAL THERAPY ADULT IP CONSULT  PHARMACY TO DOSE VANCO          Brief History of Illness:   Reason for admission requiring a surgical or invasive procedure:   Calculus Kidney   The patient underwent the following procedure(s):   See above   Immediately post-procedure the patient had fairly significant amount of bleeding which initially resolved.  Please refer to the full operative summary for details.           Hospital Course:   The patient's hospital course was notable for acute blood loss anemia requiring transfusion on post-operative day 3. On POD #4 he demonstrated significant bleeding and after failure to respond to transfusion after initiation of massive transfusion protocol the patient underwent embolization per IR and was transferred to the SICU for close  monitoring. The patient was transferred out of the SICU on POD #5 after his hemoglobin remained stable (no longer requiring transfusions) and he was HDS.     On POD # 6 he was  tolerating the discharge diet, had pain controlled with PO medications to go home with, and was requiring no IV medications or fluids. He was discharged to his facility. Of note, PICC line was discontinued prior to discharge and his PNT which had been capped due to clotting off remained clamped. A 24-Fr SPT (increased from his size prior to admission) was replaced prior to discharge. He was provided with  appropriate contact information, follow-up and instructions as seen below in the discharge paperwork.         Discharge Labs:     No results found for: PSA  Recent Labs   Lab 10/06/19  1011 10/05/19  0336 10/05/19  0009 10/04/19  2005   WBC 9.9 10.9 11.7* 11.5*   HGB 8.8* 9.0* 9.5* 9.7*    243 240 234       Recent Labs   Lab 10/06/19  1011 10/05/19  0336 10/04/19  2147 10/04/19  0337    143 141 146*   POTASSIUM 4.0 4.0 3.6 3.6   CHLORIDE 111* 110* 108 116*   CO2 28 25 22 21   BUN 19 20 20 16   CR 1.23 1.42* 1.42* 1.29*   GLC 83 65* 153* 96   BALAJI 8.4* 8.2* 8.1* 7.4*   MAG  --  2.1 1.9 1.8   PHOS  --  4.2 3.7 3.6     No lab results found in last 7 days.    Invalid input(s): URINEBLOOD  Results for orders placed or performed in visit on 09/12/19   Urine Culture Aerobic Bacterial   Result Value Ref Range    Specimen Description Catheterized Urine NEPHROSTOMY TUBE     Culture Micro (A)      50,000 to 100,000 colonies/mL  Morganella (Proteus) morganii      Culture Micro (A)      10,000 to 50,000 colonies/mL  Pseudomonas aeruginosa      Culture Micro (A)      10,000 to 50,000 colonies/mL  Strain 2  Pseudomonas aeruginosa, mucoid strain      Culture Micro 10,000 to 50,000 colonies/mL  Proteus mirabilis   (A)     Culture Micro (A)      10,000 to 50,000 colonies/mL  Methicillin resistant Staphylococcus aureus (MRSA)      Culture Micro        No further identification  Susceptibility testing not routinely done      Culture Micro       Susceptibility testing requested by  [place] pager [phone] for ID and sens on all isolates at 1040 on 9.19.19. CD.         Susceptibility    Morganella (proteus) morganii - MARY     AMPICILLIN* >=32 Resistant ug/mL      * Intrinsically Resistant     CEFAZOLIN* >=64 Resistant ug/mL      * Cefazolin MARY breakpoints are for the treatment of uncomplicated urinary tract infections.  For the treatment of systemic infections, please contact the laboratory for additional testing.Intrinsically Resistant     CEFOXITIN 16 Intermediate ug/mL     CEFTAZIDIME <=1 Sensitive ug/mL     CEFTRIAXONE <=1 Sensitive ug/mL     CIPROFLOXACIN >=4 Resistant ug/mL     GENTAMICIN <=1 Sensitive ug/mL     LEVOFLOXACIN >=8 Resistant ug/mL     NITROFURANTOIN*  Resistant ug/mL      * Intrinsically Resistant     TOBRAMYCIN <=1 Sensitive ug/mL     Trimethoprim/Sulfa >=16/304 Resistant ug/mL     AMPICILLIN/SULBACTAM >=32 Resistant ug/mL     Piperacillin/Tazo <=4 Sensitive ug/mL     CEFEPIME <=1 Sensitive ug/mL    Methicillin resistant staphylococcus aureus (mrsa) - MARY     GENTAMICIN <=0.5 Sensitive ug/mL     NITROFURANTOIN <=16 Sensitive ug/mL     OXACILLIN >=4 Resistant ug/mL     TETRACYCLINE <=1 Sensitive ug/mL     Trimethoprim/Sulfa <=0.5/9.5 Sensitive ug/mL     VANCOMYCIN 1 Sensitive ug/mL     LINEZOLID 2 Sensitive ug/mL    Proteus mirabilis - MARY     AMPICILLIN <=2 Sensitive ug/mL     CEFAZOLIN* <=4 Sensitive ug/mL      * Cefazolin MARY breakpoints are for the treatment of uncomplicated urinary tract infections.  For the treatment of systemic infections, please contact the laboratory for additional testing.Intrinsically ResistantCefazolin MARY breakpoints are for the treatment of uncomplicated urinary tract infections.  For the treatment of systemic infections, please contact the laboratory for additional testing.     CEFOXITIN 8 Sensitive ug/mL      CEFTAZIDIME <=1 Sensitive ug/mL     CEFTRIAXONE <=1 Sensitive ug/mL     CIPROFLOXACIN >=4 Resistant ug/mL     GENTAMICIN <=1 Sensitive ug/mL     LEVOFLOXACIN >=8 Resistant ug/mL     NITROFURANTOIN* 128 Resistant ug/mL      * Intrinsically Resistant     TOBRAMYCIN <=1 Sensitive ug/mL     Trimethoprim/Sulfa >=16/304 Resistant ug/mL     AMPICILLIN/SULBACTAM <=2 Sensitive ug/mL     Piperacillin/Tazo <=4 Sensitive ug/mL     CEFEPIME <=1 Sensitive ug/mL    Pseudomonas aeruginosa - MARY     AMIKACIN 4 Sensitive ug/mL     CEFEPIME 4 Sensitive ug/mL     CEFTAZIDIME <=1 Sensitive ug/mL     CIPROFLOXACIN >=4 Resistant ug/mL     GENTAMICIN 2 Sensitive ug/mL     LEVOFLOXACIN >=8 Resistant ug/mL     Piperacillin/Tazo <=4 Sensitive ug/mL     TOBRAMYCIN <=1 Sensitive ug/mL     MEROPENEM <=0.25 Sensitive ug/mL   Results for orders placed or performed during the hospital encounter of 07/29/19   Urine Culture Aerobic Bacterial   Result Value Ref Range    Specimen Description Catheterized Urine Percutaneous nephrostomy tube     Culture Micro No growth        Results for orders placed or performed during the hospital encounter of 09/30/19   IR Procedure Note    Narrative    Oliverio Martell MD     10/4/2019 12:01 AM  [facility], [place]    Procedure: IR Procedure Note  Date/Time: 10/4/2019 12:00 AM  Performed by: Oliverio Martell MD  Authorized by: Oliverio Martell MD     UNIVERSAL PROTOCOL   Site Marked: Yes  Prior Images Obtained and Reviewed:  Yes  Required items: Required blood products, implants, devices and special   equipment available    Patient identity confirmed:  Verbally with patient  Patient was reevaluated immediately before administering moderate or deep   sedation or anesthesia  Confirmation Checklist:  Patient's identity using two indicators, relevant   allergies, procedure was appropriate and matched the consent or emergent   situation and correct equipment/implants were  available  Time out: Immediately prior to the procedure a time out was called    Preparation: Patient was prepped and draped in usual sterile fashion       ANESTHESIA    Local Anesthetic: Lidocaine 1% without epinephrine      SEDATION    Patient Sedated: Yes    Sedation Type:  Moderate (conscious) sedation  Sedation:  Diazepam and fentanyl  Vital signs: Vital signs monitored during sedation    See dictated procedure note for full details.  Findings: Extravasation from a branch artery in the left upper pole    Specimens: none    Complications: None    Condition: Stable    Plan: Per orders    PROCEDURE   Patient Tolerance:  Patient tolerated the procedure well with no immediate   complications  Describe Procedure: Embolization of feeding vessel with MVP  Time of Sedation in Minutes by Physician:  45   XR Surgery CASA Fluoro L/T 5 Min w Stills    Narrative    This exam was marked as non-reportable because it will not be read by a   radiologist or a [place] non-radiologist provider.             XR Chest Port 1 View    Narrative    Exam:  XR CHEST PORT 1 VW, 10/2/2019 6:17 PM    History: s/p PCNL, now with resp difficulty, r/o pleural injury    Comparison:  3/7/2018    Findings:  Single AP view of the chest. Limited examination due to  patient positioning. Left arm PICC tip projects over the low SVC.  Appearing cardiac silhouette is enlarged. Small left pleural effusion  and adjacent basilar opacities. No pneumothorax. Visualized upper  abdomen is unremarkable.      Impression    Impression:    Small left pleural effusion with adjacent basilar atelectasis and/or  consolidation.    I have personally reviewed the examination and initial interpretation  and I agree with the findings.    WALESKA VENEGAS MD   CTA Abdomen Pelvis with Contrast   Result Value Ref Range    Radiologist flags (Urgent)      Hemorrhagic renal cyst, hemorrhage in proximal left    Impression    Impression:   1.  Left hemorrhagic renal cysts and  hemorrhage within the proximal  left renal collecting system. Active bleeding in the superior pole of  left kidney.  2.   Post surgical appearance of left laser lithotripsy, with  decreased stone burden since comparison. Left precuneus nephrostomy  and left ureteral stent, and suprapubic catheter are in appropriate  position.  3.  Patent abdominal vasculature.  4.  Unchanged appearance of staghorn calculi in the right kidney, mild  right hydronephrosis and hydroureter.  5.  Cholelithiasis without cholecystitis.    [Urgent Result: Hemorrhagic renal cyst, hemorrhage in proximal left  renal collecting system. Active extravasation superior pole of left  kidney.]    Finding was identified on 10/3/2019 10:05 PM.     Dr. Haddad was contacted by Dr. Osmel Boyd at 10/3/2019 9:57 PM  and verbalized understanding of the urgent finding.    IR Renal Angiogram Left    Narrative    Procedures:   1. Ultrasound guidance for vascular access.  2. Left renal angiography.  3. Catheterization and Embolization of 3rd order branch artery in the  left upper renal pole.  4. Left renal angiography after completion.  5. Arteriotomy closure with a 5 Malagasy mynx device.    Clinical indication: Hemorrhage    Comparison studies: CTA same-day    PROCEDURE:        Interventional radiologists: : Hans Braswell MD     Fellow: Oliverio Martell    Consent: verbal and written informed consent obtained prior to  procedure.    Procedure details: Patient placed in supine position. Right groin  prepped and draped in standard sterile fashion. Using fluoroscopic and  ultrasound guidance, micropuncture access was made to the right common  femoral artery. An ultrasound image was saved to the patient's chart  documenting the patent common femoral artery and entry of the needle.  This was ultimately exchanged for a 5 Malagasy sheath. A C2 glide  catheter was advanced over a Bentson wire into the abdominal aorta.  The left renal artery was selected, and left  renal angiogram was  performed, demonstrating active extravasation from a distal branch in  the upper renal pole. The C2 catheter was advanced further into the  main left renal artery. We then advanced a 2.8 Romanian progreat  catheter towards the bleeding artery and performed further  interrogation with selective angiographic runs. We were able to  determine that there was a neck measuring approximately 4 mm in length  prior to the area of extravasation, with normal vessel beyond this.  The microcatheter was advanced into this neck. A 1.5 mm microvascular  plug was placed within the vessel neck. Injection of contrast through  the microcatheter with the plug in place and retained on the wire  demonstrated cessation of extravasation. The plug was deployed.  Angiography after completion demonstrated no active extravasation with  minimal loss of arterial perfusion to a wedge of upper pole renal  cortex. Microcatheter and C2 catheter removed. Arteriotomy closure  with a 5 Romanian mynx device. Hemostasis was achieved. The patient  tolerated the procedure well without immediate complication and was  transferred in stable condition.     Medications: fentanyl 100 mcg IV, midazolam 2 mg IV, 1% lidocaine  (buffered with 8.4% sodium bicarbonate) for local anesthesia.     Monitoring: The patient was placed on continuous monitoring. Vital  signs and sedation monitored by nursing staff under my supervision.  The patient remained stable throughout the procedure.    Sedation time: 25 minutes    Fluoroscopy time: 13.5 minutes    Complications: None.      Impression    IMPRESSION:   1. Renal angiography on the left demonstrating active extravasation  seen on comparison CT.  2. Embolization of 3rd order branch vessel in the left upper renal  cortex with a 1.5 mm microvascular plug.  3. Angiography after completion demonstrating cessation of  extravasation.   IR Renal Embolization    Narrative    Procedures:   1. Ultrasound guidance for  vascular access.  2. Left renal angiography.  3. Catheterization and Embolization of 3rd order branch artery in the  left upper renal pole.  4. Left renal angiography after completion.  5. Arteriotomy closure with a 5 Polish mynx device.    Clinical indication: Hemorrhage    Comparison studies: CTA same-day    PROCEDURE:        Interventional radiologists: : Hans Braswell MD     Fellow: Oliverio Martell    Consent: verbal and written informed consent obtained prior to  procedure.    Procedure details: Patient placed in supine position. Right groin  prepped and draped in standard sterile fashion. Using fluoroscopic and  ultrasound guidance, micropuncture access was made to the right common  femoral artery. An ultrasound image was saved to the patient's chart  documenting the patent common femoral artery and entry of the needle.  This was ultimately exchanged for a 5 Polish sheath. A C2 glide  catheter was advanced over a [employer] wire into the abdominal aorta.  The left renal artery was selected, and left renal angiogram was  performed, demonstrating active extravasation from a distal branch in  the upper renal pole. The C2 catheter was advanced further into the  main left renal artery. We then advanced a 2.8 Polish progreat  catheter towards the bleeding artery and performed further  interrogation with selective angiographic runs. We were able to  determine that there was a neck measuring approximately 4 mm in length  prior to the area of extravasation, with normal vessel beyond this.  The microcatheter was advanced into this neck. A 1.5 mm microvascular  plug was placed within the vessel neck. Injection of contrast through  the microcatheter with the plug in place and retained on the wire  demonstrated cessation of extravasation. The plug was deployed.  Angiography after completion demonstrated no active extravasation with  minimal loss of arterial perfusion to a wedge of upper pole renal  cortex. Microcatheter  and C2 catheter removed. Arteriotomy closure  with a 5 Armenian mynx device. Hemostasis was achieved. The patient  tolerated the procedure well without immediate complication and was  transferred in stable condition.     Medications: fentanyl 100 mcg IV, midazolam 2 mg IV, 1% lidocaine  (buffered with 8.4% sodium bicarbonate) for local anesthesia.     Monitoring: The patient was placed on continuous monitoring. Vital  signs and sedation monitored by nursing staff under my supervision.  The patient remained stable throughout the procedure.    Sedation time: 25 minutes    Fluoroscopy time: 13.5 minutes    Complications: None.      Impression    IMPRESSION:   1. Renal angiography on the left demonstrating active extravasation  seen on comparison CT.  2. Embolization of 3rd order branch vessel in the left upper renal  cortex with a 1.5 mm microvascular plug.  3. Angiography after completion demonstrating cessation of  extravasation.   CTA Abdomen Pelvis with Contrast    Narrative    CTA ABDOMEN AND PELVIS 10/4/2019    CLINICAL HISTORY: Renal angiography and embolization performed for  extravasation 10/3/2019. Patient continues to have anemia. Concern for  continued bleeding.    COMPARISONS: Angiography and intervention 10/3/2019. CT 10/3/2019.    REFERRING PROVIDER: NICOLE, AMIN, MOHAMMED    TECHNIQUE: Following the uneventful administration of intravenous  contrast, CTA was performed through the abdomen and pelvis.    No unenhanced, venous phase, or delayed images were obtained.    Coronal and sagittal reconstructions were produced.    3-D and multiplanar reconstructions were unavailable at the time of  dictation.    DOSE (DLP): 1557 mGy*cm    CONTRAST: 100 mL Isovue 370     FINDINGS: Diagnostic sensitivity limited by single phase.    No active extravasation demonstrated in this single phase study. No  active extravasation demonstrated from the previous site of bleeding.     Interval placement of a plug in the upper pole  left renal branch.     Unchanged embolization coils in a inferior pole renal branch.    Relative hyperperfusion of the left mid kidney and lower pole with  associated atrophy, similar to the previous study.    Right percutaneous internal/external nephroureteral drain in stable  position. Superior pigtail in the left renal pelvis. Inferior pigtail  in the bladder.    Suprapubic catheter in place in the bladder. Increased air in the  bladder.    All inferior vena cava filter legs penetrate through the cava,  unchanged.    Unchanged bilateral staghorn calculi. Unchanged atrophic right kidney.    Gallstone in the gallbladder without CT evidence for cholecystitis.    Persistent left pleural effusion and left lower lobe consolidation.      Impression    IMPRESSION:   1. No active extravasation demonstrated in this single phase study.    2. Interval embolization of a left upper pole renal artery.     I have personally reviewed the examination and initial interpretation  and I agree with the findings.    JEFF RICKS MD   Glucose by meter   Result Value Ref Range    Glucose 108 (H) 70 - 99 mg/dL   CBC with platelets   Result Value Ref Range    WBC 13.7 (H) 4.0 - 11.0 10e9/L    RBC Count 4.77 4.4 - 5.9 10e12/L    Hemoglobin 11.5 (L) 13.3 - 17.7 g/dL    Hematocrit 40.0 40.0 - 53.0 %    MCV 84 78 - 100 fl    MCH 24.1 (L) 26.5 - 33.0 pg    MCHC 28.8 (L) 31.5 - 36.5 g/dL    RDW 16.0 (H) 10.0 - 15.0 %    Platelet Count 335 150 - 450 10e9/L   Basic metabolic panel   Result Value Ref Range    Sodium 140 133 - 144 mmol/L    Potassium 4.4 3.4 - 5.3 mmol/L    Chloride 109 94 - 109 mmol/L    Carbon Dioxide 23 20 - 32 mmol/L    Anion Gap 9 3 - 14 mmol/L    Glucose 150 (H) 70 - 99 mg/dL    Urea Nitrogen 13 7 - 30 mg/dL    Creatinine 1.09 0.66 - 1.25 mg/dL    GFR Estimate 83 >60 mL/min/[1.73_m2]    GFR Estimate If Black >90 >60 mL/min/[1.73_m2]    Calcium 8.6 8.5 - 10.1 mg/dL   Hemoglobin   Result Value Ref Range    Hemoglobin 10.2 (L)  13.3 - 17.7 g/dL   Lactic acid whole blood   Result Value Ref Range    Lactic Acid 2.2 (H) 0.7 - 2.0 mmol/L   Basic metabolic panel   Result Value Ref Range    Sodium 140 133 - 144 mmol/L    Potassium 4.3 3.4 - 5.3 mmol/L    Chloride 109 94 - 109 mmol/L    Carbon Dioxide 22 20 - 32 mmol/L    Anion Gap 9 3 - 14 mmol/L    Glucose 109 (H) 70 - 99 mg/dL    Urea Nitrogen 17 7 - 30 mg/dL    Creatinine 1.40 (H) 0.66 - 1.25 mg/dL    GFR Estimate 62 >60 mL/min/[1.73_m2]    GFR Estimate If Black 71 >60 mL/min/[1.73_m2]    Calcium 7.5 (L) 8.5 - 10.1 mg/dL   CBC with platelets differential   Result Value Ref Range    WBC 12.6 (H) 4.0 - 11.0 10e9/L    RBC Count 3.09 (L) 4.4 - 5.9 10e12/L    Hemoglobin 7.4 (L) 13.3 - 17.7 g/dL    Hematocrit 26.8 (L) 40.0 - 53.0 %    MCV 87 78 - 100 fl    MCH 23.9 (L) 26.5 - 33.0 pg    MCHC 27.6 (L) 31.5 - 36.5 g/dL    RDW 16.3 (H) 10.0 - 15.0 %    Platelet Count 228 150 - 450 10e9/L    Diff Method Automated Method     % Neutrophils 78.0 %    % Lymphocytes 8.4 %    % Monocytes 8.6 %    % Eosinophils 4.0 %    % Basophils 0.3 %    % Immature Granulocytes 0.7 %    Nucleated RBCs 0 0 /100    Absolute Neutrophil 9.8 (H) 1.6 - 8.3 10e9/L    Absolute Lymphocytes 1.1 0.8 - 5.3 10e9/L    Absolute Monocytes 1.1 0.0 - 1.3 10e9/L    Absolute Eosinophils 0.5 0.0 - 0.7 10e9/L    Absolute Basophils 0.0 0.0 - 0.2 10e9/L    Abs Immature Granulocytes 0.1 0 - 0.4 10e9/L    Absolute Nucleated RBC 0.0    Hemoglobin   Result Value Ref Range    Hemoglobin Canceled, Test credited 13.3 - 17.7 g/dL   Vancomycin level   Result Value Ref Range    Vancomycin Level 26.7 (HH) mg/L   Hemoglobin   Result Value Ref Range    Hemoglobin 7.7 (L) 13.3 - 17.7 g/dL   Basic metabolic panel   Result Value Ref Range    Sodium 143 133 - 144 mmol/L    Potassium 3.8 3.4 - 5.3 mmol/L    Chloride 111 (H) 94 - 109 mmol/L    Carbon Dioxide 23 20 - 32 mmol/L    Anion Gap 8 3 - 14 mmol/L    Glucose 125 (H) 70 - 99 mg/dL    Urea Nitrogen 18 7 - 30  mg/dL    Creatinine 1.35 (H) 0.66 - 1.25 mg/dL    GFR Estimate 64 >60 mL/min/[1.73_m2]    GFR Estimate If Black 75 >60 mL/min/[1.73_m2]    Calcium 7.8 (L) 8.5 - 10.1 mg/dL   CBC with platelets   Result Value Ref Range    WBC 10.8 4.0 - 11.0 10e9/L    RBC Count 2.99 (L) 4.4 - 5.9 10e12/L    Hemoglobin 7.2 (L) 13.3 - 17.7 g/dL    Hematocrit 24.8 (L) 40.0 - 53.0 %    MCV 83 78 - 100 fl    MCH 24.1 (L) 26.5 - 33.0 pg    MCHC 29.0 (L) 31.5 - 36.5 g/dL    RDW 16.7 (H) 10.0 - 15.0 %    Platelet Count 280 150 - 450 10e9/L   CBC with platelets   Result Value Ref Range    WBC 8.5 4.0 - 11.0 10e9/L    RBC Count 2.88 (L) 4.4 - 5.9 10e12/L    Hemoglobin 6.8 (LL) 13.3 - 17.7 g/dL    Hematocrit 24.9 (L) 40.0 - 53.0 %    MCV 87 78 - 100 fl    MCH 23.6 (L) 26.5 - 33.0 pg    MCHC 27.3 (L) 31.5 - 36.5 g/dL    RDW 16.4 (H) 10.0 - 15.0 %    Platelet Count 206 150 - 450 10e9/L   Basic metabolic panel   Result Value Ref Range    Sodium 142 133 - 144 mmol/L    Potassium 4.3 3.4 - 5.3 mmol/L    Chloride 114 (H) 94 - 109 mmol/L    Carbon Dioxide 22 20 - 32 mmol/L    Anion Gap 6 3 - 14 mmol/L    Glucose 88 70 - 99 mg/dL    Urea Nitrogen 19 7 - 30 mg/dL    Creatinine 1.39 (H) 0.66 - 1.25 mg/dL    GFR Estimate 62 >60 mL/min/[1.73_m2]    GFR Estimate If Black 72 >60 mL/min/[1.73_m2]    Calcium 7.8 (L) 8.5 - 10.1 mg/dL   Hemoglobin   Result Value Ref Range    Hemoglobin 8.2 (L) 13.3 - 17.7 g/dL   Hemoglobin   Result Value Ref Range    Hemoglobin 6.9 (LL) 13.3 - 17.7 g/dL   CBC with platelets   Result Value Ref Range    WBC 7.6 4.0 - 11.0 10e9/L    RBC Count 2.72 (L) 4.4 - 5.9 10e12/L    Hemoglobin 6.4 (LL) 13.3 - 17.7 g/dL    Hematocrit 23.4 (L) 40.0 - 53.0 %    MCV 86 78 - 100 fl    MCH 23.5 (L) 26.5 - 33.0 pg    MCHC 27.4 (L) 31.5 - 36.5 g/dL    RDW 16.3 (H) 10.0 - 15.0 %    Platelet Count 248 150 - 450 10e9/L   Basic metabolic panel   Result Value Ref Range    Sodium 146 (H) 133 - 144 mmol/L    Potassium 4.2 3.4 - 5.3 mmol/L    Chloride 115  (H) 94 - 109 mmol/L    Carbon Dioxide 24 20 - 32 mmol/L    Anion Gap 7 3 - 14 mmol/L    Glucose 87 70 - 99 mg/dL    Urea Nitrogen 16 7 - 30 mg/dL    Creatinine 1.30 (H) 0.66 - 1.25 mg/dL    GFR Estimate 67 >60 mL/min/[1.73_m2]    GFR Estimate If Black 78 >60 mL/min/[1.73_m2]    Calcium 8.2 (L) 8.5 - 10.1 mg/dL   CBC with platelets   Result Value Ref Range    WBC 15.8 (H) 4.0 - 11.0 10e9/L    RBC Count 2.92 (L) 4.4 - 5.9 10e12/L    Hemoglobin 7.2 (L) 13.3 - 17.7 g/dL    Hematocrit 25.1 (L) 40.0 - 53.0 %    MCV 86 78 - 100 fl    MCH 24.7 (L) 26.5 - 33.0 pg    MCHC 28.7 (L) 31.5 - 36.5 g/dL    RDW 15.9 (H) 10.0 - 15.0 %    Platelet Count 507 (H) 150 - 450 10e9/L   Basic metabolic panel   Result Value Ref Range    Sodium 143 133 - 144 mmol/L    Potassium 4.7 3.4 - 5.3 mmol/L    Chloride 110 (H) 94 - 109 mmol/L    Carbon Dioxide 23 20 - 32 mmol/L    Anion Gap 10 3 - 14 mmol/L    Glucose 147 (H) 70 - 99 mg/dL    Urea Nitrogen 17 7 - 30 mg/dL    Creatinine 1.25 0.66 - 1.25 mg/dL    GFR Estimate 71 >60 mL/min/[1.73_m2]    GFR Estimate If Black 82 >60 mL/min/[1.73_m2]    Calcium 8.8 8.5 - 10.1 mg/dL   Lactic acid whole blood   Result Value Ref Range    Lactic Acid 3.4 (H) 0.7 - 2.0 mmol/L   INR   Result Value Ref Range    INR 1.08 0.86 - 1.14   Calcium ionized whole blood   Result Value Ref Range    Calcium Ionized Whole Blood 5.1 4.4 - 5.2 mg/dL   Calcium ionized whole blood   Result Value Ref Range    Calcium Ionized Whole Blood 4.9 4.4 - 5.2 mg/dL   Lactic acid whole blood   Result Value Ref Range    Lactic Acid 2.8 (H) 0.7 - 2.0 mmol/L   Basic metabolic panel   Result Value Ref Range    Sodium 146 (H) 133 - 144 mmol/L    Potassium 3.6 3.4 - 5.3 mmol/L    Chloride 116 (H) 94 - 109 mmol/L    Carbon Dioxide 21 20 - 32 mmol/L    Anion Gap 9 3 - 14 mmol/L    Glucose 96 70 - 99 mg/dL    Urea Nitrogen 16 7 - 30 mg/dL    Creatinine 1.29 (H) 0.66 - 1.25 mg/dL    GFR Estimate 68 >60 mL/min/[1.73_m2]    GFR Estimate If Black 79 >60  mL/min/[1.73_m2]    Calcium 7.4 (L) 8.5 - 10.1 mg/dL     *Note: Due to a large number of results and/or encounters for the requested time period, some results have not been displayed. A complete set of results can be found in Results Review.            Discharge Instructions and Follow-Up:   GENERAL ORDERS:  - RESUME STANDARD NURSING HOME CARES    Diet:  - Regular/ home diet    Activity:   - No strenuous exercise for 6 weeks.   - No lifting, pushing, pulling more than 10 pounds for 6 weeks. Take care when pushing with your arms to stand up.  - Do not strain your belly area.  When you bend, sit up or twice, you could strain the area around your incision.    - Do not strain with bowel movements.      Medications:   1) PAIN: Continue Tylenol (acetaminophen 625mg) as directed for pain.   These have been prescribed for you.  Do not take more than 4,000mg of Tylenol (acetaminophen/ APAP) from all sources in any 24 hour period since this can cause liver damage.  Never drive, operate machinery or drink alcoholic beverages while you are taking narcotic pain medications.      2) CONSTIPATION: Resume home bowel regimen, but reduce or stop if you develop loose stools. Other over the counter solutions such as prune juice, miralax, fiber products, senna, and dulcolax can also be used. If you are taking the pericolace but still have not had a bowel movement in 3 days, start over-the-counter Milk of Magnesia taken twice daily until you have a nice bowel movement.  Call the office with any concerns.     3) ANTIBIOTICS:  - PICC discontinued prior to discharge, no further IV antibiotics indicated at this time    URINARY DRAINAGE TUBES:  1) Percutaneous Nephrostomy Tube (PNT):  - This was capped, please keep capped until follow up with IR, take care to avoid inadvertently dislodging     2) Suprapubic Urinary Drainage Belle Catheter:  - Resume standard cares including SP tube changes every 2-4 weeks or per previous    PULMONOLOGY  "CARES:  - Resume [id number] BiPAP, CPAP and Oxygen, as previously ordered by Mr. Cordova's home medical team        Follow-Up:   - Schedule an appointment to be seen by your primary care provider within 7-10 days of discharge for a postoperative checkup.   - Follow up with Dr. Horn in 2-3 weeks will be arranged for possible PNT removal and routine follow up  - Call or return sooner than your regularly scheduled visit if you develop any of the following:  Fever (greater than 101.3F), uncontrolled pain, uncontrolled nausea or vomiting, concerns about bowel function, as well as increased redness, swelling, drainage from your wound or any concerns about urinating or urinary catheter drainage.      Here are some phone numbers where you can reach us:  - Monday through Friday, 8am to 4:30pm - as long as it is not a holiday, IT IS BEST to call the Urology Clinic Triage Line at: [phone] with any non-emergent urology concerns.    - If it is a night, weekend, or holiday call the [facility] number at [phone] and ask the  to page the \"urology resident on call\".  Typically, the on-call provider should return your call within 30 minutes.  Please page the on-call provider again if you haven't been contacted as expected.  Rarely, the on-call provider will be unable to promptly return a call due to a hospital emergency.  If you have paged twice and are still not contacted, ask the hospital  to page the \"urology CHIEF-RESIDENT on call\".  - FOR EMERGENCIES, always call 911 or go to the Emergency Department         Discharge Disposition:   Discharged to his long term care facility      Attestation: I have reviewed today's vital signs, notes, medications, labs and imaging.    Chinedu Goncalves MD   Urology Resident, PGY4   [phone]     Please call Job Code:   [phone] to reach the Urology resident or PA on call - Weekdays  [phone] to reach the Urology resident or PA on call - Weeknights and " weekends    October 6, 2019

## 2019-10-02 NOTE — PROGRESS NOTES
Urology  Progress Note  10/02/2019    - Switched to intermittent vanc dosing due to supratherapeutic level yesterday  - NAEON  - AF, VSS, on CPAP ovn  - Pain tolerable  - Hb 6.8 this AM    Exam  /54   Pulse 81   Temp 96.6  F (35.9  C) (Axillary)   Resp 18   Wt 76.9 kg (169 lb 8.5 oz)   SpO2 95%   BMI 23.98 kg/m    No acute distress  Unlabored breathing  Abdomen soft, nontender, nondistended. No bleeding noted around PNT site.  PNT clear yellow urine in tubing  SPT with light red urine in tubing    I/O's (last 24/since midnight):  SPT: 500/200  Nephroureteral stent: 575/150  Total UOP: 2525/350    Labs  WBC 8.5 (10.8)  Hgb 6.8 (7.2<7.7<7.4<11.5)  Cr 1.35 (1.09)    Assessment/Plan  44 year old y/o male POD#2 s/p PCNL, shock wave lithotripsy, and placement of left nephroureteral stent for left sided stone.     Neuro: Tylenol, dilaudid, oxycodone for pain control. Home keppra.   CV: HDS  Pulm: incentive spirometry while awake  FEN/GI: Regular diet, MIVF @ 100/hr  Endo: VIRA  : Continue SPT and nephroureteral stent. Monitor UOP.  Heme/ID: Ceftazidime, vancomycin. Will consider transfusion today.  Activity: Movement with assist  PPx: SCDs  Dispo: Home likely today    Seen and examined with the chief resident. Will discuss with staff Dr. Horn.    Anthony David MD  Urology Resident     Contacting the Urology Team     Please use the following job codes to reach the Urology Team. Note that you must use an in house phone and that job codes cannot receive text pages.     On weekdays, dial [phone] (or star-star-star [phone] on the new [employer] telephones) then [phone] to reach the Adult Urology resident or PA on call    On weekdays, dial [phone] (or star-star-star [phone] on the new [employer] telephones) then [phone] to reach the Pediatric Urology resident    On weeknights and weekends, dial [phone] (or star-star-star [phone] on the new [employer] telephones) then [phone] to reach the Urology resident on call (for both Adult and Pediatrics)

## 2019-10-02 NOTE — PLAN OF CARE
OBSERVATION GOALS:     - Patient able to ambulate as they were prior to admission or with assist devices provided by therapies during their stay. - N/A. Pt quadriplegic. Up w/ Ax2 and lift to WC.     VSS on RA. CPAP overnight. Sitter at bedside. A&Ox4. Quad, absent sensation. On tele, SR 60-70s. Tolerating regular diet, needs assistance w/ feeding. LBM 9/30. Suprapubic catheter in place w/ red output. Leaking at site, dressing changed. L PNT w/ red output, dressing CDI. L PICC infusing w/ NS @ 100 ml/hr. Up w/ AX2 and lift to WC. Refusing repositioning. Incisional pain controlled w/ Oxy and Tylenol x1. Bruising and scabbing noticed on skin. Monitor Hbg this AM. Continue to monitor and follow POC.

## 2019-10-02 NOTE — PLAN OF CARE
Outpatient in a Bed discharge goals PRIOR TO DISCHARGE    Comments: List all Outpatient in a Bed discharge goals to be met before discharge home:     ~ Patient able to ambulate as they were prior to admission or with assist devices provided by therapies during their stay- pt is quadriplegic, chair in room for discharge.     ~ Nurse to Notify Provider when Outpatient in a Bed discharge goals have been met and patient is ready for discharge.      Patient alert and oriented. Minimal jaw pain. Tolerating PO diet, no n/v. Suprapubic catheter in place, minimal leakage, dressing changed. Labs drawn and in process. Will continue to monitor.

## 2019-10-02 NOTE — PHARMACY-VANCOMYCIN DOSING SERVICE
Pharmacy Vancomycin Note  Date of Service 2019  Patient's  1975   44 year old, male    Indication: Surgical Prophylaxis  Goal Trough Level: 15-20 mg/L per ID note   Day of Therapy: 4 - started PTA on   Current Vancomycin regimen:  1500 mg IV q24h    Current estimated CrCl = Estimated Creatinine Clearance: 77.5 mL/min (A) (based on SCr of 1.35 mg/dL (H)).    Creatinine for last 3 days  2019:  1:05 PM Creatinine 1.09 mg/dL  10/1/2019:  6:56 AM Creatinine 1.40 mg/dL;  3:44 PM Creatinine 1.35 mg/dL    Recent Vancomycin Levels (past 3 days)  10/1/2019:  3:44 PM Vancomycin Level 26.7 mg/L    Vancomycin IV Administrations (past 72 hours)                   vancomycin 1500 mg in 0.9% NaCl 250 ml intermittent infusion 1,500 mg (mg) 1,500 mg Given 10/01/19 1539     1,500 mg Given 19 1816                Nephrotoxins and other renal medications (From now, onward)    Start     Dose/Rate Route Frequency Ordered Stop    10/01/19 1726  vancomycin place de [place] cruz - receiving intermittent dosing      1 each Does not apply SEE ADMIN INSTRUCTIONS 10/01/19 1729               Contrast Orders - past 72 hours (72h ago, onward)    Start     Dose/Rate Route Frequency Ordered Stop    19 1049  iothalamate meglumine (CONRAY) 60 % injection  Status:  Discontinued        PRN 19 [id number]          Interpretation of levels and current regimen:  Trough level is a 21.5 hour trough  Supratherapeutic. Confirmed with RN that vancomycin level was drawn before vancomycin dose started infusing.     Has serum creatinine changed > 50% in last 72 hours: No but SrCr is up from baseline     Urine output:  good urine output    Renal Function: possibly Worsening  Note patient is quadriplegic     Plan:  1.  Switch to intermittent dosing. Dose as already given today before level result. Renal function to be re evaluated in AM to determine further dosing.    2.  Pharmacy will check trough levels as appropriate  in 1-3 Days.    3. Serum creatinine levels will be ordered daily for the first week of therapy and at least twice weekly for subsequent weeks.      Andria Hopper, PharmD

## 2019-10-02 NOTE — PROVIDER NOTIFICATION
"Provider text paged re: \"Pt Maicol Carrera, 6D, room 10. PICC line- unable to draw back blood or flush, order for tPA needed. \"  "

## 2019-10-02 NOTE — PROGRESS NOTES
Observation Goals before Discharge  Patient able to ambulate as they were prior to admission or with assist devices provided by therapies during their stay. - Pt quadriplegic. Turn in bed with assist of 2.

## 2019-10-02 NOTE — PROVIDER NOTIFICATION
Urology cross cover paged - Critical Hbg of 6.7.     Urology team to make AM rounds and decide if transfusion needed. BP soft 96/63. MAP 74. Suprapubic catheter and PNT w/ red bloody output. Will continue to monitor.

## 2019-10-02 NOTE — PROGRESS NOTES
This writer called [facility] to cancel ride for this evening at 6:30pm because patient is not discharging this evening.

## 2019-10-02 NOTE — PROGRESS NOTES
Called to assess PICC line that was clotted,unable to flush, unable to draw blood.   Explained to get an order for TPA, and showed to RN how to administer the reconstituted drug by showing the her the guideline protocol for declotting central line in the intranet.  VAS will follow up with RN if needed.

## 2019-10-02 NOTE — PHARMACY-VANCOMYCIN DOSING SERVICE
Pharmacy Vancomycin Note  Date of Service 2019  Patient's  1975   44 year old, male    Indication: Surgical Prophylaxis  Goal Trough Level: 15-20 mg/L per ID note   Day of Therapy: 5  Current Vancomycin regimen:  Intermittent dosing s/p level evaluation 10/1 (see note for Andria Hopper)    Current estimated CrCl = Estimated Creatinine Clearance: 75.3 mL/min (A) (based on SCr of 1.39 mg/dL (H)).    Creatinine for last 3 days  2019:  1:05 PM Creatinine 1.09 mg/dL  10/1/2019:  6:56 AM Creatinine 1.40 mg/dL;  3:44 PM Creatinine 1.35 mg/dL  10/2/2019:  5:56 AM Creatinine 1.39 mg/dL    Recent Vancomycin Levels (past 3 days)  10/1/2019:  3:44 PM Vancomycin Level 26.7 mg/L    Vancomycin IV Administrations (past 72 hours)                   vancomycin 1500 mg in 0.9% NaCl 250 ml intermittent infusion 1,500 mg (mg) 1,500 mg Given 10/01/19 1539     1,500 mg Given 19 1816                Nephrotoxins and other renal medications (From now, onward)    Start     Dose/Rate Route Frequency Ordered Stop    10/03/19 1000  vancomycin (VANCOCIN) 1000 mg in dextrose 5% 200 mL PREMIX      1,000 mg  200 mL/hr over 1 Hours Intravenous EVERY 24 HOURS 10/02/19 0813               Contrast Orders - past 72 hours (72h ago, onward)    Start     Dose/Rate Route Frequency Ordered Stop    19 1049  iothalamate meglumine (CONRAY) 60 % injection  Status:  Discontinued        PRN 19 [id number] 19 [id number]          Interpretation of levels and current regimen:  Trough level was  Therapeutic    Has serum creatinine changed > 50% in last 72 hours: No, but is approaching this threshhold from baseline.    Urine output:  good urine output    Renal Function: Worsening     NOTE patient is quadriplegic (so SCr estimate of renal function is falsely better than in actuality)    Plan:  1.  Decrease Dose to 1000mg (13mg/kg) every 24hrs; also washout period to allow clearance of supratherapeutic level will, thus start time will  be 1000AM on 10/3 (42.5hr washout)  2.  Pharmacy will check trough levels as appropriate in 1-3 Days.    3. Serum creatinine levels will be ordered daily for the first week of therapy and at least twice weekly for subsequent weeks.      Zane Oneal PharmD, [facility]  Inpatient clinical pharmacist

## 2019-10-02 NOTE — PROGRESS NOTES
"Pt felt something was wrong this morning and stated multiple times \"something isn't right.\" Pt explained his head was tingling. After eating breakfast he felt much better and does not feel any tingling in his head.     Observation Goal Before Discharge:  Patient able to ambulate as they were prior to admission or with assist devices provided by therapies during their stay. - Pt quadriplegic, up with assist of 2, turned in bed with assist of 1.       "

## 2019-10-02 NOTE — PROGRESS NOTES
ORDERS STILL NEED BE FAXED. When completed, please fax discharge orders to [phone].     Social Work Services Discharge Note      Patient Name:  Maicol Carrera     Anticipated Discharge Date:  10/2/19    Discharge Disposition:   LTC:  [facility]    Following MD:  Anthony David MD     Pre-Admission Screening (PAS) online form has been completed.  The Level of Care (LOC) is:  Determined  Confirmation Code is:  Not needed, pt is LTC     Additional Services/Equipment Arranged:  HE wheelchair ride scheduled for 6:30 PM. Using own wheel-chair.     Patient / Family response to discharge plan:  Agreeable     Persons notified of above discharge plan: Юлия at [facility] [phone], MD, charge    Staff Discharge Instructions:  Please fax discharge orders and signed hard scripts for any controlled substances.  Please print a packet and send with patient.     CTS Handoff completed:  NIDA Egan [facility]  Float   [phone]

## 2019-10-02 NOTE — PROGRESS NOTES
Pt had 575 ml of output from PNT and 115 ml output from suprapubic catheter at 1407. TREVA Del Castillo made aware.

## 2019-10-03 ENCOUNTER — APPOINTMENT (OUTPATIENT)
Dept: INTERVENTIONAL RADIOLOGY/VASCULAR | Facility: CLINIC | Age: 44
End: 2019-10-03
Attending: UROLOGY
Payer: COMMERCIAL

## 2019-10-03 ENCOUNTER — APPOINTMENT (OUTPATIENT)
Dept: CT IMAGING | Facility: CLINIC | Age: 44
End: 2019-10-03
Attending: UROLOGY
Payer: COMMERCIAL

## 2019-10-03 PROBLEM — N20.0 KIDNEY STONE ON LEFT SIDE: Status: ACTIVE | Noted: 2019-10-03

## 2019-10-03 LAB
ABO + RH BLD: NORMAL
ABO + RH BLD: NORMAL
ANION GAP SERPL CALCULATED.3IONS-SCNC: 10 MMOL/L (ref 3–14)
ANION GAP SERPL CALCULATED.3IONS-SCNC: 7 MMOL/L (ref 3–14)
APPEARANCE STONE: NORMAL
APTT PPP: 34 SEC (ref 22–37)
BLD GP AB SCN SERPL QL: NORMAL
BLD PROD TYP BPU: NORMAL
BLD UNIT ID BPU: 0
BLOOD BANK CMNT PATIENT-IMP: NORMAL
BLOOD PRODUCT CODE: NORMAL
BPU ID: NORMAL
BUN SERPL-MCNC: 16 MG/DL (ref 7–30)
BUN SERPL-MCNC: 17 MG/DL (ref 7–30)
CA-I BLD-MCNC: 4.7 MG/DL (ref 4.4–5.2)
CA-I BLD-MCNC: 4.9 MG/DL (ref 4.4–5.2)
CA-I BLD-MCNC: 5.1 MG/DL (ref 4.4–5.2)
CALCIUM SERPL-MCNC: 8.2 MG/DL (ref 8.5–10.1)
CALCIUM SERPL-MCNC: 8.8 MG/DL (ref 8.5–10.1)
CHLORIDE SERPL-SCNC: 110 MMOL/L (ref 94–109)
CHLORIDE SERPL-SCNC: 115 MMOL/L (ref 94–109)
CO2 SERPL-SCNC: 23 MMOL/L (ref 20–32)
CO2 SERPL-SCNC: 24 MMOL/L (ref 20–32)
COMPN STONE: NORMAL
CREAT SERPL-MCNC: 1.25 MG/DL (ref 0.66–1.25)
CREAT SERPL-MCNC: 1.3 MG/DL (ref 0.66–1.25)
ERYTHROCYTE [DISTWIDTH] IN BLOOD BY AUTOMATED COUNT: 15.6 % (ref 10–15)
ERYTHROCYTE [DISTWIDTH] IN BLOOD BY AUTOMATED COUNT: 15.9 % (ref 10–15)
ERYTHROCYTE [DISTWIDTH] IN BLOOD BY AUTOMATED COUNT: 16.3 % (ref 10–15)
FIBRINOGEN PPP-MCNC: 551 MG/DL (ref 200–420)
GFR SERPL CREATININE-BSD FRML MDRD: 67 ML/MIN/{1.73_M2}
GFR SERPL CREATININE-BSD FRML MDRD: 71 ML/MIN/{1.73_M2}
GLUCOSE SERPL-MCNC: 147 MG/DL (ref 70–99)
GLUCOSE SERPL-MCNC: 87 MG/DL (ref 70–99)
HCT VFR BLD AUTO: 23.4 % (ref 40–53)
HCT VFR BLD AUTO: 25.1 % (ref 40–53)
HCT VFR BLD AUTO: 30.2 % (ref 40–53)
HGB BLD-MCNC: 6.4 G/DL (ref 13.3–17.7)
HGB BLD-MCNC: 7.2 G/DL (ref 13.3–17.7)
HGB BLD-MCNC: 9.4 G/DL (ref 13.3–17.7)
INR PPP: 1.08 (ref 0.86–1.14)
INR PPP: 1.12 (ref 0.86–1.14)
LACTATE BLD-SCNC: 2.8 MMOL/L (ref 0.7–2)
LACTATE BLD-SCNC: 3.4 MMOL/L (ref 0.7–2)
MCH RBC QN AUTO: 23.5 PG (ref 26.5–33)
MCH RBC QN AUTO: 24.7 PG (ref 26.5–33)
MCH RBC QN AUTO: 27.2 PG (ref 26.5–33)
MCHC RBC AUTO-ENTMCNC: 27.4 G/DL (ref 31.5–36.5)
MCHC RBC AUTO-ENTMCNC: 28.7 G/DL (ref 31.5–36.5)
MCHC RBC AUTO-ENTMCNC: 31.1 G/DL (ref 31.5–36.5)
MCV RBC AUTO: 86 FL (ref 78–100)
MCV RBC AUTO: 86 FL (ref 78–100)
MCV RBC AUTO: 87 FL (ref 78–100)
NUM BPU REQUESTED: 12
NUM BPU REQUESTED: 2
NUM BPU REQUESTED: 6
NUMBER STONE: NORMAL
PLATELET # BLD AUTO: 248 10E9/L (ref 150–450)
PLATELET # BLD AUTO: 285 10E9/L (ref 150–450)
PLATELET # BLD AUTO: 507 10E9/L (ref 150–450)
POTASSIUM SERPL-SCNC: 4.1 MMOL/L (ref 3.4–5.3)
POTASSIUM SERPL-SCNC: 4.2 MMOL/L (ref 3.4–5.3)
POTASSIUM SERPL-SCNC: 4.7 MMOL/L (ref 3.4–5.3)
RBC # BLD AUTO: 2.72 10E12/L (ref 4.4–5.9)
RBC # BLD AUTO: 2.92 10E12/L (ref 4.4–5.9)
RBC # BLD AUTO: 3.46 10E12/L (ref 4.4–5.9)
SIZE STONE: NORMAL MM
SODIUM SERPL-SCNC: 143 MMOL/L (ref 133–144)
SODIUM SERPL-SCNC: 146 MMOL/L (ref 133–144)
SPECIMEN EXP DATE BLD: NORMAL
TRANSFUSION STATUS PATIENT QL: NORMAL
WBC # BLD AUTO: 15.8 10E9/L (ref 4–11)
WBC # BLD AUTO: 16.5 10E9/L (ref 4–11)
WBC # BLD AUTO: 7.6 10E9/L (ref 4–11)
WT STONE: 115 MG

## 2019-10-03 PROCEDURE — P9010 WHOLE BLOOD FOR TRANSFUSION: HCPCS | Performed by: STUDENT IN AN ORGANIZED HEALTH CARE EDUCATION/TRAINING PROGRAM

## 2019-10-03 PROCEDURE — 84132 ASSAY OF SERUM POTASSIUM: CPT | Performed by: UROLOGY

## 2019-10-03 PROCEDURE — 27210908 ZZH NEEDLE CR4

## 2019-10-03 PROCEDURE — 40000802 ZZH SITE CHECK

## 2019-10-03 PROCEDURE — P9059 PLASMA, FRZ BETWEEN 8-24HOUR: HCPCS | Performed by: UROLOGY

## 2019-10-03 PROCEDURE — C1769 GUIDE WIRE: HCPCS

## 2019-10-03 PROCEDURE — 25000132 ZZH RX MED GY IP 250 OP 250 PS 637: Performed by: STUDENT IN AN ORGANIZED HEALTH CARE EDUCATION/TRAINING PROGRAM

## 2019-10-03 PROCEDURE — 25000128 H RX IP 250 OP 636: Performed by: STUDENT IN AN ORGANIZED HEALTH CARE EDUCATION/TRAINING PROGRAM

## 2019-10-03 PROCEDURE — 40000014 ZZH STATISTIC ARTERIAL MONITORING DAILY

## 2019-10-03 PROCEDURE — 27210136 ZZH KIT CATH ARTERIAL EXT SUPPLY

## 2019-10-03 PROCEDURE — 83605 ASSAY OF LACTIC ACID: CPT | Performed by: STUDENT IN AN ORGANIZED HEALTH CARE EDUCATION/TRAINING PROGRAM

## 2019-10-03 PROCEDURE — 40000344 ZZHCL STATISTIC THAWING COMPONENT: Performed by: UROLOGY

## 2019-10-03 PROCEDURE — 99152 MOD SED SAME PHYS/QHP 5/>YRS: CPT

## 2019-10-03 PROCEDURE — 25000125 ZZHC RX 250: Performed by: STUDENT IN AN ORGANIZED HEALTH CARE EDUCATION/TRAINING PROGRAM

## 2019-10-03 PROCEDURE — 99292 CRITICAL CARE ADDL 30 MIN: CPT | Mod: GC | Performed by: SURGERY

## 2019-10-03 PROCEDURE — 82330 ASSAY OF CALCIUM: CPT | Performed by: UROLOGY

## 2019-10-03 PROCEDURE — 25000128 H RX IP 250 OP 636: Performed by: UROLOGY

## 2019-10-03 PROCEDURE — 85730 THROMBOPLASTIN TIME PARTIAL: CPT | Performed by: UROLOGY

## 2019-10-03 PROCEDURE — 94660 CPAP INITIATION&MGMT: CPT

## 2019-10-03 PROCEDURE — C1887 CATHETER, GUIDING: HCPCS

## 2019-10-03 PROCEDURE — 27210780 ZZH KIT CR3

## 2019-10-03 PROCEDURE — G0378 HOSPITAL OBSERVATION PER HR: HCPCS

## 2019-10-03 PROCEDURE — C1760 CLOSURE DEV, VASC: HCPCS

## 2019-10-03 PROCEDURE — P9037 PLATE PHERES LEUKOREDU IRRAD: HCPCS | Performed by: UROLOGY

## 2019-10-03 PROCEDURE — 85027 COMPLETE CBC AUTOMATED: CPT | Performed by: STUDENT IN AN ORGANIZED HEALTH CARE EDUCATION/TRAINING PROGRAM

## 2019-10-03 PROCEDURE — 74174 CTA ABD&PLVS W/CONTRAST: CPT

## 2019-10-03 PROCEDURE — 82330 ASSAY OF CALCIUM: CPT | Performed by: STUDENT IN AN ORGANIZED HEALTH CARE EDUCATION/TRAINING PROGRAM

## 2019-10-03 PROCEDURE — P9016 RBC LEUKOCYTES REDUCED: HCPCS | Performed by: STUDENT IN AN ORGANIZED HEALTH CARE EDUCATION/TRAINING PROGRAM

## 2019-10-03 PROCEDURE — 37243 VASC EMBOLIZE/OCCLUDE ORGAN: CPT

## 2019-10-03 PROCEDURE — 80048 BASIC METABOLIC PNL TOTAL CA: CPT | Performed by: STUDENT IN AN ORGANIZED HEALTH CARE EDUCATION/TRAINING PROGRAM

## 2019-10-03 PROCEDURE — 27210732 ZZH ACCESSORY CR1

## 2019-10-03 PROCEDURE — 40000275 ZZH STATISTIC RCP TIME EA 10 MIN

## 2019-10-03 PROCEDURE — 37244 VASC EMBOLIZE/OCCLUDE BLEED: CPT

## 2019-10-03 PROCEDURE — 36592 COLLECT BLOOD FROM PICC: CPT | Performed by: STUDENT IN AN ORGANIZED HEALTH CARE EDUCATION/TRAINING PROGRAM

## 2019-10-03 PROCEDURE — 85610 PROTHROMBIN TIME: CPT | Performed by: UROLOGY

## 2019-10-03 PROCEDURE — 25000132 ZZH RX MED GY IP 250 OP 250 PS 637: Performed by: PHYSICIAN ASSISTANT

## 2019-10-03 PROCEDURE — 27210804 ZZH SHEATH CR3

## 2019-10-03 PROCEDURE — 25500064 ZZH RX 255 OP 636: Performed by: RADIOLOGY

## 2019-10-03 PROCEDURE — 20000004 ZZH R&B ICU UMMC

## 2019-10-03 PROCEDURE — 25000125 ZZHC RX 250

## 2019-10-03 PROCEDURE — 36415 COLL VENOUS BLD VENIPUNCTURE: CPT | Performed by: STUDENT IN AN ORGANIZED HEALTH CARE EDUCATION/TRAINING PROGRAM

## 2019-10-03 PROCEDURE — 27210889 ZZH ACCESSORY CR8

## 2019-10-03 PROCEDURE — 85610 PROTHROMBIN TIME: CPT | Performed by: STUDENT IN AN ORGANIZED HEALTH CARE EDUCATION/TRAINING PROGRAM

## 2019-10-03 PROCEDURE — 40000141 ZZH STATISTIC PERIPHERAL IV START W/O US GUIDANCE

## 2019-10-03 PROCEDURE — 36252 INS CATH REN ART 1ST BILAT: CPT

## 2019-10-03 PROCEDURE — 85384 FIBRINOGEN ACTIVITY: CPT | Performed by: UROLOGY

## 2019-10-03 PROCEDURE — 85027 COMPLETE CBC AUTOMATED: CPT | Performed by: UROLOGY

## 2019-10-03 PROCEDURE — 36253 INS CATH REN ART 2ND+ UNILAT: CPT

## 2019-10-03 PROCEDURE — 27810376 ZZH PLUG CR25

## 2019-10-03 PROCEDURE — 99153 MOD SED SAME PHYS/QHP EA: CPT

## 2019-10-03 PROCEDURE — 99291 CRITICAL CARE FIRST HOUR: CPT | Mod: 25 | Performed by: SURGERY

## 2019-10-03 RX ORDER — FLUMAZENIL 0.1 MG/ML
0.2 INJECTION, SOLUTION INTRAVENOUS
Status: DISCONTINUED | OUTPATIENT
Start: 2019-10-03 | End: 2019-10-04 | Stop reason: HOSPADM

## 2019-10-03 RX ORDER — IOPAMIDOL 755 MG/ML
104 INJECTION, SOLUTION INTRAVASCULAR ONCE
Status: COMPLETED | OUTPATIENT
Start: 2019-10-03 | End: 2019-10-03

## 2019-10-03 RX ORDER — IODIXANOL 320 MG/ML
150 INJECTION, SOLUTION INTRAVASCULAR ONCE
Status: COMPLETED | OUTPATIENT
Start: 2019-10-03 | End: 2019-10-03

## 2019-10-03 RX ORDER — AMOXICILLIN 250 MG
2 CAPSULE ORAL 2 TIMES DAILY PRN
Status: DISCONTINUED | OUTPATIENT
Start: 2019-10-03 | End: 2019-10-06 | Stop reason: HOSPADM

## 2019-10-03 RX ORDER — ONDANSETRON 2 MG/ML
4 INJECTION INTRAMUSCULAR; INTRAVENOUS EVERY 6 HOURS PRN
Status: DISCONTINUED | OUTPATIENT
Start: 2019-10-03 | End: 2019-10-03

## 2019-10-03 RX ORDER — AMOXICILLIN 250 MG
1 CAPSULE ORAL 2 TIMES DAILY PRN
Status: DISCONTINUED | OUTPATIENT
Start: 2019-10-03 | End: 2019-10-06 | Stop reason: HOSPADM

## 2019-10-03 RX ORDER — NALOXONE HYDROCHLORIDE 0.4 MG/ML
.1-.4 INJECTION, SOLUTION INTRAMUSCULAR; INTRAVENOUS; SUBCUTANEOUS
Status: DISCONTINUED | OUTPATIENT
Start: 2019-10-03 | End: 2019-10-04 | Stop reason: HOSPADM

## 2019-10-03 RX ORDER — SODIUM CHLORIDE, SODIUM LACTATE, POTASSIUM CHLORIDE, CALCIUM CHLORIDE 600; 310; 30; 20 MG/100ML; MG/100ML; MG/100ML; MG/100ML
INJECTION, SOLUTION INTRAVENOUS
Status: DISCONTINUED
Start: 2019-10-03 | End: 2019-10-03 | Stop reason: HOSPADM

## 2019-10-03 RX ORDER — MAGNESIUM HYDROXIDE 1200 MG/15ML
LIQUID ORAL
Status: COMPLETED
Start: 2019-10-03 | End: 2019-10-03

## 2019-10-03 RX ORDER — HYDRALAZINE HYDROCHLORIDE 20 MG/ML
INJECTION INTRAMUSCULAR; INTRAVENOUS
Status: DISCONTINUED
Start: 2019-10-03 | End: 2019-10-03 | Stop reason: HOSPADM

## 2019-10-03 RX ORDER — MAGNESIUM HYDROXIDE 1200 MG/15ML
LIQUID ORAL
Status: DISCONTINUED
Start: 2019-10-03 | End: 2019-10-03 | Stop reason: HOSPADM

## 2019-10-03 RX ORDER — OXYBUTYNIN CHLORIDE 5 MG/1
5 TABLET ORAL 3 TIMES DAILY
Status: DISCONTINUED | OUTPATIENT
Start: 2019-10-03 | End: 2019-10-06 | Stop reason: HOSPADM

## 2019-10-03 RX ORDER — NALOXONE HYDROCHLORIDE 0.4 MG/ML
.1-.4 INJECTION, SOLUTION INTRAMUSCULAR; INTRAVENOUS; SUBCUTANEOUS
Status: DISCONTINUED | OUTPATIENT
Start: 2019-10-03 | End: 2019-10-03

## 2019-10-03 RX ORDER — ONDANSETRON 4 MG/1
4 TABLET, ORALLY DISINTEGRATING ORAL EVERY 6 HOURS PRN
Status: DISCONTINUED | OUTPATIENT
Start: 2019-10-03 | End: 2019-10-03

## 2019-10-03 RX ORDER — FENTANYL CITRATE 50 UG/ML
25-50 INJECTION, SOLUTION INTRAMUSCULAR; INTRAVENOUS EVERY 5 MIN PRN
Status: DISCONTINUED | OUTPATIENT
Start: 2019-10-03 | End: 2019-10-04 | Stop reason: HOSPADM

## 2019-10-03 RX ORDER — LABETALOL 20 MG/4 ML (5 MG/ML) INTRAVENOUS SYRINGE
Status: DISCONTINUED
Start: 2019-10-03 | End: 2019-10-03 | Stop reason: HOSPADM

## 2019-10-03 RX ORDER — HYDRALAZINE HYDROCHLORIDE 20 MG/ML
10 INJECTION INTRAMUSCULAR; INTRAVENOUS EVERY 6 HOURS PRN
Status: DISCONTINUED | OUTPATIENT
Start: 2019-10-03 | End: 2019-10-06 | Stop reason: HOSPADM

## 2019-10-03 RX ADMIN — LEVETIRACETAM 1000 MG: 500 TABLET, FILM COATED ORAL at 09:04

## 2019-10-03 RX ADMIN — CEFTAZIDIME 2 G: 2 INJECTION, POWDER, FOR SOLUTION INTRAVENOUS at 05:35

## 2019-10-03 RX ADMIN — FENTANYL CITRATE 50 MCG: 50 INJECTION INTRAMUSCULAR; INTRAVENOUS at 23:18

## 2019-10-03 RX ADMIN — SODIUM CHLORIDE 500 ML: 900 IRRIGANT IRRIGATION at 09:00

## 2019-10-03 RX ADMIN — FERROUS SULFATE TAB 325 MG (65 MG ELEMENTAL FE) 325 MG: 325 (65 FE) TAB at 09:04

## 2019-10-03 RX ADMIN — DOCUSATE SODIUM 100 MG: 100 CAPSULE, LIQUID FILLED ORAL at 19:40

## 2019-10-03 RX ADMIN — LIDOCAINE HYDROCHLORIDE 5 ML: 10 INJECTION, SOLUTION EPIDURAL; INFILTRATION; INTRACAUDAL; PERINEURAL at 23:58

## 2019-10-03 RX ADMIN — MIDAZOLAM 1 MG: 1 INJECTION INTRAMUSCULAR; INTRAVENOUS at 23:36

## 2019-10-03 RX ADMIN — MIDAZOLAM 1 MG: 1 INJECTION INTRAMUSCULAR; INTRAVENOUS at 23:19

## 2019-10-03 RX ADMIN — OXYBUTYNIN CHLORIDE 5 MG: 5 TABLET ORAL at 11:54

## 2019-10-03 RX ADMIN — OXYBUTYNIN CHLORIDE 5 MG: 5 TABLET ORAL at 14:04

## 2019-10-03 RX ADMIN — OXYBUTYNIN CHLORIDE 5 MG: 5 TABLET ORAL at 19:40

## 2019-10-03 RX ADMIN — CEFTAZIDIME 2 G: 2 INJECTION, POWDER, FOR SOLUTION INTRAVENOUS at 14:04

## 2019-10-03 RX ADMIN — DOCUSATE SODIUM 100 MG: 100 CAPSULE, LIQUID FILLED ORAL at 09:04

## 2019-10-03 RX ADMIN — FENTANYL CITRATE 50 MCG: 50 INJECTION INTRAMUSCULAR; INTRAVENOUS at 23:36

## 2019-10-03 RX ADMIN — LEVETIRACETAM 1000 MG: 500 TABLET, FILM COATED ORAL at 19:40

## 2019-10-03 RX ADMIN — IODIXANOL 40 ML: 320 INJECTION, SOLUTION INTRAVASCULAR at 23:51

## 2019-10-03 RX ADMIN — VANCOMYCIN HYDROCHLORIDE 1000 MG: 1 INJECTION, SOLUTION INTRAVENOUS at 15:22

## 2019-10-03 RX ADMIN — IOPAMIDOL 84 ML: 755 INJECTION, SOLUTION INTRAVENOUS at 21:22

## 2019-10-03 ASSESSMENT — ACTIVITIES OF DAILY LIVING (ADL)
ADLS_ACUITY_SCORE: 41

## 2019-10-03 NOTE — PLAN OF CARE
/83   Pulse 83   Temp 97.7  F (36.5  C) (Oral)   Resp 16   Wt 76.9 kg (169 lb 8.5 oz)   SpO2 97%   BMI 23.98 kg/m    Urology met with patient this AM and changed the suprapubic catheter. Pt revived 1 unit of blood due to Hgb at 6.4 this AM. Pt is alert A/O x 4. Pt is regular diet and a total feed. Pt denies pain. Pt is pending transfer to unit 7B.

## 2019-10-03 NOTE — PROGRESS NOTES
I saw Maicol Carrera on rounds and discussed with my resident team.  He is s/p pcnl.  I performed a history and physical exam. I discussed my findings with my resident team.  I have reviewed their note and agree with the listed findings, assesment, and plan.  His hgb continued to slowly decrease to 6.4 today.  His urine has improved significantly.  We have transfused today and will follow his hgb.  Once this is stable, he can likely discharge.  He is now on inpatient status.

## 2019-10-03 NOTE — PROGRESS NOTES
Suprapubic catheter irrigated twice with 110 ml put in with no pull back. Catheter seems to be functioning with drainage. Drainage appearance is pink with minimal sediment and 1 small clot. Urology will be potentially and changing suprapubic catheter.

## 2019-10-03 NOTE — PROGRESS NOTES
Page out to Magdalene NP: re: Debi, 6D.  Pt noted with large clot in SP cath, leaking around tube.  OK to hand irrigate?  Thanks, Titus ZAMORA [phone]

## 2019-10-03 NOTE — PROVIDER NOTIFICATION
"Urology paged RE: \"6D [address]. Dressing around suprapubic catheter was saturated with urine this AM. Was changed but pt is saying he needs his catheter changed? Can you guys come see him soon? Thanks! Lisette ZAMORA, [phone]\"  "

## 2019-10-03 NOTE — PLAN OF CARE
Observation Goals before Discharge  Patient able to ambulate as they were prior to admission or with assist devices provided by therapies during their stay. - Pt quadriplegic. Turn in bed with assist of 2.      Patient Hgb recheck at 6.9, pt plan to spend the night. Alert and oriented. Pt does note that he feels tired today. PICC red lumen declotted with tPA, both red and purple flushed and saline locked. Patient repositioned and changed, some redness that is blanchable at the coccyx, also appears to be scar. Pt reports of previous pressure ulcer at this site. Will continue to encourage repositioning. Pt declines turning. On pulsate mattress. Updated family members, mom and sister, Fozia ([phone]). PRN pain medication given for headache and jaw pain. Tele-NSR. Pt requesting to put on BiPAP early ~2030 for bed. Will continue to monitor.       /68   Pulse 81   Temp 97.5  F (36.4  C) (Oral)   Resp 18   Wt 76.9 kg (169 lb 8.5 oz)   SpO2 100%   BMI 23.98 kg/m

## 2019-10-03 NOTE — PROGRESS NOTES
Urology  Progress Note  10/03/2019    - LANDON  - PICC clogged overnight but successfully cleared  - This morning he denies chest pain, SOB, N/V  - Pain is controlled  - Tolerating regular diet    Exam  /72   Pulse 81   Temp 97.6  F (36.4  C) (Oral)   Resp 16   Wt 76.9 kg (169 lb 8.5 oz)   SpO2 99%   BMI 23.98 kg/m    No acute distress  Unlabored breathing  Abdomen soft, nontender, nondistended. No bleeding noted around PNT site.  PNT clear yellow urine in tubing  SPT with light red urine in tubing    I/O's (last 24/since midnight):  SPT: 2260/400  Nephroureteral stent: 1340/50  Total UOP: 3600/450    Labs  WBC 7.6 (8.5)  Hgb 6.4 (6.9<8.2<6.8<7.2<7.7<7.4<11.5)  Cr 1.3 (1.39)    Imaging:  CXR - 10/2/19  Findings:  Single AP view of the chest. Limited examination due to  patient positioning. Left arm PICC tip projects over the low SVC.  Appearing cardiac silhouette is enlarged. Small left pleural effusion  and adjacent basilar opacities. No pneumothorax. Visualized upper  abdomen is unremarkable.                                                                   Impression:    Small left pleural effusion with adjacent basilar atelectasis and/or  consolidation.    Assessment/Plan  44 year old y/o male POD#3 s/p PCNL, shock wave lithotripsy, and placement of left nephroureteral stent for left sided stone.     Neuro: Tylenol, dilaudid, oxycodone for pain control. Home keppra.   CV: HDS  Pulm: Persistent O2 requirements, left pleural effusion on CXR; aggressive pulmonary toilet, incentive spirometry while awake, wean O2 as able  FEN/GI: Regular diet, MIVF @ 100/hr  Endo: VIRA  : Continue SPT and nephroureteral stent. Monitor UOP. Oxybutynin for bladder spasms.  Heme/ID: Hgb to 6.4, suspect ABLA - Will transfuse 1 U PRBCs this AM . Ceftazidime, vancomycin.   Activity: Movement with assist  PPx: SCDs  Dispo: Home likely today    Seen and examined with the chief resident. Will discuss with staff   Kory.    Andi Blackwell MD  Urology Resident     Contacting the Urology Team     Please use the following job codes to reach the Urology Team. Note that you must use an in house phone and that job codes cannot receive text pages.     On weekdays, dial [phone] (or star-star-star 777 on the new [employer] telephones) then [phone] to reach the Adult Urology resident or PA on call    On weekdays, dial [phone] (or star-star-star 777 on the new [place] telephones) then [phone] to reach the Pediatric Urology resident    On weeknights and weekends, dial [phone] (or star-star-star 777 on the new [employer] telephones) then [phone] to reach the Urology resident on call (for both Adult and Pediatrics)

## 2019-10-03 NOTE — PROGRESS NOTES
Following 30 minute period of tPa instilled, w/ assistance of Loli ZAMORA, was able to drawback blood. Jeffry back 10 ml of blood to ensure tPA removed and then flushed with 10 ml of NS. Started abx that was due for 1700. Will continue to monitor.

## 2019-10-03 NOTE — PROVIDER NOTIFICATION
Urology text paged Re: Patient has a critical lab value. Hemoglobin is 6.4.      Text page was not received by on call Urology provider.  Andi Blackwell MD  Urology, PGY-2  [phone]

## 2019-10-03 NOTE — PLAN OF CARE
~ Patient able to ambulate as they were prior to admission or with assist devices provided by therapies during their stay: No, pt is quadriplegic, assist of 2 with lift

## 2019-10-03 NOTE — PROGRESS NOTES
Urology Brief Note:  - Per RN, urine leaking around SPT today.  Clot appears in the tube.  RN tried to flush and could instill water but couldn't aspirate back.    - Patient was also leaking intermittently around his SPT yesterday  - Patient states he typically uses a 20Fr SPT with 30cc in the balloon.  Today he has an 18Fr catheter.   - Review of MAR shows he has been getting TID oxybutynin which is his home dose.     PROCEDURE:  - Watermelon urine in SPT drainage bag and some urine appears to be flowing through the tube  - After taking down the balloon (12-15cc), the 18Fr nonlatex SPT was easily removed.  There was some encrustation at the tip but no obstructing clots.   - Abd prepped and draped  - New 20Fr nonlatex SPT replaced in bladder and balloon instilled with 20cc.  The Belle site was then verified by irrigating the bladder with sterile NS  - Hooked up to drainage bag and secured to thigh.   - Urine was initially watermelon but quickly changed to yellow  - Patient tolerated without difficulty    [facility]

## 2019-10-04 ENCOUNTER — APPOINTMENT (OUTPATIENT)
Dept: CT IMAGING | Facility: CLINIC | Age: 44
End: 2019-10-04
Attending: PHYSICIAN ASSISTANT
Payer: COMMERCIAL

## 2019-10-04 LAB
ABO + RH BLD: NORMAL
ABO + RH BLD: NORMAL
ANGLE RATE OF CLOT STRENGTH: 76.7 DEGREES (ref 53–72)
ANION GAP SERPL CALCULATED.3IONS-SCNC: 10 MMOL/L (ref 3–14)
ANION GAP SERPL CALCULATED.3IONS-SCNC: 9 MMOL/L (ref 3–14)
BLD GP AB SCN SERPL QL: NORMAL
BLD PROD TYP BPU: NORMAL
BLD UNIT ID BPU: 0
BLOOD BANK CMNT PATIENT-IMP: NORMAL
BLOOD PRODUCT CODE: NORMAL
BPU ID: NORMAL
BUN SERPL-MCNC: 16 MG/DL (ref 7–30)
BUN SERPL-MCNC: 20 MG/DL (ref 7–30)
CALCIUM SERPL-MCNC: 7.4 MG/DL (ref 8.5–10.1)
CALCIUM SERPL-MCNC: 8.1 MG/DL (ref 8.5–10.1)
CHLORIDE SERPL-SCNC: 108 MMOL/L (ref 94–109)
CHLORIDE SERPL-SCNC: 116 MMOL/L (ref 94–109)
CI HYPERCOAGULATION INDEX: 3.2 RATIO (ref 0–3)
CO2 SERPL-SCNC: 21 MMOL/L (ref 20–32)
CO2 SERPL-SCNC: 22 MMOL/L (ref 20–32)
CREAT SERPL-MCNC: 1.29 MG/DL (ref 0.66–1.25)
CREAT SERPL-MCNC: 1.42 MG/DL (ref 0.66–1.25)
ERYTHROCYTE [DISTWIDTH] IN BLOOD BY AUTOMATED COUNT: 15.3 % (ref 10–15)
ERYTHROCYTE [DISTWIDTH] IN BLOOD BY AUTOMATED COUNT: 15.9 % (ref 10–15)
FIBRINOGEN PPP-MCNC: 508 MG/DL (ref 200–420)
FIBRINOGEN PPP-MCNC: 576 MG/DL (ref 200–420)
G ACTUAL CLOT STRENGTH: 12.9 KD/SC (ref 4.5–11)
GFR SERPL CREATININE-BSD FRML MDRD: 60 ML/MIN/{1.73_M2}
GFR SERPL CREATININE-BSD FRML MDRD: 68 ML/MIN/{1.73_M2}
GLUCOSE SERPL-MCNC: 153 MG/DL (ref 70–99)
GLUCOSE SERPL-MCNC: 96 MG/DL (ref 70–99)
HCT VFR BLD AUTO: 23.4 % (ref 40–53)
HCT VFR BLD AUTO: 30.4 % (ref 40–53)
HGB BLD-MCNC: 7.5 G/DL (ref 13.3–17.7)
HGB BLD-MCNC: 7.6 G/DL (ref 13.3–17.7)
HGB BLD-MCNC: 7.7 G/DL (ref 13.3–17.7)
HGB BLD-MCNC: 7.8 G/DL (ref 13.3–17.7)
HGB BLD-MCNC: 9.7 G/DL (ref 13.3–17.7)
INR PPP: 1.11 (ref 0.86–1.14)
INR PPP: 1.19 (ref 0.86–1.14)
K TIME TO SPEC CLOT STRENGTH: 1 MINUTE (ref 1–3)
LACTATE BLD-SCNC: 0.5 MMOL/L (ref 0.7–2)
LACTATE BLD-SCNC: 0.6 MMOL/L (ref 0.7–2)
LACTATE BLD-SCNC: 1 MMOL/L (ref 0.7–2)
LY30 LYSIS AT 30 MINUTES: 1.2 % (ref 0–8)
LY60 LYSIS AT 60 MINUTES: 4.3 % (ref 0–15)
MA MAXIMUM CLOT STRENGTH: 72.1 MM (ref 50–70)
MAGNESIUM SERPL-MCNC: 1.8 MG/DL (ref 1.6–2.3)
MAGNESIUM SERPL-MCNC: 1.9 MG/DL (ref 1.6–2.3)
MCH RBC QN AUTO: 26.9 PG (ref 26.5–33)
MCH RBC QN AUTO: 27.5 PG (ref 26.5–33)
MCHC RBC AUTO-ENTMCNC: 31.9 G/DL (ref 31.5–36.5)
MCHC RBC AUTO-ENTMCNC: 32.1 G/DL (ref 31.5–36.5)
MCV RBC AUTO: 84 FL (ref 78–100)
MCV RBC AUTO: 86 FL (ref 78–100)
MRSA DNA SPEC QL NAA+PROBE: NEGATIVE
NUM BPU REQUESTED: 3
PHOSPHATE SERPL-MCNC: 3.6 MG/DL (ref 2.5–4.5)
PHOSPHATE SERPL-MCNC: 3.7 MG/DL (ref 2.5–4.5)
PLATELET # BLD AUTO: 176 10E9/L (ref 150–450)
PLATELET # BLD AUTO: 219 10E9/L (ref 150–450)
PLATELET # BLD AUTO: 234 10E9/L (ref 150–450)
POTASSIUM SERPL-SCNC: 3.6 MMOL/L (ref 3.4–5.3)
POTASSIUM SERPL-SCNC: 3.6 MMOL/L (ref 3.4–5.3)
R TIME UNTIL CLOT FORMS: 5 MINUTE (ref 5–10)
RBC # BLD AUTO: 2.73 10E12/L (ref 4.4–5.9)
RBC # BLD AUTO: 3.61 10E12/L (ref 4.4–5.9)
SODIUM SERPL-SCNC: 141 MMOL/L (ref 133–144)
SODIUM SERPL-SCNC: 146 MMOL/L (ref 133–144)
SPECIMEN EXP DATE BLD: NORMAL
SPECIMEN SOURCE: NORMAL
TRANSFUSION STATUS PATIENT QL: NORMAL
WBC # BLD AUTO: 11.5 10E9/L (ref 4–11)
WBC # BLD AUTO: 12.7 10E9/L (ref 4–11)

## 2019-10-04 PROCEDURE — P9016 RBC LEUKOCYTES REDUCED: HCPCS | Performed by: UROLOGY

## 2019-10-04 PROCEDURE — 85018 HEMOGLOBIN: CPT | Performed by: NURSE PRACTITIONER

## 2019-10-04 PROCEDURE — 85049 AUTOMATED PLATELET COUNT: CPT | Performed by: PHYSICIAN ASSISTANT

## 2019-10-04 PROCEDURE — 25000128 H RX IP 250 OP 636: Performed by: STUDENT IN AN ORGANIZED HEALTH CARE EDUCATION/TRAINING PROGRAM

## 2019-10-04 PROCEDURE — 84100 ASSAY OF PHOSPHORUS: CPT | Performed by: STUDENT IN AN ORGANIZED HEALTH CARE EDUCATION/TRAINING PROGRAM

## 2019-10-04 PROCEDURE — C1769 GUIDE WIRE: HCPCS

## 2019-10-04 PROCEDURE — 27210732 ZZH ACCESSORY CR1

## 2019-10-04 PROCEDURE — 86901 BLOOD TYPING SEROLOGIC RH(D): CPT | Performed by: UROLOGY

## 2019-10-04 PROCEDURE — 83605 ASSAY OF LACTIC ACID: CPT | Performed by: NURSE PRACTITIONER

## 2019-10-04 PROCEDURE — 93005 ELECTROCARDIOGRAM TRACING: CPT

## 2019-10-04 PROCEDURE — 80048 BASIC METABOLIC PNL TOTAL CA: CPT | Performed by: STUDENT IN AN ORGANIZED HEALTH CARE EDUCATION/TRAINING PROGRAM

## 2019-10-04 PROCEDURE — 40000894 ZZH STATISTIC OT IP EVAL DEFER: Performed by: OCCUPATIONAL THERAPIST

## 2019-10-04 PROCEDURE — 85384 FIBRINOGEN ACTIVITY: CPT | Performed by: PHYSICIAN ASSISTANT

## 2019-10-04 PROCEDURE — 25000128 H RX IP 250 OP 636: Performed by: UROLOGY

## 2019-10-04 PROCEDURE — 87640 STAPH A DNA AMP PROBE: CPT | Performed by: STUDENT IN AN ORGANIZED HEALTH CARE EDUCATION/TRAINING PROGRAM

## 2019-10-04 PROCEDURE — 85396 CLOTTING ASSAY WHOLE BLOOD: CPT | Performed by: PHYSICIAN ASSISTANT

## 2019-10-04 PROCEDURE — 27210780 ZZH KIT CR3

## 2019-10-04 PROCEDURE — 25000132 ZZH RX MED GY IP 250 OP 250 PS 637: Performed by: STUDENT IN AN ORGANIZED HEALTH CARE EDUCATION/TRAINING PROGRAM

## 2019-10-04 PROCEDURE — 83605 ASSAY OF LACTIC ACID: CPT | Performed by: STUDENT IN AN ORGANIZED HEALTH CARE EDUCATION/TRAINING PROGRAM

## 2019-10-04 PROCEDURE — 87641 MR-STAPH DNA AMP PROBE: CPT | Performed by: STUDENT IN AN ORGANIZED HEALTH CARE EDUCATION/TRAINING PROGRAM

## 2019-10-04 PROCEDURE — 93010 ELECTROCARDIOGRAM REPORT: CPT | Performed by: INTERNAL MEDICINE

## 2019-10-04 PROCEDURE — 20000004 ZZH R&B ICU UMMC

## 2019-10-04 PROCEDURE — 85027 COMPLETE CBC AUTOMATED: CPT | Performed by: STUDENT IN AN ORGANIZED HEALTH CARE EDUCATION/TRAINING PROGRAM

## 2019-10-04 PROCEDURE — 25000132 ZZH RX MED GY IP 250 OP 250 PS 637: Performed by: PHYSICIAN ASSISTANT

## 2019-10-04 PROCEDURE — 85610 PROTHROMBIN TIME: CPT | Performed by: PHYSICIAN ASSISTANT

## 2019-10-04 PROCEDURE — C1760 CLOSURE DEV, VASC: HCPCS

## 2019-10-04 PROCEDURE — 99291 CRITICAL CARE FIRST HOUR: CPT | Performed by: PHYSICIAN ASSISTANT

## 2019-10-04 PROCEDURE — 25800030 ZZH RX IP 258 OP 636: Performed by: NURSE PRACTITIONER

## 2019-10-04 PROCEDURE — 85018 HEMOGLOBIN: CPT | Performed by: PHYSICIAN ASSISTANT

## 2019-10-04 PROCEDURE — 25000128 H RX IP 250 OP 636: Performed by: PHYSICIAN ASSISTANT

## 2019-10-04 PROCEDURE — 25800030 ZZH RX IP 258 OP 636: Performed by: STUDENT IN AN ORGANIZED HEALTH CARE EDUCATION/TRAINING PROGRAM

## 2019-10-04 PROCEDURE — C1887 CATHETER, GUIDING: HCPCS

## 2019-10-04 PROCEDURE — 27210804 ZZH SHEATH CR3

## 2019-10-04 PROCEDURE — 85396 CLOTTING ASSAY WHOLE BLOOD: CPT | Performed by: NURSE PRACTITIONER

## 2019-10-04 PROCEDURE — 40000893 ZZH STATISTIC PT IP EVAL DEFER

## 2019-10-04 PROCEDURE — 83605 ASSAY OF LACTIC ACID: CPT | Performed by: PHYSICIAN ASSISTANT

## 2019-10-04 PROCEDURE — 94660 CPAP INITIATION&MGMT: CPT

## 2019-10-04 PROCEDURE — 27210908 ZZH NEEDLE CR4

## 2019-10-04 PROCEDURE — 86850 RBC ANTIBODY SCREEN: CPT | Performed by: UROLOGY

## 2019-10-04 PROCEDURE — 86900 BLOOD TYPING SEROLOGIC ABO: CPT | Performed by: UROLOGY

## 2019-10-04 PROCEDURE — 83735 ASSAY OF MAGNESIUM: CPT | Performed by: STUDENT IN AN ORGANIZED HEALTH CARE EDUCATION/TRAINING PROGRAM

## 2019-10-04 PROCEDURE — 27210889 ZZH ACCESSORY CR8

## 2019-10-04 PROCEDURE — 25000132 ZZH RX MED GY IP 250 OP 250 PS 637: Performed by: NURSE PRACTITIONER

## 2019-10-04 PROCEDURE — 85027 COMPLETE CBC AUTOMATED: CPT | Performed by: PHYSICIAN ASSISTANT

## 2019-10-04 PROCEDURE — 40000275 ZZH STATISTIC RCP TIME EA 10 MIN

## 2019-10-04 PROCEDURE — 74174 CTA ABD&PLVS W/CONTRAST: CPT

## 2019-10-04 PROCEDURE — 04LA3DZ OCCLUSION OF LEFT RENAL ARTERY WITH INTRALUMINAL DEVICE, PERCUTANEOUS APPROACH: ICD-10-PCS | Performed by: RADIOLOGY

## 2019-10-04 PROCEDURE — 25000128 H RX IP 250 OP 636: Performed by: NURSE PRACTITIONER

## 2019-10-04 PROCEDURE — 86923 COMPATIBILITY TEST ELECTRIC: CPT | Performed by: UROLOGY

## 2019-10-04 RX ORDER — VANCOMYCIN HYDROCHLORIDE 1 G/200ML
1000 INJECTION, SOLUTION INTRAVENOUS EVERY 24 HOURS
Status: DISCONTINUED | OUTPATIENT
Start: 2019-10-04 | End: 2019-10-06 | Stop reason: HOSPADM

## 2019-10-04 RX ORDER — MAGNESIUM SULFATE HEPTAHYDRATE 40 MG/ML
4 INJECTION, SOLUTION INTRAVENOUS EVERY 4 HOURS PRN
Status: DISCONTINUED | OUTPATIENT
Start: 2019-10-04 | End: 2019-10-06 | Stop reason: HOSPADM

## 2019-10-04 RX ORDER — BISACODYL 10 MG
10 SUPPOSITORY, RECTAL RECTAL DAILY
Status: DISCONTINUED | OUTPATIENT
Start: 2019-10-04 | End: 2019-10-06 | Stop reason: HOSPADM

## 2019-10-04 RX ORDER — SENNOSIDES 8.6 MG
8.6 TABLET ORAL 2 TIMES DAILY PRN
Status: DISCONTINUED | OUTPATIENT
Start: 2019-10-04 | End: 2019-10-04

## 2019-10-04 RX ORDER — POTASSIUM CHLORIDE 1.5 G/1.58G
20-40 POWDER, FOR SOLUTION ORAL
Status: DISCONTINUED | OUTPATIENT
Start: 2019-10-04 | End: 2019-10-06 | Stop reason: HOSPADM

## 2019-10-04 RX ORDER — POTASSIUM CHLORIDE 7.45 MG/ML
10 INJECTION INTRAVENOUS
Status: DISCONTINUED | OUTPATIENT
Start: 2019-10-04 | End: 2019-10-06 | Stop reason: HOSPADM

## 2019-10-04 RX ORDER — DEXTROSE MONOHYDRATE 25 G/50ML
25-50 INJECTION, SOLUTION INTRAVENOUS
Status: DISCONTINUED | OUTPATIENT
Start: 2019-10-04 | End: 2019-10-06 | Stop reason: HOSPADM

## 2019-10-04 RX ORDER — POTASSIUM CL/LIDO/0.9 % NACL 10MEQ/0.1L
10 INTRAVENOUS SOLUTION, PIGGYBACK (ML) INTRAVENOUS
Status: DISCONTINUED | OUTPATIENT
Start: 2019-10-04 | End: 2019-10-06 | Stop reason: HOSPADM

## 2019-10-04 RX ORDER — NICOTINE POLACRILEX 4 MG
15-30 LOZENGE BUCCAL
Status: DISCONTINUED | OUTPATIENT
Start: 2019-10-04 | End: 2019-10-06 | Stop reason: HOSPADM

## 2019-10-04 RX ORDER — NALOXONE HYDROCHLORIDE 0.4 MG/ML
.1-.4 INJECTION, SOLUTION INTRAMUSCULAR; INTRAVENOUS; SUBCUTANEOUS
Status: DISCONTINUED | OUTPATIENT
Start: 2019-10-04 | End: 2019-10-06 | Stop reason: HOSPADM

## 2019-10-04 RX ORDER — SODIUM CHLORIDE 9 MG/ML
INJECTION, SOLUTION INTRAVENOUS CONTINUOUS
Status: DISCONTINUED | OUTPATIENT
Start: 2019-10-04 | End: 2019-10-04

## 2019-10-04 RX ORDER — IOPAMIDOL 755 MG/ML
100 INJECTION, SOLUTION INTRAVASCULAR ONCE
Status: COMPLETED | OUTPATIENT
Start: 2019-10-04 | End: 2019-10-04

## 2019-10-04 RX ORDER — DOCUSATE SODIUM 100 MG/1
100 CAPSULE, LIQUID FILLED ORAL 2 TIMES DAILY
Status: DISCONTINUED | OUTPATIENT
Start: 2019-10-04 | End: 2019-10-04

## 2019-10-04 RX ORDER — SODIUM CHLORIDE, SODIUM LACTATE, POTASSIUM CHLORIDE, CALCIUM CHLORIDE 600; 310; 30; 20 MG/100ML; MG/100ML; MG/100ML; MG/100ML
INJECTION, SOLUTION INTRAVENOUS CONTINUOUS
Status: DISCONTINUED | OUTPATIENT
Start: 2019-10-04 | End: 2019-10-06 | Stop reason: HOSPADM

## 2019-10-04 RX ORDER — POTASSIUM CHLORIDE 29.8 MG/ML
20 INJECTION INTRAVENOUS
Status: DISCONTINUED | OUTPATIENT
Start: 2019-10-04 | End: 2019-10-06 | Stop reason: HOSPADM

## 2019-10-04 RX ORDER — POTASSIUM CHLORIDE 1500 MG/1
20-40 TABLET, EXTENDED RELEASE ORAL
Status: DISCONTINUED | OUTPATIENT
Start: 2019-10-04 | End: 2019-10-06 | Stop reason: HOSPADM

## 2019-10-04 RX ORDER — POLYETHYLENE GLYCOL 3350 17 G/17G
17 POWDER, FOR SOLUTION ORAL DAILY
Status: DISCONTINUED | OUTPATIENT
Start: 2019-10-04 | End: 2019-10-06 | Stop reason: HOSPADM

## 2019-10-04 RX ADMIN — SODIUM CHLORIDE, POTASSIUM CHLORIDE, SODIUM LACTATE AND CALCIUM CHLORIDE: 600; 310; 30; 20 INJECTION, SOLUTION INTRAVENOUS at 09:29

## 2019-10-04 RX ADMIN — OXYBUTYNIN CHLORIDE 5 MG: 5 TABLET ORAL at 08:05

## 2019-10-04 RX ADMIN — SODIUM CHLORIDE, POTASSIUM CHLORIDE, SODIUM LACTATE AND CALCIUM CHLORIDE 500 ML: 600; 310; 30; 20 INJECTION, SOLUTION INTRAVENOUS at 17:38

## 2019-10-04 RX ADMIN — CEFTAZIDIME 2 G: 2 INJECTION, POWDER, FOR SOLUTION INTRAVENOUS at 20:53

## 2019-10-04 RX ADMIN — FERROUS SULFATE TAB 325 MG (65 MG ELEMENTAL FE) 325 MG: 325 (65 FE) TAB at 08:05

## 2019-10-04 RX ADMIN — BISACODYL 10 MG: 10 SUPPOSITORY RECTAL at 09:29

## 2019-10-04 RX ADMIN — CEFTAZIDIME 2 G: 2 INJECTION, POWDER, FOR SOLUTION INTRAVENOUS at 13:19

## 2019-10-04 RX ADMIN — OXYBUTYNIN CHLORIDE 5 MG: 5 TABLET ORAL at 15:13

## 2019-10-04 RX ADMIN — SODIUM CHLORIDE: 9 INJECTION, SOLUTION INTRAVENOUS at 08:49

## 2019-10-04 RX ADMIN — IOPAMIDOL 100 ML: 755 INJECTION, SOLUTION INTRAVENOUS at 17:11

## 2019-10-04 RX ADMIN — LEVETIRACETAM 1000 MG: 500 TABLET, FILM COATED ORAL at 08:05

## 2019-10-04 RX ADMIN — LEVETIRACETAM 1000 MG: 500 TABLET, FILM COATED ORAL at 20:51

## 2019-10-04 RX ADMIN — POLYETHYLENE GLYCOL 3350 17 G: 17 POWDER, FOR SOLUTION ORAL at 09:29

## 2019-10-04 RX ADMIN — DOCUSATE SODIUM 100 MG: 100 CAPSULE, LIQUID FILLED ORAL at 08:05

## 2019-10-04 RX ADMIN — SODIUM CHLORIDE: 9 INJECTION, SOLUTION INTRAVENOUS at 01:36

## 2019-10-04 RX ADMIN — OXYBUTYNIN CHLORIDE 5 MG: 5 TABLET ORAL at 20:51

## 2019-10-04 RX ADMIN — DOCUSATE SODIUM 100 MG: 100 CAPSULE, LIQUID FILLED ORAL at 20:51

## 2019-10-04 RX ADMIN — VANCOMYCIN HYDROCHLORIDE 1000 MG: 1 INJECTION, SOLUTION INTRAVENOUS at 15:12

## 2019-10-04 ASSESSMENT — ACTIVITIES OF DAILY LIVING (ADL)
COGNITION: 1 - ATTENTION OR MEMORY DEFICITS
RETIRED_COMMUNICATION: 2-->DIFFICULTY SPEAKING (NOT RELATED TO LANGUAGE BARRIER)
TOILETING: 2-->ASSISTIVE PERSON
ADLS_ACUITY_SCORE: 32
AMBULATION: 3-->ASSISTIVE EQUIPMENT AND PERSON
FALL_HISTORY_WITHIN_LAST_SIX_MONTHS: NO
ADLS_ACUITY_SCORE: 32
SWALLOWING: 0-->SWALLOWS FOODS/LIQUIDS WITHOUT DIFFICULTY
BATHING: 2-->ASSISTIVE PERSON
RETIRED_EATING: 2-->ASSISTIVE PERSON
WHICH_OF_THE_ABOVE_FUNCTIONAL_RISKS_HAD_A_RECENT_ONSET_OR_CHANGE?: TOILETING
DRESS: 2-->ASSISTIVE PERSON
ADLS_ACUITY_SCORE: 41
ADLS_ACUITY_SCORE: 41
TRANSFERRING: 3-->ASSISTIVE EQUIPMENT AND PERSON
ADLS_ACUITY_SCORE: 42

## 2019-10-04 NOTE — PROGRESS NOTES
Patient Name: Maicol Carrera  Medical Record Number: [id number]  Today's Date: 10/3/2019    Procedure: Left renal angiogram, embolization  Proceduralist: Dr French Castro    Sedation start time: 2325  Sedation end time: 2350  Sedation medications administered: versed 2 mg, fentanyl 100 mcg   Total sedation time: 25 minutes    Procedure start time: 2309  Puncture time: 2311  Procedure end time: 2350    Report given to: SERA Brice       Other Notes: Pt arrived to IR room 4 from . Consent reviewed, telephone consent obtained from patient's father. Pt denies any questions or concerns regarding procedure. Pt positioned supine and monitored per protocol.   Bleed embolized with microvascular plug.  Sheath removed from right groin access, mynx closure device placed, groin is soft dry, no hematoma, patient to be on bed rest 3 hours, until 0250.  Pt tolerated procedure without any noted complications. Pt transferred back to .

## 2019-10-04 NOTE — PROCEDURES
[facility], [place]    Procedure: IR Procedure Note  Date/Time: 10/4/2019 12:00 AM  Performed by: Oliverio Martell MD  Authorized by: Oliverio Martell MD     UNIVERSAL PROTOCOL   Site Marked: Yes  Prior Images Obtained and Reviewed:  Yes  Required items: Required blood products, implants, devices and special equipment available    Patient identity confirmed:  Verbally with patient  Patient was reevaluated immediately before administering moderate or deep sedation or anesthesia  Confirmation Checklist:  Patient's identity using two indicators, relevant allergies, procedure was appropriate and matched the consent or emergent situation and correct equipment/implants were available  Time out: Immediately prior to the procedure a time out was called    Preparation: Patient was prepped and draped in usual sterile fashion       ANESTHESIA    Local Anesthetic: Lidocaine 1% without epinephrine      SEDATION    Patient Sedated: Yes    Sedation Type:  Moderate (conscious) sedation  Sedation:  Diazepam and fentanyl  Vital signs: Vital signs monitored during sedation    See dictated procedure note for full details.  Findings: Extravasation from a branch artery in the left upper pole    Specimens: none    Complications: None    Condition: Stable    Plan: Per orders    PROCEDURE   Patient Tolerance:  Patient tolerated the procedure well with no immediate complications  Describe Procedure: Embolization of feeding vessel with MVP  Time of Sedation in Minutes by Physician:  45

## 2019-10-04 NOTE — PLAN OF CARE
Discharge Planner OT   Patient plan for discharge: return to \[facility]\"" care facility   Current status: OT 4A:  Deferring OT consult.  Acknowledge order placed for OT eval and treat.  Upon chart review and discussion with patient, no acute needs identified requiring skilled OT intervention.       Current level of assist for mobility: Dependent  Current level of assist for ADLs:  Dependent    Barriers to return to prior living situation: medical needs  Recommendations for discharge: return to facility   Rationale for recommendations: Pt currently at baseline, D for ADLs and IADLS wc bound.       Entered by: Theresa Tse 10/04/2019 1:58 PM

## 2019-10-04 NOTE — PROGRESS NOTES
Urology Progress Note    10/03/19   9:27 PM     Interval history:  At 7:50 PM urology was paged that upon turning patient in his bed, patient began bleeding profusely into both his SPT and nephroureteral stent. Rapid response was called by nurse and SICU team responded. Upon initial assessment by SICU team, concern for clinically significant hemorrhage and patient was therefore started on massive transfusion protocol. He received a total of 3 units pRBC and 2 units FFP as well as an LR bolus. Per reports, prior to starting MTP he had approximately 1.8 brisk blood out through his nephroureteral stent bag prior to clamping and over 1 liter from his SPT prior to becoming coagulated spontaneously. He was transferred to the SICU and upon arrival patient was noted to be tachycardic in the 130s to 140s with blood pressures as low as 80 systolic.  Interventional radiology was contacted and recommended CT angio to assess for active extravasation.  Following initiation of resuscitation the above blood products patient was transported down to the CT scanner for angiography:    CTA A/P 10/3/2019:  Impression:   1.  Left hemorrhagic renal cysts and hemorrhage within the proximal  left renal collecting system. Active bleeding in the superior pole of  left kidney.  2.   Post surgical appearance of left laser lithotripsy, with  decreased stone burden since comparison. Left precuneus nephrostomy  and left ureteral stent, and suprapubic catheter are in appropriate  position.  3.  Patent abdominal vasculature.  4.  Unchanged appearance of staghorn calculi in the right kidney, mild  right hydronephrosis and hydroureter.  5.  Cholelithiasis without cholecystitis.    Given evidence of active hemorrhage on CT angios, interventional radiology was recontacted and agreed to proceed with embolization of the left kidney.  Prior to departure to the IR suite, patient was irrigated at bedside due to significant intravesical clot burden and  concerns for autonomic dysreflexia with blood pressures ranging from the 80s to low-200's systolic.  Initial attempts at irrigation using a 22 F 6 eye catheter were unsuccessful in a 24 F Melquiades catheter was used to irrigate clot from his bladder via his suprapubic cystostomy.  Ultrasound was used throughout, and there was minimal residual clot burden following irrigation.  Melquiades catheter was left in place with the balloon inflated in the event that he should require further irrigation following IR embolization.    Of note, post-transfusion Hgb was 9.4.    Plan:  - IR embolization now  - Return to SICU for further SICU cares    This was all discussed with Dr. Horn, who came to evaluate patient himself.

## 2019-10-04 NOTE — CONSULTS
INTERVENTIONAL RADIOLOGY CONSULTATION    Name: Maicol Carrera  Age: 44 year old   Referring Physician: Dr. Cormier  REASON FOR REFERRAL: Hemorrhage of      HPI: We are consulted regarding significant amount of blood draining from the left PNT. Pt had left PNT placed by our service 7/15/2019. Nephrolithotomy and lithotripsy with urology 9/30/2019. Had some bleeding from L PNT earlier today, hemoglobin was low at 6.4 and pt received one unit of blood, remained HDS. Now, has had 2-3 liters of bloody output from left PNT. Massive transfusion protocol initiated. IR consulted for potential embolization of a bleeding vessel.    PAST MEDICAL HISTORY:   Past Medical History:   Diagnosis Date     Chronic infection     + MRSA     Constipation, chronic      GERD (gastroesophageal reflux disease)      Head injury      Neurogenic bladder     SP catheter     JUAN (obstructive sleep apnea)     bipap     Quadriplegia (H)      Seizure (H)      Spastic quadriplegia (H)      Urinary tract infection        PAST SURGICAL HISTORY:   Past Surgical History:   Procedure Laterality Date     APPENDECTOMY OPEN       BACK SURGERY       GENITOURINARY SURGERY       INSERT PUMP BACLOFEN       IR NEPHROSTOMY TUBE CHANGE LEFT  9/19/2019     IR NEPHROSTOMY TUBE PLACEMENT LEFT  7/15/2019     IR PICC PLACEMENT > 5 YRS OF AGE  7/19/2019     IR PICC PLACEMENT > 5 YRS OF AGE  9/27/2019     LASER HOLMIUM NEPHROLITHOTOMY VIA PERCUTANEOUS NEPHROSTOMY  10/19/2011    Procedure:LASER HOLMIUM NEPHROLITHOTOMY VIA PERCUTANEOUS NEPHROSTOMY; Left Ureteral Stent Placement, Left Percutaneous Access, Left Percutaneous Nephrostomy  *Latex Allergy*  ; Surgeon:YAN ADDISON; Location:[facility]     LASER HOLMIUM NEPHROLITHOTOMY VIA PERCUTANEOUS NEPHROSTOMY Left 3/7/2018    Procedure: LASER HOLMIUM NEPHROLITHOTOMY VIA PERCUTANEOUS NEPHROSTOMY;  Left Percutaneous Nephrolithotomy, Access To Kidney, Ureteroscopy, Cystoscopy, Stent Placement With Holmium Laser standby;   Surgeon: Dejon Horn MD;  Location: [facility] OR     LASER HOLMIUM NEPHROLITHOTOMY VIA PERCUTANEOUS NEPHROSTOMY Left 3/21/2018    Procedure: LASER HOLMIUM NEPHROLITHOTOMY VIA PERCUTANEOUS NEPHROSTOMY;  Left Holmium Laser Percutaneous Nephrolithotomy, Access To Kidney, Left Ureteroscopy, Stent Placement  general with block **Latex Allergy**;  Surgeon: Dejon Horn MD;  Location: [facility]     LASER HOLMIUM NEPHROLITHOTOMY VIA PERCUTANEOUS NEPHROSTOMY Left 6/6/2018    Procedure: LASER HOLMIUM NEPHROLITHOTOMY VIA PERCUTANEOUS NEPHROSTOMY;  Left Percutaneous Nephrolithotomy,  Ureteroscopy, retrogrades, extraction of stones with basket, laser standby ;  Surgeon: Dejon Horn MD;  Location: [facility]     LASER HOLMIUM NEPHROLITHOTOMY VIA PERCUTANEOUS NEPHROSTOMY Left 7/29/2019    Procedure: Left Percutaneous Nephrolithotomy, Access To Kidney, Ureteroscopy, Cystoscopy, Stent Placement with Holmium Laser on standby;  Surgeon: Dejon Horn MD;  Location: [facility] OR     LASER HOLMIUM NEPHROLITHOTOMY VIA PERCUTANEOUS NEPHROSTOMY Left 9/30/2019    Procedure: Left Percutaneous Nephrolithotomy, Access To Kidney, Ureteroscopy, Cystoscopy, Stent Placement With Holmium Laser **Latex Allergy**;  Surgeon: Dejon Horn MD;  Location: [facility] OR     ORTHOPEDIC SURGERY       supra pubic catheter         Physical Examination:   VITALS:   /77 (BP Location: Right arm)   Pulse 83   Temp 99.3  F (37.4  C) (Axillary)   Resp 16   Wt 76.9 kg (169 lb 8.5 oz)   SpO2 92%   BMI 23.98 kg/m      Labs:    BMP RESULTS:  Lab Results   Component Value Date     (H) 10/03/2019    POTASSIUM 4.2 10/03/2019    CHLORIDE 115 (H) 10/03/2019    CO2 24 10/03/2019    ANIONGAP 7 10/03/2019    GLC 87 10/03/2019    BUN 16 10/03/2019    CR 1.30 (H) 10/03/2019    GFRESTIMATED 67 10/03/2019    GFRESTBLACK 78 10/03/2019    BALAJI 8.2 (L) 10/03/2019        CBC RESULTS:  Lab Results   Component Value Date    WBC 7.6 10/03/2019    RBC 2.72  (L) 10/03/2019    HGB 6.4 (LL) 10/03/2019    HCT 23.4 (L) 10/03/2019    MCV 86 10/03/2019    MCH 23.5 (L) 10/03/2019    MCHC 27.4 (L) 10/03/2019    RDW 16.3 (H) 10/03/2019     10/03/2019       INR/PTT:  Lab Results   Component Value Date    INR 1.03 11/13/2017    PTT 33 10/23/2011       Diagnostic studies:   I have personally reviewed the most recent imaging studies including    Assessment/Plan:  Recommend CTA to localize source of bleed and for planning of potential intervention.  Recommend infusion of cold saline into PNT.  We will follow the results of the CTA and proceed accordingly.    CC  Patient Care Team:  Nancy Ag MD as PCP - General (Family Practice)  Dejon Horn MD as MD (Urology)  Helen Mcclelland, RN as Registered Nurse (Urology)     Oliverio Martell MD

## 2019-10-04 NOTE — PHARMACY-VANCOMYCIN DOSING SERVICE
Pharmacy Vancomycin Initial Note  Date of Service 2019  Patient's  1975  44 year old, male    Indication: Infected renal calculi    Current estimated CrCl = Estimated Creatinine Clearance: 74.3 mL/min (A) (based on SCr of 1.29 mg/dL (H)).    Creatinine for last 3 days  10/1/2019:  3:44 PM Creatinine 1.35 mg/dL  10/2/2019:  5:56 AM Creatinine 1.39 mg/dL  10/3/2019:  4:11 AM Creatinine 1.30 mg/dL;  8:19 PM Creatinine 1.25 mg/dL  10/4/2019:  3:37 AM Creatinine 1.29 mg/dL    Recent Vancomycin Level(s) for last 3 days  10/1/2019:  3:44 PM Vancomycin Level 26.7 mg/L      Vancomycin IV Administrations (past 72 hours)                   vancomycin (VANCOCIN) 1000 mg in dextrose 5% 200 mL PREMIX (mg) 1,000 mg New Bag 10/03/19 1522                Nephrotoxins and other renal medications (From now, onward)    Start     Dose/Rate Route Frequency Ordered Stop    10/04/19 1500  vancomycin (VANCOCIN) 1000 mg in dextrose 5% 200 mL PREMIX      1,000 mg  200 mL/hr over 1 Hours Intravenous EVERY 24 HOURS 10/04/19 1148            Contrast Orders - past 72 hours (72h ago, onward)    Start     Dose/Rate Route Frequency Ordered Stop    10/03/19 2300  iodixanol (VISIPAQUE 320) injection 150 mL      150 mL INTRA-ARTERIAL ONCE 10/03/19 2251 10/03/19 2351    10/03/19 2115  iopamidol (ISOVUE-370) solution 104 mL      104 mL Intravenous ONCE 10/03/19 2109 10/03/19 2122           Plan:  1. Start vancomycin 1000 mg IV q24h, based on previous levels.   2. Goal Trough Level: 15-20 mg/L   3. Pharmacy will check trough levels as appropriate in 3-5 Days.    4. Serum creatinine levels will be ordered daily for the first week of therapy and at least twice weekly for subsequent weeks.    5. [place] method utilized to dose vancomycin therapy: Method 2    Rigo Mendieta [facility]
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
## 2019-10-04 NOTE — PROGRESS NOTES
ADDENDUM:  - Per previous ID notes, ceftazidime and vancomycin were to continue for 3 days postop making yesterday the last day of antibiotics.  But given new concerns of bleeding, recommend continuation of both antibiotics.     [facility]    Urology  Progress Note  10/04/2019    - Left renal hemorrhage overnight - please see urology note from overnight  - MTP w/ 3u pRBC, 2u FFP  - Patient underwent successful left upper pole embolization of feeding vessel  - Irrigated bladder overnight, bladder scan early this AM 10 mL  - Concern for autonomic dysreflexia overnight with unstable BPs ranging from 80s-low 200s systolic    Exam  BP 98/65   Pulse 118   Temp 99.4  F (37.4  C) (Axillary)   Resp 18   Wt 76.9 kg (169 lb 8.5 oz)   SpO2 100%   BMI 23.98 kg/m    No acute distress  Unlabored breathing  Abdomen soft, nontender, nondistended.   Nephroureteral stent clamped (clotted off previously).  SPT with light red urine in tubing    I/O's (last 24/since midnight):  SPT: 1,525/525  Nephroureteral stent: 2,450/NR  Total UOP: 3,975/525    Labs  WBC 12.7 (16.5)   Hgb 7.5 (9.4<7.2<6.4<6.9<8.2<6.8<7.2<7.7<7.4<11.5)  Cr 1.3 (1.39)    Imaging:  CTA 10/3/2019:  Impression:   1.  Left hemorrhagic renal cysts and hemorrhage within the proximal  left renal collecting system. Active bleeding in the superior pole of  left kidney.  2.   Post surgical appearance of left laser lithotripsy, with  decreased stone burden since comparison. Left precuneus nephrostomy  and left ureteral stent, and suprapubic catheter are in appropriate  position.  3.  Patent abdominal vasculature.  4.  Unchanged appearance of staghorn calculi in the right kidney, mild  right hydronephrosis and hydroureter.  5.  Cholelithiasis without cholecystitis.    Assessment/Plan  44 year old y/o male POD#4 s/p PCNL, shock wave lithotripsy, and placement of left nephroureteral stent for left sided stone. Post-op course notable for left renal bleed  requiring massive transfusion and IR embolization on POD #3, now in the unit and stable.     Overall cares per SICU    Neuro: Tylenol, dilaudid, oxycodone for pain control. Home keppra.   CV: Fluid resuscitation per SICU.   Pulm: Wean O2 as able  FEN/GI: NPO but ok for diet if no further procedures planned (Urology has not additional interventions planned), mIVF  Endo: VIRA  : Continue SPT and nephroureteral stent. Monitor UOP. Oxybutynin for bladder spasms. PRN SPT catheter irrigation for clotting.  Heme/ID: Hgb stable, further transfusion per SICU  Activity: Continue work with PT/OT as able  PPx: SCDs  Dispo: SICU, transfer to the floor once deemed appropriate by SICU    Seen and examined with the chief resident. Will discuss with staff Dr. Horn.    Andi Blackwell MD  Urology Resident     Contacting the Urology Team     Please use the following job codes to reach the Urology Team. Note that you must use an in house phone and that job codes cannot receive text pages.     On weekdays, dial [phone] (or star-star-star 777 on the new [employer] telephones) then [phone] to reach the Adult Urology resident or PA on call    On weekdays, dial 893 (or star-star-star 777 on the new [employer] telephones) then [phone] to reach the Pediatric Urology resident    On weeknights and weekends, dial 893 (or star-star-star 777 on the new [employer] telephones) then [phone] to reach the Urology resident on call (for both Adult and Pediatrics)
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
## 2019-10-04 NOTE — PROGRESS NOTES
Brief ICU Note:    - Hemoglobin 7.5 this am, down from 9.4. Ordered 1 unit of PRBC. TEG without heparinase at 0800 R 5 K 1 Angle 61.2, MA 74.6. Recheck hemoglobin 7.6 after transfusion. Received a call from Hematology Lab [id number] to report TEG results from this morning were inaccurate, specifically R, K and angle.   - Transfuse 1 unit of PRBC now, repeat TEG, fibrinogen, INR and platelet count now.   - Will discuss repeat CTA with  Urology and IR now    Angelique De La Mater

## 2019-10-04 NOTE — PLAN OF CARE
Pt VSS on RA. Tele NSR. A&Ox4, however pt is very forgetful with short term new information and pt makes repeated requests. Contact precautions maintained. Pt had BM on shift, loose brown stool. Suprapubic catheter continues to drain red urine. Hgb recheck 7.6 after one unit RBC, another unit ordered and given (for total of 5 units this hospitalization). Up with mechanical lift, frequently refuses repositioning, educated about the risks. Pt very pale, mepilex placed on coccyx. Denies pain. Slept periodically, pt's sister in to visit pt. Continue to monitor.

## 2019-10-04 NOTE — SUMMARY OF CARE
Admitted/transferred from: 6D  Reason for admission/transfer: Bleeding profusely from suprapubic and PNT drain  Patient status upon admission/transfer: tachycardic, but MAPs in the 80's-90's.  Interventions: 4 units of PRBC and 2 units of FFP given.  Pt taken to IR for microvascular plug.  Plan: Cpntinue to monitor closely for bleeding or clotting of suprapubic  2 RN skin assessment: completed by Babs Ferguson  Result of skin assessment and interventions/actions: scab on left shin, rash on chest, blanchable redness on coccyx  Height, weight, drug calc weight: done  Patient belongings: wheel chair, bipap mask from home, backpack, clothes  MDRO education (if applicable): completed
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
## 2019-10-04 NOTE — PROVIDER NOTIFICATION
10/03/19 2100   Call Information   Date of Call 10/03/19   Time of Call 2024   Name of person requesting the team Denise SHORT   Title of person requesting team RN   RRT Arrival time 2028   Time RRT ended 2128   Reason for call   Type of RRT Adult   Primary reason for call Uncontrolled excessive bleeding   Was patient transferred from the ED, ICU, or PACU within last 24 hours prior to RRT call? No   SBAR   Situation Pt has SP cath and nephrostomy tube that started dumping tatiana red blood when turned by nursing   Background Pt admitted to outpatient after antegrade nephrostogram, stent and PNT placement.    Notable History/Conditions   (qudaraplegic)   Assessment Pt is A&O. Heart rates in the 140's. SBPs 100's-120's. SP cath dumped roughly 0154-7734 cc tatiana red blood. Neph tube dumped 300-350 cc tatiana red blood. Both SP cath and Neph tube had clotted off. Pt starting to bleed from around SP cath. Pt complaining of severe abdominal/bladder discomfort.    Interventions IV fluids;Labs;Fluid bolus;Portable monitor;Other (describe)   Adjustments to Recommend LR bolus on pressure bag started. SP cath irrigated (unable to get flow or return of blood or urine) MTP initiated. STAT labs drawn from PICC and sent. SICU paged overhead stat for immediate critical care support.   Patient Outcome   Patient Outcome Transferred to  (4A)   RRT Team   Attending/Primary/Covering Physician Urology   Date Attending Physician notified 10/03/19   Time Attending Physician notified 2029   Physician(s) SICU  Dr. Lunsford   Lead RN Elba Souza   Other staff Denise SHORT

## 2019-10-04 NOTE — PRE-PROCEDURE
GENERAL PRE-PROCEDURE:   Procedure:  Left renal angio and embolization  Date/Time:  10/3/2019 10:59 PM    Verbal consent obtained?: Yes    Written consent obtained?: Yes    Risks and benefits: Risks, benefits and alternatives were discussed    Consent given by:  Parent  Patient states understanding of procedure being performed: Yes    Patient's understanding of procedure matches consent: Yes    Procedure consent matches procedure scheduled: Yes    Expected level of sedation:  Moderate  Appropriately NPO:  Yes  ASA Class:  Class 4- Severe systemic disease, acute unstable problems  Mallampati  :  Grade 2- soft palate, base of uvula, tonsillar pillars, and portion of posterior pharyngeal wall visible  Lungs:  Lungs clear with good breath sounds bilaterally  Heart:  Normal heart sounds with tachycardia  History & Physical reviewed:  History and physical reviewed and no updates needed  Statement of review:  I have reviewed the lab findings, diagnostic data, medications, and the plan for sedation

## 2019-10-04 NOTE — PROVIDER NOTIFICATION
1940: repositioned pt from L to R side. Noted bright red output draining into both PNT & suprapubic catheter bags. Pt symptomatic, stated he was dizzy. BP's 80s/50s & tachycardic. satting above 90% on RA. Urology notified, rapid response called & patient put in trendelenberg.   1950: IV fluids started (NS & LR). Suprapubic catheter flushed x2, bloody output & clots noted. Pt transferred to  via bed.

## 2019-10-04 NOTE — PLAN OF CARE
PT 4A: Will sign off. Per conversation with OT and chart review, no acute PT needs identified. Pt is lift dependent at baseline.

## 2019-10-05 LAB
ANION GAP SERPL CALCULATED.3IONS-SCNC: 7 MMOL/L (ref 3–14)
BUN SERPL-MCNC: 20 MG/DL (ref 7–30)
CALCIUM SERPL-MCNC: 8.2 MG/DL (ref 8.5–10.1)
CHLORIDE SERPL-SCNC: 110 MMOL/L (ref 94–109)
CO2 SERPL-SCNC: 25 MMOL/L (ref 20–32)
CREAT SERPL-MCNC: 1.42 MG/DL (ref 0.66–1.25)
ERYTHROCYTE [DISTWIDTH] IN BLOOD BY AUTOMATED COUNT: 16.1 % (ref 10–15)
ERYTHROCYTE [DISTWIDTH] IN BLOOD BY AUTOMATED COUNT: 16.3 % (ref 10–15)
FIBRINOGEN PPP-MCNC: 534 MG/DL (ref 200–420)
FIBRINOGEN PPP-MCNC: 551 MG/DL (ref 200–420)
GFR SERPL CREATININE-BSD FRML MDRD: 60 ML/MIN/{1.73_M2}
GLUCOSE BLDC GLUCOMTR-MCNC: 198 MG/DL (ref 70–99)
GLUCOSE BLDC GLUCOMTR-MCNC: 77 MG/DL (ref 70–99)
GLUCOSE SERPL-MCNC: 65 MG/DL (ref 70–99)
HCT VFR BLD AUTO: 28.6 % (ref 40–53)
HCT VFR BLD AUTO: 29.6 % (ref 40–53)
HGB BLD-MCNC: 9 G/DL (ref 13.3–17.7)
HGB BLD-MCNC: 9.5 G/DL (ref 13.3–17.7)
INR PPP: 1.16 (ref 0.86–1.14)
INR PPP: 1.16 (ref 0.86–1.14)
LACTATE BLD-SCNC: 0.6 MMOL/L (ref 0.7–2)
LACTATE BLD-SCNC: 0.6 MMOL/L (ref 0.7–2)
MAGNESIUM SERPL-MCNC: 2.1 MG/DL (ref 1.6–2.3)
MCH RBC QN AUTO: 26.9 PG (ref 26.5–33)
MCH RBC QN AUTO: 27.2 PG (ref 26.5–33)
MCHC RBC AUTO-ENTMCNC: 31.5 G/DL (ref 31.5–36.5)
MCHC RBC AUTO-ENTMCNC: 32.1 G/DL (ref 31.5–36.5)
MCV RBC AUTO: 85 FL (ref 78–100)
MCV RBC AUTO: 85 FL (ref 78–100)
PHOSPHATE SERPL-MCNC: 4.2 MG/DL (ref 2.5–4.5)
PLATELET # BLD AUTO: 240 10E9/L (ref 150–450)
PLATELET # BLD AUTO: 243 10E9/L (ref 150–450)
POTASSIUM SERPL-SCNC: 4 MMOL/L (ref 3.4–5.3)
RBC # BLD AUTO: 3.35 10E12/L (ref 4.4–5.9)
RBC # BLD AUTO: 3.49 10E12/L (ref 4.4–5.9)
SODIUM SERPL-SCNC: 143 MMOL/L (ref 133–144)
WBC # BLD AUTO: 10.9 10E9/L (ref 4–11)
WBC # BLD AUTO: 11.7 10E9/L (ref 4–11)

## 2019-10-05 PROCEDURE — 85610 PROTHROMBIN TIME: CPT | Performed by: STUDENT IN AN ORGANIZED HEALTH CARE EDUCATION/TRAINING PROGRAM

## 2019-10-05 PROCEDURE — 90686 IIV4 VACC NO PRSV 0.5 ML IM: CPT | Performed by: UROLOGY

## 2019-10-05 PROCEDURE — 85384 FIBRINOGEN ACTIVITY: CPT | Performed by: STUDENT IN AN ORGANIZED HEALTH CARE EDUCATION/TRAINING PROGRAM

## 2019-10-05 PROCEDURE — 25800030 ZZH RX IP 258 OP 636: Performed by: NURSE PRACTITIONER

## 2019-10-05 PROCEDURE — 00000146 ZZHCL STATISTIC GLUCOSE BY METER IP

## 2019-10-05 PROCEDURE — 85610 PROTHROMBIN TIME: CPT | Performed by: PHYSICIAN ASSISTANT

## 2019-10-05 PROCEDURE — 25000132 ZZH RX MED GY IP 250 OP 250 PS 637: Performed by: NURSE PRACTITIONER

## 2019-10-05 PROCEDURE — 85027 COMPLETE CBC AUTOMATED: CPT | Performed by: PHYSICIAN ASSISTANT

## 2019-10-05 PROCEDURE — 40000275 ZZH STATISTIC RCP TIME EA 10 MIN

## 2019-10-05 PROCEDURE — 85384 FIBRINOGEN ACTIVITY: CPT | Performed by: PHYSICIAN ASSISTANT

## 2019-10-05 PROCEDURE — 25800030 ZZH RX IP 258 OP 636: Performed by: PHYSICIAN ASSISTANT

## 2019-10-05 PROCEDURE — 25000128 H RX IP 250 OP 636: Performed by: UROLOGY

## 2019-10-05 PROCEDURE — 99233 SBSQ HOSP IP/OBS HIGH 50: CPT | Performed by: PHYSICIAN ASSISTANT

## 2019-10-05 PROCEDURE — 25000132 ZZH RX MED GY IP 250 OP 250 PS 637: Performed by: STUDENT IN AN ORGANIZED HEALTH CARE EDUCATION/TRAINING PROGRAM

## 2019-10-05 PROCEDURE — 25000132 ZZH RX MED GY IP 250 OP 250 PS 637: Performed by: PHYSICIAN ASSISTANT

## 2019-10-05 PROCEDURE — 83605 ASSAY OF LACTIC ACID: CPT | Performed by: PHYSICIAN ASSISTANT

## 2019-10-05 PROCEDURE — 40000014 ZZH STATISTIC ARTERIAL MONITORING DAILY

## 2019-10-05 PROCEDURE — 94660 CPAP INITIATION&MGMT: CPT

## 2019-10-05 PROCEDURE — 12000001 ZZH R&B MED SURG/OB UMMC

## 2019-10-05 PROCEDURE — 83735 ASSAY OF MAGNESIUM: CPT | Performed by: STUDENT IN AN ORGANIZED HEALTH CARE EDUCATION/TRAINING PROGRAM

## 2019-10-05 PROCEDURE — 84100 ASSAY OF PHOSPHORUS: CPT | Performed by: STUDENT IN AN ORGANIZED HEALTH CARE EDUCATION/TRAINING PROGRAM

## 2019-10-05 PROCEDURE — 80048 BASIC METABOLIC PNL TOTAL CA: CPT | Performed by: STUDENT IN AN ORGANIZED HEALTH CARE EDUCATION/TRAINING PROGRAM

## 2019-10-05 PROCEDURE — 25000128 H RX IP 250 OP 636: Performed by: NURSE PRACTITIONER

## 2019-10-05 PROCEDURE — 85027 COMPLETE CBC AUTOMATED: CPT | Performed by: STUDENT IN AN ORGANIZED HEALTH CARE EDUCATION/TRAINING PROGRAM

## 2019-10-05 RX ORDER — ATROPA BELLADONNA AND OPIUM 16.2; 3 MG/1; MG/1
15 SUPPOSITORY RECTAL EVERY 8 HOURS PRN
Status: DISCONTINUED | OUTPATIENT
Start: 2019-10-05 | End: 2019-10-06 | Stop reason: HOSPADM

## 2019-10-05 RX ADMIN — CEFTAZIDIME 2 G: 2 INJECTION, POWDER, FOR SOLUTION INTRAVENOUS at 04:57

## 2019-10-05 RX ADMIN — LEVETIRACETAM 1000 MG: 500 TABLET, FILM COATED ORAL at 09:45

## 2019-10-05 RX ADMIN — DOCUSATE SODIUM 100 MG: 100 CAPSULE, LIQUID FILLED ORAL at 21:26

## 2019-10-05 RX ADMIN — OXYBUTYNIN CHLORIDE 5 MG: 5 TABLET ORAL at 21:26

## 2019-10-05 RX ADMIN — VANCOMYCIN HYDROCHLORIDE 1000 MG: 1 INJECTION, SOLUTION INTRAVENOUS at 15:53

## 2019-10-05 RX ADMIN — CEFTAZIDIME 2 G: 2 INJECTION, POWDER, FOR SOLUTION INTRAVENOUS at 13:16

## 2019-10-05 RX ADMIN — LEVETIRACETAM 1000 MG: 500 TABLET, FILM COATED ORAL at 21:26

## 2019-10-05 RX ADMIN — SODIUM CHLORIDE, POTASSIUM CHLORIDE, SODIUM LACTATE AND CALCIUM CHLORIDE: 600; 310; 30; 20 INJECTION, SOLUTION INTRAVENOUS at 02:23

## 2019-10-05 RX ADMIN — POLYETHYLENE GLYCOL 3350 17 G: 17 POWDER, FOR SOLUTION ORAL at 09:46

## 2019-10-05 RX ADMIN — CEFTAZIDIME 2 G: 2 INJECTION, POWDER, FOR SOLUTION INTRAVENOUS at 21:35

## 2019-10-05 RX ADMIN — INFLUENZA A VIRUS A/BRISBANE/02/2018 IVR-190 (H1N1) ANTIGEN (FORMALDEHYDE INACTIVATED), INFLUENZA A VIRUS A/KANSAS/14/2017 X-327 (H3N2) ANTIGEN (FORMALDEHYDE INACTIVATED), INFLUENZA B VIRUS B/PHUKET/3073/2013 ANTIGEN (FORMALDEHYDE INACTIVATED), AND INFLUENZA B VIRUS B/MARYLAND/15/2016 BX-69A ANTIGEN (FORMALDEHYDE INACTIVATED) 0.5 ML: 15; 15; 15; 15 INJECTION, SUSPENSION INTRAMUSCULAR at 15:56

## 2019-10-05 RX ADMIN — DOCUSATE SODIUM 100 MG: 100 CAPSULE, LIQUID FILLED ORAL at 09:45

## 2019-10-05 RX ADMIN — OXYBUTYNIN CHLORIDE 5 MG: 5 TABLET ORAL at 09:45

## 2019-10-05 RX ADMIN — FERROUS SULFATE TAB 325 MG (65 MG ELEMENTAL FE) 325 MG: 325 (65 FE) TAB at 09:45

## 2019-10-05 RX ADMIN — OXYBUTYNIN CHLORIDE 5 MG: 5 TABLET ORAL at 14:45

## 2019-10-05 RX ADMIN — SODIUM CHLORIDE, POTASSIUM CHLORIDE, SODIUM LACTATE AND CALCIUM CHLORIDE: 600; 310; 30; 20 INJECTION, SOLUTION INTRAVENOUS at 21:58

## 2019-10-05 ASSESSMENT — ACTIVITIES OF DAILY LIVING (ADL)
ADLS_ACUITY_SCORE: 32

## 2019-10-05 NOTE — PROGRESS NOTES
Urology  Progress Note  10/05/2019    - s/p post-operative left renal hemorrhage on 10/4/19, has required 4 U PRBC this admission and 2 U FFP, last given blood at 1900 with appropriate response.   - Patient underwent successful left upper pole embolization of feeding vessel however given concern for consistent bleed had repeat CTA which was negative.   - On evening rounds yesterday, additional 10 ml instilled into SPT due to leakage, irrigation performed as well with some autonomic dysreflexia which resolved  -feeling better today, sitting in wheelchair    Exam  /70   Pulse 80   Temp 98.2  F (36.8  C) (Axillary)   Resp 15   Wt 76.1 kg (167 lb 12.3 oz)   SpO2 100%   BMI 23.73 kg/m    No acute distress  Unlabored breathing  Abdomen soft, nontender, nondistended.   Nephroureteral stent clamped (clotted off previously).  SPT with light red urine in tubing    I/O's (last 24/since midnight):  SPT: 2750/1060  Nephroureteral stent: NR    Labs  WBC 10.9   Hgb 9.0 (9.5 < 9.7)   Cr 1.42, unchanged    Imaging:    CTA 10/4/2019:   IMPRESSION:   1. No active extravasation demonstrated in this single phase study.     2. Interval embolization of a left upper pole renal artery.     CTA 10/3/2019:  Impression:   1.  Left hemorrhagic renal cysts and hemorrhage within the proximal  left renal collecting system. Active bleeding in the superior pole of  left kidney.  2.   Post surgical appearance of left laser lithotripsy, with  decreased stone burden since comparison. Left precuneus nephrostomy  and left ureteral stent, and suprapubic catheter are in appropriate  position.  3.  Patent abdominal vasculature.  4.  Unchanged appearance of staghorn calculi in the right kidney, mild  right hydronephrosis and hydroureter.  5.  Cholelithiasis without cholecystitis.    Assessment/Plan  44 year old y/o male POD#5 s/p PCNL, shock wave lithotripsy, and placement of left nephroureteral stent for left sided stone. Post-op course notable  for left renal bleed requiring massive transfusion and IR embolization on POD #3, now in the unit and stable.     Overall cares per SICU    Neuro: Tylenol, dilaudid, oxycodone for pain control. Home keppra.   CV: Fluid resuscitation per SICU.   Pulm: Wean O2 as able  FEN/GI: Regular diet, mIVF per primary  Endo: VIRA  : Continue SPT and nephroureteral stent (capped). Monitor UOP. Oxybutynin for bladder spasms. PRN SPT catheter irrigation for clotting.  Heme/ID: Hgb stable but slowly downtrending, further transfusion per SICU  Activity: Continue work with PT/OT as able  PPx: SCDs  Dispo: SICU, transfer to the floor once deemed appropriate by SICU    Seen and examined with the chief resident. Will discuss with staff Dr. Horn.    Gallo Patel MD  Urology Resident     Contacting the Urology Team     Please use the following job codes to reach the Urology Team. Note that you must use an in house phone and that job codes cannot receive text pages.     On weekdays, dial [phone] (or star-star-star 777 on the new [place] telephones) then [phone] to reach the Adult Urology resident or PA on call    On weekdays, dial [phone] (or star-star-star 777 on the new [place] telephones) then 0818 to reach the Pediatric Urology resident    On weeknights and weekends, dial [phone] (or star-star-star 777 on the new [place] telephones) then [phone] to reach the Urology resident on call (for both Adult and Pediatrics)        Addendum:     Patient started on PRN B&O for spasms, nursing order previously placed for scheduled flushes. May transfer to the floor from Urology perspective with potential discharge in 1-2 days. No further intervention indicated at this time.

## 2019-10-05 NOTE — PLAN OF CARE
D/I: Patient on unit 4A Surgical/Neuro ICU     Neuro: A&Ox4 but forgetful and perseverates.  Can make needs known.   CV:Labile blood pressures and HR with bladder spasms.  Sinus rhythm. Afebrile. No edema.  MTP initiated for 2-3L blood loss from suprapubic and PNT.  3 units of PRBcs and 2 of FFP given.  Pulm:LS clear in uppers and diminished in bases.  BiPaP overnight at 40%, but RA during the day.  GI:NPO until bleeding under control.  :Suprapubic catheter in place.  PNT drain capped.  Skin:Rash on chest, scab on left shin.  Mepilex I place on coccyx.   Pain:denies   Access:Left DL PICC and PIV in right ankle  Drains: PNT, capped.  Gtt: LR at 100  Labs/Replacement: reviewed and replaced per protocol       A: Stable    P: Will continue to monitor Pt closely and notify MD of any significant changes.

## 2019-10-05 NOTE — PLAN OF CARE
D/I: Patient on unit 4A Surgical/Neuro ICU      Neuro: A&Ox4 but forgetful and perseverates.  Can make needs known.   CV:Labile blood pressures and HR with bladder spasms.  Sinus rhythm.  EKG done for possible ST depression.  EKG showed prolonged QT. SICU MD notififed. No new oreders.  Afebrile. No edema.    Pulm:LS clear in uppers and diminished in bases.  BiPaP overnight at 40%, but RA during the day.  GI: CLD  :Suprapubic catheter in place.  PNT drain capped.  Skin:Rash on chest, scab on left shin.  Mepilex I place on coccyx. Rash on bilateral shins.  SICU MD notified.   Pain:denies   Access:Left DL PICC and PIV in right ankle.  Drains: PNT, capped.  Gtt: LR at 100  Labs/Replacement: reviewed and replaced per protocol         A: Stable    P: Will continue to monitor Pt closely and notify MD of any significant changes.
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
## 2019-10-05 NOTE — PLAN OF CARE
D. awake alert follows commands up in chair as per request most this shift, ( feels better in chair) . Good appetite, fed 100% of all meals with large intake-   A stable - transfer to frias.   I transfer to frias - stable - belonging sent

## 2019-10-06 VITALS
BODY MASS INDEX: 23.73 KG/M2 | HEART RATE: 88 BPM | WEIGHT: 167.77 LBS | DIASTOLIC BLOOD PRESSURE: 76 MMHG | RESPIRATION RATE: 16 BRPM | TEMPERATURE: 96.4 F | OXYGEN SATURATION: 100 % | SYSTOLIC BLOOD PRESSURE: 130 MMHG

## 2019-10-06 LAB
ANGLE RATE OF CLOT STRENGTH: ABNORMAL DEGREES (ref 53–72)
ANION GAP SERPL CALCULATED.3IONS-SCNC: 4 MMOL/L (ref 3–14)
BUN SERPL-MCNC: 19 MG/DL (ref 7–30)
CALCIUM SERPL-MCNC: 8.4 MG/DL (ref 8.5–10.1)
CHLORIDE SERPL-SCNC: 111 MMOL/L (ref 94–109)
CI HYPERCOAGULATION INDEX: ABNORMAL RATIO (ref 0–3)
CO2 SERPL-SCNC: 28 MMOL/L (ref 20–32)
CREAT SERPL-MCNC: 1.23 MG/DL (ref 0.66–1.25)
ERYTHROCYTE [DISTWIDTH] IN BLOOD BY AUTOMATED COUNT: 16.9 % (ref 10–15)
G ACTUAL CLOT STRENGTH: 14.7 KD/SC (ref 4.5–11)
GFR SERPL CREATININE-BSD FRML MDRD: 72 ML/MIN/{1.73_M2}
GLUCOSE SERPL-MCNC: 83 MG/DL (ref 70–99)
HCT VFR BLD AUTO: 28.8 % (ref 40–53)
HGB BLD-MCNC: 8.8 G/DL (ref 13.3–17.7)
K TIME TO SPEC CLOT STRENGTH: ABNORMAL MINUTE (ref 1–3)
LY30 LYSIS AT 30 MINUTES: 0.3 % (ref 0–8)
LY60 LYSIS AT 60 MINUTES: 2.6 % (ref 0–15)
MA MAXIMUM CLOT STRENGTH: 74.6 MM (ref 50–70)
MCH RBC QN AUTO: 27.3 PG (ref 26.5–33)
MCHC RBC AUTO-ENTMCNC: 30.6 G/DL (ref 31.5–36.5)
MCV RBC AUTO: 89 FL (ref 78–100)
PLATELET # BLD AUTO: 242 10E9/L (ref 150–450)
POTASSIUM SERPL-SCNC: 4 MMOL/L (ref 3.4–5.3)
R TIME UNTIL CLOT FORMS: 5 MINUTE (ref 5–10)
RBC # BLD AUTO: 3.22 10E12/L (ref 4.4–5.9)
SODIUM SERPL-SCNC: 142 MMOL/L (ref 133–144)
VANCOMYCIN SERPL-MCNC: 21.9 MG/L
WBC # BLD AUTO: 9.9 10E9/L (ref 4–11)

## 2019-10-06 PROCEDURE — 36592 COLLECT BLOOD FROM PICC: CPT | Performed by: STUDENT IN AN ORGANIZED HEALTH CARE EDUCATION/TRAINING PROGRAM

## 2019-10-06 PROCEDURE — 25000132 ZZH RX MED GY IP 250 OP 250 PS 637: Performed by: PHYSICIAN ASSISTANT

## 2019-10-06 PROCEDURE — 25000132 ZZH RX MED GY IP 250 OP 250 PS 637: Performed by: NURSE PRACTITIONER

## 2019-10-06 PROCEDURE — 25800030 ZZH RX IP 258 OP 636: Performed by: PHYSICIAN ASSISTANT

## 2019-10-06 PROCEDURE — 40000802 ZZH SITE CHECK

## 2019-10-06 PROCEDURE — 25000128 H RX IP 250 OP 636: Performed by: NURSE PRACTITIONER

## 2019-10-06 PROCEDURE — 40000558 ZZH STATISTIC CVC DRESSING CHANGE

## 2019-10-06 PROCEDURE — 80048 BASIC METABOLIC PNL TOTAL CA: CPT | Performed by: STUDENT IN AN ORGANIZED HEALTH CARE EDUCATION/TRAINING PROGRAM

## 2019-10-06 PROCEDURE — 25000132 ZZH RX MED GY IP 250 OP 250 PS 637: Performed by: STUDENT IN AN ORGANIZED HEALTH CARE EDUCATION/TRAINING PROGRAM

## 2019-10-06 PROCEDURE — 85027 COMPLETE CBC AUTOMATED: CPT | Performed by: STUDENT IN AN ORGANIZED HEALTH CARE EDUCATION/TRAINING PROGRAM

## 2019-10-06 PROCEDURE — 36415 COLL VENOUS BLD VENIPUNCTURE: CPT | Performed by: UROLOGY

## 2019-10-06 PROCEDURE — 80202 ASSAY OF VANCOMYCIN: CPT | Performed by: UROLOGY

## 2019-10-06 RX ORDER — OXYBUTYNIN CHLORIDE 5 MG/1
5 TABLET ORAL 3 TIMES DAILY PRN
Qty: 90 TABLET | Refills: 11 | Status: SHIPPED | OUTPATIENT
Start: 2019-10-06

## 2019-10-06 RX ORDER — OXYBUTYNIN CHLORIDE 5 MG/1
5 TABLET ORAL 3 TIMES DAILY
Qty: 90 TABLET | Refills: 11 | Status: SHIPPED | OUTPATIENT
Start: 2019-10-06 | End: 2019-10-06

## 2019-10-06 RX ADMIN — OXYBUTYNIN CHLORIDE 5 MG: 5 TABLET ORAL at 08:03

## 2019-10-06 RX ADMIN — CEFTAZIDIME 2 G: 2 INJECTION, POWDER, FOR SOLUTION INTRAVENOUS at 12:58

## 2019-10-06 RX ADMIN — LEVETIRACETAM 1000 MG: 500 TABLET, FILM COATED ORAL at 08:03

## 2019-10-06 RX ADMIN — FERROUS SULFATE TAB 325 MG (65 MG ELEMENTAL FE) 325 MG: 325 (65 FE) TAB at 08:03

## 2019-10-06 RX ADMIN — POLYETHYLENE GLYCOL 3350 17 G: 17 POWDER, FOR SOLUTION ORAL at 08:03

## 2019-10-06 RX ADMIN — CEFTAZIDIME 2 G: 2 INJECTION, POWDER, FOR SOLUTION INTRAVENOUS at 05:33

## 2019-10-06 RX ADMIN — DOCUSATE SODIUM 100 MG: 100 CAPSULE, LIQUID FILLED ORAL at 08:03

## 2019-10-06 RX ADMIN — OXYBUTYNIN CHLORIDE 5 MG: 5 TABLET ORAL at 12:59

## 2019-10-06 RX ADMIN — BISACODYL 10 MG: 10 SUPPOSITORY RECTAL at 08:03

## 2019-10-06 RX ADMIN — SODIUM CHLORIDE, POTASSIUM CHLORIDE, SODIUM LACTATE AND CALCIUM CHLORIDE: 600; 310; 30; 20 INJECTION, SOLUTION INTRAVENOUS at 08:23

## 2019-10-06 ASSESSMENT — ACTIVITIES OF DAILY LIVING (ADL)
ADLS_ACUITY_SCORE: 32
ADLS_ACUITY_SCORE: 32
ADLS_ACUITY_SCORE: 31
ADLS_ACUITY_SCORE: 32

## 2019-10-06 NOTE — PROVIDER NOTIFICATION
Purpose of Notification: Continued leakage of large amounts or urine from around SP cath and his penis  Notified Person: Dr DEJA Sinclair #[id number]  Notification Time: 0230  Notification Interaction: Phone conversation  Orders Obtained: None at present  Comments: SP cath irrigated per orders with leakage round the tube and via the penis.  Pt with oozing throughout the shift.  MD states to continue to change pads and irrigate again if UOP stops.  Probable cause is spasms which pt states can usually feel but he is not tonight.  Cont to monitor closely for urinary retention.

## 2019-10-06 NOTE — PROVIDER NOTIFICATION
Provider notified about ongoing leaky suprapubic catheter. Abd dressings surrounding often saturates despite q4h bladder irrigation. Catheter patent and draining some, but UO innacurate d/t leaking. Condom cath applied to assess for penile drainage, exudry placed around suprapubic cath site. No signs of autonomic dysreflexia and pt denies bladder spasms or desire for B&O suppository. Will continue to monitor overnight.

## 2019-10-06 NOTE — PROGRESS NOTES
Urology  Progress Note  10/06/2019    [place]  Continued to have leakage last night around SP catheter and penis.  Feels well and wants to go home.    Exam  /76 (BP Location: Right arm)   Pulse 88   Temp 96.4  F (35.8  C) (Axillary)   Resp 16   Wt 76.1 kg (167 lb 12.3 oz)   SpO2 100%   BMI 23.73 kg/m    No acute distress  Unlabored breathing  Abdomen soft, nontender, nondistended.   Nephroureteral stent clamped (clotted off previously).  SPT with light pink urine in tubing. Leakage noted around site.     I/O's (last 24/since midnight):  SPT: 3285/325  Nephroureteral stent: NR    Labs  AM labs pending.    WBC 10.9   Hgb 9.0 (9.5 < 9.7)   Cr 1.42, unchanged    Imaging:    CTA 10/4/2019:   IMPRESSION:   1. No active extravasation demonstrated in this single phase study.     2. Interval embolization of a left upper pole renal artery.     CTA 10/3/2019:  Impression:   1.  Left hemorrhagic renal cysts and hemorrhage within the proximal  left renal collecting system. Active bleeding in the superior pole of  left kidney.  2.   Post surgical appearance of left laser lithotripsy, with  decreased stone burden since comparison. Left precuneus nephrostomy  and left ureteral stent, and suprapubic catheter are in appropriate  position.  3.  Patent abdominal vasculature.  4.  Unchanged appearance of staghorn calculi in the right kidney, mild  right hydronephrosis and hydroureter.  5.  Cholelithiasis without cholecystitis.    Assessment/Plan  44 year old y/o male POD#6 s/p PCNL, shock wave lithotripsy, and placement of left nephroureteral stent for left sided stone. Post-op course notable for left renal bleed requiring massive transfusion (4U pRBCs and 2U FFP) and IR embolization of left upper pole feeding vessel on POD #4, now in the unit and stable. Repeat CTA negative.    Overall cares per SICU.    Neuro: Tylenol, dilaudid, oxycodone for pain control. Home keppra.   CV: Fluid resuscitation per SICU.   Pulm: Wean O2 as  able  FEN/GI: Regular diet, mIVF per primary  Endo: VIRA  : Continue SPT and nephroureteral stent (capped). Monitor UOP. Oxybutynin and B&O suppositories for bladder spasms. PRN SPT catheter irrigation for clotting.30 mL currently in balloon. Will exchange SPT today prior to discharge.  Heme/ID: Hgb stable but slowly downtrending, further transfusion per SICU. Vancomycin and ceftazidime (infected renal calculi)   Activity: Continue work with PT/OT as able  PPx: SCDs  Dispo: SICU, transfer to the floor once deemed appropriate by SICU    Seen and examined with the chief resident. Will discuss with staff Dr. Horn.    Gallo Patel MD  Urology Resident     Contacting the Urology Team     Please use the following job codes to reach the Urology Team. Note that you must use an in house phone and that job codes cannot receive text pages.     On weekdays, dial [phone] (or star-star-star 777 on the new [employer] telephones) then 0817 to reach the Adult Urology resident or PA on call    On weekdays, dial 893 (or star-star-star 777 on the new [employer] telephones) then 0818 to reach the Pediatric Urology resident    On weeknights and weekends, dial 893 (or star-star-star 777 on the new [employer] telephones) then [phone] to reach the Urology resident on call (for both Adult and Pediatrics)

## 2019-10-06 NOTE — PLAN OF CARE
/73 (BP Location: Right arm)   Pulse 88   Temp 96.1  F (35.6  C) (Oral)   Resp 18   Wt 76.1 kg (167 lb 12.3 oz)   SpO2 99%   BMI 23.73 kg/m  '  Pt was transferred to unit at 1815 from ICU. AVSS. Denies pain. Suprapubic cath leaky, adequate pink UOP. Pt was transferred from  to bed by ceiling lift with assisted of 2+. Rashes on chest and upper back. Continue to monitor and POC.

## 2019-10-06 NOTE — PHARMACY-VANCOMYCIN DOSING SERVICE
Pharmacy Vancomycin Note  Date of Service 2019  Patient's  1975   44 year old, male    Indication: Infected renal calculi  Goal Trough Level: 15-20 mg/L  Day of Therapy: 3  Current Vancomycin regimen:  1000 mg IV q24h    Current estimated CrCl = Estimated Creatinine Clearance: 84.2 mL/min (based on SCr of 1.23 mg/dL).    Creatinine for last 3 days  10/3/2019:  8:19 PM Creatinine 1.25 mg/dL  10/4/2019:  3:37 AM Creatinine 1.29 mg/dL;  9:47 PM Creatinine 1.42 mg/dL  10/5/2019:  3:36 AM Creatinine 1.42 mg/dL  10/6/2019: 10:11 AM Creatinine 1.23 mg/dL    Recent Vancomycin Levels (past 3 days)  10/6/2019:  2:00 PM Vancomycin Level 21.9 mg/L (22.1 hr trough)    Vancomycin IV Administrations (past 72 hours)                   vancomycin (VANCOCIN) 1000 mg in dextrose 5% 200 mL PREMIX (mg) 1,000 mg New Bag 10/05/19 1553     1,000 mg New Bag 10/04/19 1512                Nephrotoxins and other renal medications (From now, onward)    Start     Dose/Rate Route Frequency Ordered Stop    10/04/19 1500  vancomycin (VANCOCIN) 1000 mg in dextrose 5% 200 mL PREMIX      1,000 mg  200 mL/hr over 1 Hours Intravenous EVERY 24 HOURS 10/04/19 1148               Contrast Orders - past 72 hours (72h ago, onward)    Start     Dose/Rate Route Frequency Ordered Stop    10/04/19 1645  iopamidol (ISOVUE-370) solution 100 mL      100 mL Intravenous ONCE 10/04/19 1643 10/04/19 1711    10/03/19 2300  iodixanol (VISIPAQUE 320) injection 150 mL      150 mL INTRA-ARTERIAL ONCE 10/03/19 2251 10/03/19 2351    10/03/19 2115  iopamidol (ISOVUE-370) solution 104 mL      104 mL Intravenous ONCE 10/03/19 2109 10/03/19 2122          Interpretation of levels and current regimen:  Trough level is slightly Supratherapeutic.    Has serum creatinine changed > 50% in last 72 hours: No    Urine output:  good urine output    Renal Function: Stable      Plan:  1.  Continue Current Dose  2.  Pharmacy will check trough levels as appropriate in 1-3 Days.     3.  Serum creatinine levels will be ordered daily for the first week of therapy and at least twice weekly for subsequent weeks.         Rao Wasserman, PharmD, BCPS  October 6, 2019            .

## 2019-10-06 NOTE — PROGRESS NOTES
Social Work Services Discharge Note      Patient Name:  Maicol Carrera     Anticipated Discharge Date:  10/6/2019    Discharge Disposition:   [facility]    Following MD:  Anthony David MD     Pre-Admission Screening (PAS) online form has been completed.  The Level of Care (LOC) is:  Determined  Confirmation Code is:  Not needed, Pt is LTC       Additional Services/Equipment Arranged:  HE wheelchair ride scheduled for 4:00pm. Using own wheelchair.     Patient / Family response to discharge plan:  Agreeable     Persons notified of above discharge plan:  Admissions at [facility], [phone]        Staff Discharge Instructions:  Please fax discharge orders and signed hard scripts for any controlled substances.      Please print a packet and send with patient.     When completed, please fax discharge orders to [phone]      MANDY Altamirano  Weekend Social Work  [facility]  Weekend 4A, 4C, 4E, 5A 5B Pager: [phone]  Weekend 6A, 6B, 6C, 6D Pager: [phone]  Weekend 7A, 7B, 7C, 7D, 5C Pager: [phone]

## 2019-10-06 NOTE — PLAN OF CARE
Activity: x 2, total cares  Neuros: alert and oriented  Cardiac: WNL  Respiratory: clear, diminished. Room air daytime, BiPAP noc  GI/: LBM 10/4, suprapubic catheter changed this shift by service, prior leaking copious amounts of blood tinged urine. Nephrostomy tube clamped - dressing CDI  Diet: regular - total feed  Skin: scab RLE prima pore applied, skin tear sacrum,   Lines/Drains: PICC line removed, 44.5 cm, catheter tip intact. Pressure dressing applied.   Plan: Patient will discharge to [facility], Transport scheduled for 1600 w/HE wheelchair. Report call to Neetu Jauregui LPN and dismissal orders faxed to facility.

## 2019-10-06 NOTE — PLAN OF CARE
"Shift: 1900 - 0700  VS: /86 (BP Location: Right arm)   Pulse 88   Temp 98.8  F (37.1  C) (Axillary)   Resp 18   Wt 76.1 kg (167 lb 12.3 oz)   SpO2 98%   BMI 23.73 kg/m      Neuro: A&Ox4, pt forgetful and repeats self frequently, quadriplegic, CMS at baseline per pt, sitter at bedside overnight  Cardiac: pulses palpable  Resp: lung sounds course/crackles, no SOB reported, sating well on RA/BiPap, IS encouraged but refused  GI: passing gas, bowel sounds active, no BM this shift, LBM 10/4  : suprapubic catheter leaking copiously, irrigated q4h per orders - seeps out suprapubic site, red/pink urine, condom cath in place to check  for urine via penis, L PNT site capped - dressing changed  Skin: exudry around suprapubic cath site for drainage, pt refusing repositioning q2h - extensive education given, mepilex on coccyx CDI, 1\" skin tear noted  Pain/Nausea: denies pain; denies nausea  LDA: L PICC double lumen infusing IVMF and abx per MAR  Diet: regular  Mobility: power w/c at baseline, 2 assist with mechanical lift  Labs: reviewed, HgB 9.0, Cr 1.42  Plan: continue plan of care     "

## 2019-10-07 ENCOUNTER — TELEPHONE (OUTPATIENT)
Dept: UROLOGY | Facility: CLINIC | Age: 44
End: 2019-10-07

## 2019-10-07 LAB — INTERPRETATION ECG - MUSE: NORMAL

## 2019-10-07 NOTE — TELEPHONE ENCOUNTER
Surgery with Dr Horn is put on hold for now per Dr Horn. Patient is seeing Dr Horn in clinic on 10/18/19.

## 2019-10-08 ENCOUNTER — TELEPHONE (OUTPATIENT)
Dept: UROLOGY | Facility: CLINIC | Age: 44
End: 2019-10-08

## 2019-10-08 NOTE — TELEPHONE ENCOUNTER
Health Call Center    Phone Message    May a detailed message be left on voicemail: no    Reason for Call: Other: Pt's nursing home states Pt still has hematuria and Pt states he isn't feeling well. Pat at [facility] wants to know how long the hematuria should last for and whether or not Pt should go to ER again. Please call her back.    Action Taken: Message routed to:  [facility] ([facility]): [facility] UROLOGY ADULT [facility]

## 2019-10-08 NOTE — TELEPHONE ENCOUNTER
Contacted Pat at [facility] to discuss hematuria. Advised that while the patient has a stent in, hematuria is normal, unless it looks like tomato juice. They will monitor patient.  Raquel Bruner LPN

## 2019-10-09 ENCOUNTER — HOSPITAL ENCOUNTER (OUTPATIENT)
Facility: CLINIC | Age: 44
Setting detail: OBSERVATION
Discharge: NURSING, REHAB OR LONG-TERM CARE | End: 2019-10-10
Attending: EMERGENCY MEDICINE | Admitting: UROLOGY
Payer: COMMERCIAL

## 2019-10-09 DIAGNOSIS — E86.0 DEHYDRATION: ICD-10-CM

## 2019-10-09 DIAGNOSIS — R68.89 FEELS SICK: ICD-10-CM

## 2019-10-09 DIAGNOSIS — G82.50 QUADRIPLEGIA (H): ICD-10-CM

## 2019-10-09 DIAGNOSIS — R31.9 HEMATURIA, UNSPECIFIED TYPE: ICD-10-CM

## 2019-10-09 PROBLEM — R53.83 MALAISE AND FATIGUE: Status: ACTIVE | Noted: 2019-10-09

## 2019-10-09 PROBLEM — R53.81 MALAISE AND FATIGUE: Status: ACTIVE | Noted: 2019-10-09

## 2019-10-09 LAB
ALBUMIN SERPL-MCNC: 2.7 G/DL (ref 3.4–5)
ALBUMIN UR-MCNC: 100 MG/DL
ALP SERPL-CCNC: 66 U/L (ref 40–150)
ALT SERPL W P-5'-P-CCNC: 14 U/L (ref 0–70)
ANION GAP SERPL CALCULATED.3IONS-SCNC: 8 MMOL/L (ref 3–14)
APPEARANCE UR: ABNORMAL
AST SERPL W P-5'-P-CCNC: 15 U/L (ref 0–45)
BASOPHILS # BLD AUTO: 0.1 10E9/L (ref 0–0.2)
BASOPHILS NFR BLD AUTO: 0.9 %
BILIRUB SERPL-MCNC: 0.3 MG/DL (ref 0.2–1.3)
BILIRUB UR QL STRIP: NEGATIVE
BUN SERPL-MCNC: 17 MG/DL (ref 7–30)
CALCIUM SERPL-MCNC: 9 MG/DL (ref 8.5–10.1)
CHLORIDE SERPL-SCNC: 105 MMOL/L (ref 94–109)
CO2 SERPL-SCNC: 27 MMOL/L (ref 20–32)
COLOR UR AUTO: ABNORMAL
CREAT SERPL-MCNC: 0.97 MG/DL (ref 0.66–1.25)
DIFFERENTIAL METHOD BLD: ABNORMAL
DIFFERENTIAL METHOD BLD: NORMAL
EOSINOPHIL # BLD AUTO: 0.5 10E9/L (ref 0–0.7)
EOSINOPHIL NFR BLD AUTO: 4.2 %
ERYTHROCYTE [DISTWIDTH] IN BLOOD BY AUTOMATED COUNT: 17.4 % (ref 10–15)
ERYTHROCYTE [DISTWIDTH] IN BLOOD BY AUTOMATED COUNT: NORMAL % (ref 10–15)
GFR SERPL CREATININE-BSD FRML MDRD: >90 ML/MIN/{1.73_M2}
GLUCOSE BLDC GLUCOMTR-MCNC: 118 MG/DL (ref 70–99)
GLUCOSE BLDC GLUCOMTR-MCNC: 133 MG/DL (ref 70–99)
GLUCOSE BLDC GLUCOMTR-MCNC: 81 MG/DL (ref 70–99)
GLUCOSE SERPL-MCNC: 67 MG/DL (ref 70–99)
GLUCOSE UR STRIP-MCNC: NEGATIVE MG/DL
HCT VFR BLD AUTO: 33.4 % (ref 40–53)
HCT VFR BLD AUTO: NORMAL % (ref 40–53)
HGB BLD-MCNC: 10.4 G/DL (ref 13.3–17.7)
HGB BLD-MCNC: NORMAL G/DL (ref 13.3–17.7)
HGB UR QL STRIP: ABNORMAL
IMM GRANULOCYTES # BLD: 0.3 10E9/L (ref 0–0.4)
IMM GRANULOCYTES NFR BLD: 2.1 %
INR PPP: 1.18 (ref 0.86–1.14)
KETONES UR STRIP-MCNC: NEGATIVE MG/DL
LEUKOCYTE ESTERASE UR QL STRIP: ABNORMAL
LIPASE SERPL-CCNC: 206 U/L (ref 73–393)
LYMPHOCYTES # BLD AUTO: 1.6 10E9/L (ref 0.8–5.3)
LYMPHOCYTES NFR BLD AUTO: 13 %
MCH RBC QN AUTO: 27.2 PG (ref 26.5–33)
MCH RBC QN AUTO: NORMAL PG (ref 26.5–33)
MCHC RBC AUTO-ENTMCNC: 31.1 G/DL (ref 31.5–36.5)
MCHC RBC AUTO-ENTMCNC: NORMAL G/DL (ref 31.5–36.5)
MCV RBC AUTO: 87 FL (ref 78–100)
MCV RBC AUTO: NORMAL FL (ref 78–100)
MONOCYTES # BLD AUTO: 0.8 10E9/L (ref 0–1.3)
MONOCYTES NFR BLD AUTO: 6.2 %
NEUTROPHILS # BLD AUTO: 8.8 10E9/L (ref 1.6–8.3)
NEUTROPHILS NFR BLD AUTO: 73.6 %
NITRATE UR QL: NEGATIVE
NRBC # BLD AUTO: 0 10*3/UL
NRBC BLD AUTO-RTO: 0 /100
PH UR STRIP: 7 PH (ref 5–7)
PLATELET # BLD AUTO: 250 10E9/L (ref 150–450)
PLATELET # BLD AUTO: NORMAL 10E9/L (ref 150–450)
PLATELET # BLD EST: ABNORMAL 10*3/UL
POTASSIUM SERPL-SCNC: 4.4 MMOL/L (ref 3.4–5.3)
PROT SERPL-MCNC: 7.6 G/DL (ref 6.8–8.8)
RADIOLOGIST FLAGS: ABNORMAL
RBC # BLD AUTO: 3.82 10E12/L (ref 4.4–5.9)
RBC # BLD AUTO: NORMAL 10E12/L (ref 4.4–5.9)
RBC #/AREA URNS AUTO: >182 /HPF (ref 0–2)
SODIUM SERPL-SCNC: 140 MMOL/L (ref 133–144)
SOURCE: ABNORMAL
SP GR UR STRIP: >1.035 (ref 1–1.03)
UROBILINOGEN UR STRIP-MCNC: NORMAL MG/DL (ref 0–2)
WBC # BLD AUTO: 12 10E9/L (ref 4–11)
WBC # BLD AUTO: NORMAL 10E9/L (ref 4–11)
WBC #/AREA URNS AUTO: 742 /HPF (ref 0–5)

## 2019-10-09 PROCEDURE — 96360 HYDRATION IV INFUSION INIT: CPT | Performed by: EMERGENCY MEDICINE

## 2019-10-09 PROCEDURE — 87086 URINE CULTURE/COLONY COUNT: CPT | Performed by: EMERGENCY MEDICINE

## 2019-10-09 PROCEDURE — 81001 URINALYSIS AUTO W/SCOPE: CPT | Performed by: EMERGENCY MEDICINE

## 2019-10-09 PROCEDURE — 93010 ELECTROCARDIOGRAM REPORT: CPT | Mod: Z6 | Performed by: EMERGENCY MEDICINE

## 2019-10-09 PROCEDURE — G0378 HOSPITAL OBSERVATION PER HR: HCPCS

## 2019-10-09 PROCEDURE — 85025 COMPLETE CBC W/AUTO DIFF WBC: CPT | Performed by: EMERGENCY MEDICINE

## 2019-10-09 PROCEDURE — 85610 PROTHROMBIN TIME: CPT | Performed by: EMERGENCY MEDICINE

## 2019-10-09 PROCEDURE — 99285 EMERGENCY DEPT VISIT HI MDM: CPT | Mod: 25 | Performed by: EMERGENCY MEDICINE

## 2019-10-09 PROCEDURE — 93005 ELECTROCARDIOGRAM TRACING: CPT | Performed by: EMERGENCY MEDICINE

## 2019-10-09 PROCEDURE — 25000132 ZZH RX MED GY IP 250 OP 250 PS 637: Performed by: UROLOGY

## 2019-10-09 PROCEDURE — 36416 COLLJ CAPILLARY BLOOD SPEC: CPT | Performed by: EMERGENCY MEDICINE

## 2019-10-09 PROCEDURE — 96361 HYDRATE IV INFUSION ADD-ON: CPT | Performed by: EMERGENCY MEDICINE

## 2019-10-09 PROCEDURE — 80053 COMPREHEN METABOLIC PANEL: CPT | Performed by: EMERGENCY MEDICINE

## 2019-10-09 PROCEDURE — 25000128 H RX IP 250 OP 636: Performed by: EMERGENCY MEDICINE

## 2019-10-09 PROCEDURE — 40000556 ZZH STATISTIC PERIPHERAL IV START W US GUIDANCE

## 2019-10-09 PROCEDURE — 83690 ASSAY OF LIPASE: CPT | Performed by: EMERGENCY MEDICINE

## 2019-10-09 PROCEDURE — 25800030 ZZH RX IP 258 OP 636: Performed by: UROLOGY

## 2019-10-09 PROCEDURE — 00000146 ZZHCL STATISTIC GLUCOSE BY METER IP

## 2019-10-09 RX ORDER — LEVETIRACETAM 500 MG/1
1000 TABLET ORAL 2 TIMES DAILY
Status: DISCONTINUED | OUTPATIENT
Start: 2019-10-09 | End: 2019-10-10 | Stop reason: HOSPADM

## 2019-10-09 RX ORDER — DOCUSATE SODIUM 100 MG/1
100 CAPSULE, LIQUID FILLED ORAL 2 TIMES DAILY
Status: DISCONTINUED | OUTPATIENT
Start: 2019-10-09 | End: 2019-10-10 | Stop reason: HOSPADM

## 2019-10-09 RX ORDER — ONDANSETRON 2 MG/ML
4 INJECTION INTRAMUSCULAR; INTRAVENOUS EVERY 6 HOURS PRN
Status: DISCONTINUED | OUTPATIENT
Start: 2019-10-09 | End: 2019-10-10 | Stop reason: HOSPADM

## 2019-10-09 RX ORDER — BACLOFEN 10 MG/1
10 TABLET ORAL EVERY 4 HOURS PRN
Status: DISCONTINUED | OUTPATIENT
Start: 2019-10-09 | End: 2019-10-10 | Stop reason: HOSPADM

## 2019-10-09 RX ORDER — PROCHLORPERAZINE 25 MG
25 SUPPOSITORY, RECTAL RECTAL EVERY 12 HOURS PRN
Status: DISCONTINUED | OUTPATIENT
Start: 2019-10-09 | End: 2019-10-10 | Stop reason: HOSPADM

## 2019-10-09 RX ORDER — NALOXONE HYDROCHLORIDE 0.4 MG/ML
.1-.4 INJECTION, SOLUTION INTRAMUSCULAR; INTRAVENOUS; SUBCUTANEOUS
Status: DISCONTINUED | OUTPATIENT
Start: 2019-10-09 | End: 2019-10-10 | Stop reason: HOSPADM

## 2019-10-09 RX ORDER — POLYVINYL ALCOHOL 14 MG/ML
2 SOLUTION/ DROPS OPHTHALMIC
Status: DISCONTINUED | OUTPATIENT
Start: 2019-10-09 | End: 2019-10-10 | Stop reason: HOSPADM

## 2019-10-09 RX ORDER — BISACODYL 10 MG
10 SUPPOSITORY, RECTAL RECTAL EVERY OTHER DAY
Status: DISCONTINUED | OUTPATIENT
Start: 2019-10-10 | End: 2019-10-10 | Stop reason: HOSPADM

## 2019-10-09 RX ORDER — ACETAMINOPHEN 325 MG/1
650 TABLET ORAL EVERY 4 HOURS PRN
Status: DISCONTINUED | OUTPATIENT
Start: 2019-10-09 | End: 2019-10-10 | Stop reason: HOSPADM

## 2019-10-09 RX ORDER — OXYBUTYNIN CHLORIDE 5 MG/1
5 TABLET ORAL 3 TIMES DAILY PRN
Status: DISCONTINUED | OUTPATIENT
Start: 2019-10-09 | End: 2019-10-10 | Stop reason: HOSPADM

## 2019-10-09 RX ORDER — ONDANSETRON 4 MG/1
4 TABLET, ORALLY DISINTEGRATING ORAL EVERY 6 HOURS PRN
Status: DISCONTINUED | OUTPATIENT
Start: 2019-10-09 | End: 2019-10-10 | Stop reason: HOSPADM

## 2019-10-09 RX ORDER — ALBUTEROL SULFATE 0.83 MG/ML
2.5 SOLUTION RESPIRATORY (INHALATION)
Status: DISCONTINUED | OUTPATIENT
Start: 2019-10-09 | End: 2019-10-10 | Stop reason: HOSPADM

## 2019-10-09 RX ORDER — PROCHLORPERAZINE MALEATE 10 MG
10 TABLET ORAL EVERY 6 HOURS PRN
Status: DISCONTINUED | OUTPATIENT
Start: 2019-10-09 | End: 2019-10-10 | Stop reason: HOSPADM

## 2019-10-09 RX ORDER — SODIUM CHLORIDE 9 MG/ML
INJECTION, SOLUTION INTRAVENOUS CONTINUOUS
Status: DISCONTINUED | OUTPATIENT
Start: 2019-10-09 | End: 2019-10-10 | Stop reason: HOSPADM

## 2019-10-09 RX ADMIN — SODIUM CHLORIDE 1000 ML: 900 INJECTION, SOLUTION INTRAVENOUS at 19:56

## 2019-10-09 RX ADMIN — SODIUM CHLORIDE: 9 INJECTION, SOLUTION INTRAVENOUS at 22:09

## 2019-10-09 RX ADMIN — LEVETIRACETAM 1000 MG: 500 TABLET, FILM COATED ORAL at 20:33

## 2019-10-09 RX ADMIN — DOCUSATE SODIUM 100 MG: 100 CAPSULE, LIQUID FILLED ORAL at 20:33

## 2019-10-09 RX ADMIN — Medication 5 DROP: at 20:33

## 2019-10-09 ASSESSMENT — ENCOUNTER SYMPTOMS
COUGH: 0
SHORTNESS OF BREATH: 0
FEVER: 0
HEMATURIA: 1
WEAKNESS: 1
ABDOMINAL PAIN: 0
VOMITING: 0
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
## 2019-10-09 NOTE — ED NOTES
Sign out Provider: Srinath  Sign out Plan: 44-year-old male with quadriplegia and multiple ureteral stones status post recent urologic procedure complicated by hemorrhage requiring multiple transfusions and embolization who presents to the emergency department feeling poorly.  Patient has suprapubic catheter with questionable UTI.  Currently awaiting evaluation by urology.  Plan for likely observation admission.    Reassessment: Patient has been seen by neurology and will admit for observation.    Disposition: Admit           Olga Chun MD  10/10/19 5991

## 2019-10-09 NOTE — ED TRIAGE NOTES
"Pt presents via EMS from [facility] in [place] with complaints of just not feeling well. Pt had recent procedure here on 9/30 for kidney stones with stents by Dr. Horn. Pt stating bloody urine via ly is \"getting worse.\" VSS, denies pain.   "

## 2019-10-10 VITALS
SYSTOLIC BLOOD PRESSURE: 113 MMHG | OXYGEN SATURATION: 95 % | RESPIRATION RATE: 18 BRPM | TEMPERATURE: 97.6 F | HEART RATE: 79 BPM | DIASTOLIC BLOOD PRESSURE: 74 MMHG

## 2019-10-10 LAB
ANION GAP SERPL CALCULATED.3IONS-SCNC: 4 MMOL/L (ref 3–14)
BACTERIA SPEC CULT: NO GROWTH
BUN SERPL-MCNC: 16 MG/DL (ref 7–30)
CALCIUM SERPL-MCNC: 8.4 MG/DL (ref 8.5–10.1)
CHLORIDE SERPL-SCNC: 108 MMOL/L (ref 94–109)
CO2 SERPL-SCNC: 28 MMOL/L (ref 20–32)
CREAT SERPL-MCNC: 0.92 MG/DL (ref 0.66–1.25)
ERYTHROCYTE [DISTWIDTH] IN BLOOD BY AUTOMATED COUNT: 17.3 % (ref 10–15)
GFR SERPL CREATININE-BSD FRML MDRD: >90 ML/MIN/{1.73_M2}
GLUCOSE SERPL-MCNC: 87 MG/DL (ref 70–99)
HCT VFR BLD AUTO: 32.6 % (ref 40–53)
HGB BLD-MCNC: 9.7 G/DL (ref 13.3–17.7)
INTERPRETATION ECG - MUSE: NORMAL
Lab: NORMAL
MCH RBC QN AUTO: 27 PG (ref 26.5–33)
MCHC RBC AUTO-ENTMCNC: 29.8 G/DL (ref 31.5–36.5)
MCV RBC AUTO: 91 FL (ref 78–100)
PLATELET # BLD AUTO: 287 10E9/L (ref 150–450)
POTASSIUM SERPL-SCNC: 4.2 MMOL/L (ref 3.4–5.3)
RBC # BLD AUTO: 3.59 10E12/L (ref 4.4–5.9)
SODIUM SERPL-SCNC: 139 MMOL/L (ref 133–144)
SPECIMEN SOURCE: NORMAL
WBC # BLD AUTO: 10.6 10E9/L (ref 4–11)

## 2019-10-10 PROCEDURE — 80048 BASIC METABOLIC PNL TOTAL CA: CPT | Performed by: UROLOGY

## 2019-10-10 PROCEDURE — G0378 HOSPITAL OBSERVATION PER HR: HCPCS

## 2019-10-10 PROCEDURE — 85027 COMPLETE CBC AUTOMATED: CPT | Performed by: UROLOGY

## 2019-10-10 PROCEDURE — 25000132 ZZH RX MED GY IP 250 OP 250 PS 637: Performed by: UROLOGY

## 2019-10-10 PROCEDURE — 36415 COLL VENOUS BLD VENIPUNCTURE: CPT | Performed by: UROLOGY

## 2019-10-10 RX ADMIN — DOCUSATE SODIUM 100 MG: 100 CAPSULE, LIQUID FILLED ORAL at 10:29

## 2019-10-10 RX ADMIN — LEVETIRACETAM 1000 MG: 500 TABLET, FILM COATED ORAL at 10:29

## 2019-10-10 NOTE — ED NOTES
[facility], [place]   ED Nurse to Floor Handoff     Maicol Carrera is a 44 year old male who speaks English and lives with others,  in a nursing home  They arrived in the ED by ambulance from nursing home.    ED Chief Complaint: Hematuria    ED Dx;   Final diagnoses:   Feels sick   Hematuria, unspecified type   Dehydration         Needed?: No    Allergies:   Allergies   Allergen Reactions     Latex Rash   .  Past Medical Hx:   Past Medical History:   Diagnosis Date     Chronic infection     + MRSA     Constipation, chronic      GERD (gastroesophageal reflux disease)      Head injury      Neurogenic bladder     SP catheter     JUAN (obstructive sleep apnea)     bipap     Quadriplegia (H)      Seizure (H)      Spastic quadriplegia (H)      Urinary tract infection       Baseline Mental status: WDL  Current Mental Status changes: at basesline    Infection present or suspected this encounter: yes urinary  Sepsis suspected: No  Isolation type: Contact     Activity level - Baseline/Home:  Total Care  Activity Level - Current:   Total Care    Bariatric equipment needed?: No    In the ED these meds were given:   Medications   acetaminophen (TYLENOL) tablet 650 mg (has no administration in time range)   albuterol (PROVENTIL) neb solution 2.5 mg (has no administration in time range)   baclofen (LIORESAL) tablet 10 mg (has no administration in time range)   bisacodyl (DULCOLAX) Suppository 10 mg (has no administration in time range)   carbamide peroxide (DEBROX) 6.5 % otic solution 5 drop (5 drops Both Ears Given 10/9/19 2033)   docusate sodium (COLACE) capsule 100 mg (100 mg Oral Given 10/9/19 2033)   levETIRAcetam (KEPPRA) tablet 1,000 mg (1,000 mg Oral Given 10/9/19 2033)   ondansetron (ZOFRAN-ODT) ODT tab 4 mg (has no administration in time range)   oxybutynin (DITROPAN) tablet 5 mg (has no administration in time range)   polyvinyl alcohol (LIQUIFILM TEARS) 1.4 % ophthalmic solution 2  drop (has no administration in time range)   naloxone (NARCAN) injection 0.1-0.4 mg (has no administration in time range)   melatonin tablet 1 mg (has no administration in time range)   ondansetron (ZOFRAN) injection 4 mg (has no administration in time range)   sodium chloride 0.9% infusion ( Intravenous New Bag 10/9/19 2209)   prochlorperazine (COMPAZINE) injection 10 mg (has no administration in time range)     Or   prochlorperazine (COMPAZINE) tablet 10 mg (has no administration in time range)     Or   prochlorperazine (COMPAZINE) Suppository 25 mg (has no administration in time range)   0.9% sodium chloride BOLUS (0 mLs Intravenous Stopped 10/9/19 3653)       Drips running?  Yes, NS @ 100 mL/hr    Home pump  No    Current LDAs  Peripheral IV 10/09/19 Right Upper forearm (Active)   Number of days: 0       Suprapubic Catheter Double-lumen previous placement (Active)   Number of days: 581       Ureteral Drain/Stent Left ureter 10 fr (Active)   Number of days: 20       Wound 10/05/19 Medial Sacrum Skin tear (Active)   Number of days: 4       Incision/Surgical Site 03/21/18 Left Flank (Active)   Number of days: 567       Labs results:   Labs Ordered and Resulted from Time of ED Arrival Up to the Time of Departure from the ED   COMPREHENSIVE METABOLIC PANEL - Abnormal; Notable for the following components:       Result Value    Glucose 67 (*)     Albumin 2.7 (*)     All other components within normal limits   INR - Abnormal; Notable for the following components:    INR 1.18 (*)     All other components within normal limits   ROUTINE UA WITH MICROSCOPIC REFLEX TO CULTURE - Abnormal; Notable for the following components:    Specific Gravity Urine >1.035 (*)     Blood Urine Large (*)     Protein Albumin Urine 100 (*)     Leukocyte Esterase Urine Large (*)     WBC Urine 742 (*)     RBC Urine >182 (*)     All other components within normal limits   CBC WITH PLATELETS DIFFERENTIAL - Abnormal; Notable for the following  components:    WBC 12.0 (*)     RBC Count 3.82 (*)     Hemoglobin 10.4 (*)     Hematocrit 33.4 (*)     MCHC 31.1 (*)     RDW 17.4 (*)     Absolute Neutrophil 8.8 (*)     All other components within normal limits   GLUCOSE BY METER - Abnormal; Notable for the following components:    Glucose 133 (*)     All other components within normal limits   GLUCOSE BY METER - Abnormal; Notable for the following components:    Glucose 118 (*)     All other components within normal limits   CBC WITH PLATELETS DIFFERENTIAL   LIPASE   GLUCOSE BY METER   GLUCOSE MONITOR NURSING POCT   BLADDER SCAN   PERIPHERAL IV CATHETER   IP ASSIGN PROVIDER TEAM TO TREATMENT TEAM   OBSERVATION GOALS   ASSESS   NOTIFY PHYSICIAN   MEASURE HEIGHT AND WEIGHT   VITAL SIGNS   NOTIFY   NOTIFY   ACTIVITY   URINE CULTURE AEROBIC BACTERIAL       Imaging Studies: No results found for this or any previous visit (from the past 24 hour(s)).    Recent vital signs:   BP (!) 145/79   Pulse 78   Temp 97.6  F (36.4  C) (Oral)   Resp 18   SpO2 97%     [place] Coma Scale Score: 15 (10/09/19 1407)       Cardiac Rhythm: Normal Sinus  Pt needs tele? No  Skin/wound Issues: Sacral wound    Code Status: Full Code    Pain control: pt had none    Nausea control: pt had none    Abnormal labs/tests/findings requiring intervention: blood in urine, encouraging IV fuids    Family present during ED course? No   Family Comments/Social Situation comments: Lives in Nursing home    Tasks needing completion: None    Mino Hui RN  asc -- [phone] [facility]  [phone] [facility] ED

## 2019-10-10 NOTE — PROGRESS NOTES
Social Work Services Discharge Note      Patient Name:  Maicol Carrera     Anticipated Discharge Date:  10/10/19 Thursday    Discharge Disposition:   LT:  [place] [address]  Main: [phone]  Admissions: [phone]  Fax: [phone]    Following MD:  per facility's designation     Pre-Admission Screening (PAS) online form has been completed.  The Level of Care (LOC) is:  Determined  Confirmation Code is:  Not needed due to returning back to LTC       Additional Services/Equipment Arranged:  [employer] stretcher transport arranged for immediate pickup ([id number]).       Patient / Family response to discharge plan:  SW met with pt x2.  Pt states that he has in a larger catheter than usual and is worried that the SNF will not have these larger catheters on hand for him.  He asks to talk with Urology.    RENALDO paged TREVA Del Castillo, who states that pt's catheter has not changed.  SW returned to patient who states again that this catheter is larger than his usual.  He states that the hospital usually gives a few catheters to take with him at discharge and he requests this again.  SW updated pt of his discharge ride time and plan.  Pt eager to return to his SNF and glad to hear that his ride is coming soon.  RENALDO updated Karena bedside RN of requests for additional catheters and the ride time.         Persons notified of above discharge plan:  Patient; Karena RN; Mag[facility] PA; [facility] in Admissions at [facility]    Staff Discharge Instructions:  RENALDO faxed the discharge orders, discharge summary, the H&P and progress notes to Юлия in Admissions at the fax number above, per request.  Please print a packet and send with patient.     CTS Handoff completed:  NO    Medicare Notice of Rights provided to the patient/family:  NO    ALEXIS Rush  Social Work Services  Emergency Department   [phone] phone  [phone] pager  9:00am-9:30pm  Mon-Sat    On-call pager, [phone], 4:00pm to midnight

## 2019-10-10 NOTE — PROGRESS NOTES
"Urology  Progress Note    - Pain is \"fine\"  - C/o that Bipap wasn't working well overnight  - Denies N/V  - Overall feeling well    Exam  /77   Pulse 79   Temp 97.6  F (36.4  C) (Oral)   Resp 18   SpO2 98%   No acute distress  Unlabored breathing  Abdomen soft, nontender, nondistended.   Nephroureteral stent clamped  SPT with yellow urine in tubing.     UOP NR/NR    Labs    AM labs pending.    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      Neuro: VIRA, tylenol for pain, oxybutynin as below for bladder spasms. Melatonin for sleep.  CV: VIRA, HDS. Will avoid bladder manipulation if possible to prevent autonomic dysreflexia.   Pulm: incentive spirometry while awake  FEN/GI: Regular diet, MIVF @ 100/hr  Endo: VIRA  : SPT to gravity,   Heme/ID: VIRA, mild leukocytosis, will re-assess after fluids; Will plan to treat for possible UTI  Activity: Repositioning per nursing staff. Movement encouraged.   PPx: SCDs  Dispo: Urology for obs    Seen and examined with the chief resident. Will discuss with Dr. Horn.    Andi Blackwell MD, PGY-2  Urology Resident     Contacting the Urology Team     Please use the following job codes to reach the Urology Team. Note that you must use an in house phone and that job codes cannot receive text pages.     On weekdays, dial [phone] (or star-star-star 777 on the new [employer] telephones) then [phone] to reach the Adult Urology resident or PA on call    On weekdays, dial [phone] (or star-star-star 777 on the new [employer] telephones) then [phone] to reach the Pediatric Urology resident    On weeknights and " weekends, dial [phone] (or star-star-star 777 on the new [employer] telephones) then [phone] to reach the Urology resident on call (for both Adult and Pediatrics)

## 2019-10-10 NOTE — DISCHARGE SUMMARY
[facility] UroDischarge Summary    Patient: Maicol Carrera    MRN: [id number]   : 1975         Date of Admission:  10/9/2019   Date of Discharge::  10/10/2019  Admitting Physician:  Dejon Horn MD  Discharge Physician:  Chacha Monroe PA-C             Admission Diagnoses:   Malaise and fatigue    Past Medical History:   Diagnosis Date     Chronic infection     + MRSA     Constipation, chronic      GERD (gastroesophageal reflux disease)      Head injury      Neurogenic bladder     SP catheter     JUAN (obstructive sleep apnea)     bipap     Quadriplegia (H)      Seizure (H)      Spastic quadriplegia (H)      Urinary tract infection              Discharge Diagnosis:   Gross hematuria    Past Medical History:   Diagnosis Date     Chronic infection     + MRSA     Constipation, chronic      GERD (gastroesophageal reflux disease)      Head injury      Neurogenic bladder     SP catheter     JUAN (obstructive sleep apnea)     bipap     Quadriplegia (H)      Seizure (H)      Spastic quadriplegia (H)      Urinary tract infection             Procedures:   Procedure(s): No admission procedures for hospital encounter.             Medications Prior to Admission:   (Not in a hospital admission)            Discharge Medications:     Current Discharge Medication List      CONTINUE these medications which have NOT CHANGED    Details   ACETAMINOPHEN PO Take 650 mg by mouth every 4 hours as needed for pain      albuterol (2.5 MG/3ML) 0.083% nebulizer solution Take 3 mLs by nebulization every 2 hours as needed.  Qty: 1 Box    Associated Diagnoses: SOB (shortness of breath)      baclofen (LIORESAL) 10 MG tablet Take 10 mg by mouth every 4 hours as needed       BACLOFEN IT Via implanted IT pump  Baclofen Concentration 2000 mc/gmL   Dose: 277.1 mcg/day  Low reservoir alarm date: 3/4/2020  Low reservoir Alarm Volume: 2ml  Interrogated 19  Pump is managed by Ulices Roth      bisacodyl (DULCOLAX) 10 MG  suppository Place 1 suppository rectally every other day.  Qty: 12 suppository    Associated Diagnoses: Chronic constipation      carbamide peroxide (DEBROX) 6.5 % otic solution 5 drops 2 times daily      cholecalciferol (VITAMIN D3) 5000 units (125 mcg) capsule TAKE 1 CAP BY MOUTH ONCE DAILY FOR VIT-D DEFICIENCY  Refills: 99      clindamycin (CLINDAMAX) 1 % topical gel Apply topically 2 times daily      Cranberry 400 MG TABS Take 400 mg by mouth 2 times daily      cyanocolbalamin (VITAMIN B-12) 1000 MCG tablet Take 1 tablet by mouth daily.    Associated Diagnoses: Vitamin B 12 deficiency      docusate sodium (COLACE) 100 MG capsule Take 100 mg by mouth 2 times daily.      ferrous sulfate 325 (65 FE) MG tablet Take 1 tablet by mouth daily (with breakfast).  Qty: 100 tablet    Associated Diagnoses: Anemia due to blood loss, acute      ketoconazole (NIZORAL) 2 % cream Apply topically 2 times daily as needed      levETIRAcetam (KEPPRA) 1000 MG tablet TAKE 1 TAB BY MOUTH TWICE A DAY FOR CONVULSIONS  Refills: 99      !! MAG-G 500 (27 Mg) MG tablet TAKE 1 TABLET BY MOUTH EVERY OTHER DAY FOR HYPERMAGNESIUM  Refills: 99      !! magnesium gluconate (MAGONATE) 500 MG tablet Take 1 tablet by mouth daily.    Associated Diagnoses: Magnesium deficiency      metroNIDAZOLE (METROLOTION) 0.75 % external lotion       miconazole (MICATIN; MICRO GUARD) 2 % powder Apply  topically 2 times daily.  Qty: 70 g    Associated Diagnoses: Candida infection of flexural skin      Miconazole POWD       mometasone (ELOCON) 0.1 % cream Apply topically At Bedtime      Multiple Vitamins-Minerals (MULTIVITAMIN ADULT PO) Take 1 tablet by mouth daily      Nutritional Supplements (OSTEO-MULTIVITAMINS/MINERALS OR) Take  by mouth daily. 12 pm        ondansetron (ZOFRAN-ODT) 4 MG disintegrating tablet Take 1 tablet by mouth every 6 hours as needed for nausea.  Qty: 20 tablet    Associated Diagnoses: Nausea and vomiting      oxybutynin (DITROPAN) 5 MG tablet  Take 1 tablet (5 mg) by mouth 3 times daily as needed for bladder spasms  Qty: 90 tablet, Refills: 11    Associated Diagnoses: Bladder spasms      polyethylene glycol (MIRALAX/GLYCOLAX) powder Take 1 capful by mouth daily as needed for constipation (if no BM x3 days)      polyvinyl alcohol (LIQUIFILM TEARS) 1.4 % ophthalmic solution Place 2 drops into both eyes every 2 hours as needed for dry eyes      selenium sulfide (SELSUN) 2.5 % lotion Apply to scalp topically in the morning every Sun, Tue, Thursday. Place for 5 min then rinse.      senna-docusate (SENOKOT-S;PERICOLACE) 8.6-50 MG per tablet Take 1-2 tablets by mouth 2 times daily.  Qty: 60 tablet    Associated Diagnoses: Chronic constipation      vitamin C 250 MG TABS Take 1 tablet by mouth 2 times daily.    Associated Diagnoses: Recurrent UTI      zinc oxide (DESITIN) 40 % ointment Apply  topically every hour as needed.  Qty: 56.7 g    Associated Diagnoses: Breakdown of skin tissue       !! - Potential duplicate medications found. Please discuss with provider.                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 Patient was discharged from the hospital on 10/6 after hospitalization for the above. He did have hematuria from his SPT at the time of discharge but no concern for clot retention. He did require clot irrigation after his significant left renal bleed and this caused autonomic dysreflexia. Since discharge to his nursing home his  hematuria has persisted (has not seen clots) and SPT has been draining well. He has also felt generally weak, which brought him to the ED today. He denies any fevers, chills, nausea, vomiting, chest pain, shortness of breath, vomiting, or abdominal pain. He has noticed occasional generalized body aches intermittently over the last few days. His nephroureteral stent is still capped as instructed (until IR follow-up).            Hospital Course:   Mr. Carrera as admitted to the Urology Observation service.  There his hospital course was unremarkable.  Vitals remained appropriate and there was no suggestion of infection.  No antibiotics were begun. Labs also remained within normal limits, with mild leukocytosis normalizing with fluids.  Ultimately it was felt that Maicol was dehydrated at presentation as he felt 100% better after IV fluids.     His Belle also required manual irrigation, although a few clots in the bladder cleared easily and SP tube change was never required.  Prior to discharge on HD#1 the urine was barely pink-tinged and flowing easily through the catheter.     On HD#1 his activity was at baseline, he was tolerating the discharge diet, had pain controlled with PO medications to go home with, and was requiring no IV medications or fluids. He was discharged to his nursing facility with appropriate contact information, follow-up and instructions as seen below in the discharge paperwork.         Discharge Labs:     No results found for: PSA  Recent Labs   Lab 10/10/19  0601 10/09/19  1645 10/09/19  1453 10/06/19  1011   WBC 10.6 12.0* Canceled, Test credited 9.9   HGB 9.7* 10.4* Canceled, Test credited 8.8*    250 Canceled, Test credited 242     Recent Labs   Lab 10/10/19  0601 10/09/19  1453 10/06/19  1011 10/05/19  0336 10/04/19  2147 10/04/19  0337    140 142 143 141 146*   POTASSIUM 4.2 4.4 4.0 4.0 3.6 3.6   CHLORIDE 108 105 111* 110* 108 116*   CO2 28 27 28 25 22 21   BUN 16 17 19 20 20 16    CR 0.92 0.97 1.23 1.42* 1.42* 1.29*   GLC 87 67* 83 65* 153* 96   BALAJI 8.4* 9.0 8.4* 8.2* 8.1* 7.4*   MAG  --   --   --  2.1 1.9 1.8   PHOS  --   --   --  4.2 3.7 3.6     Recent Labs   Lab 10/09/19  1707   COLOR Red   APPEARANCE Slightly Cloudy   URINEGLC Negative   URINEBILI Negative   URINEKETONE Negative   SG >1.035*   URINEPH 7.0   PROTEIN 100*   NITRITE Negative   LEUKEST Large*   RBCU >182*   WBCU 742*     Results for orders placed or performed during the hospital encounter of 10/09/19   Urine Culture Aerobic Bacterial   Result Value Ref Range    Specimen Description Catheterized Urine     Special Requests Specimen received in preservative     Culture Micro Culture negative < 24 hours, reincubate    Results for orders placed or performed in visit on 09/12/19   Urine Culture Aerobic Bacterial   Result Value Ref Range    Specimen Description Catheterized Urine NEPHROSTOMY TUBE     Culture Micro (A)      50,000 to 100,000 colonies/mL  Morganella (Proteus) morganii      Culture Micro (A)      10,000 to 50,000 colonies/mL  Pseudomonas aeruginosa      Culture Micro (A)      10,000 to 50,000 colonies/mL  Strain 2  Pseudomonas aeruginosa, mucoid strain      Culture Micro 10,000 to 50,000 colonies/mL  Proteus mirabilis   (A)     Culture Micro (A)      10,000 to 50,000 colonies/mL  Methicillin resistant Staphylococcus aureus (MRSA)      Culture Micro       No further identification  Susceptibility testing not routinely done      Culture Micro       Susceptibility testing requested by  [place] pager [phone] for ID and sens on all isolates at 1040 on 9.19.19. CD.         Susceptibility    Morganella (proteus) morganii - MARY     AMPICILLIN* >=32 Resistant ug/mL      * Intrinsically Resistant     CEFAZOLIN* >=64 Resistant ug/mL      * Cefazolin MARY breakpoints are for the treatment of uncomplicated urinary tract infections.  For the treatment of systemic infections, please contact the laboratory for additional  testing.Intrinsically Resistant     CEFOXITIN 16 Intermediate ug/mL     CEFTAZIDIME <=1 Sensitive ug/mL     CEFTRIAXONE <=1 Sensitive ug/mL     CIPROFLOXACIN >=4 Resistant ug/mL     GENTAMICIN <=1 Sensitive ug/mL     LEVOFLOXACIN >=8 Resistant ug/mL     NITROFURANTOIN*  Resistant ug/mL      * Intrinsically Resistant     TOBRAMYCIN <=1 Sensitive ug/mL     Trimethoprim/Sulfa >=16/304 Resistant ug/mL     AMPICILLIN/SULBACTAM >=32 Resistant ug/mL     Piperacillin/Tazo <=4 Sensitive ug/mL     CEFEPIME <=1 Sensitive ug/mL    Methicillin resistant staphylococcus aureus (mrsa) - MARY     GENTAMICIN <=0.5 Sensitive ug/mL     NITROFURANTOIN <=16 Sensitive ug/mL     OXACILLIN >=4 Resistant ug/mL     TETRACYCLINE <=1 Sensitive ug/mL     Trimethoprim/Sulfa <=0.5/9.5 Sensitive ug/mL     VANCOMYCIN 1 Sensitive ug/mL     LINEZOLID 2 Sensitive ug/mL    Proteus mirabilis - MARY     AMPICILLIN <=2 Sensitive ug/mL     CEFAZOLIN* <=4 Sensitive ug/mL      * Cefazolin MARY breakpoints are for the treatment of uncomplicated urinary tract infections.  For the treatment of systemic infections, please contact the laboratory for additional testing.Intrinsically ResistantCefazolin MARY breakpoints are for the treatment of uncomplicated urinary tract infections.  For the treatment of systemic infections, please contact the laboratory for additional testing.     CEFOXITIN 8 Sensitive ug/mL     CEFTAZIDIME <=1 Sensitive ug/mL     CEFTRIAXONE <=1 Sensitive ug/mL     CIPROFLOXACIN >=4 Resistant ug/mL     GENTAMICIN <=1 Sensitive ug/mL     LEVOFLOXACIN >=8 Resistant ug/mL     NITROFURANTOIN* 128 Resistant ug/mL      * Intrinsically Resistant     TOBRAMYCIN <=1 Sensitive ug/mL     Trimethoprim/Sulfa >=16/304 Resistant ug/mL     AMPICILLIN/SULBACTAM <=2 Sensitive ug/mL     Piperacillin/Tazo <=4 Sensitive ug/mL     CEFEPIME <=1 Sensitive ug/mL    Pseudomonas aeruginosa - MARY     AMIKACIN 4 Sensitive ug/mL     CEFEPIME 4 Sensitive ug/mL     CEFTAZIDIME <=1  Sensitive ug/mL     CIPROFLOXACIN >=4 Resistant ug/mL     GENTAMICIN 2 Sensitive ug/mL     LEVOFLOXACIN >=8 Resistant ug/mL     Piperacillin/Tazo <=4 Sensitive ug/mL     TOBRAMYCIN <=1 Sensitive ug/mL     MEROPENEM <=0.25 Sensitive ug/mL       Results for orders placed or performed during the hospital encounter of 10/09/19   CBC with platelets differential   Result Value Ref Range    WBC Canceled, Test credited 4.0 - 11.0 10e9/L    RBC Count Canceled, Test credited 4.4 - 5.9 10e12/L    Hemoglobin Canceled, Test credited 13.3 - 17.7 g/dL    Hematocrit Canceled, Test credited 40.0 - 53.0 %    MCV Canceled, Test credited 78 - 100 fl    MCH Canceled, Test credited 26.5 - 33.0 pg    MCHC Canceled, Test credited 31.5 - 36.5 g/dL    RDW Canceled, Test credited 10.0 - 15.0 %    Platelet Count Canceled, Test credited 150 - 450 10e9/L    Diff Method Canceled, Test credited    Comprehensive metabolic panel   Result Value Ref Range    Sodium 140 133 - 144 mmol/L    Potassium 4.4 3.4 - 5.3 mmol/L    Chloride 105 94 - 109 mmol/L    Carbon Dioxide 27 20 - 32 mmol/L    Anion Gap 8 3 - 14 mmol/L    Glucose 67 (L) 70 - 99 mg/dL    Urea Nitrogen 17 7 - 30 mg/dL    Creatinine 0.97 0.66 - 1.25 mg/dL    GFR Estimate >90 >60 mL/min/[1.73_m2]    GFR Estimate If Black >90 >60 mL/min/[1.73_m2]    Calcium 9.0 8.5 - 10.1 mg/dL    Bilirubin Total 0.3 0.2 - 1.3 mg/dL    Albumin 2.7 (L) 3.4 - 5.0 g/dL    Protein Total 7.6 6.8 - 8.8 g/dL    Alkaline Phosphatase 66 40 - 150 U/L    ALT 14 0 - 70 U/L    AST 15 0 - 45 U/L   Lipase   Result Value Ref Range    Lipase 206 73 - 393 U/L   INR   Result Value Ref Range    INR 1.18 (H) 0.86 - 1.14   UA with Microscopic reflex to Culture   Result Value Ref Range    Color Urine Red     Appearance Urine Slightly Cloudy     Glucose Urine Negative NEG^Negative mg/dL    Bilirubin Urine Negative NEG^Negative    Ketones Urine Negative NEG^Negative mg/dL    Specific Gravity Urine >1.035 (H) 1.003 - 1.035    Blood  Urine Large (A) NEG^Negative    pH Urine 7.0 5.0 - 7.0 pH    Protein Albumin Urine 100 (A) NEG^Negative mg/dL    Urobilinogen mg/dL Normal 0.0 - 2.0 mg/dL    Nitrite Urine Negative NEG^Negative    Leukocyte Esterase Urine Large (A) NEG^Negative    Source Clean catch urine     WBC Urine 742 (H) 0 - 5 /HPF    RBC Urine >182 (H) 0 - 2 /HPF   CBC with platelets differential   Result Value Ref Range    WBC 12.0 (H) 4.0 - 11.0 10e9/L    RBC Count 3.82 (L) 4.4 - 5.9 10e12/L    Hemoglobin 10.4 (L) 13.3 - 17.7 g/dL    Hematocrit 33.4 (L) 40.0 - 53.0 %    MCV 87 78 - 100 fl    MCH 27.2 26.5 - 33.0 pg    MCHC 31.1 (L) 31.5 - 36.5 g/dL    RDW 17.4 (H) 10.0 - 15.0 %    Platelet Count 250 150 - 450 10e9/L    Diff Method Automated Method     % Neutrophils 73.6 %    % Lymphocytes 13.0 %    % Monocytes 6.2 %    % Eosinophils 4.2 %    % Basophils 0.9 %    % Immature Granulocytes 2.1 %    Nucleated RBCs 0 0 /100    Absolute Neutrophil 8.8 (H) 1.6 - 8.3 10e9/L    Absolute Lymphocytes 1.6 0.8 - 5.3 10e9/L    Absolute Monocytes 0.8 0.0 - 1.3 10e9/L    Absolute Eosinophils 0.5 0.0 - 0.7 10e9/L    Absolute Basophils 0.1 0.0 - 0.2 10e9/L    Abs Immature Granulocytes 0.3 0 - 0.4 10e9/L    Absolute Nucleated RBC 0.0     Platelet Estimate Confirming automated cell count    Glucose by meter   Result Value Ref Range    Glucose 81 70 - 99 mg/dL   Glucose by meter   Result Value Ref Range    Glucose 133 (H) 70 - 99 mg/dL   Glucose by meter   Result Value Ref Range    Glucose 118 (H) 70 - 99 mg/dL   Basic metabolic panel   Result Value Ref Range    Sodium 139 133 - 144 mmol/L    Potassium 4.2 3.4 - 5.3 mmol/L    Chloride 108 94 - 109 mmol/L    Carbon Dioxide 28 20 - 32 mmol/L    Anion Gap 4 3 - 14 mmol/L    Glucose 87 70 - 99 mg/dL    Urea Nitrogen 16 7 - 30 mg/dL    Creatinine 0.92 0.66 - 1.25 mg/dL    GFR Estimate >90 >60 mL/min/[1.73_m2]    GFR Estimate If Black >90 >60 mL/min/[1.73_m2]    Calcium 8.4 (L) 8.5 - 10.1 mg/dL   CBC with platelets    Result Value Ref Range    WBC 10.6 4.0 - 11.0 10e9/L    RBC Count 3.59 (L) 4.4 - 5.9 10e12/L    Hemoglobin 9.7 (L) 13.3 - 17.7 g/dL    Hematocrit 32.6 (L) 40.0 - 53.0 %    MCV 91 78 - 100 fl    MCH 27.0 26.5 - 33.0 pg    MCHC 29.8 (L) 31.5 - 36.5 g/dL    RDW 17.3 (H) 10.0 - 15.0 %    Platelet Count 287 150 - 450 10e9/L   EKG 12-lead, tracing only   Result Value Ref Range    Interpretation ECG Click View Image link to view waveform and result    Urine Culture Aerobic Bacterial   Result Value Ref Range    Specimen Description Catheterized Urine     Special Requests Specimen received in preservative     Culture Micro Culture negative < 24 hours, reincubate             Discharge Instructions and Follow-Up:   GENERAL ORDERS:  - RESUME STANDARD NURSING HOME CARES     Diet:  - Regular/ home diet     Activity:   - No strenuous exercise for 6 weeks.   - No lifting, pushing, pulling more than 10 pounds for 6 weeks. Take care when pushing with your arms to stand up.  - Do not strain your belly area.  When you bend, sit up or twice, you could strain the area around your incision.    - Do not strain with bowel movements.       Medications:   1) PAIN: Continue Tylenol (acetaminophen 625mg) as directed for pain.   These have been prescribed for you.  Do not take more than 4,000mg of Tylenol (acetaminophen/ APAP) from all sources in any 24 hour period since this can cause liver damage.  Never drive, operate machinery or drink alcoholic beverages while you are taking narcotic pain medications.       2) CONSTIPATION: Resume home bowel regimen, but reduce or stop if you develop loose stools. Other over the counter solutions such as prune juice, miralax, fiber products, senna, and dulcolax can also be used. If you are taking the pericolace but still have not had a bowel movement in 3 days, start over-the-counter Milk of Magnesia taken twice daily until you have a nice bowel movement.  Call the office with any concerns.      3)  "ANTIBIOTICS:  - PICC discontinued prior to discharge, no further IV antibiotics indicated at this time     URINARY DRAINAGE TUBES:  1) Percutaneous Nephrostomy Tube (PNT):  - This was capped, please keep capped until follow up with IR, take care to avoid inadvertently dislodging      2) Suprapubic Urinary Drainage Belle Catheter:  - Resume standard cares including SP tube changes every 2-4 weeks or per previous     PULMONOLOGY CARES:  - Resume [id number] BiPAP, CPAP and Oxygen, as previously ordered by Mr. Cordova's home medical team       Follow-Up:   - Schedule an appointment to be seen by your primary care provider within 7-10 days of discharge for a postoperative checkup.   - Follow up with Dr. Horn in 2-3 weeks will be arranged for possible PNT removal and routine follow up  - Call or return sooner than your regularly scheduled visit if you develop any of the following:  Fever (greater than 101.3F), uncontrolled pain, uncontrolled nausea or vomiting, concerns about bowel function, as well as increased redness, swelling, drainage from your wound or any concerns about urinating or urinary catheter drainage.       Here are some phone numbers where you can reach us:  - Monday through Friday, 8am to 4:30pm - as long as it is not a holiday, IT IS BEST to call the Urology Clinic Triage Line at: [phone] with any non-emergent urology concerns.    - If it is a night, weekend, or holiday call the [facility] number at [phone] and ask the  to page the \"urology resident on call\".  Typically, the on-call provider should return your call within 30 minutes.  Please page the on-call provider again if you haven't been contacted as expected.  Rarely, the on-call provider will be unable to promptly return a call due to a hospital emergency.  If you have paged twice and are still not contacted, ask the hospital  to page the \"urology CHIEF-RESIDENT on call\".  - FOR EMERGENCIES, always call " 911 or go to the Emergency Department         Discharge Disposition:   Discharged to his nursing facility      Attestation: I have reviewed today's vital signs, notes, medications, labs and imaging.    Magdalene Monroe PA-C  Urology Physician Assistant  Personal Pager: [phone]    Please call Job Code:   [phone] to reach the Urology resident or PA on call - Weekdays  [phone] to reach the Urology resident or PA on call - Weeknights and weekends    October 10, 2019

## 2019-10-10 NOTE — H&P
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 Patient was discharged from the hospital on 10/6 after hospitalization for the above. He did have hematuria from his SPT at the time of discharge but no concern for clot retention. He did require clot irrigation after his significant left renal bleed and this caused autonomic dysreflexia. Since discharge to his nursing home his hematuria has persisted (has not seen clots) and SPT has been draining well. He has also felt generally weak, which brought him to the ED today. He denies any fevers, chills, nausea, vomiting, chest pain, shortness of breath, vomiting, or abdominal pain. He has noticed occasional generalized body aches intermittently over the last few days. His nephroureteral stent is still capped as instructed (until IR follow-up).           Past Medical History:     Past Medical History:   Diagnosis Date     Chronic infection     + MRSA     Constipation, chronic      GERD (gastroesophageal reflux disease)      Head injury      Neurogenic bladder     SP catheter     JUAN (obstructive sleep apnea)     bipap     Quadriplegia (H)      Seizure (H)      Spastic quadriplegia (H)      Urinary tract infection             Past Surgical History:     Past Surgical History:   Procedure Laterality Date     APPENDECTOMY OPEN       BACK SURGERY       GENITOURINARY SURGERY       INSERT PUMP BACLOFEN        IR NEPHROSTOMY TUBE CHANGE LEFT  9/19/2019     IR NEPHROSTOMY TUBE PLACEMENT LEFT  7/15/2019     IR PICC PLACEMENT > 5 YRS OF AGE  7/19/2019     IR PICC PLACEMENT > 5 YRS OF AGE  9/27/2019     LASER HOLMIUM NEPHROLITHOTOMY VIA PERCUTANEOUS NEPHROSTOMY  10/19/2011    Procedure:LASER HOLMIUM NEPHROLITHOTOMY VIA PERCUTANEOUS NEPHROSTOMY; Left Ureteral Stent Placement, Left Percutaneous Access, Left Percutaneous Nephrostomy  *Latex Allergy*  ; Surgeon:YAN ADDISON; Location:[facility]     LASER HOLMIUM NEPHROLITHOTOMY VIA PERCUTANEOUS NEPHROSTOMY Left 3/7/2018    Procedure: LASER HOLMIUM NEPHROLITHOTOMY VIA PERCUTANEOUS NEPHROSTOMY;  Left Percutaneous Nephrolithotomy, Access To Kidney, Ureteroscopy, Cystoscopy, Stent Placement With Holmium Laser standby;  Surgeon: Dejon Horn MD;  Location: [facility] OR     LASER HOLMIUM NEPHROLITHOTOMY VIA PERCUTANEOUS NEPHROSTOMY Left 3/21/2018    Procedure: LASER HOLMIUM NEPHROLITHOTOMY VIA PERCUTANEOUS NEPHROSTOMY;  Left Holmium Laser Percutaneous Nephrolithotomy, Access To Kidney, Left Ureteroscopy, Stent Placement  general with block **Latex Allergy**;  Surgeon: Dejon Horn MD;  Location: [facility]     LASER HOLMIUM NEPHROLITHOTOMY VIA PERCUTANEOUS NEPHROSTOMY Left 6/6/2018    Procedure: LASER HOLMIUM NEPHROLITHOTOMY VIA PERCUTANEOUS NEPHROSTOMY;  Left Percutaneous Nephrolithotomy,  Ureteroscopy, retrogrades, extraction of stones with basket, laser standby ;  Surgeon: Dejon Horn MD;  Location: [facility]     LASER HOLMIUM NEPHROLITHOTOMY VIA PERCUTANEOUS NEPHROSTOMY Left 7/29/2019    Procedure: Left Percutaneous Nephrolithotomy, Access To Kidney, Ureteroscopy, Cystoscopy, Stent Placement with Holmium Laser on standby;  Surgeon: Dejon Horn MD;  Location: [facility] OR     LASER HOLMIUM NEPHROLITHOTOMY VIA PERCUTANEOUS NEPHROSTOMY Left 9/30/2019    Procedure: Left Percutaneous Nephrolithotomy, Access To Kidney, Ureteroscopy, Cystoscopy, Stent Placement With  Holmium Laser **Latex Allergy**;  Surgeon: Dejon Horn MD;  Location: [facility] OR     ORTHOPEDIC SURGERY       supra pubic catheter              Social History:     Social History     Tobacco Use     Smoking status: Former Smoker     Packs/day: 0.30     Years: 5.00     Pack years: 1.50     Types: Cigarettes     Last attempt to quit: 1994     Years since quittin.0     Smokeless tobacco: Former User     Quit date:    Substance Use Topics     Alcohol use: No            Family History:   No family history on file.         Allergies:     Allergies   Allergen Reactions     Latex Rash            Medications:     Current Facility-Administered Medications   Medication     0.9% sodium chloride BOLUS     Current Outpatient Medications   Medication Sig     ACETAMINOPHEN PO Take 650 mg by mouth every 4 hours as needed for pain     albuterol (2.5 MG/3ML) 0.083% nebulizer solution Take 3 mLs by nebulization every 2 hours as needed.     baclofen (LIORESAL) 10 MG tablet Take 10 mg by mouth every 4 hours as needed      BACLOFEN IT Via implanted IT pump  Baclofen Concentration 2000 mc/gmL   Dose: 277.1 mcg/day  Low reservoir alarm date: 3/4/2020  Low reservoir Alarm Volume: 2ml  Interrogated 19  Pump is managed by Ulices Roth     bisacodyl (DULCOLAX) 10 MG suppository Place 1 suppository rectally every other day.     carbamide peroxide (DEBROX) 6.5 % otic solution 5 drops 2 times daily     cholecalciferol (VITAMIN D3) 5000 units (125 mcg) capsule TAKE 1 CAP BY MOUTH ONCE DAILY FOR VIT-D DEFICIENCY     clindamycin (CLINDAMAX) 1 % topical gel Apply topically 2 times daily     Cranberry 400 MG TABS Take 400 mg by mouth 2 times daily     cyanocolbalamin (VITAMIN B-12) 1000 MCG tablet Take 1 tablet by mouth daily.     docusate sodium (COLACE) 100 MG capsule Take 100 mg by mouth 2 times daily.     ferrous sulfate 325 (65 FE) MG tablet Take 1 tablet by mouth daily (with breakfast).     ketoconazole (NIZORAL) 2 %  cream Apply topically 2 times daily as needed     levETIRAcetam (KEPPRA) 1000 MG tablet TAKE 1 TAB BY MOUTH TWICE A DAY FOR CONVULSIONS     MAG-G 500 (27 Mg) MG tablet TAKE 1 TABLET BY MOUTH EVERY OTHER DAY FOR HYPERMAGNESIUM     magnesium gluconate (MAGONATE) 500 MG tablet Take 1 tablet by mouth daily.     metroNIDAZOLE (METROLOTION) 0.75 % external lotion      miconazole (MICATIN; MICRO GUARD) 2 % powder Apply  topically 2 times daily.     Miconazole POWD      mometasone (ELOCON) 0.1 % cream Apply topically At Bedtime     Multiple Vitamins-Minerals (MULTIVITAMIN ADULT PO) Take 1 tablet by mouth daily     Nutritional Supplements (OSTEO-MULTIVITAMINS/MINERALS OR) Take  by mouth daily. 12 pm       ondansetron (ZOFRAN-ODT) 4 MG disintegrating tablet Take 1 tablet by mouth every 6 hours as needed for nausea.     oxybutynin (DITROPAN) 5 MG tablet Take 1 tablet (5 mg) by mouth 3 times daily as needed for bladder spasms     polyethylene glycol (MIRALAX/GLYCOLAX) powder Take 1 capful by mouth daily as needed for constipation (if no BM x3 days)     polyvinyl alcohol (LIQUIFILM TEARS) 1.4 % ophthalmic solution Place 2 drops into both eyes every 2 hours as needed for dry eyes     selenium sulfide (SELSUN) 2.5 % lotion Apply to scalp topically in the morning every Sun, Tue, Thursday. Place for 5 min then rinse.     senna-docusate (SENOKOT-S;PERICOLACE) 8.6-50 MG per tablet Take 1-2 tablets by mouth 2 times daily.     vitamin C 250 MG TABS Take 1 tablet by mouth 2 times daily.     zinc oxide (DESITIN) 40 % ointment Apply  topically every hour as needed.             Review of Systems:    ROS: 10 point ROS neg other than the symptoms noted above in the HPI           Physical Exam:   VS:  T: 97.4    HR: 78    BP: 122/82    RR: 18   GEN:  AOx3.  NAD.    CV:  RRR  LUNGS: Non-labored breathing on room air.  BACK:  No midline or CVA tenderness. Left nephroureteral stent in place, capped.  ABD:  Soft.  NT.  ND.  No rebound or  guarding.  No masses.  : SPT in place draining strawberry red urine with no visible clots, enforcement dressing in place, minimal saturation  EXT:  Warm, well perfused, deconditioned/atrophic extremities.  SKIN:  Warm.  Dry.  No rashes.  NEURO:  Quadriplegic           Data:   All laboratory data reviewed:    Recent Labs   Lab 10/09/19  1645 10/09/19  1453 10/06/19  1011 10/05/19  0336   WBC 12.0* Canceled, Test credited 9.9 10.9   HGB 10.4* Canceled, Test credited 8.8* 9.0*    Canceled, Test credited 242 243     Recent Labs   Lab 10/09/19  1453 10/06/19  1011 10/05/19  0336 10/04/19  2147 10/04/19  0337    142 143 141 146*   POTASSIUM 4.4 4.0 4.0 3.6 3.6   CHLORIDE 105 111* 110* 108 116*   CO2 27 28 25 22 21   BUN 17 19 20 20 16   CR 0.97 1.23 1.42* 1.42* 1.29*   GLC 67* 83 65* 153* 96   BALAJI 9.0 8.4* 8.2* 8.1* 7.4*   MAG  --   --  2.1 1.9 1.8   PHOS  --   --  4.2 3.7 3.6     Recent Labs   Lab 10/09/19  1707   COLOR Red   APPEARANCE Slightly Cloudy   URINEGLC Negative   URINEBILI Negative   URINEKETONE Negative   SG >1.035*   URINEPH 7.0   PROTEIN 100*   NITRITE Negative   LEUKEST Large*   RBCU >182*   WBCU 742*       All pertinent imaging reviewed:  No recent imaging.         Impression and Plan:   Impression:   Maicol Carrera is a 44 year old male with a PMH of spastic quadriplegia due to C4 SCI and TBI at age 19, TBI, seizures, and urologic history of NGB and bilateral kidney stones, s/p left PCNL and nephroureteral stent placement complicated by post-op renal hemorrhage requiring massive transfusion, SICU admission and IR embolization on 10/3 who presents with ongoing hematuria and generalized malaise. Workup largely negative in the ED - hemoglobin continuing to improve since time of discharge, not concerning for hemodynamically-compromising bleed, and patient has been afebrile. UA consistent with colonized urine and wouldn't treat with antibiotics without fevers or localizing signs of  infections/positive UC. PVR 50 mL in the ED so no evidence for clot retention and no indication for irrigation at this time. Patient appeared dehydrated in ED, given 1L bolus and plan will be to continue fluid resuscuitation. Given ongoing bleeding and malaise urology will admit for observation.       Plan:  Neuro: VIRA, tylenol for pain, oxybutynin as below for bladder spasms. [place] for sleep.  CV: VIRA, HDS. Will avoid bladder manipulation if possible to prevent autonomic dysreflexia.   Pulm: incentive spirometry while awake  FEN/GI: Regular diet, MIVF @ 100/hr  Endo: VIRA  : SPT to gravity,   Heme/ID: VIRA, mild leukocytosis, will re-assess after fluids; no indications for abx at this time  Activity: Repositioning per nursing staff. Movement encouraged.   PPx: SCDs  Dispo: Urology for obs      This patient's exam findings, labs, and imaging discussed with urology staff surgeon Dr. Horn, who developed the treatment plan.    Anthony David MD  Urology Resident

## 2019-10-10 NOTE — ED NOTES
Patient repositioned in bed; bed linen, diaper, and suprapubic dressing changed.  Patient had soft brown stool. Belle catheter emptied for 500 mL gross hematuria.  Patient taken off BiPAP as he is awake for the day.  Breakfast ordered for patient.

## 2019-10-10 NOTE — ED NOTES
Attempted to irrigate supra-public catheter with warmed saline; solution flushes easily into bladder; has minimal pink colored urine returns by gravity, but unable to aspirate.  Urology PA arrived and aggressively irrigated catheter with large blood clot return.  Now bladder affluent draining with difficulty.  Patient's nephrostomy dressing changed with sterile 4x4 drain sponges and paper tape.  Patient had clean dry diaper placed and then placed onto hospital bed.

## 2019-10-15 DIAGNOSIS — N20.0 LEFT NEPHROLITHIASIS: Primary | ICD-10-CM

## 2019-10-16 ENCOUNTER — PRE VISIT (OUTPATIENT)
Dept: UROLOGY | Facility: CLINIC | Age: 44
End: 2019-10-16

## 2019-10-18 ENCOUNTER — OFFICE VISIT (OUTPATIENT)
Dept: UROLOGY | Facility: CLINIC | Age: 44
End: 2019-10-18
Payer: COMMERCIAL

## 2019-10-18 VITALS — DIASTOLIC BLOOD PRESSURE: 80 MMHG | SYSTOLIC BLOOD PRESSURE: 137 MMHG | RESPIRATION RATE: 18 BRPM | HEART RATE: 62 BPM

## 2019-10-18 DIAGNOSIS — N20.0 KIDNEY STONE: Primary | ICD-10-CM

## 2019-10-18 ASSESSMENT — PAIN SCALES - GENERAL: PAINLEVEL: NO PAIN (0)

## 2019-10-18 NOTE — PATIENT INSTRUCTIONS
Follow up in six (6) weeks with a Renal US with Dr. Horn.    Schedule appointment with Interventional Radiology for nephrostomy tube removal.    It was a pleasure meeting with you today.  Thank you for allowing me and my team the privilege of caring for you today.  YOU are the reason we are here, and I truly hope we provided you with the excellent service you deserve.  Please let us know if there is anything else we can do for you so that we can be sure you are leaving completely satisfied with your care experience.      Joshua Wong, EMT

## 2019-10-18 NOTE — PROGRESS NOTES
Urology Clinic    Dejon Horn MD  Date of Service: 10/18/2019     Name: Maicol Carrera  MRN: [id number]  Age: 44 year old  : 1975  Referring provider: Nancy Ag     Assessment and Plan:  Assessment:  Maicol Carrera  is a 44 year old male with a history of nephrolithiasis s/p percutaneous nephrolithotomy with left renal stones. Right kidney function is 20 percent, left kidney function is 80 percent. His 10/2019 2nd look PERCUTANEOUS NEPHROLITHOTOMY was complicated by need for embolization to control bleeding.    There is known residual stone, but he has decided to forgo any further procedures given the complications of his last procedure.    Plan:  - He will follow up with IR for nephrostomy tube removal.   - Follow up in 6 weeks with renal ultrasound.   ______________________________________________________________________    HPI  Maicol Carrera is a 44 year old male with a history of quadriplegia, and neurogenic bladder, managed with an indwelling suprapubic catheter, here for follow-up regarding left-sided nephrolithiasis. He has a history of recurrent urolithiasis with a left-sided staghorn calculus that has been managed with a percutaneous nephrolithotomy on 2018, 2018, 2018, 2019, and 2019. He has a suprapubic catheter in place.     Most recently on 2019 he underwent left PCNL and nephroureteral stent placement complicated by post-op renal hemorrhage requiring massive transfusion, SICU admission and IR embolization on 10/3 . He presented to the ED on 10/09/2019 for ongoing hematuria and generalized malaise.     Today he is feeling improved. His nephrostomy tube is plugged but has not yet been removed.     Review of Systems:   Pertinent items are noted in HPI or as below, remainder of complete ROS is negative.      Physical Exam:   /80 (BP Location: Left arm, Patient Position: Chair, Cuff Size: Adult Regular)   Pulse 62   Resp 18    Constitutional: Alert, no acute distress  Psychiatric: Normal mood and affect  Gastrointestinal: Abdomen soft, non-tender.  : Deferred    Laboratory:   I personally reviewed all applicable laboratory data and went over findings with patient  Significant for:    CBC RESULTS:  Recent Labs   Lab Test 10/10/19  0601 10/09/19  1645 10/09/19  1453 10/06/19  1011   WBC 10.6 12.0* Canceled, Test credited 9.9   HGB 9.7* 10.4* Canceled, Test credited 8.8*    250 Canceled, Test credited 242     BMP RESULTS:  Recent Labs   Lab Test 10/10/19  0601 10/09/19  1453 10/06/19  1011 10/05/19  0336    140 142 143   POTASSIUM 4.2 4.4 4.0 4.0   CHLORIDE 108 105 111* 110*   CO2 28 27 28 25   ANIONGAP 4 8 4 7   GLC 87 67* 83 65*   BUN 16 17 19 20   CR 0.92 0.97 1.23 1.42*   GFRESTIMATED >90 >90 72 60*   GFRESTBLACK >90 >90 83 70   BALAJI 8.4* 9.0 8.4* 8.2*     UA RESULTS:   Recent Labs   Lab Test 10/09/19  1707 07/16/19  0940 05/23/18  1125   SG >1.035* 1.004 1.010   URINEPH 7.0 7.5* 8.0*   NITRITE Negative Negative Positive*   RBCU >182* 24* 88*   WBCU 742* >182* >182*       Imaging:   I personally reviewed all applicable imaging and went over the below findings with patient.    Results for orders placed or performed during the hospital encounter of 09/30/19   XR Surgery CASA Fluoro L/T 5 Min w Stills    Narrative    This exam was marked as non-reportable because it will not be read by a   radiologist or a [facility] non-radiologist provider.             XR Chest Port 1 View    Narrative    Exam:  XR CHEST PORT 1 VW, 10/2/2019 6:17 PM    History: s/p PCNL, now with resp difficulty, r/o pleural injury    Comparison:  3/7/2018    Findings:  Single AP view of the chest. Limited examination due to  patient positioning. Left arm PICC tip projects over the low SVC.  Appearing cardiac silhouette is enlarged. Small left pleural effusion  and adjacent basilar opacities. No pneumothorax. Visualized upper  abdomen is unremarkable.       Impression    Impression:    Small left pleural effusion with adjacent basilar atelectasis and/or  consolidation.    I have personally reviewed the examination and initial interpretation  and I agree with the findings.    WALESKA VENEGAS MD   CTA Abdomen Pelvis with Contrast     Value    Radiologist flags (Urgent)     Hemorrhagic renal cyst, hemorrhage in proximal left    Narrative    Exam: Computed tomographic angiography of the abdomen and pelvis  without and with contrast, including 3D reformations dated 10/3/2019  9:31 PM    Clinical information:  Renal bleeding; grossly blood output into left  drain and ly    Technique: Helical scans through the abdomen and pelvis obtained  before the administration of intravenous contrast media and following  the injection of contrast media  in the arterial phase. Source images  reviewed as well as 3D and multi-planar reconstructions.    Contrast: iopamidol (ISOVUE-370) solution 104 mL     DLP: 3396 mGy*cm    Comparison study: 9/30/2019, 2/1/2019    Findings:      The abdominal aorta is normal in caliber. Infrarenal abdominal aorta  measures 2.0 x 1.9 cm. Aorta measures 1.7 x 1.8 cm at the bifurcation.    The celiac axis, SMA and DAVE are patent. The renal arteries are patent  bilaterally. Radiopaque metallic radiopacities at the left renal hilum  in close proximity to the subsegmental left renal vessels.     The splenic vein, portal vein, SMV and renal veins are patent.  The hepatic veins and IVC are patent.  Infrarenal IVC filter with legs  extending beyond the wall of the IVC, stable.    Abdomen and pelvis:     Left renal percutaneous nephrostomy with tip coiled in the left renal  pelvis. Left ureteral stent. Mild left hydroureter with air within the  proximal left ureter and renal pelvis. Hemorrhagic peripelvic cysts  and hemorrhage in the left renal pelvis and calyces. There is an area  of active extravasation of contrast in the superior pole of the left  kidney on  early arterial phase with bloom measuring 8 x 9 mm (series 9  image 135; series 10 image 78). Second area of active extravasation  measuring 3 x 6 mm, likely in continuity with the larger bloom (series  9 image 126; series 10 image 80). Bulky staghorn calculi in the right  kidney and right renal pelvis. Mild right hydronephrosis and  hydroureter. Mild circumferential bladder wall thickening. Suprapubic  catheter is in place in the bladder.     Cholelithiasis without cholecystitis. Spleen, pancreas, liver, adrenal  glands appear within normal limits. Anterior abdominal wall surgical  implants with intrathecal line. Mild anasarca. No suspicious osseous  lesions. Degenerative changes of the bones. Postsurgical changes of  internal fixation of the right femur.      Impression    Impression:   1.  Left hemorrhagic renal cysts and hemorrhage within the proximal  left renal collecting system. Active bleeding in the superior pole of  left kidney.  2.   Post surgical appearance of left laser lithotripsy, with  decreased stone burden since comparison. Left precuneus nephrostomy  and left ureteral stent, and suprapubic catheter are in appropriate  position.  3.  Patent abdominal vasculature.  4.  Unchanged appearance of staghorn calculi in the right kidney, mild  right hydronephrosis and hydroureter.  5.  Cholelithiasis without cholecystitis.    [Urgent Result: Hemorrhagic renal cyst, hemorrhage in proximal left  renal collecting system. Active extravasation superior pole of left  kidney.]    Finding was identified on 10/3/2019 10:05 PM.     Dr. Haddad was contacted by Dr. Osmel Boyd at 10/3/2019 9:57 PM  and verbalized understanding of the urgent finding.     I have personally reviewed the examination and initial interpretation  and I agree with the findings.    ALY HOLLEY MD   IR Renal Angiogram Left    Narrative    Procedures:   1. Ultrasound guidance for vascular access.  2. Left renal angiography.  3.  Catheterization and Embolization of 3rd order branch artery in the  left upper renal pole.  4. Left renal angiography after completion.  5. Arteriotomy closure with a 5 Uzbek mynx device.    Clinical indication: Hemorrhage    Comparison studies: CTA same-day    PROCEDURE:        Interventional radiologists: : Hans Braswell MD     Fellow: Oliverio Martell    Consent: verbal and written informed consent obtained prior to  procedure.    Procedure details: Patient placed in supine position. Right groin  prepped and draped in standard sterile fashion. Using fluoroscopic and  ultrasound guidance, micropuncture access was made to the right common  femoral artery. An ultrasound image was saved to the patient's chart  documenting the patent common femoral artery and entry of the needle.  This was ultimately exchanged for a 5 Uzbek sheath. A C2 glide  catheter was advanced over a MEDNAX wire into the abdominal aorta.  The left renal artery was selected, and left renal angiogram was  performed, demonstrating active extravasation from a distal branch in  the upper renal pole. The C2 catheter was advanced further into the  main left renal artery. We then advanced a 2.8 Uzbek progreat  catheter towards the bleeding artery and performed further  interrogation with selective angiographic runs. We were able to  determine that there was a neck measuring approximately 4 mm in length  prior to the area of extravasation, with normal vessel beyond this.  The microcatheter was advanced into this neck. A 1.5 mm microvascular  plug was placed within the vessel neck. Injection of contrast through  the microcatheter with the plug in place and retained on the wire  demonstrated cessation of extravasation. The plug was deployed.  Angiography after completion demonstrated no active extravasation with  minimal loss of arterial perfusion to a wedge of upper pole renal  cortex. Microcatheter and C2 catheter removed. Arteriotomy closure  with  a 5 Iranian mynx device. Hemostasis was achieved. The patient  tolerated the procedure well without immediate complication and was  transferred in stable condition.     Medications: fentanyl 100 mcg IV, midazolam 2 mg IV, 1% lidocaine  (buffered with 8.4% sodium bicarbonate) for local anesthesia.     Monitoring: The patient was placed on continuous monitoring. Vital  signs and sedation monitored by nursing staff under my supervision.  The patient remained stable throughout the procedure.    Sedation time: 25 minutes    Fluoroscopy time: 13.5 minutes    Complications: None.      Impression    IMPRESSION:   1. Renal angiography on the left demonstrating active extravasation  seen on comparison CT.  2. Embolization of 3rd order branch vessel in the left upper renal  cortex with a 1.5 mm microvascular plug.  3. Angiography after completion demonstrating cessation of  extravasation.   IR Renal Embolization    Narrative    Procedures:   1. Ultrasound guidance for vascular access.  2. Left renal angiography.  3. Catheterization and Embolization of 3rd order branch artery in the  left upper renal pole.  4. Left renal angiography after completion.  5. Arteriotomy closure with a 5 Iranian mynx device.    Clinical indication: Hemorrhage    Comparison studies: CTA same-day    PROCEDURE:        Interventional radiologists: : Hans Braswell MD     Fellow: Oliverio Martell    Consent: verbal and written informed consent obtained prior to  procedure.    Procedure details: Patient placed in supine position. Right groin  prepped and draped in standard sterile fashion. Using fluoroscopic and  ultrasound guidance, micropuncture access was made to the right common  femoral artery. An ultrasound image was saved to the patient's chart  documenting the patent common femoral artery and entry of the needle.  This was ultimately exchanged for a 5 Iranian sheath. A C2 glide  catheter was advanced over a Bentson wire into the abdominal  aorta.  The left renal artery was selected, and left renal angiogram was  performed, demonstrating active extravasation from a distal branch in  the upper renal pole. The C2 catheter was advanced further into the  main left renal artery. We then advanced a 2.8 Indonesian progreat  catheter towards the bleeding artery and performed further  interrogation with selective angiographic runs. We were able to  determine that there was a neck measuring approximately 4 mm in length  prior to the area of extravasation, with normal vessel beyond this.  The microcatheter was advanced into this neck. A 1.5 mm microvascular  plug was placed within the vessel neck. Injection of contrast through  the microcatheter with the plug in place and retained on the wire  demonstrated cessation of extravasation. The plug was deployed.  Angiography after completion demonstrated no active extravasation with  minimal loss of arterial perfusion to a wedge of upper pole renal  cortex. Microcatheter and C2 catheter removed. Arteriotomy closure  with a 5 Indonesian mynx device. Hemostasis was achieved. The patient  tolerated the procedure well without immediate complication and was  transferred in stable condition.     Medications: fentanyl 100 mcg IV, midazolam 2 mg IV, 1% lidocaine  (buffered with 8.4% sodium bicarbonate) for local anesthesia.     Monitoring: The patient was placed on continuous monitoring. Vital  signs and sedation monitored by nursing staff under my supervision.  The patient remained stable throughout the procedure.    Sedation time: 25 minutes    Fluoroscopy time: 13.5 minutes    Complications: None.      Impression    IMPRESSION:   1. Renal angiography on the left demonstrating active extravasation  seen on comparison CT.  2. Embolization of 3rd order branch vessel in the left upper renal  cortex with a 1.5 mm microvascular plug.  3. Angiography after completion demonstrating cessation of  extravasation.   CTA Abdomen Pelvis with  Contrast    Narrative    CTA ABDOMEN AND PELVIS 10/4/2019    CLINICAL HISTORY: Renal angiography and embolization performed for  extravasation 10/3/2019. Patient continues to have anemia. Concern for  continued bleeding.    COMPARISONS: Angiography and intervention 10/3/2019. CT 10/3/2019.    REFERRING PROVIDER: NICOLE, AMIN, MOHAMMED    TECHNIQUE: Following the uneventful administration of intravenous  contrast, CTA was performed through the abdomen and pelvis.    No unenhanced, venous phase, or delayed images were obtained.    Coronal and sagittal reconstructions were produced.    3-D and multiplanar reconstructions were unavailable at the time of  dictation.    DOSE (DLP): 1557 mGy*cm    CONTRAST: 100 mL Isovue 370     FINDINGS: Diagnostic sensitivity limited by single phase.    No active extravasation demonstrated in this single phase study. No  active extravasation demonstrated from the previous site of bleeding.     Interval placement of a plug in the upper pole left renal branch.     Unchanged embolization coils in a inferior pole renal branch.    Relative hyperperfusion of the left mid kidney and lower pole with  associated atrophy, similar to the previous study.    Right percutaneous internal/external nephroureteral drain in stable  position. Superior pigtail in the left renal pelvis. Inferior pigtail  in the bladder.    Suprapubic catheter in place in the bladder. Increased air in the  bladder.    All inferior vena cava filter legs penetrate through the cava,  unchanged.    Unchanged bilateral staghorn calculi. Unchanged atrophic right kidney.    Gallstone in the gallbladder without CT evidence for cholecystitis.    Persistent left pleural effusion and left lower lobe consolidation.      Impression    IMPRESSION:   1. No active extravasation demonstrated in this single phase study.    2. Interval embolization of a left upper pole renal artery.     I have personally reviewed the examination and initial  interpretation  and I agree with the findings.    JEFF RICKS MD       Scribe Disclosure:  I, Jackie Greenberg, am serving as a scribe to document services personally performed by Dejon Horn MD at this visit, based upon the provider's statements to me. All documentation has been reviewed by the aforementioned provider prior to being entered into the official medical record.     Jackie Greenberg served as the scribe for this patient's visit and documented my history and physical exam.  I performed the history and physical exam.  I have edited and agree with the note.  JEREMIE Horn MD

## 2019-10-18 NOTE — LETTER
10/18/2019       RE: Maicol Carrera  [facility]  [address] [facility] [facility] [place] [address]     Dear Colleague,    Thank you for referring your patient, Maicol Carrera, to the [facility] at [facility]. Please see a copy of my visit note below.      Urology Clinic    Dejon Horn MD  Date of Service: 10/18/2019     Name: Maicol Carrera  MRN: [id number]  Age: 44 year old  : 1975  Referring provider: Nancy Ag     Assessment and Plan:  Assessment:  Maicol Carrera  is a 44 year old male with a history of nephrolithiasis s/p percutaneous nephrolithotomy with left renal stones. Right kidney function is 20 percent, left kidney function is 80 percent.  His 10/2019 2nd look PERCUTANEOUS NEPHROLITHOTOMY was complicated by need for embolization to control bleeding.    There is known residual stone, but he has decided to forgo any further procedures given the complications of his last procedure.    Plan:  - He will follow up with IR for nephrostomy tube removal.   - Follow up in 6 weeks with renal ultrasound.   ______________________________________________________________________    HPI  Maicol Carrera is a 44 year old male with a history of quadriplegia, and neurogenic bladder, managed with an indwelling suprapubic catheter, here for follow-up regarding left-sided nephrolithiasis. He has a history of recurrent urolithiasis with a left-sided staghorn calculus that has been managed with a percutaneous nephrolithotomy on 2018, 2018, 2018, 2019, and 2019. He has a suprapubic catheter in place.     Most recently on 2019 he underwent left PCNL and nephroureteral stent placement complicated by post-op renal hemorrhage requiring massive transfusion, SICU admission and IR embolization on 10/3 . He presented to the ED on 10/09/2019 for ongoing hematuria and generalized malaise.      Today he is feeling improved. His nephrostomy tube is plugged but has not yet been removed.     Review of Systems:   Pertinent items are noted in HPI or as below, remainder of complete ROS is negative.      Physical Exam:   /80 (BP Location: Left arm, Patient Position: Chair, Cuff Size: Adult Regular)   Pulse 62   Resp 18   Constitutional: Alert, no acute distress  Psychiatric: Normal mood and affect  Gastrointestinal: Abdomen soft, non-tender.  : Deferred    Laboratory:   I personally reviewed all applicable laboratory data and went over findings with patient  Significant for:    CBC RESULTS:  Recent Labs   Lab Test 10/10/19  0601 10/09/19  1645 10/09/19  1453 10/06/19  1011   WBC 10.6 12.0* Canceled, Test credited 9.9   HGB 9.7* 10.4* Canceled, Test credited 8.8*    250 Canceled, Test credited 242     BMP RESULTS:  Recent Labs   Lab Test 10/10/19  0601 10/09/19  1453 10/06/19  1011 10/05/19  0336    140 142 143   POTASSIUM 4.2 4.4 4.0 4.0   CHLORIDE 108 105 111* 110*   CO2 28 27 28 25   ANIONGAP 4 8 4 7   GLC 87 67* 83 65*   BUN 16 17 19 20   CR 0.92 0.97 1.23 1.42*   GFRESTIMATED >90 >90 72 60*   GFRESTBLACK >90 >90 83 70   BALAJI 8.4* 9.0 8.4* 8.2*     UA RESULTS:   Recent Labs   Lab Test 10/09/19  1707 07/16/19  0940 05/23/18  1125   SG >1.035* 1.004 1.010   URINEPH 7.0 7.5* 8.0*   NITRITE Negative Negative Positive*   RBCU >182* 24* 88*   WBCU 742* >182* >182*       Imaging:   I personally reviewed all applicable imaging and went over the below findings with patient.    Results for orders placed or performed during the hospital encounter of 09/30/19   XR Surgery CASA Fluoro L/T 5 Min w Stills    Narrative    This exam was marked as non-reportable because it will not be read by a   radiologist or a [place] non-radiologist provider.             XR Chest Port 1 View    Narrative    Exam:  XR CHEST PORT 1 VW, 10/2/2019 6:17 PM    History: s/p PCNL, now with resp difficulty, r/o pleural  injury    Comparison:  3/7/2018    Findings:  Single AP view of the chest. Limited examination due to  patient positioning. Left arm PICC tip projects over the low SVC.  Appearing cardiac silhouette is enlarged. Small left pleural effusion  and adjacent basilar opacities. No pneumothorax. Visualized upper  abdomen is unremarkable.      Impression    Impression:    Small left pleural effusion with adjacent basilar atelectasis and/or  consolidation.    I have personally reviewed the examination and initial interpretation  and I agree with the findings.    WALESKA VENEGAS MD   CTA Abdomen Pelvis with Contrast     Value    Radiologist flags (Urgent)     Hemorrhagic renal cyst, hemorrhage in proximal left    Narrative    Exam: Computed tomographic angiography of the abdomen and pelvis  without and with contrast, including 3D reformations dated 10/3/2019  9:31 PM    Clinical information:  Renal bleeding; grossly blood output into left  drain and ly    Technique: Helical scans through the abdomen and pelvis obtained  before the administration of intravenous contrast media and following  the injection of contrast media  in the arterial phase. Source images  reviewed as well as 3D and multi-planar reconstructions.    Contrast: iopamidol (ISOVUE-370) solution 104 mL     DLP: 3396 mGy*cm    Comparison study: 9/30/2019, 2/1/2019    Findings:      The abdominal aorta is normal in caliber. Infrarenal abdominal aorta  measures 2.0 x 1.9 cm. Aorta measures 1.7 x 1.8 cm at the bifurcation.    The celiac axis, SMA and DAVE are patent. The renal arteries are patent  bilaterally. Radiopaque metallic radiopacities at the left renal hilum  in close proximity to the subsegmental left renal vessels.     The splenic vein, portal vein, SMV and renal veins are patent.  The hepatic veins and IVC are patent.  Infrarenal IVC filter with legs  extending beyond the wall of the IVC, stable.    Abdomen and pelvis:     Left renal percutaneous  nephrostomy with tip coiled in the left renal  pelvis. Left ureteral stent. Mild left hydroureter with air within the  proximal left ureter and renal pelvis. Hemorrhagic peripelvic cysts  and hemorrhage in the left renal pelvis and calyces. There is an area  of active extravasation of contrast in the superior pole of the left  kidney on early arterial phase with bloom measuring 8 x 9 mm (series 9  image 135; series 10 image 78). Second area of active extravasation  measuring 3 x 6 mm, likely in continuity with the larger bloom (series  9 image 126; series 10 image 80). Bulky staghorn calculi in the right  kidney and right renal pelvis. Mild right hydronephrosis and  hydroureter. Mild circumferential bladder wall thickening. Suprapubic  catheter is in place in the bladder.     Cholelithiasis without cholecystitis. Spleen, pancreas, liver, adrenal  glands appear within normal limits. Anterior abdominal wall surgical  implants with intrathecal line. Mild anasarca. No suspicious osseous  lesions. Degenerative changes of the bones. Postsurgical changes of  internal fixation of the right femur.      Impression    Impression:   1.  Left hemorrhagic renal cysts and hemorrhage within the proximal  left renal collecting system. Active bleeding in the superior pole of  left kidney.  2.   Post surgical appearance of left laser lithotripsy, with  decreased stone burden since comparison. Left precuneus nephrostomy  and left ureteral stent, and suprapubic catheter are in appropriate  position.  3.  Patent abdominal vasculature.  4.  Unchanged appearance of staghorn calculi in the right kidney, mild  right hydronephrosis and hydroureter.  5.  Cholelithiasis without cholecystitis.    [Urgent Result: Hemorrhagic renal cyst, hemorrhage in proximal left  renal collecting system. Active extravasation superior pole of left  kidney.]    Finding was identified on 10/3/2019 10:05 PM.     Dr. Haddad was contacted by Dr. Osmel Boyd at  10/3/2019 9:57 PM  and verbalized understanding of the urgent finding.     I have personally reviewed the examination and initial interpretation  and I agree with the findings.    ALY HOLLEY MD   IR Renal Angiogram Left    Narrative    Procedures:   1. Ultrasound guidance for vascular access.  2. Left renal angiography.  3. Catheterization and Embolization of 3rd order branch artery in the  left upper renal pole.  4. Left renal angiography after completion.  5. Arteriotomy closure with a 5 [place] mynx device.    Clinical indication: Hemorrhage    Comparison studies: CTA same-day    PROCEDURE:        Interventional radiologists: : Hans Braswell MD     Fellow: Oliverio Martell    Consent: verbal and written informed consent obtained prior to  procedure.    Procedure details: Patient placed in supine position. Right groin  prepped and draped in standard sterile fashion. Using fluoroscopic and  ultrasound guidance, micropuncture access was made to the right common  femoral artery. An ultrasound image was saved to the patient's chart  documenting the patent common femoral artery and entry of the needle.  This was ultimately exchanged for a 5 [place] sheath. A C2 glide  catheter was advanced over a Bentson wire into the abdominal aorta.  The left renal artery was selected, and left renal angiogram was  performed, demonstrating active extravasation from a distal branch in  the upper renal pole. The C2 catheter was advanced further into the  main left renal artery. We then advanced a 2.8 [place] progreat  catheter towards the bleeding artery and performed further  interrogation with selective angiographic runs. We were able to  determine that there was a neck measuring approximately 4 mm in length  prior to the area of extravasation, with normal vessel beyond this.  The microcatheter was advanced into this neck. A 1.5 mm microvascular  plug was placed within the vessel neck. Injection of contrast through  the  microcatheter with the plug in place and retained on the wire  demonstrated cessation of extravasation. The plug was deployed.  Angiography after completion demonstrated no active extravasation with  minimal loss of arterial perfusion to a wedge of upper pole renal  cortex. Microcatheter and C2 catheter removed. Arteriotomy closure  with a 5 [place] mynx device. Hemostasis was achieved. The patient  tolerated the procedure well without immediate complication and was  transferred in stable condition.     Medications: fentanyl 100 mcg IV, midazolam 2 mg IV, 1% lidocaine  (buffered with 8.4% sodium bicarbonate) for local anesthesia.     Monitoring: The patient was placed on continuous monitoring. Vital  signs and sedation monitored by nursing staff under my supervision.  The patient remained stable throughout the procedure.    Sedation time: 25 minutes    Fluoroscopy time: 13.5 minutes    Complications: None.      Impression    IMPRESSION:   1. Renal angiography on the left demonstrating active extravasation  seen on comparison CT.  2. Embolization of 3rd order branch vessel in the left upper renal  cortex with a 1.5 mm microvascular plug.  3. Angiography after completion demonstrating cessation of  extravasation.   IR Renal Embolization    Narrative    Procedures:   1. Ultrasound guidance for vascular access.  2. Left renal angiography.  3. Catheterization and Embolization of 3rd order branch artery in the  left upper renal pole.  4. Left renal angiography after completion.  5. Arteriotomy closure with a 5 [place] mynx device.    Clinical indication: Hemorrhage    Comparison studies: CTA same-day    PROCEDURE:        Interventional radiologists: : Hans Braswell MD     Fellow: Oliverio Martell    Consent: verbal and written informed consent obtained prior to  procedure.    Procedure details: Patient placed in supine position. Right groin  prepped and draped in standard sterile fashion. Using fluoroscopic  and  ultrasound guidance, micropuncture access was made to the right common  femoral artery. An ultrasound image was saved to the patient's chart  documenting the patent common femoral artery and entry of the needle.  This was ultimately exchanged for a 5 [place] sheath. A C2 glide  catheter was advanced over a Bentson wire into the abdominal aorta.  The left renal artery was selected, and left renal angiogram was  performed, demonstrating active extravasation from a distal branch in  the upper renal pole. The C2 catheter was advanced further into the  main left renal artery. We then advanced a 2.8 [place] progreat  catheter towards the bleeding artery and performed further  interrogation with selective angiographic runs. We were able to  determine that there was a neck measuring approximately 4 mm in length  prior to the area of extravasation, with normal vessel beyond this.  The microcatheter was advanced into this neck. A 1.5 mm microvascular  plug was placed within the vessel neck. Injection of contrast through  the microcatheter with the plug in place and retained on the wire  demonstrated cessation of extravasation. The plug was deployed.  Angiography after completion demonstrated no active extravasation with  minimal loss of arterial perfusion to a wedge of upper pole renal  cortex. Microcatheter and C2 catheter removed. Arteriotomy closure  with a 5 [place]ian mynx device. Hemostasis was achieved. The patient  tolerated the procedure well without immediate complication and was  transferred in stable condition.     Medications: fentanyl 100 mcg IV, midazolam 2 mg IV, 1% lidocaine  (buffered with 8.4% sodium bicarbonate) for local anesthesia.     Monitoring: The patient was placed on continuous monitoring. Vital  signs and sedation monitored by nursing staff under my supervision.  The patient remained stable throughout the procedure.    Sedation time: 25 minutes    Fluoroscopy time: 13.5 minutes    Complications:  None.      Impression    IMPRESSION:   1. Renal angiography on the left demonstrating active extravasation  seen on comparison CT.  2. Embolization of 3rd order branch vessel in the left upper renal  cortex with a 1.5 mm microvascular plug.  3. Angiography after completion demonstrating cessation of  extravasation.   CTA Abdomen Pelvis with Contrast    Narrative    CTA ABDOMEN AND PELVIS 10/4/2019    CLINICAL HISTORY: Renal angiography and embolization performed for  extravasation 10/3/2019. Patient continues to have anemia. Concern for  continued bleeding.    COMPARISONS: Angiography and intervention 10/3/2019. CT 10/3/2019.    REFERRING PROVIDER: NICOLE, AMIN, MOHAMMED    TECHNIQUE: Following the uneventful administration of intravenous  contrast, CTA was performed through the abdomen and pelvis.    No unenhanced, venous phase, or delayed images were obtained.    Coronal and sagittal reconstructions were produced.    3-D and multiplanar reconstructions were unavailable at the time of  dictation.    DOSE (DLP): 1557 mGy*cm    CONTRAST: 100 mL Isovue 370     FINDINGS: Diagnostic sensitivity limited by single phase.    No active extravasation demonstrated in this single phase study. No  active extravasation demonstrated from the previous site of bleeding.     Interval placement of a plug in the upper pole left renal branch.     Unchanged embolization coils in a inferior pole renal branch.    Relative hyperperfusion of the left mid kidney and lower pole with  associated atrophy, similar to the previous study.    Right percutaneous internal/external nephroureteral drain in stable  position. Superior pigtail in the left renal pelvis. Inferior pigtail  in the bladder.    Suprapubic catheter in place in the bladder. Increased air in the  bladder.    All inferior vena cava filter legs penetrate through the cava,  unchanged.    Unchanged bilateral staghorn calculi. Unchanged atrophic right kidney.    Gallstone in the  gallbladder without CT evidence for cholecystitis.    Persistent left pleural effusion and left lower lobe consolidation.      Impression    IMPRESSION:   1. No active extravasation demonstrated in this single phase study.    2. Interval embolization of a left upper pole renal artery.     I have personally reviewed the examination and initial interpretation  and I agree with the findings.    JEFF RICKS MD       Scribe Disclosure:  I, Jackie Greenberg, am serving as a scribe to document services personally performed by Dejon Horn MD at this visit, based upon the provider's statements to me. All documentation has been reviewed by the aforementioned provider prior to being entered into the official medical record.     Jackie Greenberg served as the scribe for this patient's visit and documented my history and physical exam.  I performed the history and physical exam.  I have edited and agree with the note.  JEREMIE Horn MD          Again, thank you for allowing me to participate in the care of your patient.      Sincerely,    Dejon Horn MD

## 2019-10-18 NOTE — NURSING NOTE
Chief Complaint   Patient presents with     Kidney Stone Related     Patient states possible stent removal     Lyn Lee CMA 8:28 AM on 10/18/2019.

## 2019-10-25 ENCOUNTER — TELEPHONE (OUTPATIENT)
Dept: INTERVENTIONAL RADIOLOGY/VASCULAR | Facility: CLINIC | Age: 44
End: 2019-10-25

## 2019-10-29 ENCOUNTER — APPOINTMENT (OUTPATIENT)
Dept: MEDSURG UNIT | Facility: CLINIC | Age: 44
End: 2019-10-29
Attending: UROLOGY
Payer: COMMERCIAL

## 2019-10-29 ENCOUNTER — APPOINTMENT (OUTPATIENT)
Dept: INTERVENTIONAL RADIOLOGY/VASCULAR | Facility: CLINIC | Age: 44
End: 2019-10-29
Attending: UROLOGY
Payer: COMMERCIAL

## 2019-10-29 ENCOUNTER — HOSPITAL ENCOUNTER (OUTPATIENT)
Facility: CLINIC | Age: 44
Discharge: HOME | End: 2019-10-29
Attending: UROLOGY | Admitting: UROLOGY
Payer: COMMERCIAL

## 2019-10-29 VITALS
TEMPERATURE: 97.5 F | SYSTOLIC BLOOD PRESSURE: 112 MMHG | HEART RATE: 90 BPM | OXYGEN SATURATION: 95 % | DIASTOLIC BLOOD PRESSURE: 78 MMHG | RESPIRATION RATE: 16 BRPM

## 2019-10-29 DIAGNOSIS — N20.0 LEFT NEPHROLITHIASIS: ICD-10-CM

## 2019-10-29 PROCEDURE — 25000128 H RX IP 250 OP 636: Performed by: PHYSICIAN ASSISTANT

## 2019-10-29 PROCEDURE — 25800030 ZZH RX IP 258 OP 636: Performed by: PHYSICIAN ASSISTANT

## 2019-10-29 PROCEDURE — 40000167 ZZH STATISTIC PP CARE STAGE 2

## 2019-10-29 PROCEDURE — 76000 FLUOROSCOPY <1 HR PHYS/QHP: CPT | Mod: LT

## 2019-10-29 PROCEDURE — C1769 GUIDE WIRE: HCPCS

## 2019-10-29 PROCEDURE — 27210995 ZZH RX 272: Performed by: RADIOLOGY

## 2019-10-29 RX ORDER — LIDOCAINE 40 MG/G
CREAM TOPICAL
Status: DISCONTINUED | OUTPATIENT
Start: 2019-10-29 | End: 2019-10-29 | Stop reason: HOSPADM

## 2019-10-29 RX ORDER — AMPICILLIN 1 G/1
1 INJECTION, POWDER, FOR SOLUTION INTRAMUSCULAR; INTRAVENOUS
Status: COMPLETED | OUTPATIENT
Start: 2019-10-29 | End: 2019-10-29

## 2019-10-29 RX ORDER — DEXTROSE MONOHYDRATE 25 G/50ML
25-50 INJECTION, SOLUTION INTRAVENOUS
Status: DISCONTINUED | OUTPATIENT
Start: 2019-10-29 | End: 2019-10-29 | Stop reason: HOSPADM

## 2019-10-29 RX ORDER — LIDOCAINE HYDROCHLORIDE 10 MG/ML
1-30 INJECTION, SOLUTION EPIDURAL; INFILTRATION; INTRACAUDAL; PERINEURAL
Status: COMPLETED | OUTPATIENT
Start: 2019-10-29 | End: 2019-10-29

## 2019-10-29 RX ORDER — SODIUM CHLORIDE 9 MG/ML
INJECTION, SOLUTION INTRAVENOUS CONTINUOUS
Status: DISCONTINUED | OUTPATIENT
Start: 2019-10-29 | End: 2019-10-29 | Stop reason: HOSPADM

## 2019-10-29 RX ORDER — NICOTINE POLACRILEX 4 MG
15-30 LOZENGE BUCCAL
Status: DISCONTINUED | OUTPATIENT
Start: 2019-10-29 | End: 2019-10-29 | Stop reason: HOSPADM

## 2019-10-29 RX ADMIN — GENTAMICIN SULFATE 120 MG: 40 INJECTION, SOLUTION INTRAMUSCULAR; INTRAVENOUS at 12:07

## 2019-10-29 RX ADMIN — AMPICILLIN 1 G: 1 INJECTION, POWDER, FOR SOLUTION INTRAMUSCULAR; INTRAVENOUS at 11:45

## 2019-10-29 RX ADMIN — SODIUM CHLORIDE: 9 INJECTION, SOLUTION INTRAVENOUS at 11:45

## 2019-10-29 RX ADMIN — LIDOCAINE HYDROCHLORIDE 5 ML: 10 INJECTION, SOLUTION EPIDURAL; INFILTRATION; INTRACAUDAL; PERINEURAL at 12:59

## 2019-10-29 NOTE — PROGRESS NOTES
Pt back from IR s/p PNT removal. VSS.  Pt alert and oriented x4.  Left flank site F/D/I.  Pt denies any pain.

## 2019-10-29 NOTE — PROCEDURES
[facility], [place]    Procedure: IR Procedure Note  Date/Time: 10/29/2019 12:58 PM  Performed by: Herson Marina MD  Authorized by: Herson Marina MD     UNIVERSAL PROTOCOL   Site Marked: Yes  Prior Images Obtained and Reviewed:  Yes  Required items: Required blood products, implants, devices and special equipment available    Patient identity confirmed:  Verbally with patient  Patient was reevaluated immediately before administering moderate or deep sedation or anesthesia  Confirmation Checklist:  Patient's identity using two indicators, relevant allergies, procedure was appropriate and matched the consent or emergent situation and correct equipment/implants were available  Time out: Immediately prior to the procedure a time out was called    Preparation: Patient was prepped and draped in usual sterile fashion       ANESTHESIA    Anesthesia: Local infiltration      SEDATION    Patient Sedated: No    Fluoroscopy Time: 1 minute(s)  See dictated procedure note for full details.  Findings: Tube removed under fluoroscopic guidance.    Specimens: none    Complications: None    Condition: Stable    Plan: Patient to return as needed.     PROCEDURE   Patient Tolerance:  Patient tolerated the procedure well with no immediate complications    Time of Sedation in Minutes by Physician:  0

## 2019-10-29 NOTE — IP AVS SNAPSHOT
[address] [address]                                    After Visit Summary   10/29/2019    Maicol Carrera    MRN: [id number]           After Visit Summary Signature Page    I have received my discharge instructions, and my questions have been answered. I have discussed any challenges I see with this plan with the nurse or doctor.    ..........................................................................................................................................  Patient/Patient Representative Signature      ..........................................................................................................................................  Patient Representative Print Name and Relationship to Patient    ..................................................               ................................................  Date                                   Time    ..........................................................................................................................................  Reviewed by Signature/Title    ...................................................              ..............................................  Date                                               Time          22EPIC Rev 08/18

## 2019-10-29 NOTE — DISCHARGE INSTRUCTIONS
"[facility]  Discharge Instructions for   Percutaneous Nephrostomy   Tube Removal    After you go home:    Have an adult stay with you for 24 hours.    Drink plenty of fluids.    Resume a regular diet unless otherwise ordered by your physician.      For 24 Hours:    Relax and take it easy.    No alcohol for 24 hours.    Do not make any important or legal decisions.    Do not do any strenuous exercise or lifting greater that 10 lbs for at least 2 days following your procedure.    CALL THE PHYSICIAN IF:    You start bleeding from the procedure site. If you do start to bleed from the site lie down and hold some pressure on the site. Your physician will tell you if you need to return to the hospital.    You develop nausea or vomiting.    You develop hives or a rash or any unexplained itching.    ADDITIONAL INSTRUCTIONS:  Please call for the following problems:  1. The skin around the site is red, painful, or has drainage.  2. You have pain in your back, over your kidney.  3. You have a fever of 100.5 F and chills  4. You feel nauseated and \"just not right.\"      Change the dressing initially the next day to check the insertion site.  After that change every other day.  Clean around tube site with washcloth and antibacterial soap.  The drainage site may leak more initially, keep site clean and dry.    [facility] INTERVENTIONAL RADIOLOGY DEPARTMENT  Procedure Physician:     Dr. Marina Date:October 29, 2019  Telephone Numbers:  [phone]     Monday-Friday 8:00AM-4:30PM                       [phone]     After 4:30 PM Monday-Friday, Weekends and Holidays. Ask for Interventional Radiologist on Call. Someone is available 24 hours a day.  [facility] toll free number: [phone] Monday- Friday 8:00AM -4:30PM.    I  "

## 2019-10-29 NOTE — PROGRESS NOTES
Pt discharged at this time post nephrostomy tube removal. Site appears clean, dry, intact, and soft with no complaints of pain. Tolerates PO intake (food and water) well. Discharge instructions completed with all questions answered and copy sent with patient.

## 2019-10-29 NOTE — PROGRESS NOTES
Patient Name: Maicol Carrera  Medical Record Number: [id number]  Today's Date: 10/29/2019    Procedure: Nephrostomy tube removal.  Proceduralist: MD Salas.    Procedure start time: 1253  Puncture time: 1253  Procedure end time: 1256    Report given to: 2A, RN  : n/a    Other Notes: Pt arrived to IR room 2 from . Consent reviewed. Pt denies any questions or concerns regarding procedure. Pt positioned sidelying and monitored per protocol. Pt tolerated procedure without any noted complications. Pt transferred back to .    Nohemi Gillespie RN

## 2019-10-29 NOTE — IP AVS SNAPSHOT
MRN:[id number]                      After Visit Summary   10/29/2019    Maicol Carrera    MRN: [id number]           Visit Information        Department      10/29/2019  9:34 AM [address] [facility]          Review of your medicines      UNREVIEWED medicines. Ask your doctor about these medicines       Dose / Directions   ACETAMINOPHEN PO      Dose:  650 mg  Take 650 mg by mouth every 4 hours as needed for pain  Refills:  0     albuterol (2.5 MG/3ML) 0.083% neb solution  Commonly known as:  PROVENTIL  Used for:  SOB (shortness of breath)      Dose:  2.5 mg  Take 3 mLs by nebulization every 2 hours as needed.  Quantity:  1 Box  Refills:  0     * baclofen 10 MG tablet  Commonly known as:  LIORESAL      Dose:  10 mg  Take 10 mg by mouth every 4 hours as needed  Refills:  0     * BACLOFEN IT      Via implanted IT pump  Baclofen Concentration 2000 mc/gmL   Dose: 277.1 mcg/day  Low reservoir alarm date: 3/4/2020  Low reservoir Alarm Volume: 2ml  Interrogated 6/19/19  Pump is managed by Ulices Roth  Refills:  0     bisacodyl 10 MG suppository  Commonly known as:  DULCOLAX  Used for:  Chronic constipation      Dose:  10 mg  Place 1 suppository rectally every other day.  Quantity:  12 suppository  Refills:  0     carbamide peroxide 6.5 % otic solution  Commonly known as:  DEBROX      Dose:  5 drop  5 drops 2 times daily  Refills:  0     cholecalciferol 5000 units (125 mcg) capsule  Commonly known as:  VITAMIN D3      TAKE 1 CAP BY MOUTH ONCE DAILY FOR VIT-D DEFICIENCY  Refills:  99     clindamycin 1 % external gel  Commonly known as:  CLINDAMAX      Apply topically 2 times daily  Refills:  0     COLACE 100 MG capsule  Generic drug:  docusate sodium      Dose:  100 mg  Take 100 mg by mouth 2 times daily.  Refills:  0     Cranberry 400 MG Tabs      Dose:  400 mg  Take 400 mg by mouth 2 times daily  Refills:  0     cyanocobalamin 1000 MCG tablet  Commonly known as:  VITAMIN B-12  Used for:  Vitamin B 12  deficiency      Dose:  1,000 mcg  Take 1 tablet by mouth daily.  Refills:  0     ferrous sulfate 325 (65 Fe) MG tablet  Commonly known as:  FEROSUL  Used for:  Anemia due to blood loss, acute      Dose:  325 mg  Take 1 tablet by mouth daily (with breakfast).  Quantity:  100 tablet  Refills:  0     ketoconazole 2 % external cream  Commonly known as:  NIZORAL      Apply topically 2 times daily as needed  Refills:  0     levETIRAcetam 1000 MG tablet  Commonly known as:  KEPPRA      TAKE 1 TAB BY MOUTH TWICE A DAY FOR CONVULSIONS  Refills:  99     * magnesium gluconate 500 MG tablet  Commonly known as:  MAGONATE  Used for:  Magnesium deficiency      Dose:  500 mg  Take 1 tablet by mouth daily.  Refills:  0     * MAG-G 500 (27 Mg) MG tablet  Generic drug:  magnesium gluconate      TAKE 1 TABLET BY MOUTH EVERY OTHER DAY FOR HYPERMAGNESIUM  Refills:  99     metroNIDAZOLE 0.75 % external lotion  Commonly known as:  METROLOTION      Refills:  0     miconazole 2 % external powder  Commonly known as:  MICATIN/MICRO GUARD  Used for:  Candida infection of flexural skin      Apply  topically 2 times daily.  Quantity:  70 g  Refills:  0     Miconazole Powd      Refills:  0     mometasone 0.1 % external cream  Commonly known as:  ELOCON      Apply topically At Bedtime  Refills:  0     MULTIVITAMIN ADULT PO      Dose:  1 tablet  Take 1 tablet by mouth daily  Refills:  0     ondansetron 4 MG ODT tab  Commonly known as:  ZOFRAN-ODT  Used for:  Nausea and vomiting      Dose:  4 mg  Take 1 tablet by mouth every 6 hours as needed for nausea.  Quantity:  20 tablet  Refills:  0     OSTEO-MULTIVITAMINS/MINERALS OR      Take  by mouth daily. 12 pm  Refills:  0     oxybutynin 5 MG tablet  Commonly known as:  DITROPAN  Used for:  Bladder spasms      Dose:  5 mg  Take 1 tablet (5 mg) by mouth 3 times daily as needed for bladder spasms  Quantity:  90 tablet  Refills:  11     polyethylene glycol powder  Commonly known as:  MIRALAX/GLYCOLAX       Dose:  1 capful  Take 1 capful by mouth daily as needed for constipation (if no BM x3 days)  Refills:  0     polyvinyl alcohol 1.4 % ophthalmic solution  Commonly known as:  LIQUIFILM TEARS      Dose:  2 drop  Place 2 drops into both eyes every 2 hours as needed for dry eyes  Refills:  0     selenium sulfide 2.5 % external lotion  Commonly known as:  SELSUN      Apply to scalp topically in the morning every Sun, Tue, Thursday. Place for 5 min then rinse.  Refills:  0     senna-docusate 8.6-50 MG tablet  Commonly known as:  SENOKOT-S/PERICOLACE  Used for:  Chronic constipation      Dose:  1-2 tablet  Take 1-2 tablets by mouth 2 times daily.  Quantity:  60 tablet  Refills:  0     vitamin C 250 MG Tabs tablet  Commonly known as:  ASCORBIC ACID  Used for:  Recurrent UTI      Dose:  250 mg  Take 1 tablet by mouth 2 times daily.  Refills:  0     zinc oxide 40 % external ointment  Commonly known as:  DESITIN  Used for:  Breakdown of skin tissue      Apply  topically every hour as needed.  Quantity:  56.7 g  Refills:  0         * This list has 4 medication(s) that are the same as other medications prescribed for you. Read the directions carefully, and ask your doctor or other care provider to review them with you.                  Protect others around you: Learn how to safely use, store and throw away your medicines at www.disposemymeds.org.       Follow-ups after your visit       Care Instructions       Further instructions from your care team       [facility]  Discharge Instructions for   Percutaneous Nephrostomy   Tube Removal    After you go home:    Have an adult stay with you for 24 hours.    Drink plenty of fluids.    Resume a regular diet unless otherwise ordered by your physician.      For 24 Hours:    Relax and take it easy.    No alcohol for 24 hours.    Do not make any important or legal decisions.    Do not do any strenuous exercise or lifting greater that 10 lbs for at least 2 days  "following your procedure.    CALL THE PHYSICIAN IF:    You start bleeding from the procedure site. If you do start to bleed from the site lie down and hold some pressure on the site. Your physician will tell you if you need to return to the hospital.    You develop nausea or vomiting.    You develop hives or a rash or any unexplained itching.    ADDITIONAL INSTRUCTIONS:  Please call for the following problems:  1. The skin around the site is red, painful, or has drainage.  2. You have pain in your back, over your kidney.  3. You have a fever of 100.5 F and chills  4. You feel nauseated and \"just not right.\"      Change the dressing initially the next day to check the insertion site.  After that change every other day.  Clean around tube site with washcloth and antibacterial soap.  The drainage site may leak more initially, keep site clean and dry.    [facility] INTERVENTIONAL RADIOLOGY DEPARTMENT  Procedure Physician:     Dr. Marina Date:2019  Telephone Numbers:  [phone]     Monday-Friday 8:00AM-4:30PM                       [phone]     After 4:30 PM Monday-Friday, Weekends and Holidays. Ask for Interventional Radiologist on Call. Someone is available 24 hours a day.  [facility] toll free number: [phone] Monday- Friday 8:00AM -4:30PM.    I    Additional Information About Your Visit       [employer] Information    [employer] lets you send messages to your doctor, view your test results, renew your prescriptions, schedule appointments and more. To sign up, go to [email or web address] . Click on \"Log in\" on the left side of the screen, which will take you to the Welcome page. Then click on \"Sign up Now\" on the right side of the page.     You will be asked to enter the access code listed below, as well as some personal information. Please follow the directions to create your username and password.     Your access code is: [email or web address]  Expires: 2019  1:42 PM     Your access code will  in 60 " days. If you need help or a new code, please call your [facility] clinic or [phone].       Care EveryWhere ID    This is your Care EveryWhere ID. This could be used by other organizations to access your [facility] medical records  [id number]       Your Vitals Were  Most recent update: 10/29/2019  1:12 PM    Blood Pressure   112/78 (BP Location: Right arm)    Pulse   90    Temperature   97.5  F (36.4  C) (Oral)    Respirations   16    Pulse Oximetry   95%          Primary Care Provider Office Phone # Fax #    Nancy Ramandeep Ag [phone]      Equal Access to Services    [facility]: Hadii trupti vigil hadnathaniel Forman, waaxda francis, jada valenzuelaalmabrynn kunz, kathy phillips . [facility] [place] [facility] [phone].    ATENCIÓN: Si habla español, tiene a da silva disposición servicios gratuitos de asistencia lingüística. [facility] [phone].    We comply with applicable federal civil rights laws and [place] laws. We do not discriminate on the basis of race, color, national origin, age, disability, sex, sexual orientation, or gender identity.           Thank you!    Thank you for choosing [facility] for your care. Our goal is always to provide you with excellent care. Hearing back from our patients is one way we can continue to improve our services. Please take a few minutes to complete the written survey that you may receive in the mail after you visit with us. Thank you!            Medication List      ASK your doctor about these medications          Morning Afternoon Evening Bedtime As Needed    ACETAMINOPHEN PO  INSTRUCTIONS:  Take 650 mg by mouth every 4 hours as needed for pain                     albuterol (2.5 MG/3ML) 0.083% neb solution  Also known as:  PROVENTIL  INSTRUCTIONS:  Take 3 mLs by nebulization every 2 hours as needed.                     * baclofen 10 MG tablet  Also known as:  LIORESAL  INSTRUCTIONS:  Take 10 mg by mouth every 4 hours as needed                     *  BACLOFEN IT  INSTRUCTIONS:  Via implanted IT pump  Baclofen Concentration 2000 mc/gmL   Dose: 277.1 mcg/day  Low reservoir alarm date: 3/4/2020  Low reservoir Alarm Volume: 2ml  Interrogated 6/19/19  Pump is managed by Ulices Roth                     bisacodyl 10 MG suppository  Also known as:  DULCOLAX  INSTRUCTIONS:  Place 1 suppository rectally every other day.                     carbamide peroxide 6.5 % otic solution  Also known as:  DEBROX  INSTRUCTIONS:  5 drops 2 times daily                     cholecalciferol 5000 units (125 mcg) capsule  Also known as:  VITAMIN D3  INSTRUCTIONS:  TAKE 1 CAP BY MOUTH ONCE DAILY FOR VIT-D DEFICIENCY                     clindamycin 1 % external gel  Also known as:  CLINDAMAX  INSTRUCTIONS:  Apply topically 2 times daily                     COLACE 100 MG capsule  INSTRUCTIONS:  Take 100 mg by mouth 2 times daily.  Generic drug:  docusate sodium                     Cranberry 400 MG Tabs  INSTRUCTIONS:  Take 400 mg by mouth 2 times daily                     cyanocobalamin 1000 MCG tablet  Also known as:  VITAMIN B-12  INSTRUCTIONS:  Take 1 tablet by mouth daily.                     ferrous sulfate 325 (65 Fe) MG tablet  Also known as:  FEROSUL  INSTRUCTIONS:  Take 1 tablet by mouth daily (with breakfast).                     ketoconazole 2 % external cream  Also known as:  NIZORAL  INSTRUCTIONS:  Apply topically 2 times daily as needed                     levETIRAcetam 1000 MG tablet  Also known as:  KEPPRA  INSTRUCTIONS:  TAKE 1 TAB BY MOUTH TWICE A DAY FOR CONVULSIONS                     * magnesium gluconate 500 MG tablet  Also known as:  MAGONATE  INSTRUCTIONS:  Take 1 tablet by mouth daily.                     * MAG-G 500 (27 Mg) MG tablet  INSTRUCTIONS:  TAKE 1 TABLET BY MOUTH EVERY OTHER DAY FOR HYPERMAGNESIUM  Generic drug:  magnesium gluconate                     metroNIDAZOLE 0.75 % external lotion  Also known as:  METROLOTION                     miconazole 2 %  external powder  Also known as:  MICATIN/MICRO GUARD  INSTRUCTIONS:  Apply  topically 2 times daily.                     Miconazole Powd                     mometasone 0.1 % external cream  Also known as:  ELOCON  INSTRUCTIONS:  Apply topically At Bedtime                     MULTIVITAMIN ADULT PO  INSTRUCTIONS:  Take 1 tablet by mouth daily                     ondansetron 4 MG ODT tab  Also known as:  ZOFRAN-ODT  INSTRUCTIONS:  Take 1 tablet by mouth every 6 hours as needed for nausea.                     OSTEO-MULTIVITAMINS/MINERALS OR  INSTRUCTIONS:  Take  by mouth daily. 12 pm                     oxybutynin 5 MG tablet  Also known as:  DITROPAN  INSTRUCTIONS:  Take 1 tablet (5 mg) by mouth 3 times daily as needed for bladder spasms                     polyethylene glycol powder  Also known as:  MIRALAX/GLYCOLAX  INSTRUCTIONS:  Take 1 capful by mouth daily as needed for constipation (if no BM x3 days)                     polyvinyl alcohol 1.4 % ophthalmic solution  Also known as:  LIQUIFILM TEARS  INSTRUCTIONS:  Place 2 drops into both eyes every 2 hours as needed for dry eyes                     selenium sulfide 2.5 % external lotion  Also known as:  SELSUN  INSTRUCTIONS:  Apply to scalp topically in the morning every Sun, Tue, Thursday. Place for 5 min then rinse.                     senna-docusate 8.6-50 MG tablet  Also known as:  SENOKOT-S/PERICOLACE  INSTRUCTIONS:  Take 1-2 tablets by mouth 2 times daily.                     vitamin C 250 MG Tabs tablet  Also known as:  ASCORBIC ACID  INSTRUCTIONS:  Take 1 tablet by mouth 2 times daily.                     zinc oxide 40 % external ointment  Also known as:  DESITIN  INSTRUCTIONS:  Apply  topically every hour as needed.                        * This list has 4 medication(s) that are the same as other medications prescribed for you. Read the directions carefully, and ask your doctor or other care provider to review them with you.

## 2020-01-20 NOTE — PROGRESS NOTES
Patient Name: Maicol Carrera  Medical Record Number: [id number]  Today's Date: 6/7/2018    Procedure: nephrostomy tube check and tube removed      Proceduralist: Dr. Sanket Hairston    Patient in room: 1407  Procedure start time: 1427  Puncture time: 1427  Procedure end time: 1446  Patient out of room: 1500    Report given to: 6D RN    Other Notes: Pt arrived to IR room 5 from [facility]. Pt denies any questions or concerns regarding procedure. Pt positioned supine and monitored per protocol. Pt tolerated procedure without any noted complications. Pt transferred back to      R/O CHF exacerbation ESRD (end stage renal disease) on dialysis

## 2020-04-14 ENCOUNTER — TELEPHONE (OUTPATIENT)
Dept: UROLOGY | Facility: CLINIC | Age: 45
End: 2020-04-14

## 2020-04-14 NOTE — TELEPHONE ENCOUNTER
Message left on [facility] from [facility] voicemail stating to return a call to discuss the patient's catheter supplies.      Tereso Murguia MA

## 2020-04-14 NOTE — TELEPHONE ENCOUNTER
Lety Shah from [facility] states that the patient states that he was advised by Dr Dejon Horn that he needs to have a 24 fr SP tube catheter change instead of an 18 fr SP catheter change due to kidneystones. He have a 18 fr SP catheter placed now. He is requesting to have a 24 fr SP tube catheter.  Is this ok? Please advise.      Thanks, Tereso Murguia MA  Thanks, Tereso Murguia MA

## 2020-04-14 NOTE — TELEPHONE ENCOUNTER
[facility] Call Center    Phone Message    May a detailed message be left on voicemail: yes     Reason for Call: Other: [facility] calling to request a call back. She would like to speak with a nurse in regards to pts catheter supplies. Please call her back to discuss.     Action Taken: Message routed to:  [facility] ([facility]): liliana uro     Travel Screening: Not Applicable

## 2020-04-21 NOTE — TELEPHONE ENCOUNTER
Niurka ledbetter [facility] states that the patient's PCP already gave them the order to upgrade the patient's catheter to a 24 fr.    Tereso Murguia MA

## 2021-05-25 ENCOUNTER — RECORDS - HEALTHEAST (OUTPATIENT)
Dept: ADMINISTRATIVE | Facility: CLINIC | Age: 46
End: 2021-05-25

## 2023-02-20 NOTE — PROGRESS NOTES
SPIRITUAL HEALTH SERVICES  [facility] 3C   PRE-SURGERY VISIT    Per pt's longstanding request, provided companionship prior to surgery.  Ongoing  support desired.    Monster Fierro  Staff   Pager [phone]       <<--- Click to launch

## 2024-08-20 NOTE — ADDENDUM NOTE
Addendum  created 07/29/19 1200 by Stella Mckeon MD    Order list changed      
Addendum  created 07/29/19 1300 by Stella Mckeon MD    Order list changed      
Addendum  created 07/29/19 1330 by Stella Mckeon MD    Order list changed      
3

## 2025-07-22 NOTE — ANESTHESIA POSTPROCEDURE EVALUATION
Patient: Maicol Kingel    Procedure(s):  Left Percutaneous Nephrolithotomy,  Ureteroscopy, retrogrades, extraction of stones with basket, laser standby  - Wound Class: I-Clean    Diagnosis:Nephrolithiasis  Diagnosis Additional Information: No value filed.    Anesthesia Type:  General, ETT    Note:  Anesthesia Post Evaluation    Patient location during evaluation: PACU  Patient participation: Able to fully participate in evaluation  Level of consciousness: awake and alert  Pain management: adequate  Airway patency: patent  Cardiovascular status: acceptable  Respiratory status: acceptable  Hydration status: acceptable  PONV: none     Anesthetic complications: None          Last vitals:  Vitals:    06/06/18 1615 06/06/18 1630 06/06/18 1645   BP: 116/88 112/82 113/85   Pulse:      Resp: 18 16 16   Temp:      SpO2: 96% 96% 97%         Electronically Signed By: Ellie Waldrop MD  June 6, 2018  5:04 PM   Yes that is fine.

## (undated) DEVICE — SOL NACL 0.9% IRRIG 3000ML BAG 2B7477

## (undated) DEVICE — KIT UROSTOMY POUCH 2 3/4" 19254

## (undated) DEVICE — PACK GOWN 3/PK DISP XL SBA32GPFCB

## (undated) DEVICE — DRAPE LAVH/LAPAROSCOPY W/POUCH 29474

## (undated) DEVICE — LASER FIBER HOLMIUM FLEXIVA 200UM M0068403910 840-391

## (undated) DEVICE — CATH TRAY FOLEY SURESTEP 16FR W/URINE MTR STATLK LF A303416A

## (undated) DEVICE — DRAPE LINGEMAN PERC PROCEDURE 1-0815

## (undated) DEVICE — GUIDEWIRE URO HIWIRE ANG STIFF .035"X150CM NITINOL  G30475

## (undated) DEVICE — GUIDEWIRE AMPLATZ SUPER STIFF .035 X 145CM M0066401080

## (undated) DEVICE — DRSG DRAIN 4X4" 7086

## (undated) DEVICE — NDL TROCAR TWO-PART 18GA 20CM

## (undated) DEVICE — PACK CYSTO UMMC CUSTOM

## (undated) DEVICE — CATH URETERAL DL 6FR FLEX-TIP 10FRX50CM G17323 AQ-022610

## (undated) DEVICE — TAPE CLOTH 3" CARDINAL 3TRCL03

## (undated) DEVICE — PUMP SYSTEM SINGLE ACTION M0067201000

## (undated) DEVICE — SU ETHILON 2-0 FS 18" 664H

## (undated) DEVICE — SOL NACL 0.9% IRRIG 1000ML BOTTLE 2F7124

## (undated) DEVICE — Device

## (undated) DEVICE — DRAPE C-ARM W/STRAPS 42X72" 07-CA104

## (undated) DEVICE — PROBE SET CYBERWAND LITHO GYRUS RENAL/BLADDER CW-RBP

## (undated) DEVICE — DRAPE STERI TOWEL LG 1010

## (undated) DEVICE — CONNECTOR WATER VALVE PERFUSION PACK STR 020272801

## (undated) DEVICE — DRAIN SKATER 08FRX35CM 755608035

## (undated) DEVICE — CATH URETERAL OPEN END 05FR 70CM 020015

## (undated) DEVICE — KIT CATH BALLOON NEPHROMAX 30FRX15CM M0062101640

## (undated) DEVICE — CATH URETERAL DUAL LUMEN 10FRX54CM M0064051000

## (undated) DEVICE — SOL WATER IRRIG 1000ML BOTTLE 2F7114

## (undated) DEVICE — RAD KNIFE HANDLE W/11 BLADE DISPOSABLE 371611

## (undated) DEVICE — GUIDEWIRE SENSOR DUAL FLEX STR 0.035"X150CM M0066703080

## (undated) DEVICE — CONNECTOR URETERAL CATH 14FRX30CM COOK G14230

## (undated) DEVICE — GLOVE PROTEXIS W/NEU-THERA 8.5  2D73TE85

## (undated) DEVICE — LINEN TOWEL PACK X5 5464

## (undated) DEVICE — BAG URINARY DRAIN LEG MEDIUM 19OZ LF 150719

## (undated) DEVICE — GUIDEWIRE AMPLATZ 0.035"X145CM SUPER STIFF G53566

## (undated) DEVICE — GLOVE PROTEXIS W/NEU-THERA 8.0  2D73TE80

## (undated) DEVICE — GUIDEWIRE AMPLATZ 0.035"X145CM  SUPER STIFF 640-104

## (undated) DEVICE — NDL PERC ACCESS 18GX20CM M0067001220

## (undated) DEVICE — SPECIMEN CONTAINER 5OZ STERILE 2600SA

## (undated) DEVICE — SUCTION MANIFOLD DORNOCH ULTRA CART UL-CL500

## (undated) DEVICE — BASKET NITINOL TIPLESS HALO  1.5FRX120CM 554120

## (undated) DEVICE — DRAPE LEGGINGS CLEAR 8430

## (undated) DEVICE — DRSG TEGADERM 4X4 3/4" 1626W

## (undated) DEVICE — NDL HOLDER AMPLATZ 090000

## (undated) DEVICE — ENDO SEAL BX PORT BPS-A

## (undated) DEVICE — CATH URETERAL DUAL LUMEN 10FR 50CM 130200

## (undated) DEVICE — BAG URINARY DRAIN LUBRISIL IC 4000ML LF 253509A

## (undated) DEVICE — SYR 30ML LL W/O NDL 302832

## (undated) DEVICE — CATH BALLOON DILATION X FORCE 30FR 10MMX15CM 996101

## (undated) DEVICE — GOWN REINFORCED XXLG 9071

## (undated) DEVICE — SYR 10ML LL W/O NDL 302995

## (undated) DEVICE — TUBING SUCTION 10'X3/16" N510

## (undated) DEVICE — CATH ANGIO KUMPE 4.1FR 40CM KMP HNBR4.1-35-40-P-NS-KMP

## (undated) DEVICE — SYR 70ML TOOMEY 041170

## (undated) DEVICE — DRAPE SHEET MED 44X70" 9355

## (undated) DEVICE — DRSG TELFA 3X8" 1238

## (undated) DEVICE — CATH URETERAL OPEN END 5FRX70CM M0064002010

## (undated) DEVICE — ESU GROUND PAD ADULT W/CORD E7507

## (undated) DEVICE — CATH BALLOON NEPHROMAX 30FRX12CM M0062101180

## (undated) DEVICE — PAD CHUX UNDERPAD 23X24" 7136

## (undated) DEVICE — DRAPE GYN/UROLOGY FLUID POUCH TUR 29455

## (undated) DEVICE — SU VICRYL 3-0 SH 27" J316H

## (undated) DEVICE — BLADE CLIPPER SGL USE 9680

## (undated) DEVICE — CUP AND LID 2PK 2OZ STERILE  SSK9006A

## (undated) DEVICE — GOWN XLG DISP 9545

## (undated) DEVICE — BAG DRAIN BILIARY NEPHROSTOMY REMINGTON 600ML LF 600-D

## (undated) DEVICE — DRSG PRIMAPORE 02X3" 7133

## (undated) DEVICE — GUIDEWIRE AMPLATZ SUPER STIFF 0.035"X180CM M001465250

## (undated) DEVICE — LITHOTRIPSY PROBE

## (undated) RX ORDER — FENTANYL CITRATE 50 UG/ML
INJECTION, SOLUTION INTRAMUSCULAR; INTRAVENOUS
Status: DISPENSED
Start: 2017-12-06

## (undated) RX ORDER — CEFAZOLIN SODIUM 2 G/100ML
INJECTION, SOLUTION INTRAVENOUS
Status: DISPENSED
Start: 2018-06-06

## (undated) RX ORDER — HEPARIN SODIUM,PORCINE 10 UNIT/ML
VIAL (ML) INTRAVENOUS
Status: DISPENSED
Start: 2019-09-27

## (undated) RX ORDER — ONDANSETRON 2 MG/ML
INJECTION INTRAMUSCULAR; INTRAVENOUS
Status: DISPENSED
Start: 2018-03-21

## (undated) RX ORDER — PHENYLEPHRINE HCL IN 0.9% NACL 1 MG/10 ML
SYRINGE (ML) INTRAVENOUS
Status: DISPENSED
Start: 2018-03-21

## (undated) RX ORDER — EPHEDRINE SULFATE 50 MG/ML
INJECTION, SOLUTION INTRAMUSCULAR; INTRAVENOUS; SUBCUTANEOUS
Status: DISPENSED
Start: 2018-06-06

## (undated) RX ORDER — SODIUM CHLORIDE 9 MG/ML
INJECTION, SOLUTION INTRAVENOUS
Status: DISPENSED
Start: 2018-03-07

## (undated) RX ORDER — DEXAMETHASONE SODIUM PHOSPHATE 4 MG/ML
INJECTION, SOLUTION INTRA-ARTICULAR; INTRALESIONAL; INTRAMUSCULAR; INTRAVENOUS; SOFT TISSUE
Status: DISPENSED
Start: 2019-09-30

## (undated) RX ORDER — EPHEDRINE SULFATE 50 MG/ML
INJECTION, SOLUTION INTRAMUSCULAR; INTRAVENOUS; SUBCUTANEOUS
Status: DISPENSED
Start: 2018-03-21

## (undated) RX ORDER — FENTANYL CITRATE 50 UG/ML
INJECTION, SOLUTION INTRAMUSCULAR; INTRAVENOUS
Status: DISPENSED
Start: 2017-11-13

## (undated) RX ORDER — AMPICILLIN 1 G/1
INJECTION, POWDER, FOR SOLUTION INTRAMUSCULAR; INTRAVENOUS
Status: DISPENSED
Start: 2019-09-19

## (undated) RX ORDER — SODIUM CHLORIDE, SODIUM LACTATE, POTASSIUM CHLORIDE, CALCIUM CHLORIDE 600; 310; 30; 20 MG/100ML; MG/100ML; MG/100ML; MG/100ML
INJECTION, SOLUTION INTRAVENOUS
Status: DISPENSED
Start: 2019-09-30

## (undated) RX ORDER — LIDOCAINE HYDROCHLORIDE 10 MG/ML
INJECTION, SOLUTION EPIDURAL; INFILTRATION; INTRACAUDAL; PERINEURAL
Status: DISPENSED
Start: 2019-09-27

## (undated) RX ORDER — SODIUM CHLORIDE 9 MG/ML
INJECTION, SOLUTION INTRAVENOUS
Status: DISPENSED
Start: 2017-12-06

## (undated) RX ORDER — LIDOCAINE HYDROCHLORIDE 10 MG/ML
INJECTION, SOLUTION EPIDURAL; INFILTRATION; INTRACAUDAL; PERINEURAL
Status: DISPENSED
Start: 2019-07-18

## (undated) RX ORDER — PROPOFOL 10 MG/ML
INJECTION, EMULSION INTRAVENOUS
Status: DISPENSED
Start: 2019-07-29

## (undated) RX ORDER — FUROSEMIDE 10 MG/ML
INJECTION INTRAMUSCULAR; INTRAVENOUS
Status: DISPENSED
Start: 2019-04-25

## (undated) RX ORDER — LIDOCAINE HYDROCHLORIDE 20 MG/ML
INJECTION, SOLUTION EPIDURAL; INFILTRATION; INTRACAUDAL; PERINEURAL
Status: DISPENSED
Start: 2019-07-29

## (undated) RX ORDER — ONDANSETRON 2 MG/ML
INJECTION INTRAMUSCULAR; INTRAVENOUS
Status: DISPENSED
Start: 2018-03-08

## (undated) RX ORDER — LIDOCAINE HYDROCHLORIDE 10 MG/ML
INJECTION, SOLUTION EPIDURAL; INFILTRATION; INTRACAUDAL; PERINEURAL
Status: DISPENSED
Start: 2018-03-12

## (undated) RX ORDER — AMPICILLIN 1 G/1
INJECTION, POWDER, FOR SOLUTION INTRAMUSCULAR; INTRAVENOUS
Status: DISPENSED
Start: 2019-10-29

## (undated) RX ORDER — SODIUM CHLORIDE 9 MG/ML
INJECTION, SOLUTION INTRAVENOUS
Status: DISPENSED
Start: 2017-11-13

## (undated) RX ORDER — FENTANYL CITRATE 50 UG/ML
INJECTION, SOLUTION INTRAMUSCULAR; INTRAVENOUS
Status: DISPENSED
Start: 2018-03-07

## (undated) RX ORDER — LIDOCAINE HYDROCHLORIDE 20 MG/ML
INJECTION, SOLUTION EPIDURAL; INFILTRATION; INTRACAUDAL; PERINEURAL
Status: DISPENSED
Start: 2019-09-30

## (undated) RX ORDER — ONDANSETRON 2 MG/ML
INJECTION INTRAMUSCULAR; INTRAVENOUS
Status: DISPENSED
Start: 2019-09-30

## (undated) RX ORDER — HEPARIN SODIUM,PORCINE 10 UNIT/ML
VIAL (ML) INTRAVENOUS
Status: DISPENSED
Start: 2019-07-19

## (undated) RX ORDER — PROPOFOL 10 MG/ML
INJECTION, EMULSION INTRAVENOUS
Status: DISPENSED
Start: 2018-06-06

## (undated) RX ORDER — FENTANYL CITRATE 50 UG/ML
INJECTION, SOLUTION INTRAMUSCULAR; INTRAVENOUS
Status: DISPENSED
Start: 2019-07-29

## (undated) RX ORDER — PHENYLEPHRINE HCL IN 0.9% NACL 1 MG/10 ML
SYRINGE (ML) INTRAVENOUS
Status: DISPENSED
Start: 2018-06-06

## (undated) RX ORDER — SODIUM CHLORIDE 9 MG/ML
INJECTION, SOLUTION INTRAVENOUS
Status: DISPENSED
Start: 2019-09-19

## (undated) RX ORDER — LIDOCAINE HYDROCHLORIDE 10 MG/ML
INJECTION, SOLUTION EPIDURAL; INFILTRATION; INTRACAUDAL; PERINEURAL
Status: DISPENSED
Start: 2019-10-03

## (undated) RX ORDER — PHENYLEPHRINE HCL IN 0.9% NACL 1 MG/10 ML
SYRINGE (ML) INTRAVENOUS
Status: DISPENSED
Start: 2019-07-29

## (undated) RX ORDER — LIDOCAINE HYDROCHLORIDE 10 MG/ML
INJECTION, SOLUTION EPIDURAL; INFILTRATION; INTRACAUDAL; PERINEURAL
Status: DISPENSED
Start: 2019-09-19

## (undated) RX ORDER — FENTANYL CITRATE 50 UG/ML
INJECTION, SOLUTION INTRAMUSCULAR; INTRAVENOUS
Status: DISPENSED
Start: 2018-03-21

## (undated) RX ORDER — FENTANYL CITRATE 50 UG/ML
INJECTION, SOLUTION INTRAMUSCULAR; INTRAVENOUS
Status: DISPENSED
Start: 2018-06-06

## (undated) RX ORDER — ONDANSETRON 2 MG/ML
INJECTION INTRAMUSCULAR; INTRAVENOUS
Status: DISPENSED
Start: 2019-07-29

## (undated) RX ORDER — EPHEDRINE SULFATE 50 MG/ML
INJECTION, SOLUTION INTRAMUSCULAR; INTRAVENOUS; SUBCUTANEOUS
Status: DISPENSED
Start: 2019-09-30

## (undated) RX ORDER — ACETAMINOPHEN 325 MG/1
TABLET ORAL
Status: DISPENSED
Start: 2018-03-21

## (undated) RX ORDER — LIDOCAINE HYDROCHLORIDE 10 MG/ML
INJECTION, SOLUTION EPIDURAL; INFILTRATION; INTRACAUDAL; PERINEURAL
Status: DISPENSED
Start: 2019-10-29

## (undated) RX ORDER — ALBUMIN, HUMAN INJ 5% 5 %
SOLUTION INTRAVENOUS
Status: DISPENSED
Start: 2019-07-29

## (undated) RX ORDER — AMPICILLIN 1 G/1
INJECTION, POWDER, FOR SOLUTION INTRAMUSCULAR; INTRAVENOUS
Status: DISPENSED
Start: 2017-11-13

## (undated) RX ORDER — ONDANSETRON 2 MG/ML
INJECTION INTRAMUSCULAR; INTRAVENOUS
Status: DISPENSED
Start: 2018-06-06

## (undated) RX ORDER — PROPOFOL 10 MG/ML
INJECTION, EMULSION INTRAVENOUS
Status: DISPENSED
Start: 2018-03-08

## (undated) RX ORDER — CIPROFLOXACIN 500 MG/1
TABLET, FILM COATED ORAL
Status: DISPENSED
Start: 2019-08-16

## (undated) RX ORDER — SODIUM CHLORIDE 9 MG/ML
INJECTION, SOLUTION INTRAVENOUS
Status: DISPENSED
Start: 2018-06-06

## (undated) RX ORDER — LIDOCAINE HYDROCHLORIDE 10 MG/ML
INJECTION, SOLUTION EPIDURAL; INFILTRATION; INTRACAUDAL; PERINEURAL
Status: DISPENSED
Start: 2018-03-13

## (undated) RX ORDER — LIDOCAINE HYDROCHLORIDE 20 MG/ML
INJECTION, SOLUTION EPIDURAL; INFILTRATION; INTRACAUDAL; PERINEURAL
Status: DISPENSED
Start: 2018-03-08

## (undated) RX ORDER — SODIUM CHLORIDE 9 MG/ML
INJECTION, SOLUTION INTRAVENOUS
Status: DISPENSED
Start: 2019-07-15

## (undated) RX ORDER — FENTANYL CITRATE 50 UG/ML
INJECTION, SOLUTION INTRAMUSCULAR; INTRAVENOUS
Status: DISPENSED
Start: 2019-07-15

## (undated) RX ORDER — FENTANYL CITRATE 50 UG/ML
INJECTION, SOLUTION INTRAMUSCULAR; INTRAVENOUS
Status: DISPENSED
Start: 2019-09-30

## (undated) RX ORDER — SODIUM CHLORIDE 9 MG/ML
INJECTION, SOLUTION INTRAVENOUS
Status: DISPENSED
Start: 2019-09-30

## (undated) RX ORDER — GLYCOPYRROLATE 0.2 MG/ML
INJECTION, SOLUTION INTRAMUSCULAR; INTRAVENOUS
Status: DISPENSED
Start: 2018-03-21

## (undated) RX ORDER — ALBUMIN, HUMAN INJ 5% 5 %
SOLUTION INTRAVENOUS
Status: DISPENSED
Start: 2018-03-07

## (undated) RX ORDER — AMPICILLIN 1 G/1
INJECTION, POWDER, FOR SOLUTION INTRAMUSCULAR; INTRAVENOUS
Status: DISPENSED
Start: 2017-12-06

## (undated) RX ORDER — FENTANYL CITRATE 50 UG/ML
INJECTION, SOLUTION INTRAMUSCULAR; INTRAVENOUS
Status: DISPENSED
Start: 2019-10-03

## (undated) RX ORDER — LIDOCAINE HYDROCHLORIDE 10 MG/ML
INJECTION, SOLUTION EPIDURAL; INFILTRATION; INTRACAUDAL; PERINEURAL
Status: DISPENSED
Start: 2017-12-06

## (undated) RX ORDER — PROPOFOL 10 MG/ML
INJECTION, EMULSION INTRAVENOUS
Status: DISPENSED
Start: 2019-09-30

## (undated) RX ORDER — CEFAZOLIN SODIUM 2 G/100ML
INJECTION, SOLUTION INTRAVENOUS
Status: DISPENSED
Start: 2018-03-07

## (undated) RX ORDER — LIDOCAINE HYDROCHLORIDE 10 MG/ML
INJECTION, SOLUTION EPIDURAL; INFILTRATION; INTRACAUDAL; PERINEURAL
Status: DISPENSED
Start: 2019-07-15

## (undated) RX ORDER — LIDOCAINE HYDROCHLORIDE 10 MG/ML
INJECTION, SOLUTION EPIDURAL; INFILTRATION; INTRACAUDAL; PERINEURAL
Status: DISPENSED
Start: 2017-11-13

## (undated) RX ORDER — CEFAZOLIN SODIUM 2 G/100ML
INJECTION, SOLUTION INTRAVENOUS
Status: DISPENSED
Start: 2018-03-21

## (undated) RX ORDER — ACETAMINOPHEN 325 MG/1
TABLET ORAL
Status: DISPENSED
Start: 2018-03-07

## (undated) RX ORDER — SODIUM CHLORIDE 9 MG/ML
INJECTION, SOLUTION INTRAVENOUS
Status: DISPENSED
Start: 2019-10-29

## (undated) RX ORDER — HEPARIN SODIUM,PORCINE 10 UNIT/ML
VIAL (ML) INTRAVENOUS
Status: DISPENSED
Start: 2018-03-13

## (undated) RX ORDER — DEXAMETHASONE SODIUM PHOSPHATE 4 MG/ML
INJECTION, SOLUTION INTRA-ARTICULAR; INTRALESIONAL; INTRAMUSCULAR; INTRAVENOUS; SOFT TISSUE
Status: DISPENSED
Start: 2018-06-06

## (undated) RX ORDER — SODIUM CHLORIDE 9 MG/ML
INJECTION, SOLUTION INTRAVENOUS
Status: DISPENSED
Start: 2019-07-29